# Patient Record
Sex: MALE | Race: BLACK OR AFRICAN AMERICAN | NOT HISPANIC OR LATINO | Employment: UNEMPLOYED | URBAN - METROPOLITAN AREA
[De-identification: names, ages, dates, MRNs, and addresses within clinical notes are randomized per-mention and may not be internally consistent; named-entity substitution may affect disease eponyms.]

---

## 2023-12-21 ENCOUNTER — LAB (OUTPATIENT)
Dept: LAB | Facility: LAB | Age: 63
End: 2023-12-21
Payer: MEDICAID

## 2023-12-21 DIAGNOSIS — Z12.5 ENCOUNTER FOR SCREENING FOR MALIGNANT NEOPLASM OF PROSTATE: ICD-10-CM

## 2023-12-21 DIAGNOSIS — J44.9 CHRONIC OBSTRUCTIVE PULMONARY DISEASE, UNSPECIFIED (MULTI): ICD-10-CM

## 2023-12-21 DIAGNOSIS — I10 ESSENTIAL (PRIMARY) HYPERTENSION: Primary | ICD-10-CM

## 2023-12-21 DIAGNOSIS — E78.5 HYPERLIPIDEMIA, UNSPECIFIED: ICD-10-CM

## 2023-12-21 LAB
ALBUMIN SERPL BCP-MCNC: 4.1 G/DL (ref 3.4–5)
ALP SERPL-CCNC: 67 U/L (ref 33–136)
ALT SERPL W P-5'-P-CCNC: 15 U/L (ref 10–52)
ANION GAP SERPL CALC-SCNC: 13 MMOL/L (ref 10–20)
APPEARANCE UR: ABNORMAL
AST SERPL W P-5'-P-CCNC: 15 U/L (ref 9–39)
BILIRUB DIRECT SERPL-MCNC: 0.1 MG/DL (ref 0–0.3)
BILIRUB SERPL-MCNC: 0.7 MG/DL (ref 0–1.2)
BILIRUB UR STRIP.AUTO-MCNC: NEGATIVE MG/DL
BUN SERPL-MCNC: 24 MG/DL (ref 6–23)
CALCIUM SERPL-MCNC: 9.3 MG/DL (ref 8.6–10.6)
CHLORIDE SERPL-SCNC: 108 MMOL/L (ref 98–107)
CHOLEST SERPL-MCNC: 282 MG/DL (ref 0–199)
CHOLESTEROL/HDL RATIO: 6.3
CO2 SERPL-SCNC: 27 MMOL/L (ref 21–32)
COLOR UR: YELLOW
CREAT SERPL-MCNC: 1.68 MG/DL (ref 0.5–1.3)
GFR SERPL CREATININE-BSD FRML MDRD: 46 ML/MIN/1.73M*2
GLUCOSE SERPL-MCNC: 89 MG/DL (ref 74–99)
GLUCOSE UR STRIP.AUTO-MCNC: NEGATIVE MG/DL
HDLC SERPL-MCNC: 44.5 MG/DL
KETONES UR STRIP.AUTO-MCNC: NEGATIVE MG/DL
LDLC SERPL CALC-MCNC: 216 MG/DL
LEUKOCYTE ESTERASE UR QL STRIP.AUTO: NEGATIVE
MUCOUS THREADS #/AREA URNS AUTO: NORMAL /LPF
NITRITE UR QL STRIP.AUTO: NEGATIVE
NON HDL CHOLESTEROL: 238 MG/DL (ref 0–149)
PH UR STRIP.AUTO: 5 [PH]
POTASSIUM SERPL-SCNC: 4.3 MMOL/L (ref 3.5–5.3)
PROT SERPL-MCNC: 7 G/DL (ref 6.4–8.2)
PROT UR STRIP.AUTO-MCNC: ABNORMAL MG/DL
PSA SERPL-MCNC: 3.85 NG/ML
RBC # UR STRIP.AUTO: ABNORMAL /UL
RBC #/AREA URNS AUTO: NORMAL /HPF
SODIUM SERPL-SCNC: 144 MMOL/L (ref 136–145)
SP GR UR STRIP.AUTO: 1.02
TRIGL SERPL-MCNC: 106 MG/DL (ref 0–149)
TSH SERPL-ACNC: 1.31 MIU/L (ref 0.44–3.98)
UROBILINOGEN UR STRIP.AUTO-MCNC: 2 MG/DL
VLDL: 21 MG/DL (ref 0–40)
WBC #/AREA URNS AUTO: NORMAL /HPF

## 2023-12-21 PROCEDURE — 82248 BILIRUBIN DIRECT: CPT

## 2023-12-21 PROCEDURE — 85025 COMPLETE CBC W/AUTO DIFF WBC: CPT

## 2023-12-21 PROCEDURE — 84153 ASSAY OF PSA TOTAL: CPT

## 2023-12-21 PROCEDURE — 36415 COLL VENOUS BLD VENIPUNCTURE: CPT

## 2023-12-21 PROCEDURE — 81001 URINALYSIS AUTO W/SCOPE: CPT

## 2023-12-21 PROCEDURE — 80053 COMPREHEN METABOLIC PANEL: CPT

## 2023-12-21 PROCEDURE — 84443 ASSAY THYROID STIM HORMONE: CPT

## 2023-12-21 PROCEDURE — 83036 HEMOGLOBIN GLYCOSYLATED A1C: CPT

## 2023-12-21 PROCEDURE — 80061 LIPID PANEL: CPT

## 2023-12-22 LAB
BASOPHILS # BLD AUTO: 0.05 X10*3/UL (ref 0–0.1)
BASOPHILS NFR BLD AUTO: 0.7 %
EOSINOPHIL # BLD AUTO: 0.2 X10*3/UL (ref 0–0.7)
EOSINOPHIL NFR BLD AUTO: 2.8 %
ERYTHROCYTE [DISTWIDTH] IN BLOOD BY AUTOMATED COUNT: 14.4 % (ref 11.5–14.5)
EST. AVERAGE GLUCOSE BLD GHB EST-MCNC: 117 MG/DL
HBA1C MFR BLD: 5.7 %
HCT VFR BLD AUTO: 45 % (ref 41–52)
HGB BLD-MCNC: 14.6 G/DL (ref 13.5–17.5)
HOLD SPECIMEN: NORMAL
IMM GRANULOCYTES # BLD AUTO: 0.04 X10*3/UL (ref 0–0.7)
IMM GRANULOCYTES NFR BLD AUTO: 0.6 % (ref 0–0.9)
LYMPHOCYTES # BLD AUTO: 1.79 X10*3/UL (ref 1.2–4.8)
LYMPHOCYTES NFR BLD AUTO: 24.7 %
MCH RBC QN AUTO: 31.5 PG (ref 26–34)
MCHC RBC AUTO-ENTMCNC: 32.4 G/DL (ref 32–36)
MCV RBC AUTO: 97 FL (ref 80–100)
MONOCYTES # BLD AUTO: 1.17 X10*3/UL (ref 0.1–1)
MONOCYTES NFR BLD AUTO: 16.2 %
NEUTROPHILS # BLD AUTO: 3.99 X10*3/UL (ref 1.2–7.7)
NEUTROPHILS NFR BLD AUTO: 55 %
NRBC BLD-RTO: 0 /100 WBCS (ref 0–0)
PLATELET # BLD AUTO: 207 X10*3/UL (ref 150–450)
RBC # BLD AUTO: 4.63 X10*6/UL (ref 4.5–5.9)
WBC # BLD AUTO: 7.2 X10*3/UL (ref 4.4–11.3)

## 2024-01-10 ENCOUNTER — APPOINTMENT (OUTPATIENT)
Dept: RADIOLOGY | Facility: HOSPITAL | Age: 64
End: 2024-01-10
Payer: MEDICARE

## 2024-01-10 ENCOUNTER — HOSPITAL ENCOUNTER (OUTPATIENT)
Dept: RADIOLOGY | Facility: HOSPITAL | Age: 64
Discharge: HOME | End: 2024-01-10
Payer: MEDICARE

## 2024-01-10 DIAGNOSIS — Z12.2 ENCOUNTER FOR SCREENING FOR MALIGNANT NEOPLASM OF RESPIRATORY ORGANS: ICD-10-CM

## 2024-01-10 DIAGNOSIS — F17.200 NICOTINE DEPENDENCE, UNSPECIFIED, UNCOMPLICATED: ICD-10-CM

## 2024-01-10 PROCEDURE — 71250 CT THORAX DX C-: CPT

## 2024-01-11 DIAGNOSIS — R91.8 LUNG MASS: Primary | ICD-10-CM

## 2024-01-15 ENCOUNTER — APPOINTMENT (OUTPATIENT)
Dept: RADIOLOGY | Facility: CLINIC | Age: 64
End: 2024-01-15
Payer: COMMERCIAL

## 2024-01-15 ENCOUNTER — ANCILLARY PROCEDURE (OUTPATIENT)
Dept: RADIOLOGY | Facility: CLINIC | Age: 64
End: 2024-01-15
Payer: MEDICARE

## 2024-01-15 DIAGNOSIS — R91.8 LUNG MASS: Primary | ICD-10-CM

## 2024-01-15 PROCEDURE — 3430000001 HC RX 343 DIAGNOSTIC RADIOPHARMACEUTICALS: Mod: MUE | Performed by: INTERNAL MEDICINE

## 2024-01-15 PROCEDURE — 78815 PET IMAGE W/CT SKULL-THIGH: CPT | Mod: PET TUMOR INIT TX STRAT | Performed by: STUDENT IN AN ORGANIZED HEALTH CARE EDUCATION/TRAINING PROGRAM

## 2024-01-15 PROCEDURE — 78815 PET IMAGE W/CT SKULL-THIGH: CPT | Mod: PI

## 2024-01-15 PROCEDURE — A9552 F18 FDG: HCPCS | Mod: MUE | Performed by: INTERNAL MEDICINE

## 2024-01-15 RX ORDER — FLUDEOXYGLUCOSE F 18 200 MCI/ML
13.32 INJECTION, SOLUTION INTRAVENOUS
Status: COMPLETED | OUTPATIENT
Start: 2024-01-15 | End: 2024-01-15

## 2024-01-15 RX ADMIN — FLUDEOXYGLUCOSE F 18 13.32 MILLICURIE: 200 INJECTION, SOLUTION INTRAVENOUS at 08:55

## 2024-01-16 DIAGNOSIS — Z01.818 PRE-OP TESTING: ICD-10-CM

## 2024-01-16 DIAGNOSIS — R59.0 MEDIASTINAL LYMPHADENOPATHY: ICD-10-CM

## 2024-01-16 DIAGNOSIS — R91.1 LUNG NODULE: Primary | ICD-10-CM

## 2024-01-16 NOTE — PROGRESS NOTES
Bronchoscopy Scheduling Request    Pre-bronchoscopy visit: New patient visit with Bronchoscopy group provider  Please schedule procedure: Next available    Cytology on-site:  Yes  Location:  Either location  Performing physician:  Advanced diagnostic bronchoscopist  Referring physician:  Robin Metz MD, Darwin Early MD  Indication:  RLL nodule, mediastinal / hilar LN  Sedation / Anesthesia:  GA  Procedure:  Staging EBUS  Time:  Tier 2  Fluorscopy:   No  Imaging needed:  None  Labs:  CBC, BMP  Meds:  None?  Special Considerations:  None  Reviewed by:  Sergey Braun MD

## 2024-01-26 ENCOUNTER — OFFICE VISIT (OUTPATIENT)
Dept: CARDIOLOGY | Facility: CLINIC | Age: 64
End: 2024-01-26
Payer: MEDICARE

## 2024-01-26 VITALS
HEART RATE: 95 BPM | DIASTOLIC BLOOD PRESSURE: 90 MMHG | OXYGEN SATURATION: 96 % | BODY MASS INDEX: 29.04 KG/M2 | SYSTOLIC BLOOD PRESSURE: 150 MMHG | WEIGHT: 251 LBS | HEIGHT: 78 IN

## 2024-01-26 DIAGNOSIS — I25.10 CORONARY ARTERY DISEASE INVOLVING NATIVE CORONARY ARTERY OF NATIVE HEART WITHOUT ANGINA PECTORIS: ICD-10-CM

## 2024-01-26 DIAGNOSIS — I25.84 CORONARY ARTERY CALCIFICATION: ICD-10-CM

## 2024-01-26 DIAGNOSIS — R06.09 DOE (DYSPNEA ON EXERTION): Primary | ICD-10-CM

## 2024-01-26 DIAGNOSIS — I25.10 CORONARY ARTERY CALCIFICATION: ICD-10-CM

## 2024-01-26 PROCEDURE — 99205 OFFICE O/P NEW HI 60 MIN: CPT | Performed by: STUDENT IN AN ORGANIZED HEALTH CARE EDUCATION/TRAINING PROGRAM

## 2024-01-26 RX ORDER — AMLODIPINE BESYLATE 5 MG/1
5 TABLET ORAL DAILY
Qty: 30 TABLET | Refills: 11 | Status: SHIPPED | OUTPATIENT
Start: 2024-01-26 | End: 2024-05-22 | Stop reason: ALTCHOICE

## 2024-01-26 RX ORDER — TRIAMTERENE/HYDROCHLOROTHIAZID 37.5-25 MG
1 TABLET ORAL EVERY MORNING
COMMUNITY
Start: 2023-12-21

## 2024-01-26 RX ORDER — BUDESONIDE AND FORMOTEROL FUMARATE DIHYDRATE 80; 4.5 UG/1; UG/1
2 AEROSOL RESPIRATORY (INHALATION) DAILY
COMMUNITY
Start: 2023-12-21 | End: 2024-04-25 | Stop reason: SDUPTHER

## 2024-01-26 RX ORDER — ATORVASTATIN CALCIUM 40 MG/1
40 TABLET, FILM COATED ORAL DAILY
COMMUNITY
Start: 2024-01-11

## 2024-01-26 NOTE — PATIENT INSTRUCTIONS
We will start amlodipine 5 mg once daily.    We will obtain a transthoracic echocardiogram for structural evaluation including ejection fraction, assessment of regional wall motion abnormalities or valvular disease, and further evaluation of hemodynamics.     Given the patient's risk factors and symptoms, we will obtain a nuclear treadmill stress test for further ischemic evaluation. Please avoid caffeine the day prior to and the day of the stress test. Do not eat the morning of the stress test or 12 hours prior.     Please continue remaining cardiac medications including atorvastatin 40 mg daily.    Please followup with me in Cardiology clinic within the next 6 to 8 weeks.  Please return to clinic sooner or seek emergent care if your symptoms reoccur or worsen.

## 2024-01-26 NOTE — PROGRESS NOTES
Referred by Dr. Early for New Patient Visit (PCP referral. )     HPI:    Dane Molina is a 63 y.o. male with pertinent history of hypertension, dyslipidemia, active tobacco abuse, COPD, 2 FDG avid lesions in the right lower lobe concerning for primary lung cancer, likely underlying coronary artery disease with moderate coronary artery calcifications and right lower lobe mass on CT of chest performed 1/10/2024 presents to cardiology clinic to establish care.     His recent emergency room visit and imaging was reviewed and discussed.  He notes progression of dyspnea on exertion.  No clear chest discomfort.  Dyspnea on exertion does have some improvement with inhalers.  He is undergoing evaluation for possible lung cancer with biopsy.  He is trying to quit smoking.  No exacerbating or relieving factors.  Patient denies chest pain and angina.  Pt denies orthopnea, and paroxysmal nocturnal dyspnea.  Pt denies worsening lower extremity edema.  Pt denies palpitations or syncope.  No recent falls.  No fever or chills.  No cough.  No change in bowel or bladder habits.  No sick contacts.  No recent travel.    12 point review of systems including (Constitutional, Eyes, ENMT, Respiratory, Cardiac, Gastrointestinal, Neurological, Psychiatric, and Hematologic) was performed and is otherwise negative.    Past medical history:  As above.    Medications were reviewed.    Allergies were reviewed.    Family history:  No sudden cardiac death or premature coronary artery disease.     Social history reviewed:   reports that he has been smoking cigarettes and cigars. He has never used smokeless tobacco. No history on file for alcohol use and drug use.     Medications reviewed:   Current Outpatient Medications   Medication Instructions    atorvastatin (LIPITOR) 40 mg, oral, Daily    Symbicort 80-4.5 mcg/actuation inhaler 2 puffs, Daily    triamterene-hydrochlorothiazid (Maxzide-25) 37.5-25 mg tablet 1 tablet, oral, Every morning         Vitals reviewed: Visit Vitals  /90   Pulse 95       Physical Exam:   General:  Patient is awake, alert, and oriented.  Patient is in no acute distress.  HEENT:  Pupils equal and reactive.  Normocephalic.  Moist mucosa.    Neck:  No thyromegaly.  Normal Jugular Venous Pressure.  Cardiovascular:  Regular rate and rhythm.  Normal S1 and S2.  1/6 MILADY.  Pulmonary:  Clear to auscultation bilaterally.  Abdomen:  Soft. Non-tender.   Non-distended.  Positive bowel sounds.  Lower Extremities:  2+ pedal pulses. No LE edema.  Neurologic:  Cranial nerves intact.  No focal deficit.   Skin: Skin warm and dry, normal skin turgor.   Psychiatric: Normal affect.    Last Labs:  CBC -      Lab Results   Component Value Date    WBC 7.2 12/21/2023    HGB 14.6 12/21/2023    HCT 45.0 12/21/2023     12/21/2023        CMP-  Lab Results   Component Value Date    GLUCOSE 89 12/21/2023     12/21/2023    K 4.3 12/21/2023     (H) 12/21/2023    CO2 27 12/21/2023    ANIONGAP 13 12/21/2023    BUN 24 (H) 12/21/2023    CREATININE 1.68 (H) 12/21/2023    EGFR 46 (L) 12/21/2023    CALCIUM 9.3 12/21/2023    PROT 7.0 12/21/2023    ALBUMIN 4.1 12/21/2023    AST 15 12/21/2023    ALT 15 12/21/2023    ALKPHOS 67 12/21/2023    BILITOT 0.7 12/21/2023        LIPIDS-  Lab Results   Component Value Date    CHOL 282 (H) 12/21/2023    TRIG 106 12/21/2023    HDL 44.5 12/21/2023    CHHDL 6.3 12/21/2023    VLDL 21 12/21/2023        OTHERS-  Lab Results   Component Value Date    HGBA1C 5.7 (H) 12/21/2023        I personally reviewed the patient's recent vitals, labs, medications, orders, EKGs, pertinent cardiac imaging/ echocardiography and ischemic evaluations including stress testing/ cardiac catheterization.    Assessment and Plan:    Dane Molina is a 63 y.o. male with pertinent history of hypertension, dyslipidemia, active tobacco abuse, COPD, 2 FDG avid lesions in the right lower lobe concerning for primary lung cancer, likely  underlying coronary artery disease with moderate coronary artery calcifications and right lower lobe mass on CT of chest performed 1/10/2024 presents to cardiology clinic to establish care. His recent emergency room visit and imaging was reviewed and discussed.  He notes progression of dyspnea on exertion.  No clear chest discomfort.  Dyspnea on exertion does have some improvement with inhalers.  He is undergoing evaluation for possible lung cancer with biopsy.  He is trying to quit smoking.  His blood pressure has been running high.    In the future he would likely benefit from aspirin 81 mg we will hold off on that for now until after his biopsy and further evaluation.    We will start amlodipine 5 mg once daily.    We will obtain a transthoracic echocardiogram for structural evaluation including ejection fraction, assessment of regional wall motion abnormalities or valvular disease, and further evaluation of hemodynamics.     Given the patient's risk factors and symptoms, we will obtain a nuclear treadmill stress test for further ischemic evaluation. Please avoid caffeine the day prior to and the day of the stress test. Do not eat the morning of the stress test or 12 hours prior.     Please continue remaining cardiac medications including atorvastatin 40 mg daily.    Please followup with me in Cardiology clinic within the next 6 to 8 weeks.  Please return to clinic sooner or seek emergent care if your symptoms reoccur or worsen.    Thank you for allowing me to participate in their care.  Please feel free to call me with any further questions or concerns.        Wili Zarco MD, FACC, E[facility] ARMSTRONG  Division of Cardiovascular Medicine  Medical Director, [facility] [place]  Clinical , [employer]  [email or web address]   Office:  [phone]

## 2024-02-02 ENCOUNTER — OFFICE VISIT (OUTPATIENT)
Dept: PULMONOLOGY | Facility: CLINIC | Age: 64
End: 2024-02-02
Payer: MEDICARE

## 2024-02-02 VITALS
SYSTOLIC BLOOD PRESSURE: 150 MMHG | TEMPERATURE: 99.9 F | RESPIRATION RATE: 18 BRPM | BODY MASS INDEX: 33.72 KG/M2 | WEIGHT: 249 LBS | HEART RATE: 86 BPM | OXYGEN SATURATION: 97 % | DIASTOLIC BLOOD PRESSURE: 100 MMHG | HEIGHT: 72 IN

## 2024-02-02 DIAGNOSIS — R59.0 MEDIASTINAL LYMPHADENOPATHY: ICD-10-CM

## 2024-02-02 DIAGNOSIS — R91.1 LUNG NODULE: Primary | ICD-10-CM

## 2024-02-02 DIAGNOSIS — J44.9 CHRONIC OBSTRUCTIVE PULMONARY DISEASE, UNSPECIFIED COPD TYPE (MULTI): ICD-10-CM

## 2024-02-02 DIAGNOSIS — R06.02 SHORTNESS OF BREATH: ICD-10-CM

## 2024-02-02 DIAGNOSIS — F17.200 SMOKING: ICD-10-CM

## 2024-02-02 PROCEDURE — 99205 OFFICE O/P NEW HI 60 MIN: CPT | Performed by: INTERNAL MEDICINE

## 2024-02-02 PROCEDURE — 99215 OFFICE O/P EST HI 40 MIN: CPT | Performed by: INTERNAL MEDICINE

## 2024-02-02 ASSESSMENT — ENCOUNTER SYMPTOMS
NECK PAIN: 0
EYE ITCHING: 0
APPETITE CHANGE: 0
VOMITING: 0
DYSURIA: 0
COUGH: 1
SEIZURES: 0
BACK PAIN: 1
DIZZINESS: 0
DYSPHORIC MOOD: 0
EYE REDNESS: 0
SHORTNESS OF BREATH: 1
LIGHT-HEADEDNESS: 0
RHINORRHEA: 0
SINUS PRESSURE: 0
CHILLS: 0
HEMATURIA: 0
ARTHRALGIAS: 0
PALPITATIONS: 0
FATIGUE: 0
HEADACHES: 0
SINUS PAIN: 0
CHEST TIGHTNESS: 0
WHEEZING: 1
DIARRHEA: 0
CONSTIPATION: 1
CONFUSION: 0
UNEXPECTED WEIGHT CHANGE: 1
ABDOMINAL PAIN: 0
FEVER: 0
SORE THROAT: 0
WOUND: 0
FREQUENCY: 0
NERVOUS/ANXIOUS: 0
MYALGIAS: 0
EYE PAIN: 1
NAUSEA: 0

## 2024-02-02 NOTE — PROGRESS NOTES
Subjective   Patient ID: Dane Molina is a 63 y.o. male who presents for Shortness of Breath.  Shortness of Breath  Associated symptoms include wheezing. Pertinent negatives include no abdominal pain, chest pain, fever, headaches, leg swelling, neck pain, rash, rhinorrhea, sore throat or vomiting.     63 YOM with h/o HTN, DLP, who is referred to my office for SOB and also lung nodule/mass.  Patient has a CT screening done in 12/27 that showed 3.1 cm RLL mass associated with hilar/mediastinal LAP, with concern for lung cancer. Subsequently had PET/CT done that showed SUV 6.1 in the mass and 3.5 on a separate nodule. R subcarinal nodule with SUV 11 and R hilar LAP SUV 9.6. Now is here to establish care.   Denies SOB at rest, but ROSSI after walking 2 city block or climbing 2 FOS. +ve orthopnea, no PND or LE edema. No associated wheezing or CP. Of note on Symbicort that he uses it BID. Also on albuterol that he uses it 1/2 times a week.   +ve wheezing, on average 1-2 times a week.   Denies chronic cough, but at times has a yellow sputum. No h/o hemoptysis.    Denies any sleeping issues. Feels rested in am.    Exposures: active smoker, now only cigars, currently 5-6 cigars a day. , no known exposures.   Past Medical History:   Diagnosis Date    Hyperlipidemia     Hypertension     SOB (shortness of breath)    Past Surgical History:  Ankle surgery  Social History     Socioeconomic History    Marital status: Single     Spouse name: Not on file    Number of children: Not on file    Years of education: Not on file    Highest education level: Not on file   Occupational History    Not on file   Tobacco Use    Smoking status: Every Day     Types: Cigars    Smokeless tobacco: Never   Vaping Use    Vaping Use: Never used   Substance and Sexual Activity    Alcohol use: Yes     Comment: recovering alcohol    Drug use: Not Currently    Sexual activity: Defer   Other Topics Concern    Not on file   Social History  Narrative    Not on file     Social Determinants of Health     Financial Resource Strain: Not on file   Food Insecurity: Not on file   Transportation Needs: Not on file   Physical Activity: Not on file   Stress: Not on file   Social Connections: Not on file   Intimate Partner Violence: Not on file   Housing Stability: Not on file   Family History:   +ve for HTN  Current Outpatient Medications   Medication Instructions    amLODIPine (NORVASC) 5 mg, oral, Daily    atorvastatin (LIPITOR) 40 mg, oral, Daily    Symbicort 80-4.5 mcg/actuation inhaler 2 puffs, Daily    triamterene-hydrochlorothiazid (Maxzide-25) 37.5-25 mg tablet 1 tablet, oral, Every morning   Review of Systems   Constitutional:  Positive for unexpected weight change (weight gain.). Negative for appetite change, chills, fatigue and fever.   HENT:  Positive for congestion. Negative for postnasal drip, rhinorrhea, sinus pressure, sinus pain and sore throat.    Eyes:  Positive for pain. Negative for redness, itching and visual disturbance.   Respiratory:  Positive for cough, shortness of breath and wheezing. Negative for chest tightness.    Cardiovascular:  Negative for chest pain, palpitations and leg swelling.   Gastrointestinal:  Positive for constipation. Negative for abdominal pain, diarrhea, nausea and vomiting.   Genitourinary:  Negative for dysuria, frequency and hematuria.   Musculoskeletal:  Positive for back pain. Negative for arthralgias, myalgias and neck pain.   Skin:  Negative for rash and wound.   Neurological:  Negative for dizziness, seizures, syncope, light-headedness and headaches.   Psychiatric/Behavioral:  Negative for confusion and dysphoric mood. The patient is not nervous/anxious.    Objective   Visit Vitals  BP (!) 150/100 Comment: manual   Pulse 86   Temp 37.7 °C (99.9 °F) (Temporal)   Resp 18      Physical Exam  Constitutional:       General: He is not in acute distress.     Appearance: Normal appearance. He is obese. He is not  toxic-appearing.   HENT:      Head: Normocephalic and atraumatic.      Nose:      Comments: On RA     Mouth/Throat:      Mouth: Mucous membranes are moist.      Comments: Mallampati 2-3.   Eyes:      General: No scleral icterus.     Extraocular Movements: Extraocular movements intact.      Pupils: Pupils are equal, round, and reactive to light.   Cardiovascular:      Rate and Rhythm: Normal rate and regular rhythm.      Heart sounds: No murmur heard.     No friction rub. No gallop.   Pulmonary:      Effort: Pulmonary effort is normal. No respiratory distress.      Breath sounds: No wheezing or rales.      Comments: Clear lung fields b/l with fair air entry.   Abdominal:      General: Abdomen is flat. There is no distension.      Palpations: Abdomen is soft.      Tenderness: There is no abdominal tenderness.   Musculoskeletal:         General: No swelling or tenderness. Normal range of motion.      Cervical back: Neck supple. No rigidity or tenderness.      Right lower leg: No edema.      Left lower leg: No edema.   Lymphadenopathy:      Cervical: No cervical adenopathy.   Skin:     General: Skin is warm and dry.      Coloration: Skin is not jaundiced.      Findings: No bruising.   Neurological:      General: No focal deficit present.      Mental Status: He is alert and oriented to person, place, and time.      Cranial Nerves: No cranial nerve deficit.      Motor: No weakness.   Psychiatric:         Mood and Affect: Mood normal.         Behavior: Behavior normal.   Results:   I personally the images for the CT screening done in 12/27 that showed 3.1 cm RLL mass associated with hilar/mediastinal LAP and  PET/CT from 1/2024 that showed SUV 6.1 in the mass and 3.5 on a separate nodule. R subcarinal nodule with SUV 11 and R hilar LAP SUV 9.6.   Labs from 12/21/2023 reviewed that showed Cr 1.68, Hb A1C 5.7    Assessment/Plan   63 YOM with h/o HTN, DLP, who is referred to my office for SOB and also lung nodule/mass.    1.  Lung mass/LAP/Lung nodule: large 3.1 RLL mass with associated hilar/mediastinal LAP, given h/o smoking with concern for lung cancer. PET/CT done that showed SUV 6.1 in the mass and 3.5 on a separate nodule. R subcarinal nodule with SUV 11 and R hilar LAP SUV 9.6.       Patient is scheduled for bronch + biopsy on 12/5/2024    2. SOB: likely due to COPD, currently on Symbicort as outpatient     Continue Symbicort     Full PFT     Echo and stress test are ordered, pending    3. Smoking: actively  smokes cigars     Smoking  cessation counseling done, total time  4 min.     4. HTN: BP very high during his office visit.      advised to check at home if still elevated need to call doctor.     RTC in 1-2 months  Robin Metz MD 02/02/24 10:11 AM

## 2024-02-05 ENCOUNTER — ANESTHESIA EVENT (OUTPATIENT)
Dept: GASTROENTEROLOGY | Facility: HOSPITAL | Age: 64
End: 2024-02-05
Payer: MEDICARE

## 2024-02-05 ENCOUNTER — HOSPITAL ENCOUNTER (OUTPATIENT)
Dept: GASTROENTEROLOGY | Facility: HOSPITAL | Age: 64
Setting detail: OUTPATIENT SURGERY
Discharge: HOME | End: 2024-02-05
Payer: MEDICARE

## 2024-02-05 ENCOUNTER — ANESTHESIA (OUTPATIENT)
Dept: GASTROENTEROLOGY | Facility: HOSPITAL | Age: 64
End: 2024-02-05
Payer: MEDICARE

## 2024-02-05 VITALS
BODY MASS INDEX: 33.5 KG/M2 | DIASTOLIC BLOOD PRESSURE: 71 MMHG | WEIGHT: 247.36 LBS | SYSTOLIC BLOOD PRESSURE: 112 MMHG | OXYGEN SATURATION: 98 % | TEMPERATURE: 97.3 F | RESPIRATION RATE: 17 BRPM | HEART RATE: 79 BPM | HEIGHT: 72 IN

## 2024-02-05 DIAGNOSIS — R91.1 LUNG NODULE: ICD-10-CM

## 2024-02-05 DIAGNOSIS — R59.0 MEDIASTINAL LYMPHADENOPATHY: ICD-10-CM

## 2024-02-05 PROCEDURE — 88173 CYTOPATH EVAL FNA REPORT: CPT | Performed by: PATHOLOGY

## 2024-02-05 PROCEDURE — 88173 CYTOPATH EVAL FNA REPORT: CPT | Mod: TC | Performed by: STUDENT IN AN ORGANIZED HEALTH CARE EDUCATION/TRAINING PROGRAM

## 2024-02-05 PROCEDURE — A31622 PR BRONCHOSCOPY,DIAGNOSTIC

## 2024-02-05 PROCEDURE — 2720000007 HC OR 272 NO HCPCS: Performed by: STUDENT IN AN ORGANIZED HEALTH CARE EDUCATION/TRAINING PROGRAM

## 2024-02-05 PROCEDURE — 2500000001 HC RX 250 WO HCPCS SELF ADMINISTERED DRUGS (ALT 637 FOR MEDICARE OP)

## 2024-02-05 PROCEDURE — 2500000005 HC RX 250 GENERAL PHARMACY W/O HCPCS

## 2024-02-05 PROCEDURE — 3700000001 HC GENERAL ANESTHESIA TIME - INITIAL BASE CHARGE: Performed by: STUDENT IN AN ORGANIZED HEALTH CARE EDUCATION/TRAINING PROGRAM

## 2024-02-05 PROCEDURE — 7100000001 HC RECOVERY ROOM TIME - INITIAL BASE CHARGE: Performed by: STUDENT IN AN ORGANIZED HEALTH CARE EDUCATION/TRAINING PROGRAM

## 2024-02-05 PROCEDURE — 7100000010 HC PHASE TWO TIME - EACH INCREMENTAL 1 MINUTE: Performed by: STUDENT IN AN ORGANIZED HEALTH CARE EDUCATION/TRAINING PROGRAM

## 2024-02-05 PROCEDURE — 2500000004 HC RX 250 GENERAL PHARMACY W/ HCPCS (ALT 636 FOR OP/ED)

## 2024-02-05 PROCEDURE — 31652 BRONCH EBUS SAMPLNG 1/2 NODE: CPT | Performed by: STUDENT IN AN ORGANIZED HEALTH CARE EDUCATION/TRAINING PROGRAM

## 2024-02-05 PROCEDURE — 7100000009 HC PHASE TWO TIME - INITIAL BASE CHARGE: Performed by: STUDENT IN AN ORGANIZED HEALTH CARE EDUCATION/TRAINING PROGRAM

## 2024-02-05 PROCEDURE — 88305 TISSUE EXAM BY PATHOLOGIST: CPT | Performed by: PATHOLOGY

## 2024-02-05 PROCEDURE — 3700000002 HC GENERAL ANESTHESIA TIME - EACH INCREMENTAL 1 MINUTE: Performed by: STUDENT IN AN ORGANIZED HEALTH CARE EDUCATION/TRAINING PROGRAM

## 2024-02-05 PROCEDURE — 88342 IMHCHEM/IMCYTCHM 1ST ANTB: CPT | Performed by: PATHOLOGY

## 2024-02-05 PROCEDURE — 88341 IMHCHEM/IMCYTCHM EA ADD ANTB: CPT | Performed by: PATHOLOGY

## 2024-02-05 PROCEDURE — 7100000002 HC RECOVERY ROOM TIME - EACH INCREMENTAL 1 MINUTE: Performed by: STUDENT IN AN ORGANIZED HEALTH CARE EDUCATION/TRAINING PROGRAM

## 2024-02-05 PROCEDURE — A31622 PR BRONCHOSCOPY,DIAGNOSTIC: Performed by: ANESTHESIOLOGY

## 2024-02-05 PROCEDURE — 88172 CYTP DX EVAL FNA 1ST EA SITE: CPT | Performed by: PATHOLOGY

## 2024-02-05 RX ORDER — ACETAMINOPHEN 325 MG/1
650 TABLET ORAL EVERY 4 HOURS PRN
OUTPATIENT
Start: 2024-02-05

## 2024-02-05 RX ORDER — ROCURONIUM BROMIDE 10 MG/ML
INJECTION, SOLUTION INTRAVENOUS AS NEEDED
Status: DISCONTINUED | OUTPATIENT
Start: 2024-02-05 | End: 2024-02-05

## 2024-02-05 RX ORDER — ESMOLOL HYDROCHLORIDE 10 MG/ML
INJECTION INTRAVENOUS AS NEEDED
Status: DISCONTINUED | OUTPATIENT
Start: 2024-02-05 | End: 2024-02-05

## 2024-02-05 RX ORDER — OXYCODONE HYDROCHLORIDE 5 MG/1
5 TABLET ORAL EVERY 4 HOURS PRN
OUTPATIENT
Start: 2024-02-05

## 2024-02-05 RX ORDER — ONDANSETRON HYDROCHLORIDE 2 MG/ML
INJECTION, SOLUTION INTRAVENOUS AS NEEDED
Status: DISCONTINUED | OUTPATIENT
Start: 2024-02-05 | End: 2024-02-05

## 2024-02-05 RX ORDER — PROPOFOL 10 MG/ML
INJECTION, EMULSION INTRAVENOUS CONTINUOUS PRN
Status: DISCONTINUED | OUTPATIENT
Start: 2024-02-05 | End: 2024-02-05

## 2024-02-05 RX ORDER — MIDAZOLAM HYDROCHLORIDE 1 MG/ML
INJECTION INTRAMUSCULAR; INTRAVENOUS AS NEEDED
Status: DISCONTINUED | OUTPATIENT
Start: 2024-02-05 | End: 2024-02-05

## 2024-02-05 RX ORDER — ONDANSETRON HYDROCHLORIDE 2 MG/ML
4 INJECTION, SOLUTION INTRAVENOUS ONCE AS NEEDED
OUTPATIENT
Start: 2024-02-05

## 2024-02-05 RX ORDER — LIDOCAINE HYDROCHLORIDE 20 MG/ML
INJECTION, SOLUTION EPIDURAL; INFILTRATION; INTRACAUDAL; PERINEURAL AS NEEDED
Status: DISCONTINUED | OUTPATIENT
Start: 2024-02-05 | End: 2024-02-05

## 2024-02-05 RX ORDER — PROPOFOL 10 MG/ML
INJECTION, EMULSION INTRAVENOUS AS NEEDED
Status: DISCONTINUED | OUTPATIENT
Start: 2024-02-05 | End: 2024-02-05

## 2024-02-05 RX ORDER — LIDOCAINE HYDROCHLORIDE 40 MG/ML
SOLUTION TOPICAL AS NEEDED
Status: DISCONTINUED | OUTPATIENT
Start: 2024-02-05 | End: 2024-02-05

## 2024-02-05 RX ORDER — ALBUTEROL SULFATE 0.83 MG/ML
2.5 SOLUTION RESPIRATORY (INHALATION) ONCE AS NEEDED
OUTPATIENT
Start: 2024-02-05

## 2024-02-05 RX ORDER — SODIUM CHLORIDE, SODIUM LACTATE, POTASSIUM CHLORIDE, CALCIUM CHLORIDE 600; 310; 30; 20 MG/100ML; MG/100ML; MG/100ML; MG/100ML
100 INJECTION, SOLUTION INTRAVENOUS CONTINUOUS
OUTPATIENT
Start: 2024-02-05

## 2024-02-05 RX ORDER — SODIUM CHLORIDE, SODIUM LACTATE, POTASSIUM CHLORIDE, CALCIUM CHLORIDE 600; 310; 30; 20 MG/100ML; MG/100ML; MG/100ML; MG/100ML
INJECTION, SOLUTION INTRAVENOUS CONTINUOUS PRN
Status: DISCONTINUED | OUTPATIENT
Start: 2024-02-05 | End: 2024-02-05

## 2024-02-05 RX ADMIN — ESMOLOL HYDROCHLORIDE 30 MG: 10 INJECTION, SOLUTION INTRAVENOUS at 11:58

## 2024-02-05 RX ADMIN — ESMOLOL HYDROCHLORIDE 40 MG: 10 INJECTION, SOLUTION INTRAVENOUS at 12:21

## 2024-02-05 RX ADMIN — ONDANSETRON 4 MG: 2 INJECTION INTRAMUSCULAR; INTRAVENOUS at 12:09

## 2024-02-05 RX ADMIN — PROPOFOL 120 MCG/KG/MIN: 10 INJECTION, EMULSION INTRAVENOUS at 11:51

## 2024-02-05 RX ADMIN — SUGAMMADEX 200 MG: 100 INJECTION, SOLUTION INTRAVENOUS at 12:24

## 2024-02-05 RX ADMIN — SODIUM CHLORIDE, POTASSIUM CHLORIDE, SODIUM LACTATE AND CALCIUM CHLORIDE: 600; 310; 30; 20 INJECTION, SOLUTION INTRAVENOUS at 11:46

## 2024-02-05 RX ADMIN — LIDOCAINE HYDROCHLORIDE 80 MG: 20 INJECTION, SOLUTION EPIDURAL; INFILTRATION; INTRACAUDAL; PERINEURAL at 11:50

## 2024-02-05 RX ADMIN — PROPOFOL 200 MG: 10 INJECTION, EMULSION INTRAVENOUS at 11:50

## 2024-02-05 RX ADMIN — GLYCOPYRROLATE 0.1 MCG: 0.2 INJECTION, SOLUTION INTRAMUSCULAR; INTRAVITREAL at 12:09

## 2024-02-05 RX ADMIN — ROCURONIUM BROMIDE 50 MG: 10 INJECTION, SOLUTION INTRAVENOUS at 11:50

## 2024-02-05 RX ADMIN — GLYCOPYRROLATE 0.1 MCG: 0.2 INJECTION, SOLUTION INTRAMUSCULAR; INTRAVITREAL at 11:46

## 2024-02-05 RX ADMIN — SUGAMMADEX 200 MG: 100 INJECTION, SOLUTION INTRAVENOUS at 12:20

## 2024-02-05 RX ADMIN — LIDOCAINE HYDROCHLORIDE 4 ML: 40 SOLUTION TOPICAL at 11:52

## 2024-02-05 RX ADMIN — MIDAZOLAM HYDROCHLORIDE 2 MG: 1 INJECTION INTRAMUSCULAR; INTRAVENOUS at 11:46

## 2024-02-05 RX ADMIN — ESMOLOL HYDROCHLORIDE 30 MG: 10 INJECTION, SOLUTION INTRAVENOUS at 12:09

## 2024-02-05 ASSESSMENT — PAIN - FUNCTIONAL ASSESSMENT
PAIN_FUNCTIONAL_ASSESSMENT: 0-10

## 2024-02-05 ASSESSMENT — COLUMBIA-SUICIDE SEVERITY RATING SCALE - C-SSRS
1. IN THE PAST MONTH, HAVE YOU WISHED YOU WERE DEAD OR WISHED YOU COULD GO TO SLEEP AND NOT WAKE UP?: NO
6. HAVE YOU EVER DONE ANYTHING, STARTED TO DO ANYTHING, OR PREPARED TO DO ANYTHING TO END YOUR LIFE?: NO
2. HAVE YOU ACTUALLY HAD ANY THOUGHTS OF KILLING YOURSELF?: NO

## 2024-02-05 ASSESSMENT — PAIN SCALES - GENERAL
PAINLEVEL_OUTOF10: 0 - NO PAIN

## 2024-02-05 NOTE — ANESTHESIA POSTPROCEDURE EVALUATION
Patient: Dane Molina    Procedure Summary       Date: 02/05/24 Room / Location: [facility]    Anesthesia Start: 1146 Anesthesia Stop: 1240    Procedure: BRONCHOSCOPY Diagnosis:       Lung nodule      Mediastinal lymphadenopathy    Scheduled Providers: Luis Manuel Martinez MD; Peng Hall MD; JOAN Yu Responsible Provider: Peng Hall MD    Anesthesia Type: general ASA Status: 3            Anesthesia Type: general    Vitals Value Taken Time   /71 02/05/24 1315   Temp 36.3 °C (97.3 °F) 02/05/24 1315   Pulse 79 02/05/24 1315   Resp 17 02/05/24 1315   SpO2 98 % 02/05/24 1315       Anesthesia Post Evaluation    Patient location during evaluation: PACU  Patient participation: complete - patient participated  Level of consciousness: awake and alert  Pain management: adequate  Airway patency: patent  Cardiovascular status: acceptable and hemodynamically stable  Respiratory status: acceptable, spontaneous ventilation and nonlabored ventilation  Hydration status: acceptable  Postoperative Nausea and Vomiting: none        There were no known notable events for this encounter.

## 2024-02-05 NOTE — NURSING NOTE
1315: Handoff received from Bailee CALABRESE    1325: Patient dressed with RN assistance    1335: Discharge instructions reviewed with patient and family, no further questions at this time.     1340: Peripheral IV removed with no complications.     1350: Patient to main lobby via transport with all belongings in stable condition. Phase 2 complete.   No

## 2024-02-05 NOTE — DISCHARGE INSTRUCTIONS
The anesthetics, sedatives or narcotics which were given to you today will be acting in your body for the next 24 hours, so you might feel a little sleepy or groggy. This feeling should slowly wear off.   Carefully read and follow the instructions below:   You received sedation today.   Do not drive or operate machinery or power tools of any kind.   No alcoholic beverages today, not even beer or wine.   No over the counter medications that contain alcohol or may cause drowsiness.   Do not make important decisions or sign legal documents.     Do not use Aspirin containing products or non-steroidal medications for the next 24 hours.  (Examples of these types of medications include: Advil, Aleve, Ecotrin, Ibuprofen, Motrin or Naprosyn.  This list is not all-inclusive.  Check with your physician or pharmacist before resuming these medications.)  Tylenol, cough medicine, cough drops or throat lozenges may be used when you are allowed to resume eating and drinking.     Call your physician if any of these symptoms occur:   High fever over 101 degrees or chills (a low grade fever is common for 24 hours)   Rash or hives   Persistent nausea or vomiting   Inability to urinate within 8 hours after the procedure  Go directly to the emergency room if you notice any of the following:   Shortness of breath   Chest pain  Coughing up large amounts of bright red blood greater than a teaspoonful of blood clots (about a teaspoonful for the next 24-48 hours is normal, especially if you had a biopsy)  Resume all normal medications unless directed otherwise by your doctor.     Your doctor recommends these additional instructions:    Follow up with your referring physician as previously scheduled.    If you experience any problems or have any questions following discharge, please call:   Before 5 pm: [phone]   After 5pm and on weekends: [phone] / [phone] and ask for the Pulmonary Fellow on-call (Pager Number: [phone])

## 2024-02-05 NOTE — ANESTHESIA PREPROCEDURE EVALUATION
Patient: Dane Molina    Procedure Information       Date/Time: 02/05/24 1130    Scheduled providers: Luis Manuel Martinez MD; Peng Hall MD; JOAN Yu    Procedure: BRONCHOSCOPY    Location: [facility]            Relevant Problems   Other   (+) Mediastinal lymphadenopathy       Clinical information reviewed:   Tobacco  Allergies  Meds   Med Hx  Surg Hx   Fam Hx  Soc Hx        NPO/Void Status  Carbohydrate Drink Given Prior to Surgery? : N  Date of Last Liquid: 02/05/24  Time of Last Liquid: 0630  Date of Last Solid: 02/04/24  Time of Last Solid: 1930  Last Intake Type: Clear fluids (water)  Time of Last Void: 1030           Past Medical History:   Diagnosis Date    COPD (chronic obstructive pulmonary disease) (CMS/HCC)     Hyperlipidemia     Hypertension     SOB (shortness of breath)       Past Surgical History:   Procedure Laterality Date    COLONOSCOPY      ESOPHAGOGASTRODUODENOSCOPY      ORIF ANKLE FRACTURE  1993     Social History     Tobacco Use    Smoking status: Every Day     Types: Cigars    Smokeless tobacco: Never   Vaping Use    Vaping Use: Never used   Substance Use Topics    Alcohol use: Yes     Comment: recovering alcohol    Drug use: Not Currently      Current Outpatient Medications   Medication Instructions    amLODIPine (NORVASC) 5 mg, oral, Daily    atorvastatin (LIPITOR) 40 mg, oral, Daily    Symbicort 80-4.5 mcg/actuation inhaler 2 puffs, Daily    triamterene-hydrochlorothiazid (Maxzide-25) 37.5-25 mg tablet 1 tablet, oral, Every morning      No Known Allergies     Chemistry    Lab Results   Component Value Date/Time     12/21/2023 1330    K 4.3 12/21/2023 1330     (H) 12/21/2023 1330    CO2 27 12/21/2023 1330    BUN 24 (H) 12/21/2023 1330    CREATININE 1.68 (H) 12/21/2023 1330    Lab Results   Component Value Date/Time    CALCIUM 9.3 12/21/2023 1330    ALKPHOS 67 12/21/2023 1330    AST 15 12/21/2023 1330    ALT 15 12/21/2023 1330    BILITOT 0.7  "12/21/2023 1330          Lab Results   Component Value Date/Time    WBC 7.2 12/21/2023 1330    HGB 14.6 12/21/2023 1330    HCT 45.0 12/21/2023 1330     12/21/2023 1330     No results found for: \"PROTIME\", \"PTT\", \"INR\"  No results found for this or any previous visit (from the past 4464 hour(s)).  No results found for this or any previous visit from the past 1095 days.       Visit Vitals  BP (!) 166/92   Pulse 91   Temp 36.2 °C (97.2 °F) (Temporal)   Resp 16   Ht 1.829 m (6' 0.01\")   Wt 112 kg (247 lb 5.7 oz)   SpO2 97%   BMI 33.54 kg/m²   Smoking Status Every Day   BSA 2.39 m²        Physical Exam    Airway  Mallampati: III  TM distance: >3 FB  Neck ROM: full     Cardiovascular   Rhythm: regular  Rate: normal     Dental - normal exam     Pulmonary   Breath sounds clear to auscultation     Abdominal - normal exam              Anesthesia Plan    History of general anesthesia?: yes  History of complications of general anesthesia?: no    ASA 3     general   (General with ETT)  intravenous induction   Postoperative administration of opioids is intended.  Trial extubation is planned.  Anesthetic plan and risks discussed with patient.    Plan discussed with CAA and CRNA.        "

## 2024-02-05 NOTE — ANESTHESIA PROCEDURE NOTES
Airway  Date/Time: 2/5/2024 11:52 AM  Urgency: elective    Airway not difficult    Staffing  Performed: JOAN   Authorized by: Peng Hall MD    Performed by: JOAN Yu  Patient location during procedure: OR    Indications and Patient Condition  Indications for airway management: anesthesia and airway protection  Spontaneous Ventilation: absent  Sedation level: deep  Preoxygenated: yes  Patient position: sniffing  Mask difficulty assessment: 1 - vent by mask  Planned trial extubation    Final Airway Details  Final airway type: endotracheal airway      Successful airway: ETT  Cuffed: yes   Successful intubation technique: direct laryngoscopy  Facilitating devices/methods: intubating stylet  Endotracheal tube insertion site: oral  Blade: Janiya  Blade size: #4  ETT size (mm): 8.5  Cormack-Lehane Classification: grade IIb - view of arytenoids or posterior of glottis only  Placement verified by: chest auscultation   Measured from: teeth  ETT to teeth (cm): 23  Number of attempts at approach: 1    Additional Comments  Lips/teeth in preanesthetic condition. LTA kit used.

## 2024-02-06 ASSESSMENT — PAIN SCALES - GENERAL: PAINLEVEL_OUTOF10: 0 - NO PAIN

## 2024-02-08 LAB
LAB AP ASR DISCLAIMER: NORMAL
LABORATORY COMMENT REPORT: NORMAL
LABORATORY COMMENT REPORT: NORMAL
PATH REPORT.FINAL DX SPEC: NORMAL
PATH REPORT.GROSS SPEC: NORMAL
PATH REPORT.INTRAOP OBS SPEC DOC: NORMAL
PATH REPORT.TOTAL CANCER: NORMAL

## 2024-02-09 NOTE — RESULT ENCOUNTER NOTE
Results discussed with patient over the phone today. MRI brain ordered and referral to oncology placed. All questions answered for the patient at this time.    Luis Manuel Martinez MD

## 2024-02-18 NOTE — PATIENT INSTRUCTIONS
Dane,    It was a pleasure to see you in the office today. We discussed the followings:     Lung mass: this is highly suspicious for malignancy. To further assess this, please have a bronchoscopy with biopsy done  Shortness of breath: this might be due to COPD or from your heart, to determine if you have COPD, please have a breathing test done.  Smoking: please make every effort to quit smoking.    Please return to the office in 1-2 months.

## 2024-02-19 ENCOUNTER — DOCUMENTATION (OUTPATIENT)
Dept: HEMATOLOGY/ONCOLOGY | Facility: CLINIC | Age: 64
End: 2024-02-19
Payer: COMMERCIAL

## 2024-02-19 ENCOUNTER — HOSPITAL ENCOUNTER (OUTPATIENT)
Dept: RESPIRATORY THERAPY | Facility: CLINIC | Age: 64
Discharge: HOME | End: 2024-02-19
Payer: MEDICAID

## 2024-02-19 DIAGNOSIS — R91.1 LUNG NODULE: ICD-10-CM

## 2024-02-19 PROCEDURE — 94727 GAS DIL/WSHOT DETER LNG VOL: CPT

## 2024-02-19 PROCEDURE — 94729 DIFFUSING CAPACITY: CPT | Performed by: INTERNAL MEDICINE

## 2024-02-19 PROCEDURE — 94727 GAS DIL/WSHOT DETER LNG VOL: CPT | Performed by: INTERNAL MEDICINE

## 2024-02-19 PROCEDURE — 94729 DIFFUSING CAPACITY: CPT

## 2024-02-19 PROCEDURE — 94060 EVALUATION OF WHEEZING: CPT | Performed by: INTERNAL MEDICINE

## 2024-02-19 PROCEDURE — 94060 EVALUATION OF WHEEZING: CPT

## 2024-02-19 NOTE — PROGRESS NOTES
Call made to patient to change appt to 2.26 at [facility] as Dr. Galvin has a sooner appt. Unable to reach patient and unable to leave voicemail. Will try again later.

## 2024-02-26 ENCOUNTER — APPOINTMENT (OUTPATIENT)
Dept: HEMATOLOGY/ONCOLOGY | Facility: CLINIC | Age: 64
End: 2024-02-26
Payer: MEDICARE

## 2024-02-29 ENCOUNTER — OFFICE VISIT (OUTPATIENT)
Dept: HEMATOLOGY/ONCOLOGY | Facility: HOSPITAL | Age: 64
End: 2024-02-29
Payer: MEDICARE

## 2024-02-29 ENCOUNTER — SOCIAL WORK (OUTPATIENT)
Dept: CASE MANAGEMENT | Facility: HOSPITAL | Age: 64
End: 2024-02-29
Payer: COMMERCIAL

## 2024-02-29 ENCOUNTER — HOSPITAL ENCOUNTER (OUTPATIENT)
Dept: RADIOLOGY | Facility: HOSPITAL | Age: 64
Discharge: HOME | End: 2024-02-29
Payer: MEDICAID

## 2024-02-29 ENCOUNTER — LAB (OUTPATIENT)
Dept: LAB | Facility: HOSPITAL | Age: 64
End: 2024-02-29
Payer: MEDICARE

## 2024-02-29 VITALS
BODY MASS INDEX: 35.25 KG/M2 | WEIGHT: 246.25 LBS | RESPIRATION RATE: 20 BRPM | DIASTOLIC BLOOD PRESSURE: 94 MMHG | SYSTOLIC BLOOD PRESSURE: 154 MMHG | TEMPERATURE: 97.7 F | HEIGHT: 70 IN | OXYGEN SATURATION: 97 % | HEART RATE: 96 BPM

## 2024-02-29 DIAGNOSIS — R91.1 LUNG NODULE: ICD-10-CM

## 2024-02-29 DIAGNOSIS — R59.0 MEDIASTINAL LYMPHADENOPATHY: ICD-10-CM

## 2024-02-29 DIAGNOSIS — C34.91 SMALL CELL CARCINOMA OF RIGHT LUNG (MULTI): Primary | ICD-10-CM

## 2024-02-29 LAB
ALBUMIN SERPL BCP-MCNC: 4.1 G/DL (ref 3.4–5)
ALP SERPL-CCNC: 76 U/L (ref 33–136)
ALT SERPL W P-5'-P-CCNC: 14 U/L (ref 10–52)
ANION GAP SERPL CALC-SCNC: 13 MMOL/L (ref 10–20)
AST SERPL W P-5'-P-CCNC: 12 U/L (ref 9–39)
BASOPHILS # BLD AUTO: 0.03 X10*3/UL (ref 0–0.1)
BASOPHILS NFR BLD AUTO: 0.4 %
BILIRUB SERPL-MCNC: 0.5 MG/DL (ref 0–1.2)
BUN SERPL-MCNC: 28 MG/DL (ref 6–23)
CALCIUM SERPL-MCNC: 9.6 MG/DL (ref 8.6–10.3)
CHLORIDE SERPL-SCNC: 107 MMOL/L (ref 98–107)
CO2 SERPL-SCNC: 26 MMOL/L (ref 21–32)
CREAT SERPL-MCNC: 1.62 MG/DL (ref 0.5–1.3)
EGFRCR SERPLBLD CKD-EPI 2021: 48 ML/MIN/1.73M*2
EOSINOPHIL # BLD AUTO: 0.2 X10*3/UL (ref 0–0.7)
EOSINOPHIL NFR BLD AUTO: 2.5 %
ERYTHROCYTE [DISTWIDTH] IN BLOOD BY AUTOMATED COUNT: 13.5 % (ref 11.5–14.5)
GLUCOSE SERPL-MCNC: 91 MG/DL (ref 74–99)
HCT VFR BLD AUTO: 45.5 % (ref 41–52)
HGB BLD-MCNC: 15.3 G/DL (ref 13.5–17.5)
IMM GRANULOCYTES # BLD AUTO: 0.05 X10*3/UL (ref 0–0.7)
IMM GRANULOCYTES NFR BLD AUTO: 0.6 % (ref 0–0.9)
LYMPHOCYTES # BLD AUTO: 1.97 X10*3/UL (ref 1.2–4.8)
LYMPHOCYTES NFR BLD AUTO: 24.2 %
MCH RBC QN AUTO: 31 PG (ref 26–34)
MCHC RBC AUTO-ENTMCNC: 33.6 G/DL (ref 32–36)
MCV RBC AUTO: 92 FL (ref 80–100)
MONOCYTES # BLD AUTO: 1.37 X10*3/UL (ref 0.1–1)
MONOCYTES NFR BLD AUTO: 16.8 %
NEUTROPHILS # BLD AUTO: 4.53 X10*3/UL (ref 1.2–7.7)
NEUTROPHILS NFR BLD AUTO: 55.5 %
NRBC BLD-RTO: 0 /100 WBCS (ref 0–0)
PLATELET # BLD AUTO: 225 X10*3/UL (ref 150–450)
POTASSIUM SERPL-SCNC: 3.6 MMOL/L (ref 3.5–5.3)
PROT SERPL-MCNC: 7.5 G/DL (ref 6.4–8.2)
RBC # BLD AUTO: 4.93 X10*6/UL (ref 4.5–5.9)
SODIUM SERPL-SCNC: 142 MMOL/L (ref 136–145)
WBC # BLD AUTO: 8.2 X10*3/UL (ref 4.4–11.3)

## 2024-02-29 PROCEDURE — 84075 ASSAY ALKALINE PHOSPHATASE: CPT

## 2024-02-29 PROCEDURE — 99205 OFFICE O/P NEW HI 60 MIN: CPT | Performed by: INTERNAL MEDICINE

## 2024-02-29 PROCEDURE — 36415 COLL VENOUS BLD VENIPUNCTURE: CPT

## 2024-02-29 PROCEDURE — 85025 COMPLETE CBC W/AUTO DIFF WBC: CPT

## 2024-02-29 PROCEDURE — 99215 OFFICE O/P EST HI 40 MIN: CPT | Performed by: INTERNAL MEDICINE

## 2024-02-29 RX ORDER — LORAZEPAM 0.5 MG/1
TABLET ORAL
Qty: 3 TABLET | Refills: 0 | Status: SHIPPED | OUTPATIENT
Start: 2024-02-29 | End: 2024-05-22 | Stop reason: WASHOUT

## 2024-02-29 ASSESSMENT — PAIN SCALES - GENERAL: PAINLEVEL: 0-NO PAIN

## 2024-02-29 NOTE — PROGRESS NOTES
Patient ID: Dane Molina is a 63 y.o. male    Primary Care Provider: Darwin Early MD    DIAGNOSIS AND STAGING  [id number] small cell lung cancer (INSM1 and TTF1+) of the right lower lobe, diagnosed on 02/05/2024 through bronchoscopy     SITES OF DISEASE  Right lower lobe   Levels 4R and 7 nodes   ? Liver      MOLECULAR GENOMICS  Not performed   RB loss by IHC      PRIOR THERAPIES  None     CURRENT THERAPY  TBD    CURRENT ONCOLOGICAL PROBLEMS  None      HISTORY OF PRESENT ILLNESS  Patient presented to the emergency room on 01/04/2023 with a hypertensive urgency and cough.  He has a history of noncompliance with antihypertensive medications.  He also complained of dyspnea and underwent a CT chest without IV contrast that demonstrated a right lower lobe paramediastinal mass measuring 3.1 cm with adjacent pulmonary nodule measuring 1.8 cm.  Mediastinal lymphadenopathy was demonstrated, including a subcarinal node measuring 1.7 cm.  There was right hilar lymphadenopathy, 2 cm in short axis.  On 01/15/2024 the patient underwent a PET scan that demonstrated avidity of the aforementioned masses/lymph nodes, as well as a lesion in the liver, SUV max 3.3, right hepatic lobe.  On 02/05/2024, patient underwent a bronchoscopy:  A. LYMPH NODE 4 R PULMONARY FINE NEEDLE ASPIRATION , CYTOLOGY AND CELL BLOCK:    Positive for malignant cells   Metastatic small cell carcinoma, see note  B. LYMPH NODE 7 PULMONARY FINE NEEDLE ASPIRATION , CYTOLOGY AND CELL BLOCK:    Positive for malignant cells  Metastatic small cell carcinoma, see note          Note: Immunostains demonstrate the lesional cells to be diffusely positive for CAM 5.2, INSM1, TTF-1.  The proliferative marker Ki-67 is greater than 90%.  Immunostain for retinoblastoma shows loss of staining.  Immunostain for p40 is negative.  The morphology and immunohistochemical profile are consistent with the above diagnosis.  On 02/29/24 the pt is seen by med onc for the first  time. Denies any systemic sx - denies lack of appetite, fatigue or unintentional weight loss. Denies new localized pain, headaches or neuro sx.  Denies new respiratory sx.  He is claustrophobic and MRI brain was scheduled for today but pt could not go through with the test. He also has mild CKD, which limits his ability to receive intravenous contrast for CT.        PAST MEDICAL HISTORY  CKD - creatinine 1.68  Neck pain (injury at work)   Hypertension  Iron deficiency anemia (hx of blood transfusion in the 90's)    SURGICAL HISTORY  Ankle fracture and has a latoya in place      SOCIAL HISTORY  Former 35-40 pack-year smoker, quit at age 57  Smokes cigars now   History of alcohol addiction, sober for the past 15 years   Has one chlid (Roula)  Single      FAMILY HISTORY  Lung CA (mother)  Mother had CKD and required HD    CURRENT MEDS REVIEWED       ALLERGIES REVIEWED        SUBJECTIVE:  A above, evaluated for newly diagnosed small cell lung CA   Brain MRI and liver pending for complete staging   Denies any sx related to underlying malignancy     A 13 point review of systems was performed, with significant findings documented above in subjective history.    OBJECTIVE:  Vitals:    02/29/24 1413   BP: (!) 154/94   Pulse: 96   Resp: 20   Temp: 36.5 °C (97.7 °F)   SpO2: 97%      Body surface area is 2.36 meters squared.     Wt Readings from Last 5 Encounters:   02/05/24 112 kg (247 lb 5.7 oz)   02/02/24 113 kg (249 lb)   01/26/24 114 kg (251 lb)       ECOGSCORE: 1- Restricted in physically strenuous activity.  Carries out light duty.    Physical Exam  Constitutional:       Appearance: Normal appearance.   HENT:      Head: Normocephalic and atraumatic.   Eyes:      General: No scleral icterus.     Extraocular Movements: Extraocular movements intact.      Conjunctiva/sclera: Conjunctivae normal.   Cardiovascular:      Rate and Rhythm: Normal rate and regular rhythm.   Pulmonary:      Effort: Pulmonary effort is normal.    Abdominal:      Palpations: Abdomen is soft. There is no mass.      Tenderness: There is no abdominal tenderness. There is no guarding.   Musculoskeletal:      Right lower leg: No edema.      Left lower leg: No edema.   Skin:     General: Skin is warm.      Findings: No erythema or rash.   Neurological:      General: No focal deficit present.      Mental Status: He is alert and oriented to person, place, and time.      Motor: No weakness.      Gait: Gait normal.   Psychiatric:         Mood and Affect: Mood normal.         Behavior: Behavior normal.         Thought Content: Thought content normal.         Judgment: Judgment normal.          Diagnostic Results   Results:  Labs:  Lab Results   Component Value Date    WBC 7.2 12/21/2023    HGB 14.6 12/21/2023    HCT 45.0 12/21/2023    MCV 97 12/21/2023     12/21/2023      Lab Results   Component Value Date    NEUTROABS 3.99 12/21/2023        Lab Results   Component Value Date    GLUCOSE 89 12/21/2023    CALCIUM 9.3 12/21/2023     12/21/2023    K 4.3 12/21/2023    CO2 27 12/21/2023     (H) 12/21/2023    BUN 24 (H) 12/21/2023    CREATININE 1.68 (H) 12/21/2023     Lab Results   Component Value Date    ALT 15 12/21/2023    AST 15 12/21/2023    ALKPHOS 67 12/21/2023    BILITOT 0.7 12/21/2023    BILIDIR 0.1 12/21/2023      Lab Results   Component Value Date    TSH 1.31 12/21/2023     STUDY:  CT CHEST WO IV CONTRAST; 1/10/2024 9:24 am      INDICATION:  Signs/Symptoms:scan. Shortness of breath, current smoker.      COMPARISON:  None      ACCESSION NUMBER(S):  [id number]      ORDERING CLINICIAN:  MALINA BEATTY      TECHNIQUE:  Contiguous axial images of the thorax were obtained from the level of  the thoracic inlet through the lung bases. All CT examinations are  performed with 1 or more of the following dose reduction techniques:  Automated exposure control, adjustment of mA and/or kv according to  patient's size, or use of iterative  reconstruction techniques.      FINDINGS:  There is a mass in the paramediastinal right lower lobe measuring up  to 3.1 cm. There is an adjacent enlarged pulmonary nodule measuring  up to 1.8 cm. There is mucous plugging and tree-in-bud nodularity  also noted within the superior right lower lobe. There is an adjacent  enlarged fissural lymph node in the right lung measuring up to 9 mm.      There is mediastinal lymphadenopathy. There is a right hilar lymph  node conglomerate measuring up to 2 cm in short axis. There is a  subcarinal lymph node conglomerate measuring up to 1.7 cm.      The thyroid gland is unremarkable.      The heart size is within normal limits.  No pericardial effusion is  identified. Aorta is ectatic measuring up to 3.7 cm. Moderate  coronary calcifications.      The trachea and mainstem bronchi are patent. There is no evidence of  pneumothorax or pleural effusion.      The visualized osseous structures are intact.      Limited images through the upper abdomen are unremarkable.      IMPRESSION:  Right lower lobe mass with adjacent enlarged pulmonary lymph nodes  and associated right hilar and mediastinal lymphadenopathy. Findings  are concerning for primary lung neoplasm. Recommend follow-up with  PET-CT. Given proximity to right mainstem bronchus if tissue sampling  is deemed clinically necessary endoscopic tissue sampling may be  preferable to percutaneous lung biopsy.    STUDY:  NM PET CT LUNG CA  INITIAL DIAGNOSIS;  1/15/2024 8:53 am      INDICATION:  Signs/Symptoms: 63-year-old male with enlarging lung nodule/mass.      COMPARISON:  CT chest without contrast on 01/10/2024      ACCESSION NUMBER(S):  [id number]      ORDERING CLINICIAN:  NOLA CAPUTO      TECHNIQUE:  DIVISION OF NUCLEAR MEDICINE  POSITRON EMISSION TOMOGRAPHY (PET-CT)      The patient received an intravenous dose of 13.0 mCi of Fluorine-18  fluorodeoxyglucose (FDG). Positron emission tomographic (PET) images  from  mid-thigh to skull base were then acquired after a one hour  delay. Also acquired was a contemporaneous low dose non-contrast CT  scan performed for attenuation correction of PET images and anatomic  localization.  The PET and CT images were digitally fused for  display.  All images were acquired on a combined PET-CT scanner unit.  Some areas of FDG accumulation may be described in standardized  uptake value (SUV) units.      CODING:  Initial Treatment Strategy (PI)      CALIBRATION:  Dose Injection-to-Scan Interval (mins): 65 min  Mediastinal blood pool SUV (normal 1.5-2.5): 1.7  Blood glucose: 97 mg/dL      FINDINGS:  NECK:  *FDG avid opacification within bilateral maxillary sinuses,  SUV max  of 3.9 at right maxillary sinus and 3.4 at left maxillary sinus,  likely representing sinusitis. *FDG avidity in bilateral palatine  tonsils, likely reactive. *No enlarged or FDG avid cervical  lymphadenopathy is present. *Unremarkable bilateral thyroid glands.      CHEST:  *FDG avid paramediastinal mass in the right lower lobe measuring to  3.0 cm with SUV max 6.1. Another FDG avid pulmonary lesion the right  lower lobe measuring 1.9 cm, with SUV max of 3.5. 1.0 cm lesion  adjacent to the major fissure in the right lower lobe with SUV max  1.6. *Bulky FDG avid right hilar nodes with SUV max of 9.6,  subcarinal node with SUV max of 11.0. Right paratracheal node with  SUV max of 11.0. *Few mild FDG avid bilateral axillary nodes with SUV  max of 1.4 at right axilla and 1.3 at left axilla, likely reactive in  etiology.      ABDOMEN AND PELVIS:  *Physiologic radiotracer uptake is present in the liver and spleen  with excretion into the bowel loops and the genitourinary tract. Mild  FDG avid focus in the right hepatic lobe with SUV max 3.3  *Unremarkable bilateral adrenal glands. *No enlarged or FDG avid  lymphadenopathy in the abdomen or pelvis.      MUSCULOSKELETAL:  *No concerning FDG avid bone lesion throughout the axial  and  appendicular skeleton.      IMPRESSION:  1. Two FDG avid lesions in the right lower lobe as described above,  likely representing primary lung cancer. FDG avid 1.0 cm lesion  adjacent to the major fissure in the right lower lobe, likely  representing either another site of lung cancer versus fissural lymph  node.  2. FDG avid right hilar, subcarinal, right paratracheal nodes, likely  representing tad metastases.  3. Mild FDG avid focus in the right hepatic lobe, liver metastasis  can not be excluded, attention on follow-up study.    Assessment/Plan     No matching staging information was found for the patient.    This is a former cigarette smoker with newly diagnosed [id number] small cell lung cancer of the RLL -   Needs brain MRI and liver MRI to complete staging -   Regardless of staging, he is not a candidate for cisplatin-based chemotherapy in view of his kidney function.  If metastatic disease, may be eligible for clinical trial.   A Rx for Ativan 0.5 mg provided for pt to take in preparation for MRI, which was re-scheduled.   I will see him immediately after his imaging is done to discuss next steps.   His daughter Roula was called during the appointment and also able to hear recommendations and ask questions.     This note was created with the assistance of a speech recognition program.  Although the intention is to generate a document that actually reflects the content of the visit, it is possible that some mistakes occur and may not be corrected by the time of completion of this note.        Tori Galvin MD, MS  Thoracic Medical Oncology   [id number] [place] [address]  Phone: [phone]

## 2024-02-29 NOTE — PROGRESS NOTES
This SW was asked by med onc NP to meet with pt following first clinic visit. Pt had not prepared to bring money for parking garage. This SW explained about passes during active tx in infusion or radiation, SW unable to provide a pass this date. SW advised that  can take info and provide an IOU type of paper; pt said he has a credit card he can use today. SW also provided info about buying passes for 7-30 visits and paying ahead of time for much less.   Pt reviewed handout provided with services with which SW can assist. Pt reports he is set in terms of income at this time. Has Medicaid insurance. No other needs identified at this time.

## 2024-03-04 ENCOUNTER — HOSPITAL ENCOUNTER (OUTPATIENT)
Dept: RADIOLOGY | Facility: CLINIC | Age: 64
Discharge: HOME | End: 2024-03-04
Payer: MEDICAID

## 2024-03-04 DIAGNOSIS — R91.1 LUNG NODULE: ICD-10-CM

## 2024-03-04 DIAGNOSIS — R59.0 MEDIASTINAL LYMPHADENOPATHY: ICD-10-CM

## 2024-03-04 PROCEDURE — 70553 MRI BRAIN STEM W/O & W/DYE: CPT | Performed by: RADIOLOGY

## 2024-03-04 PROCEDURE — 70553 MRI BRAIN STEM W/O & W/DYE: CPT

## 2024-03-04 PROCEDURE — A9575 INJ GADOTERATE MEGLUMI 0.1ML: HCPCS | Mod: SE | Performed by: INTERNAL MEDICINE

## 2024-03-04 PROCEDURE — 74183 MRI ABD W/O CNTR FLWD CNTR: CPT | Performed by: RADIOLOGY

## 2024-03-04 PROCEDURE — 2550000001 HC RX 255 CONTRASTS: Mod: SE | Performed by: INTERNAL MEDICINE

## 2024-03-04 PROCEDURE — 74183 MRI ABD W/O CNTR FLWD CNTR: CPT

## 2024-03-04 RX ORDER — GADOTERATE MEGLUMINE 376.9 MG/ML
0.2 INJECTION INTRAVENOUS
Status: COMPLETED | OUTPATIENT
Start: 2024-03-04 | End: 2024-03-04

## 2024-03-04 RX ADMIN — GADOTERATE MEGLUMINE 22 ML: 376.9 INJECTION INTRAVENOUS at 16:18

## 2024-03-04 ASSESSMENT — ENCOUNTER SYMPTOMS
LOSS OF SENSATION IN FEET: 0
DEPRESSION: 0
OCCASIONAL FEELINGS OF UNSTEADINESS: 0

## 2024-03-07 ENCOUNTER — ONCOLOGY MEDICATION OUTREACH (OUTPATIENT)
Dept: HEMATOLOGY/ONCOLOGY | Facility: HOSPITAL | Age: 64
End: 2024-03-07
Payer: COMMERCIAL

## 2024-03-07 ENCOUNTER — OFFICE VISIT (OUTPATIENT)
Dept: HEMATOLOGY/ONCOLOGY | Facility: HOSPITAL | Age: 64
End: 2024-03-07
Payer: MEDICAID

## 2024-03-07 VITALS
WEIGHT: 242.95 LBS | RESPIRATION RATE: 20 BRPM | TEMPERATURE: 97.3 F | SYSTOLIC BLOOD PRESSURE: 151 MMHG | DIASTOLIC BLOOD PRESSURE: 85 MMHG | BODY MASS INDEX: 34.51 KG/M2 | HEART RATE: 94 BPM | OXYGEN SATURATION: 96 %

## 2024-03-07 DIAGNOSIS — C34.90 SMALL CELL LUNG CANCER (MULTI): Primary | ICD-10-CM

## 2024-03-07 PROCEDURE — 99215 OFFICE O/P EST HI 40 MIN: CPT | Performed by: INTERNAL MEDICINE

## 2024-03-07 PROCEDURE — 99215 OFFICE O/P EST HI 40 MIN: CPT | Mod: GC | Performed by: INTERNAL MEDICINE

## 2024-03-07 RX ORDER — DIPHENHYDRAMINE HYDROCHLORIDE 50 MG/ML
50 INJECTION INTRAMUSCULAR; INTRAVENOUS AS NEEDED
Status: CANCELLED | OUTPATIENT
Start: 2024-03-19

## 2024-03-07 RX ORDER — FAMOTIDINE 10 MG/ML
20 INJECTION INTRAVENOUS ONCE AS NEEDED
Status: CANCELLED | OUTPATIENT
Start: 2024-03-21

## 2024-03-07 RX ORDER — PROCHLORPERAZINE MALEATE 10 MG
10 TABLET ORAL EVERY 6 HOURS PRN
Qty: 30 TABLET | Refills: 5 | Status: SHIPPED | OUTPATIENT
Start: 2024-03-07

## 2024-03-07 RX ORDER — DEXAMETHASONE SODIUM PHOSPHATE 4 MG/ML
8 INJECTION, SOLUTION INTRA-ARTICULAR; INTRALESIONAL; INTRAMUSCULAR; INTRAVENOUS; SOFT TISSUE ONCE
Status: CANCELLED | OUTPATIENT
Start: 2024-03-20

## 2024-03-07 RX ORDER — DIPHENHYDRAMINE HYDROCHLORIDE 50 MG/ML
50 INJECTION INTRAMUSCULAR; INTRAVENOUS AS NEEDED
Status: CANCELLED | OUTPATIENT
Start: 2024-03-20

## 2024-03-07 RX ORDER — EPINEPHRINE 0.3 MG/.3ML
0.3 INJECTION SUBCUTANEOUS EVERY 5 MIN PRN
Status: CANCELLED | OUTPATIENT
Start: 2024-03-20

## 2024-03-07 RX ORDER — PROCHLORPERAZINE MALEATE 10 MG
10 TABLET ORAL EVERY 6 HOURS PRN
Status: CANCELLED | OUTPATIENT
Start: 2024-03-19

## 2024-03-07 RX ORDER — PALONOSETRON 0.05 MG/ML
0.25 INJECTION, SOLUTION INTRAVENOUS ONCE
Status: CANCELLED | OUTPATIENT
Start: 2024-03-19

## 2024-03-07 RX ORDER — ONDANSETRON HYDROCHLORIDE 2 MG/ML
8 INJECTION, SOLUTION INTRAVENOUS ONCE
Status: CANCELLED | OUTPATIENT
Start: 2024-03-21

## 2024-03-07 RX ORDER — ONDANSETRON HYDROCHLORIDE 8 MG/1
8 TABLET, FILM COATED ORAL EVERY 8 HOURS PRN
Qty: 30 TABLET | Refills: 5 | Status: SHIPPED | OUTPATIENT
Start: 2024-03-07

## 2024-03-07 RX ORDER — EPINEPHRINE 0.3 MG/.3ML
0.3 INJECTION SUBCUTANEOUS EVERY 5 MIN PRN
Status: CANCELLED | OUTPATIENT
Start: 2024-03-21

## 2024-03-07 RX ORDER — PROCHLORPERAZINE MALEATE 10 MG
10 TABLET ORAL EVERY 6 HOURS PRN
Status: CANCELLED | OUTPATIENT
Start: 2024-03-20

## 2024-03-07 RX ORDER — DIPHENHYDRAMINE HYDROCHLORIDE 50 MG/ML
50 INJECTION INTRAMUSCULAR; INTRAVENOUS AS NEEDED
Status: CANCELLED | OUTPATIENT
Start: 2024-03-21

## 2024-03-07 RX ORDER — PROCHLORPERAZINE EDISYLATE 5 MG/ML
10 INJECTION INTRAMUSCULAR; INTRAVENOUS EVERY 6 HOURS PRN
Status: CANCELLED | OUTPATIENT
Start: 2024-03-21

## 2024-03-07 RX ORDER — ALBUTEROL SULFATE 0.83 MG/ML
3 SOLUTION RESPIRATORY (INHALATION) AS NEEDED
Status: CANCELLED | OUTPATIENT
Start: 2024-03-19

## 2024-03-07 RX ORDER — OLANZAPINE 5 MG/1
5 TABLET ORAL NIGHTLY
Qty: 4 TABLET | Refills: 5 | Status: SHIPPED | OUTPATIENT
Start: 2024-03-07

## 2024-03-07 RX ORDER — PROCHLORPERAZINE EDISYLATE 5 MG/ML
10 INJECTION INTRAMUSCULAR; INTRAVENOUS EVERY 6 HOURS PRN
Status: CANCELLED | OUTPATIENT
Start: 2024-03-20

## 2024-03-07 RX ORDER — FAMOTIDINE 10 MG/ML
20 INJECTION INTRAVENOUS ONCE AS NEEDED
Status: CANCELLED | OUTPATIENT
Start: 2024-03-19

## 2024-03-07 RX ORDER — ALBUTEROL SULFATE 0.83 MG/ML
3 SOLUTION RESPIRATORY (INHALATION) AS NEEDED
Status: CANCELLED | OUTPATIENT
Start: 2024-03-20

## 2024-03-07 RX ORDER — HEPARIN 100 UNIT/ML
500 SYRINGE INTRAVENOUS AS NEEDED
Status: CANCELLED | OUTPATIENT
Start: 2024-03-07

## 2024-03-07 RX ORDER — FAMOTIDINE 10 MG/ML
20 INJECTION INTRAVENOUS ONCE AS NEEDED
Status: CANCELLED | OUTPATIENT
Start: 2024-03-20

## 2024-03-07 RX ORDER — HEPARIN SODIUM,PORCINE/PF 10 UNIT/ML
50 SYRINGE (ML) INTRAVENOUS AS NEEDED
Status: CANCELLED | OUTPATIENT
Start: 2024-03-07

## 2024-03-07 RX ORDER — EPINEPHRINE 0.3 MG/.3ML
0.3 INJECTION SUBCUTANEOUS EVERY 5 MIN PRN
Status: CANCELLED | OUTPATIENT
Start: 2024-03-19

## 2024-03-07 RX ORDER — PROCHLORPERAZINE MALEATE 10 MG
10 TABLET ORAL EVERY 6 HOURS PRN
Status: CANCELLED | OUTPATIENT
Start: 2024-03-21

## 2024-03-07 RX ORDER — ALBUTEROL SULFATE 0.83 MG/ML
3 SOLUTION RESPIRATORY (INHALATION) AS NEEDED
Status: CANCELLED | OUTPATIENT
Start: 2024-03-21

## 2024-03-07 RX ORDER — PROCHLORPERAZINE EDISYLATE 5 MG/ML
10 INJECTION INTRAMUSCULAR; INTRAVENOUS EVERY 6 HOURS PRN
Status: CANCELLED | OUTPATIENT
Start: 2024-03-19

## 2024-03-07 RX ORDER — DEXAMETHASONE SODIUM PHOSPHATE 4 MG/ML
8 INJECTION, SOLUTION INTRA-ARTICULAR; INTRALESIONAL; INTRAMUSCULAR; INTRAVENOUS; SOFT TISSUE ONCE
Status: CANCELLED | OUTPATIENT
Start: 2024-03-21

## 2024-03-07 ASSESSMENT — PAIN SCALES - GENERAL: PAINLEVEL: 0-NO PAIN

## 2024-03-07 NOTE — PROGRESS NOTES
ONCOLOGY CLINICAL PHARMACY NOTE     Subjective  Dane Molina is a 63 y.o. male with recently diagnosed SCLC, here for education.    Patient was consented by Dr. Galvin today in clinic for initiation of chemotherapy (carboplatin/etoposide).     Objective  There were no vitals taken for this visit.  Lab Results   Component Value Date    WBC 8.2 02/29/2024    HGB 15.3 02/29/2024    HCT 45.5 02/29/2024    MCV 92 02/29/2024     02/29/2024      Lab Results   Component Value Date    GLUCOSE 91 02/29/2024    CALCIUM 9.6 02/29/2024     02/29/2024    K 3.6 02/29/2024    CO2 26 02/29/2024     02/29/2024    BUN 28 (H) 02/29/2024    CREATININE 1.62 (H) 02/29/2024     Lab Results   Component Value Date    ALT 14 02/29/2024    AST 12 02/29/2024    ALKPHOS 76 02/29/2024    BILITOT 0.5 02/29/2024       Allergies  No Known Allergies    Medications  Were medications reconciled this encounter: No     Current Outpatient Medications:     amLODIPine (Norvasc) 5 mg tablet, Take 1 tablet (5 mg) by mouth once daily., Disp: 30 tablet, Rfl: 11    atorvastatin (Lipitor) 40 mg tablet, Take 1 tablet (40 mg) by mouth once daily., Disp: , Rfl:     LORazepam (Ativan) 0.5 mg tablet, Take 2 tablets 30 minutes before MRI. If necessary, you may take another tablet at the time of the test., Disp: 3 tablet, Rfl: 0    OLANZapine (ZyPREXA) 5 mg tablet, Take 1 tablet (5 mg) by mouth once daily at bedtime. For 4 days starting the evening of treatment., Disp: 4 tablet, Rfl: 5    ondansetron (Zofran) 8 mg tablet, Take 1 tablet (8 mg) by mouth every 8 hours if needed for nausea or vomiting., Disp: 30 tablet, Rfl: 5    prochlorperazine (Compazine) 10 mg tablet, Take 1 tablet (10 mg) by mouth every 6 hours if needed for nausea or vomiting., Disp: 30 tablet, Rfl: 5    Symbicort 80-4.5 mcg/actuation inhaler, 2 puffs once daily., Disp: , Rfl:     triamterene-hydrochlorothiazid (Maxzide-25) 37.5-25 mg tablet, Take 1 tablet by mouth once daily in  the morning., Disp: , Rfl:     Assessment and Plan  Dane Molina is a 63 y.o. male with recently diagnosed SCLC, to be treated with carboplatin/etoposide. Final staging pending liver biopsy (ordered today) - will update plan with C2 (either add IO or concurrent radiation).     Chemotherapy:  Plan for C1D1 treatment on Monday, 3/18  Drug interactions identified: None  Dose adjustments (renal/hepatic, toxicities): None currently (history of CKD but CrCl>50 ml/min as of 2/29)  Patient was counseled on the following:   Carboplatin  Administration schedule: goal AUC of 5 Day 1 of every 21-day cycle (4 cycles)  Side effects counseled on: decreased cell counts (thrombocytopenia, neutropenia, anemia), nausea  Etoposide  Administration schedule: 100 mg/m2 Days 1-3 of every 21-day cycle (4 cycles)  Side effects counseled on: myelosuppression, hair loss  Atezolizumab (potentially will add C2 pending final staging)  Drug information sheet provided to patient    Supportive Care:  CINV:  Pre-meds day of treatment: Aloxi, Emend, dexamethasone  Prescriptions released from [facility] to patient's home pharmacy: Zyprexa nightly x4 nights starting Day 1 of each cycle, Zofran prn (first line), Compazine prn (second line; first line for 2-3 days following Day 1)  Growth Factor Support: None currently    Instructed patient to alert team and go to Emergency Department for fevers of at least 100.4 degrees F, or other changes in clinical status. All questions were answered and my contact information was provided to patient.    Juliet Donald [facility]  Clinical Pharmacist - Thoracic

## 2024-03-07 NOTE — PROGRESS NOTES
Patient ID: Dane Molina is a 63 y.o. male    Primary Care Provider: Darwin Early MD    DIAGNOSIS AND STAGING  [id number] small cell lung cancer (INSM1 and TTF1+) of the right lower lobe, diagnosed on 02/05/2024 through bronchoscopy     SITES OF DISEASE  Right lower lobe   Levels 4R and 7 nodes   ? Liver      MOLECULAR GENOMICS  Not performed   RB loss by IHC      PRIOR THERAPIES  None     CURRENT THERAPY  Carboplatin/etoposide     CURRENT ONCOLOGICAL PROBLEMS  None      HISTORY OF PRESENT ILLNESS  Patient presented to the emergency room on 01/04/2023 with a hypertensive urgency and cough.  He has a history of noncompliance with antihypertensive medications.  He also complained of dyspnea and underwent a CT chest without IV contrast that demonstrated a right lower lobe paramediastinal mass measuring 3.1 cm with adjacent pulmonary nodule measuring 1.8 cm.  Mediastinal lymphadenopathy was demonstrated, including a subcarinal node measuring 1.7 cm.  There was right hilar lymphadenopathy, 2 cm in short axis.  On 01/15/2024 the patient underwent a PET scan that demonstrated avidity of the aforementioned masses/lymph nodes, as well as a lesion in the liver, SUV max 3.3, right hepatic lobe.  On 02/05/2024, patient underwent a bronchoscopy:  A. LYMPH NODE 4 R PULMONARY FINE NEEDLE ASPIRATION , CYTOLOGY AND CELL BLOCK:    Positive for malignant cells   Metastatic small cell carcinoma, see note  B. LYMPH NODE 7 PULMONARY FINE NEEDLE ASPIRATION , CYTOLOGY AND CELL BLOCK:    Positive for malignant cells  Metastatic small cell carcinoma, see note          Note: Immunostains demonstrate the lesional cells to be diffusely positive for CAM 5.2, INSM1, TTF-1.  The proliferative marker Ki-67 is greater than 90%.  Immunostain for retinoblastoma shows loss of staining.  Immunostain for p40 is negative.  The morphology and immunohistochemical profile are consistent with the above diagnosis.    On 02/29/24 the pt is seen by  med onc for the first time. Denies any systemic sx - denies lack of appetite, fatigue or unintentional weight loss. Denies new localized pain, headaches or neuro sx.  Denies new respiratory sx.  He is claustrophobic and MRI brain was scheduled for today but pt could not go through with the test. He also has mild CKD, which limits his ability to receive intravenous contrast for CT.     03/04/2024: brain MRI negative for mets. Liver MRI with 1.9cm indeterminate lesion.     03/07/2024: consent obtained for carboplatin/etoposide     03/18/2024: anticipating start of carbo/etoposide        PAST MEDICAL HISTORY  CKD - creatinine 1.68  Neck pain (injury at work)   Hypertension  Iron deficiency anemia (hx of blood transfusion in the 90's)    SURGICAL HISTORY  Ankle fracture and has a latoya in place      SOCIAL HISTORY  Former 35-40 pack-year smoker, quit at age 57  Smokes cigars now   History of alcohol addiction, sober for the past 15 years   Has one chlid ([place])  Single      FAMILY HISTORY  Lung CA (mother)  Mother had CKD and required HD    CURRENT MEDS REVIEWED    ALLERGIES REVIEWED     SUBJECTIVE:  Kenyatta is presenting alone today.   He continues to feel well overall and is without complaints.   Still smoking 2-3 cigars daily with 1-2 cigarettes daily.   He has lost some weight (3.5lbs) in the last week though he states he has been watching his diet more.   Denies fever, chills, cough, CP, dyspnea, N/V/D, abdominal pain.     A 13 point review of systems was performed, with significant findings documented above in subjective history.    OBJECTIVE:  Vitals:    03/07/24 1313   BP: 151/85   Pulse: 94   Resp: 20   Temp: 36.3 °C (97.3 °F)   SpO2: 96%      Body surface area is 2.34 meters squared.     Wt Readings from Last 5 Encounters:   03/07/24 110 kg (242 lb 15.2 oz)   02/29/24 112 kg (246 lb 4.1 oz)   02/05/24 112 kg (247 lb 5.7 oz)   02/02/24 113 kg (249 lb)   01/26/24 114 kg (251 lb)       ECOGSCORE: 1- Restricted in  physically strenuous activity.  Carries out light duty.    Physical Exam  Constitutional:       General: He is not in acute distress.     Appearance: Normal appearance. He is not toxic-appearing.   HENT:      Head: Normocephalic and atraumatic.      Mouth/Throat:      Mouth: Mucous membranes are moist.      Pharynx: Oropharynx is clear. No oropharyngeal exudate or posterior oropharyngeal erythema.   Eyes:      General: No scleral icterus.     Extraocular Movements: Extraocular movements intact.      Conjunctiva/sclera: Conjunctivae normal.      Pupils: Pupils are equal, round, and reactive to light.   Cardiovascular:      Rate and Rhythm: Normal rate and regular rhythm.      Heart sounds: No murmur heard.     No gallop.   Pulmonary:      Effort: Pulmonary effort is normal. No respiratory distress.      Breath sounds: Normal breath sounds. No wheezing or rales.   Abdominal:      General: There is no distension.      Palpations: Abdomen is soft. There is no mass.      Tenderness: There is no abdominal tenderness. There is no guarding.   Musculoskeletal:         General: No swelling.      Cervical back: Normal range of motion and neck supple.      Right lower leg: No edema.      Left lower leg: No edema.   Lymphadenopathy:      Cervical: No cervical adenopathy.   Skin:     General: Skin is warm and dry.      Findings: No erythema or rash.   Neurological:      General: No focal deficit present.      Mental Status: He is alert and oriented to person, place, and time.      Motor: No weakness.      Gait: Gait normal.   Psychiatric:         Mood and Affect: Mood normal.         Behavior: Behavior normal.         Thought Content: Thought content normal.         Judgment: Judgment normal.          Diagnostic Results   Results:  Labs:  Lab Results   Component Value Date    WBC 8.2 02/29/2024    HGB 15.3 02/29/2024    HCT 45.5 02/29/2024    MCV 92 02/29/2024     02/29/2024      Lab Results   Component Value Date     NEUTROABS 4.53 02/29/2024        Lab Results   Component Value Date    GLUCOSE 91 02/29/2024    CALCIUM 9.6 02/29/2024     02/29/2024    K 3.6 02/29/2024    CO2 26 02/29/2024     02/29/2024    BUN 28 (H) 02/29/2024    CREATININE 1.62 (H) 02/29/2024     Lab Results   Component Value Date    ALT 14 02/29/2024    AST 12 02/29/2024    ALKPHOS 76 02/29/2024    BILITOT 0.5 02/29/2024    BILIDIR 0.1 12/21/2023      Lab Results   Component Value Date    TSH 1.31 12/21/2023     STUDY:  MR BRAIN W AND WO IV CONTRAST;  3/4/2024 4:47 pm      INDICATION:  Signs/Symptoms:small cell lung cancer.      COMPARISON:  None.      ACCESSION NUMBER(S):  [id number]      ORDERING CLINICIAN:  GISSEL CANTRELL      TECHNIQUE:  The brain was studied in the sagittal, axial and coronal planes  utilizing FLAIR, T1 and T2 weighted images.     Following intravenous  injection of gadolinium contrast, T1 weighted fat suppressed  multiplanar images were also performed.      FINDINGS:  There is a normal-size ventricular system.  There is no evidence of  intracranial mass or extra-axial collection.      There is marked mucosal thickening and enhancement within the ethmoid  and maxillary sinuses bilaterally. The skull base, paranasal sinuses  and orbital structures are otherwise unremarkable. Diffusion weighted  images and associated ADC maps of the brain were unremarkable.  There  is no evidence of diffusion restriction to suggest the presence of  acute infarction. Gradient echo T2 weighted images fail to  demonstrate hemosiderin deposition or other evidence of hemorrhage.  There are scattered foci of increased signal in the subcortical and  periventricular white matter best appreciated on FLAIR images. These  likely represent foci of demyelination of uncertain cause and  significance and may be physiologic at this patient's stated age.  Following intravenous injection of there is no abnormal enhancement.  There is normal contrast  opacification of the dural venous sinuses.      IMPRESSION:  * There is no evidence of mass, infarction or hemorrhage  *Sinusitis    STUDY:  MR LIVER W AND WO IV CONTRAST;  3/4/2024 4:50 pm      INDICATION:  Signs/Symptoms:questionable liver metastasis in pt with small cell  lung cancer - cannot have CT abdomen w/ IV contrast due to CKD.      COMPARISON:  CT of the chest dated 01/10/2020. PET-CT dated 01/15/2024.      ACCESSION NUMBER(S):  [id number]      ORDERING CLINICIAN:  DAYANARA GRAY      TECHNIQUE:  MRI LIVER; Multiplanar magnetic resonance images of the abdomen were  obtained including the following sequences; T2-weighted SSFSE with  and without fat saturation, T1-weighted GRE in/opposed phase, DWI,  fat saturated 3D-T1w GRE pre and dynamically post contrast.  22 ml of  Gadolinium contrast agent Dotarem were administered intravenously  without immediate complication.      FINDINGS:  Limitations: Examination is limited due to respiratory motion artifact      LIVER:  The liver is normal in size measuring 13.0 cm in craniocaudal  dimension. There is a mildly T2 hyperintense lesion within hepatic  segment 8 (series 9 image 17) measuring 1.8 x 1.9 cm with peripheral  enhancement which persists on additional postcontrast sequences with  central hypoenhancement. There are multiple additional subcentimeter  T2 hyperintense cystic lesions scattered throughout the liver for  example measuring up to 0.5 cm within hepatic segment 8 (series 9,  image 17) most compatible with simple hepatic cysts. No additional  hepatic lesions..      BILE DUCTS:  No intrahepatic or extrahepatic bile duct dilatation is demonstrated.      GALLBLADDER:  Within normal limits.      PANCREAS:  Normal signal intensity. Normal enhancement. No masses. The  pancreatic duct is normal.      SPLEEN:  Within normal limits.      ADRENAL GLANDS:  Within normal limits.      KIDNEYS:  The kidneys are normal in size and enhance symmetrically.  No  hydronephrosis.      LYMPH NODES:  No lymphadenopathy.      ABDOMINAL VESSELS:  Aorta and the major abdominal arterial vessels demonstrate no gross  abnormality.  Superior mesenteric vein, splenic vein, and main, right  and left portal vein are patent. Hepatic veins are patent.  No  significant collaterals or esophageal varices are present.      BOWEL:  There is significant mucosal thickening and enhancement of the  stomach. No significant abnormality of the remaining bowel.      PERITONEUM/RETROPERITONEUM:  No ascites.      BONES AND LOWER THORAX:  No abnormally enhancing focal bony lesions are identified. Multilevel  discogenic degenerative disease is noted in several levels of the  thoraco- lumbar spine.  Partially visualized lesions within the right  hilar lung compatible with patient's known small-cell lung cancer.  The remaining visualized portions of the lungs and heart appear  within normal limits.      IMPRESSION:  Limited examination, due to respiratory motion artifact. Within  limitations:  1. Indeterminate hepatic segment 8 lesion measuring up to 1.9 cm with  mild T2 hyperintensity and peripheral enhancement. Hepatic metastasis  can not be excluded. Consider correlation with tissue sampling.  2. Prominent gastric mucosal thickening; correlate with concern for  gastritis.      STUDY:  CT CHEST WO IV CONTRAST; 1/10/2024 9:24 am      INDICATION:  Signs/Symptoms:scan. Shortness of breath, current smoker.      COMPARISON:  None      ACCESSION NUMBER(S):  [id number]      ORDERING CLINICIAN:  MALINA BEATTY      TECHNIQUE:  Contiguous axial images of the thorax were obtained from the level of  the thoracic inlet through the lung bases. All CT examinations are  performed with 1 or more of the following dose reduction techniques:  Automated exposure control, adjustment of mA and/or kv according to  patient's size, or use of iterative reconstruction techniques.      FINDINGS:  There is a mass in the  paramediastinal right lower lobe measuring up  to 3.1 cm. There is an adjacent enlarged pulmonary nodule measuring  up to 1.8 cm. There is mucous plugging and tree-in-bud nodularity  also noted within the superior right lower lobe. There is an adjacent  enlarged fissural lymph node in the right lung measuring up to 9 mm.      There is mediastinal lymphadenopathy. There is a right hilar lymph  node conglomerate measuring up to 2 cm in short axis. There is a  subcarinal lymph node conglomerate measuring up to 1.7 cm.      The thyroid gland is unremarkable.      The heart size is within normal limits.  No pericardial effusion is  identified. Aorta is ectatic measuring up to 3.7 cm. Moderate  coronary calcifications.      The trachea and mainstem bronchi are patent. There is no evidence of  pneumothorax or pleural effusion.      The visualized osseous structures are intact.      Limited images through the upper abdomen are unremarkable.      IMPRESSION:  Right lower lobe mass with adjacent enlarged pulmonary lymph nodes  and associated right hilar and mediastinal lymphadenopathy. Findings  are concerning for primary lung neoplasm. Recommend follow-up with  PET-CT. Given proximity to right mainstem bronchus if tissue sampling  is deemed clinically necessary endoscopic tissue sampling may be  preferable to percutaneous lung biopsy.    STUDY:  NM PET CT LUNG CA  INITIAL DIAGNOSIS;  1/15/2024 8:53 am      INDICATION:  Signs/Symptoms: 63-year-old male with enlarging lung nodule/mass.      COMPARISON:  CT chest without contrast on 01/10/2024      ACCESSION NUMBER(S):  [id number]      ORDERING CLINICIAN:  NOLA CAPUTO      TECHNIQUE:  DIVISION OF NUCLEAR MEDICINE  POSITRON EMISSION TOMOGRAPHY (PET-CT)      The patient received an intravenous dose of 13.0 mCi of Fluorine-18  fluorodeoxyglucose (FDG). Positron emission tomographic (PET) images  from mid-thigh to skull base were then acquired after a one hour  delay. Also  acquired was a contemporaneous low dose non-contrast CT  scan performed for attenuation correction of PET images and anatomic  localization.  The PET and CT images were digitally fused for  display.  All images were acquired on a combined PET-CT scanner unit.  Some areas of FDG accumulation may be described in standardized  uptake value (SUV) units.      CODING:  Initial Treatment Strategy (PI)      CALIBRATION:  Dose Injection-to-Scan Interval (mins): 65 min  Mediastinal blood pool SUV (normal 1.5-2.5): 1.7  Blood glucose: 97 mg/dL      FINDINGS:  NECK:  *FDG avid opacification within bilateral maxillary sinuses,  SUV max  of 3.9 at right maxillary sinus and 3.4 at left maxillary sinus,  likely representing sinusitis. *FDG avidity in bilateral palatine  tonsils, likely reactive. *No enlarged or FDG avid cervical  lymphadenopathy is present. *Unremarkable bilateral thyroid glands.      CHEST:  *FDG avid paramediastinal mass in the right lower lobe measuring to  3.0 cm with SUV max 6.1. Another FDG avid pulmonary lesion the right  lower lobe measuring 1.9 cm, with SUV max of 3.5. 1.0 cm lesion  adjacent to the major fissure in the right lower lobe with SUV max  1.6. *Bulky FDG avid right hilar nodes with SUV max of 9.6,  subcarinal node with SUV max of 11.0. Right paratracheal node with  SUV max of 11.0. *Few mild FDG avid bilateral axillary nodes with SUV  max of 1.4 at right axilla and 1.3 at left axilla, likely reactive in  etiology.      ABDOMEN AND PELVIS:  *Physiologic radiotracer uptake is present in the liver and spleen  with excretion into the bowel loops and the genitourinary tract. Mild  FDG avid focus in the right hepatic lobe with SUV max 3.3  *Unremarkable bilateral adrenal glands. *No enlarged or FDG avid  lymphadenopathy in the abdomen or pelvis.      MUSCULOSKELETAL:  *No concerning FDG avid bone lesion throughout the axial and  appendicular skeleton.      IMPRESSION:  1. Two FDG avid lesions in the  right lower lobe as described above,  likely representing primary lung cancer. FDG avid 1.0 cm lesion  adjacent to the major fissure in the right lower lobe, likely  representing either another site of lung cancer versus fissural lymph  node.  2. FDG avid right hilar, subcarinal, right paratracheal nodes, likely  representing tad metastases.  3. Mild FDG avid focus in the right hepatic lobe, liver metastasis  can not be excluded, attention on follow-up study.    Assessment/Plan     No matching staging information was found for the patient.  This is a current cigarette smoker with newly diagnosed [id number] small cell lung cancer of the RLL -   Brain MRI negative for brain metastases.   Liver MRI with indeterminate 1.9cm lesion   Regardless of staging, he is not a candidate for cisplatin-based chemotherapy nor any clinical trial in view of his kidney function.  - consent obtained today for carboplatin/etoposide to begin week of 3/11  - referral for stat IR bx of liver lesion placed today   - referral to radiation oncology placed today      Tobacco use disorder   - currently smoking 2-3 cigars daily plus 1-2 cigarettes daily   - working with PCP to get NRT  - pt counseled again today on the importance of smoking cessation       CKD III  - baseline creatine around 1.6 - stable     HTN  - Continue triamterene-hydrochlorothiazide   - Continue amlodipine 5mg daily     ?COPD   - No PFTs on file - ordered by pulmonology and pending   - continue symbicort     Patient seen and discussed with Dr. Kamar Pyle MD   Internal Medicine, PGY-2  [facility]

## 2024-03-13 ENCOUNTER — TELEPHONE (OUTPATIENT)
Dept: RADIATION ONCOLOGY | Facility: HOSPITAL | Age: 64
End: 2024-03-13
Payer: COMMERCIAL

## 2024-03-14 ENCOUNTER — NUTRITION (OUTPATIENT)
Dept: HEMATOLOGY/ONCOLOGY | Facility: HOSPITAL | Age: 64
End: 2024-03-14

## 2024-03-14 ENCOUNTER — LAB (OUTPATIENT)
Dept: LAB | Facility: HOSPITAL | Age: 64
End: 2024-03-14
Payer: MEDICARE

## 2024-03-14 ENCOUNTER — TELEPHONE (OUTPATIENT)
Dept: HEMATOLOGY/ONCOLOGY | Facility: HOSPITAL | Age: 64
End: 2024-03-14
Payer: COMMERCIAL

## 2024-03-14 ENCOUNTER — HOSPITAL ENCOUNTER (OUTPATIENT)
Dept: RADIATION ONCOLOGY | Facility: HOSPITAL | Age: 64
Setting detail: RADIATION/ONCOLOGY SERIES
Discharge: HOME | End: 2024-03-14
Payer: MEDICARE

## 2024-03-14 VITALS
WEIGHT: 249 LBS | TEMPERATURE: 97.7 F | DIASTOLIC BLOOD PRESSURE: 92 MMHG | HEART RATE: 88 BPM | OXYGEN SATURATION: 95 % | RESPIRATION RATE: 18 BRPM | HEIGHT: 70 IN | BODY MASS INDEX: 35.65 KG/M2 | SYSTOLIC BLOOD PRESSURE: 155 MMHG

## 2024-03-14 DIAGNOSIS — C34.90 SMALL CELL LUNG CANCER (MULTI): ICD-10-CM

## 2024-03-14 LAB
ALBUMIN SERPL BCP-MCNC: 3.9 G/DL (ref 3.4–5)
ALP SERPL-CCNC: 79 U/L (ref 33–136)
ALT SERPL W P-5'-P-CCNC: 14 U/L (ref 10–52)
ANION GAP SERPL CALC-SCNC: 14 MMOL/L (ref 10–20)
AST SERPL W P-5'-P-CCNC: 14 U/L (ref 9–39)
BASOPHILS # BLD AUTO: 0.05 X10*3/UL (ref 0–0.1)
BASOPHILS NFR BLD AUTO: 0.7 %
BILIRUB SERPL-MCNC: 0.4 MG/DL (ref 0–1.2)
BUN SERPL-MCNC: 17 MG/DL (ref 6–23)
CALCIUM SERPL-MCNC: 9 MG/DL (ref 8.6–10.6)
CHLORIDE SERPL-SCNC: 106 MMOL/L (ref 98–107)
CO2 SERPL-SCNC: 26 MMOL/L (ref 21–32)
CREAT SERPL-MCNC: 1.57 MG/DL (ref 0.5–1.3)
EGFRCR SERPLBLD CKD-EPI 2021: 49 ML/MIN/1.73M*2
EOSINOPHIL # BLD AUTO: 0.2 X10*3/UL (ref 0–0.7)
EOSINOPHIL NFR BLD AUTO: 2.6 %
ERYTHROCYTE [DISTWIDTH] IN BLOOD BY AUTOMATED COUNT: 13.5 % (ref 11.5–14.5)
GLUCOSE SERPL-MCNC: 99 MG/DL (ref 74–99)
HBV CORE AB SER QL: NONREACTIVE
HBV SURFACE AB SER-ACNC: <3.1 MIU/ML
HBV SURFACE AG SERPL QL IA: NONREACTIVE
HCT VFR BLD AUTO: 43.1 % (ref 41–52)
HGB BLD-MCNC: 14.5 G/DL (ref 13.5–17.5)
IMM GRANULOCYTES # BLD AUTO: 0.02 X10*3/UL (ref 0–0.7)
IMM GRANULOCYTES NFR BLD AUTO: 0.3 % (ref 0–0.9)
INR PPP: 1.2 (ref 0.9–1.1)
LDH SERPL L TO P-CCNC: 162 U/L (ref 84–246)
LYMPHOCYTES # BLD AUTO: 2.11 X10*3/UL (ref 1.2–4.8)
LYMPHOCYTES NFR BLD AUTO: 27.7 %
MCH RBC QN AUTO: 31.3 PG (ref 26–34)
MCHC RBC AUTO-ENTMCNC: 33.6 G/DL (ref 32–36)
MCV RBC AUTO: 93 FL (ref 80–100)
MONOCYTES # BLD AUTO: 1.2 X10*3/UL (ref 0.1–1)
MONOCYTES NFR BLD AUTO: 15.7 %
NEUTROPHILS # BLD AUTO: 4.04 X10*3/UL (ref 1.2–7.7)
NEUTROPHILS NFR BLD AUTO: 53 %
NRBC BLD-RTO: 0 /100 WBCS (ref 0–0)
PLATELET # BLD AUTO: 227 X10*3/UL (ref 150–450)
POTASSIUM SERPL-SCNC: 3.6 MMOL/L (ref 3.5–5.3)
PROT SERPL-MCNC: 7.4 G/DL (ref 6.4–8.2)
PROTHROMBIN TIME: 13.4 SECONDS (ref 9.8–12.8)
RBC # BLD AUTO: 4.63 X10*6/UL (ref 4.5–5.9)
SODIUM SERPL-SCNC: 142 MMOL/L (ref 136–145)
WBC # BLD AUTO: 7.6 X10*3/UL (ref 4.4–11.3)

## 2024-03-14 PROCEDURE — 85025 COMPLETE CBC W/AUTO DIFF WBC: CPT

## 2024-03-14 PROCEDURE — 36415 COLL VENOUS BLD VENIPUNCTURE: CPT

## 2024-03-14 PROCEDURE — 99215 OFFICE O/P EST HI 40 MIN: CPT | Mod: GC | Performed by: RADIOLOGY

## 2024-03-14 PROCEDURE — 83615 LACTATE (LD) (LDH) ENZYME: CPT

## 2024-03-14 PROCEDURE — 99205 OFFICE O/P NEW HI 60 MIN: CPT | Performed by: RADIOLOGY

## 2024-03-14 PROCEDURE — 86704 HEP B CORE ANTIBODY TOTAL: CPT

## 2024-03-14 PROCEDURE — 85610 PROTHROMBIN TIME: CPT

## 2024-03-14 PROCEDURE — 86706 HEP B SURFACE ANTIBODY: CPT

## 2024-03-14 PROCEDURE — 80053 COMPREHEN METABOLIC PANEL: CPT

## 2024-03-14 PROCEDURE — 87340 HEPATITIS B SURFACE AG IA: CPT

## 2024-03-14 ASSESSMENT — COLUMBIA-SUICIDE SEVERITY RATING SCALE - C-SSRS
2. HAVE YOU ACTUALLY HAD ANY THOUGHTS OF KILLING YOURSELF?: NO
1. IN THE PAST MONTH, HAVE YOU WISHED YOU WERE DEAD OR WISHED YOU COULD GO TO SLEEP AND NOT WAKE UP?: NO
6. HAVE YOU EVER DONE ANYTHING, STARTED TO DO ANYTHING, OR PREPARED TO DO ANYTHING TO END YOUR LIFE?: NO

## 2024-03-14 ASSESSMENT — PATIENT HEALTH QUESTIONNAIRE - PHQ9
1. LITTLE INTEREST OR PLEASURE IN DOING THINGS: NOT AT ALL
2. FEELING DOWN, DEPRESSED OR HOPELESS: NOT AT ALL
SUM OF ALL RESPONSES TO PHQ9 QUESTIONS 1 AND 2: 0

## 2024-03-14 ASSESSMENT — PAIN SCALES - GENERAL: PAINLEVEL: 0-NO PAIN

## 2024-03-14 NOTE — TELEPHONE ENCOUNTER
Pt has first infusion on 3/19.  He picked up new prescriptions from pharmacy and is unsure if he is to take them.  Pt instructed to take olanzapine at bedtime x4 days starting the evening of 3/19.  He understands that ondansetron and prochlorperazine are prn for nausea/vomiting.  No further needs at this time.

## 2024-03-14 NOTE — PROGRESS NOTES
"NUTRITION Assessment NOTE    Nutrition Assessment     Reason for Visit:  Dane Molina is a 64 y.o. male with stage IIIB limited stage SCLC  who presents for nutrition education.    3/18/24: Anticipated start of carbo/etoposide.    Spoke with patient in [facility] clinic.    He has been wanting to lose weight and started making some diet modifications 2 weeks prior, such as getting rid of pop, fried foods, and red meat.    Patient had questions about plant-based protein drinks.    Discussed that protein drinks can be useful if unable to get enough from the diet, but that diet-based modifications are typically a better route, especially as drinks can be expensive.     Discussed the balanced plate method.    Will continue to remain available to patient for future questions.     Lab Results   Component Value Date/Time    GLUCOSE 99 03/14/2024 1652     03/14/2024 1652    K 3.6 03/14/2024 1652     03/14/2024 1652    CO2 26 03/14/2024 1652    ANIONGAP 14 03/14/2024 1652    BUN 17 03/14/2024 1652    CREATININE 1.57 (H) 03/14/2024 1652    EGFR 49 (L) 03/14/2024 1652    CALCIUM 9.0 03/14/2024 1652    ALBUMIN 3.9 03/14/2024 1652    ALKPHOS 79 03/14/2024 1652    PROT 7.4 03/14/2024 1652    AST 14 03/14/2024 1652    BILITOT 0.4 03/14/2024 1652    ALT 14 03/14/2024 1652     No results found for: \"VITD25\"    Anthropometrics:  Anthropometrics  IBW/kg (Dietitian Calculated): 83 kg  Percent of IBW: 136 %  Weight Change  Weight History / % Weight Change: Patient had cut out pop and lost 6 lbs between 3/14-3/19. This weight loss was purposeful. Would like to continue losing weight.        Wt Readings from Last 10 Encounters:   03/21/24 113 kg (248 lb 7.3 oz)   03/20/24 112 kg (246 lb 14.6 oz)   03/19/24 110 kg (243 lb)   03/14/24 113 kg (249 lb)   03/07/24 110 kg (242 lb 15.2 oz)   02/29/24 112 kg (246 lb 4.1 oz)   02/05/24 112 kg (247 lb 5.7 oz)   02/02/24 113 kg (249 lb)   01/26/24 114 kg (251 lb)        Food And Nutrient " Intake:  Food and Nutrient History  Food and Nutrient History: Patient recently cut out pop, fried foods, and red meat. Had questions about plant-based protein drinks, but doesn't necessarily avoid lactose.  Energy Intake: Good > 75 %  Fluid Intake: 64+ oz fluid daily.  Food Allergy: NKFA>  GI Symptoms: none  Oral Problems: denies     Medication and Complementary/Alternative Medicine Use  Prescription Medication Use: Olanzapine; zofran; compazine.      Nutrition Focused Physical Exam Findings:  defer: Well nourished.     Energy Needs  Estimated Energy Needs  Total Energy Estimated Needs (kCal): 2160 kCal  Method for Estimating Needs: 27 kcal/kg IBW  Estimated Fluid Needs  Total Fluid Estimated Needs (mL): 2160 mL  Method for Estimating Needs: 1 ml/kcal or per team  Estimated Protein Needs  Total Protein Estimated Needs (g): 96 g  Method for Estimating Needs: 1.2 g/kg IBW        Nutrition Diagnosis   Malnutrition Diagnosis  Patient has Malnutrition Diagnosis: No    Nutrition Diagnosis  Patient has Nutrition Diagnosis: Yes  Diagnosis Status (1): New  Nutrition Diagnosis 1: Food and nutrition related knowledge deficit  Related to (1): nutrition for weight loss  As Evidenced by (1): inability to lose weight.    Nutrition Interventions/Recommendations   Nutrition Prescription       Food and Nutrition Delivery  Food and Nutrition Delivery  Meals & Snacks: General Healthful Diet, Modify schedule of foods/fluids, Modify Composition of Meals/Snacks  Goals: 3-4 meals per day; balanced plate method.    Nutrition Education  Nutrition Education  Nutrition Education Content: Content related nutrition education  Goals: General healthful diet.    Coordination of Care       There are no Patient Instructions on file for this visit.    Nutrition Monitoring and Evaluation   Food/Nutrient Related History Monitoring  Monitoring and Evaluation Plan: Energy intake, Fluid intake, Amount of food, Meal/snack pattern  Energy Intake: Estimated  energy intake  Criteria: 24 hr recall  Fluid Intake: Estimated fluid intake  Criteria: 24 hr recall  Amount of Food: Estimated amout of food  Criteria: 24 hr recall  Meal/Snack Pattern: Estimated meal and snack pattern  Criteria: 24 hr recall  Body Composition/Growth/Weight History  Monitoring and Evaluation Plan: Weight  Weight: Measured weight          Time Spent  Prep time on day of patient encounter: 10 minutes  Time spent directly with patient, family or caregiver: 20 minutes  Additional Time Spent on Patient Care Activities: 0 minutes  Documentation Time: 15 minutes  Other Time Spent: 0 minutes  Total: 45 minutes        Readiness to Change : Good  Level of Understanding : Good  Anticipated Compliant : Good

## 2024-03-15 ENCOUNTER — HOSPITAL ENCOUNTER (OUTPATIENT)
Dept: RADIOLOGY | Facility: HOSPITAL | Age: 64
Discharge: HOME | End: 2024-03-15
Payer: MEDICARE

## 2024-03-15 ENCOUNTER — HOSPITAL ENCOUNTER (OUTPATIENT)
Dept: CARDIOLOGY | Facility: HOSPITAL | Age: 64
Discharge: HOME | End: 2024-03-15
Payer: MEDICARE

## 2024-03-15 DIAGNOSIS — I25.10 CORONARY ARTERY CALCIFICATION: ICD-10-CM

## 2024-03-15 DIAGNOSIS — I25.10 CORONARY ARTERY DISEASE INVOLVING NATIVE CORONARY ARTERY OF NATIVE HEART WITHOUT ANGINA PECTORIS: ICD-10-CM

## 2024-03-15 DIAGNOSIS — R06.09 DOE (DYSPNEA ON EXERTION): ICD-10-CM

## 2024-03-15 DIAGNOSIS — I25.84 CORONARY ARTERY CALCIFICATION: ICD-10-CM

## 2024-03-15 PROCEDURE — 3430000001 HC RX 343 DIAGNOSTIC RADIOPHARMACEUTICALS: Performed by: STUDENT IN AN ORGANIZED HEALTH CARE EDUCATION/TRAINING PROGRAM

## 2024-03-15 PROCEDURE — A9502 TC99M TETROFOSMIN: HCPCS | Performed by: STUDENT IN AN ORGANIZED HEALTH CARE EDUCATION/TRAINING PROGRAM

## 2024-03-15 PROCEDURE — 78452 HT MUSCLE IMAGE SPECT MULT: CPT | Performed by: STUDENT IN AN ORGANIZED HEALTH CARE EDUCATION/TRAINING PROGRAM

## 2024-03-15 PROCEDURE — 78452 HT MUSCLE IMAGE SPECT MULT: CPT

## 2024-03-15 PROCEDURE — 93017 CV STRESS TEST TRACING ONLY: CPT

## 2024-03-15 RX ADMIN — TETROFOSMIN 10.2 MILLICURIE: 0.23 INJECTION, POWDER, LYOPHILIZED, FOR SOLUTION INTRAVENOUS at 09:49

## 2024-03-15 RX ADMIN — TETROFOSMIN 32 MILLICURIE: 0.23 INJECTION, POWDER, LYOPHILIZED, FOR SOLUTION INTRAVENOUS at 11:42

## 2024-03-15 NOTE — PROGRESS NOTES
Radiation Oncology Outpatient Consult    Patient Name:  Dane Molina  MRN:  [id number]  :  1960    Referring Provider: Tori Galvin MD  Primary Care Provider: Darwin Early MD  Care Team: Patient Care Team:  Darwin Early MD as PCP - General (Internal Medicine)  Tori Galvin MD as Medical Oncologist (Hematology and Oncology)    Date of Service: 3/14/2024     History of Present Illness:  Dane Molina is a 64 y.o. male who was referred by Tori Galvin MD, for a consultation to the [facility] Department of Radiation Oncology.  He is presenting for evaluation and management of his newly diagnosed SCLC.    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  Molecular and ancillary testing: diffusely positive for CAM 5.2, INSM1, TTF-1.  The proliferative marker Ki-67 is greater than 90%.  Rb+    His oncological work up and treatment history is as below:  24: Patient presented to the emergency room with a hypertensive urgency and cough.  He has a history of noncompliance with antihypertensive medications.  He also complained of dyspnea at this time. CT chest without IV contrast demonstrated a right lower lobe paramediastinal mass measuring 3.1 cm with adjacent pulmonary nodule measuring 1.8 cm.  Mediastinal lymphadenopathy was demonstrated, including a subcarinal node measuring 1.7 cm.  There was right hilar lymphadenopathy, 2 cm in short axis.  PET Scan 01/15/2024 demonstrated an FDG avid paramediastinal mass in the right lower lobe measuring to 3.0 cm (SUV max 6.1). Another FDG avid pulmonary lesion the right lower lobe measuring 1.9 cm, (SUV max of 3.5.) 1.0 cm lesion adjacent to the major fissure in the right lower lobe (SUV max 1.6). Bulky FDG avid right hilar nodes with SUV max of 9.6, subcarinal node (SUV max of 11.0). Right paratracheal node (SUV max of 11.0). Additonally, there was a lesion in the R hepatic lobe of the liver. (SUV  max 3.3).  02/05/2024, patient underwent a bronchoscopy: 4R & 7 were + for malignant cells; diffusely positive for CAM 5.2, INSM1, TTF-1.  The proliferative marker Ki-67 is greater than 90%.    PFTs 2/19/24: FVC 71% predicted, FEV1 65% of predicted, DLCO 82% of predicted.  Brain MRI 03/04/2024, negative for mets. Liver MRI with 1.9cm indeterminate lesion.   03/07/2024: consent obtained for carboplatin/etoposide   03/18/2024: anticipating start of carbo/etoposide   03/22/24: Biopsy of Liver lesion     Today, he presents to his appointment alone. He states that he lives with a friend in [place] and recently moved here from [place] to be closer to his daughter and grandchildren. He states that he is feeling well at this time. He denies SOB, cough, or any problems with ADLs. He works in physical labor. He states that he has basically been asymptomatic this entire time. He recently stopped drinking soda, so he has lost a few pounds intentionally. Denies any weight loss prior to this.    Review of Systems:   Denies cough, chest pain, chest tightness, breathing difficulty, wheezing, hemoptysis, cardiac symptoms.  Denies headaches, neurological symptoms, speech/swallow changes, acute vision/hearing changes.  Denies difficulty ambulating, imbalance or recent falls.  Denies abdominal pain, nausea, vomiting, diarrhea.  Denies fever, chills, night sweats, significant weight loss, loss of appetite.  Denies enlarged lymph nodes, pedal edema, calf tenderness.    The patient's current pain level was assessed.  They report currently having a pain of 0 out of 10.  They feel their pain is under control without the use of pain medications.  The patient does not have a documented plan of care to address pain.      RADIATION SCREENING QUESTIONS:  Prior radiation therapy: No  Pacemaker: No  Other implantable devices: No  Connective tissue disease: No    Current Systemic Treatment:  No     Past Medical History:    Past Medical History:    Diagnosis Date    CKD (chronic kidney disease)     COPD (chronic obstructive pulmonary disease) (CMS/[facility])     Hyperlipidemia     Hypertension     SOB (shortness of breath)         Past Surgical History:    Past Surgical History:   Procedure Laterality Date    COLONOSCOPY      ESOPHAGOGASTRODUODENOSCOPY      ORIF ANKLE FRACTURE  1993        Family History:  Cancer-related family history is not on file.    Social History:    Social History     Tobacco Use    Smoking status: Every Day     Types: Cigars     Start date: 1978    Smokeless tobacco: Never   Vaping Use    Vaping Use: Never used   Substance Use Topics    Alcohol use: Not Currently     Comment: recovering alcohol for 15 years    Drug use: Not Currently       Allergies:  No Known Allergies     Medications:    Current Outpatient Medications:     amLODIPine (Norvasc) 5 mg tablet, Take 1 tablet (5 mg) by mouth once daily., Disp: 30 tablet, Rfl: 11    atorvastatin (Lipitor) 40 mg tablet, Take 1 tablet (40 mg) by mouth once daily., Disp: , Rfl:     OLANZapine (ZyPREXA) 5 mg tablet, Take 1 tablet (5 mg) by mouth once daily at bedtime. For 4 days starting the evening of treatment., Disp: 4 tablet, Rfl: 5    ondansetron (Zofran) 8 mg tablet, Take 1 tablet (8 mg) by mouth every 8 hours if needed for nausea or vomiting., Disp: 30 tablet, Rfl: 5    prochlorperazine (Compazine) 10 mg tablet, Take 1 tablet (10 mg) by mouth every 6 hours if needed for nausea or vomiting., Disp: 30 tablet, Rfl: 5    Symbicort 80-4.5 mcg/actuation inhaler, 2 puffs once daily., Disp: , Rfl:     triamterene-hydrochlorothiazid (Maxzide-25) 37.5-25 mg tablet, Take 1 tablet by mouth once daily in the morning., Disp: , Rfl:     LORazepam (Ativan) 0.5 mg tablet, Take 2 tablets 30 minutes before MRI. If necessary, you may take another tablet at the time of the test., Disp: 3 tablet, Rfl: 0  No current facility-administered medications for this encounter.      Performance Status:  The Karnofsky  "performance scale today is 100, Fully active, able to carry on all pre-disease performed without restriction (ECOG equivalent 0).      OBJECTIVE  Physical Exam:  BP (!) 155/92   Pulse 88   Temp 36.5 °C (97.7 °F) (Temporal)   Resp 18   Ht 1.778 m (5' 10\")   Wt 113 kg (249 lb)   SpO2 95%   BMI 35.73 kg/m²    Constitutional:       General: He is not in acute distress.     Appearance: Normal appearance.   HENT:      Head: Normocephalic and atraumatic.      Mouth: Mucous membranes are moist.   Eyes:      Extraocular Movements: Extraocular movements intact.      Pupils: Pupils are equal, round, and reactive to light.   Cardiovascular:      Rate and Rhythm: Normal rate and regular rhythm.      Heart sounds: Normal heart sounds.   Pulmonary:      Effort: Pulmonary effort is normal. No respiratory distress.      Breath sounds: Normal breath sounds.   Abdominal:      General: There is no distension.      Palpations: Abdomen is soft. There is no mass.      Tenderness: There is no abdominal tenderness.   Musculoskeletal:         General: No swelling or tenderness.      Cervical back: Normal range of motion.   Neurological:      General: No focal deficit present.      Mental Status: She is alert and oriented to person, place, and time.      Motor: No weakness.   Psychiatric:         Mood and Affect: Mood normal.     Laboratory Review:  The pertinent lab results were reviewed and discussed with the patient.      Pathology Review:  The pertinent pathology results were reviewed from EMR and discussed with the patient.       Imaging:  The pertinent imaging results were reviewed from EMR/PACS with key results discussed with the patient.    ASSESSMENT:  Dane Molina is a 64 y.o. 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  Molecular and ancillary testing: diffusely positive for CAM 5.2, INSM1, TTF-1.  The proliferative marker Ki-67 is greater than 90%.  Rb+. PET-CT identifies right lower lobe with an adjoining " pulmonary lesion adjacent to the major fissure in the right lower lobe, Bulky FDG avid right hilar nodes, subcarinal node, right paratracheal node. Bronchoscopy has confirmed 4R & 7 were + for malignant cells. Additonally, there was a lesion in the R hepatic lobe of the liver, indeterminate lesion on MRI. Brain MRI is negative for mets. He has an excellent PS.     He is scheduled to start C1 chemotherapy early next week on 03/19/2024, with liver biopsy on 03/22/2024.    He presents to the clinic to discuss role of radiation therapy for presumed limited-stage SCLC.    PLAN:    Mr. Molina's pertinent history, exam, imaging and pathology details were reviewed.     Treatment recommendations/Alternatives:  We reviewed the standard of care management for limited-stage SCLC including the role of radiation therapy concurrent with chemotherapy. We believe the best approach for him concurrent therapy, as he has a good performance status and good lung function. Assuming the lesion in his liver is negative for cancer, we will plan to start RT at the start of cycle 2 of chemo.      Radiation treatment logistics:  We then focused our attention regarding logistics, rationale and potential risks with radiation therapy. This will need an initial simulation/mapping that will involve a planning CT scan in treatment position followed by a 2-3 week planning period and then initiation of radiation treatments.      For limited stage disease, we have the options to use twice-daily radiation, 45 Gy/ 30 fractions delivered on week days over three weeks twiice daily vs 60-66 Gy in 30-33 fractions over 6-6.5 weeks in once-daily fractions when delivered as concurrent therapy.      We then reviewed the role of advanced radiation modalities including VMAT/IMRT (photons). Given the large treatment volume, there might be dosimetric benefits of considering IMRT/VMAT over 3DCRT to reduce dose to the lungs, heart, esophagus and cord. This could  potentially translate to reduced risk of acute and delayed complications.      We then reviewed possible side effects (Acute and long-term). Common and uncommon side effects with risks as relevant for his case were discussed.     The patient would like to do daily treaments, as it will be easier for him to continue working. We will discuss case with med onc to determine best course of action moving forward. CT simulation will be arranged at the earliest, depending on order of RT. Additionally, we discussed that if the liver lesion is positive, then we would defer RT for now and he would start with systemic therapy only.     NCCN Guidelines were applicable to guide this patients treatment plan.      Network location: The patient will be treated at the [facility] location. Simulation will be done at the [facility] location.   This will be arranged on 03/29/2024, to allow possible results from biopsy.     Pain Management:  No active concerns.     Social Work:  No concerns.     Nutrition: Will be seen by Dietician.    The patient was provided our contact information.    Trish Rankin DO,MS,MPH  PGY-3  For  To Conde MD, MMM  Senior Attending Physician, [facility]  Professor, [employer]   Our [place]: “To Heal, To Teach, To Discover.”  RN partner: [email or web address]    Phone (scheduling): [phone]/ [email or web address]  Phone (after hours): [phone]      ATTENDING ADDENDUM:  I saw and evaluated the patient with the resident. I personally obtained the key and critical portions of the history and physical exam and directly counseled the patient of the treatment plan. I reviewed the resident documentation and discussed the patient with the resident. I agree with the resident's medical decision making as documented in the note.

## 2024-03-16 NOTE — ADDENDUM NOTE
Encounter addended by: To Conde MD on: 3/16/2024 5:20 PM   Actions taken: Level of Service modified, Clinical Note Signed

## 2024-03-19 ENCOUNTER — INFUSION (OUTPATIENT)
Dept: HEMATOLOGY/ONCOLOGY | Facility: HOSPITAL | Age: 64
End: 2024-03-19
Payer: MEDICARE

## 2024-03-19 VITALS
HEIGHT: 70 IN | HEART RATE: 91 BPM | WEIGHT: 243 LBS | SYSTOLIC BLOOD PRESSURE: 149 MMHG | RESPIRATION RATE: 18 BRPM | DIASTOLIC BLOOD PRESSURE: 82 MMHG | BODY MASS INDEX: 34.79 KG/M2 | TEMPERATURE: 97 F | OXYGEN SATURATION: 99 %

## 2024-03-19 DIAGNOSIS — C34.90 SMALL CELL LUNG CANCER (MULTI): ICD-10-CM

## 2024-03-19 PROCEDURE — 96375 TX/PRO/DX INJ NEW DRUG ADDON: CPT | Mod: INF

## 2024-03-19 PROCEDURE — 96367 TX/PROPH/DG ADDL SEQ IV INF: CPT

## 2024-03-19 PROCEDURE — 2500000004 HC RX 250 GENERAL PHARMACY W/ HCPCS (ALT 636 FOR OP/ED): Performed by: INTERNAL MEDICINE

## 2024-03-19 PROCEDURE — 96417 CHEMO IV INFUS EACH ADDL SEQ: CPT

## 2024-03-19 PROCEDURE — 96413 CHEMO IV INFUSION 1 HR: CPT

## 2024-03-19 RX ORDER — PROCHLORPERAZINE EDISYLATE 5 MG/ML
10 INJECTION INTRAMUSCULAR; INTRAVENOUS EVERY 6 HOURS PRN
Status: DISCONTINUED | OUTPATIENT
Start: 2024-03-19 | End: 2024-03-19 | Stop reason: HOSPADM

## 2024-03-19 RX ORDER — FAMOTIDINE 10 MG/ML
20 INJECTION INTRAVENOUS ONCE AS NEEDED
Status: DISCONTINUED | OUTPATIENT
Start: 2024-03-19 | End: 2024-03-19 | Stop reason: HOSPADM

## 2024-03-19 RX ORDER — PROCHLORPERAZINE MALEATE 10 MG
10 TABLET ORAL EVERY 6 HOURS PRN
Status: DISCONTINUED | OUTPATIENT
Start: 2024-03-19 | End: 2024-03-19 | Stop reason: HOSPADM

## 2024-03-19 RX ORDER — PALONOSETRON 0.05 MG/ML
0.25 INJECTION, SOLUTION INTRAVENOUS ONCE
Status: COMPLETED | OUTPATIENT
Start: 2024-03-19 | End: 2024-03-19

## 2024-03-19 RX ORDER — ALBUTEROL SULFATE 0.83 MG/ML
3 SOLUTION RESPIRATORY (INHALATION) AS NEEDED
Status: DISCONTINUED | OUTPATIENT
Start: 2024-03-19 | End: 2024-03-19 | Stop reason: HOSPADM

## 2024-03-19 RX ORDER — EPINEPHRINE 0.3 MG/.3ML
0.3 INJECTION SUBCUTANEOUS EVERY 5 MIN PRN
Status: DISCONTINUED | OUTPATIENT
Start: 2024-03-19 | End: 2024-03-19 | Stop reason: HOSPADM

## 2024-03-19 RX ORDER — DIPHENHYDRAMINE HYDROCHLORIDE 50 MG/ML
50 INJECTION INTRAMUSCULAR; INTRAVENOUS AS NEEDED
Status: DISCONTINUED | OUTPATIENT
Start: 2024-03-19 | End: 2024-03-19 | Stop reason: HOSPADM

## 2024-03-19 RX ADMIN — CARBOPLATIN 426 MG: 450 INJECTION, SOLUTION INTRAVENOUS at 10:15

## 2024-03-19 RX ADMIN — DEXAMETHASONE SODIUM PHOSPHATE 12 MG: 10 INJECTION, SOLUTION INTRAMUSCULAR; INTRAVENOUS at 09:10

## 2024-03-19 RX ADMIN — PALONOSETRON HYDROCHLORIDE 250 MCG: 0.25 INJECTION INTRAVENOUS at 09:10

## 2024-03-19 RX ADMIN — ETOPOSIDE 234 MG: 20 INJECTION INTRAVENOUS at 10:55

## 2024-03-19 RX ADMIN — FOSAPREPITANT 150 MG: 150 INJECTION, POWDER, LYOPHILIZED, FOR SOLUTION INTRAVENOUS at 09:33

## 2024-03-19 ASSESSMENT — PAIN SCALES - GENERAL: PAINLEVEL: 0-NO PAIN

## 2024-03-19 NOTE — PROGRESS NOTES
Pt tolerated infusion today without any s/sx of an adverse rxn. Patient requested to have IV kept in place for day 2-3 etoposide. PIV was wrapped and pt was discharged home in stable condition.

## 2024-03-20 ENCOUNTER — INFUSION (OUTPATIENT)
Dept: HEMATOLOGY/ONCOLOGY | Facility: HOSPITAL | Age: 64
End: 2024-03-20
Payer: MEDICARE

## 2024-03-20 VITALS
HEART RATE: 84 BPM | SYSTOLIC BLOOD PRESSURE: 144 MMHG | DIASTOLIC BLOOD PRESSURE: 78 MMHG | TEMPERATURE: 98.6 F | WEIGHT: 246.91 LBS | BODY MASS INDEX: 35.11 KG/M2 | OXYGEN SATURATION: 94 % | RESPIRATION RATE: 18 BRPM

## 2024-03-20 DIAGNOSIS — C34.90 SMALL CELL LUNG CANCER (MULTI): ICD-10-CM

## 2024-03-20 PROCEDURE — 96413 CHEMO IV INFUSION 1 HR: CPT

## 2024-03-20 PROCEDURE — 2500000004 HC RX 250 GENERAL PHARMACY W/ HCPCS (ALT 636 FOR OP/ED): Performed by: INTERNAL MEDICINE

## 2024-03-20 PROCEDURE — 96375 TX/PRO/DX INJ NEW DRUG ADDON: CPT | Mod: INF

## 2024-03-20 RX ORDER — PROCHLORPERAZINE MALEATE 10 MG
10 TABLET ORAL EVERY 6 HOURS PRN
Status: DISCONTINUED | OUTPATIENT
Start: 2024-03-20 | End: 2024-03-20 | Stop reason: HOSPADM

## 2024-03-20 RX ORDER — HEPARIN 100 UNIT/ML
500 SYRINGE INTRAVENOUS AS NEEDED
Status: CANCELLED | OUTPATIENT
Start: 2024-03-20

## 2024-03-20 RX ORDER — FAMOTIDINE 10 MG/ML
20 INJECTION INTRAVENOUS ONCE AS NEEDED
Status: DISCONTINUED | OUTPATIENT
Start: 2024-03-20 | End: 2024-03-20 | Stop reason: HOSPADM

## 2024-03-20 RX ORDER — PROCHLORPERAZINE EDISYLATE 5 MG/ML
10 INJECTION INTRAMUSCULAR; INTRAVENOUS EVERY 6 HOURS PRN
Status: DISCONTINUED | OUTPATIENT
Start: 2024-03-20 | End: 2024-03-20 | Stop reason: HOSPADM

## 2024-03-20 RX ORDER — HEPARIN SODIUM,PORCINE/PF 10 UNIT/ML
50 SYRINGE (ML) INTRAVENOUS AS NEEDED
Status: CANCELLED | OUTPATIENT
Start: 2024-03-20

## 2024-03-20 RX ORDER — DIPHENHYDRAMINE HYDROCHLORIDE 50 MG/ML
50 INJECTION INTRAMUSCULAR; INTRAVENOUS AS NEEDED
Status: DISCONTINUED | OUTPATIENT
Start: 2024-03-20 | End: 2024-03-20 | Stop reason: HOSPADM

## 2024-03-20 RX ORDER — EPINEPHRINE 0.3 MG/.3ML
0.3 INJECTION SUBCUTANEOUS EVERY 5 MIN PRN
Status: DISCONTINUED | OUTPATIENT
Start: 2024-03-20 | End: 2024-03-20 | Stop reason: HOSPADM

## 2024-03-20 RX ORDER — ALBUTEROL SULFATE 0.83 MG/ML
3 SOLUTION RESPIRATORY (INHALATION) AS NEEDED
Status: DISCONTINUED | OUTPATIENT
Start: 2024-03-20 | End: 2024-03-20 | Stop reason: HOSPADM

## 2024-03-20 RX ADMIN — ETOPOSIDE 234 MG: 20 INJECTION INTRAVENOUS at 11:18

## 2024-03-20 RX ADMIN — DEXAMETHASONE SODIUM PHOSPHATE 8 MG: 4 INJECTION, SOLUTION INTRA-ARTICULAR; INTRALESIONAL; INTRAMUSCULAR; INTRAVENOUS; SOFT TISSUE at 10:52

## 2024-03-20 ASSESSMENT — PAIN SCALES - GENERAL: PAINLEVEL: 0-NO PAIN

## 2024-03-20 NOTE — PROGRESS NOTES
Dane Molina is a 64 y.o. male who presents for OP Infusion.    He is on the following chemotherapy regimen:     Treatment Plans       Name Type Plan Dates Plan Provider         Active    CARBOplatin / Etoposide, 21 Day Cycles - Lung Oncology Treatment  3/14/2024 - Present Tori Galvin MD                  .    After yesterday's infusion, he has been doing well.  Overall, he states his energy level is stable.  His appetite has been good.  He reports no complaints.  He has no other concerns.    Lines of note: PIV right forearm intact  Site Maintenance:  Flushed, blood return noted .  Line Removal:  PIV saline locked, wrapped and left in place for tomorrows treatment     Patient tolerated infusion without any complications.  Discharged home with family in stable condition.

## 2024-03-21 ENCOUNTER — INFUSION (OUTPATIENT)
Dept: HEMATOLOGY/ONCOLOGY | Facility: HOSPITAL | Age: 64
End: 2024-03-21
Payer: MEDICARE

## 2024-03-21 VITALS
WEIGHT: 248.46 LBS | RESPIRATION RATE: 19 BRPM | BODY MASS INDEX: 35.33 KG/M2 | HEART RATE: 84 BPM | SYSTOLIC BLOOD PRESSURE: 134 MMHG | TEMPERATURE: 98.1 F | OXYGEN SATURATION: 96 % | DIASTOLIC BLOOD PRESSURE: 75 MMHG

## 2024-03-21 DIAGNOSIS — C34.90 SMALL CELL LUNG CANCER (MULTI): ICD-10-CM

## 2024-03-21 DIAGNOSIS — C34.90 SMALL CELL LUNG CANCER (MULTI): Primary | ICD-10-CM

## 2024-03-21 PROCEDURE — 96375 TX/PRO/DX INJ NEW DRUG ADDON: CPT | Mod: INF

## 2024-03-21 PROCEDURE — 96413 CHEMO IV INFUSION 1 HR: CPT

## 2024-03-21 PROCEDURE — 2500000004 HC RX 250 GENERAL PHARMACY W/ HCPCS (ALT 636 FOR OP/ED): Performed by: INTERNAL MEDICINE

## 2024-03-21 PROCEDURE — 96374 THER/PROPH/DIAG INJ IV PUSH: CPT | Mod: INF

## 2024-03-21 RX ORDER — DIPHENHYDRAMINE HYDROCHLORIDE 50 MG/ML
50 INJECTION INTRAMUSCULAR; INTRAVENOUS AS NEEDED
Status: DISCONTINUED | OUTPATIENT
Start: 2024-03-21 | End: 2024-03-21 | Stop reason: HOSPADM

## 2024-03-21 RX ORDER — EPINEPHRINE 0.3 MG/.3ML
0.3 INJECTION SUBCUTANEOUS EVERY 5 MIN PRN
Status: DISCONTINUED | OUTPATIENT
Start: 2024-03-21 | End: 2024-03-21 | Stop reason: HOSPADM

## 2024-03-21 RX ORDER — FAMOTIDINE 10 MG/ML
20 INJECTION INTRAVENOUS ONCE AS NEEDED
Status: DISCONTINUED | OUTPATIENT
Start: 2024-03-21 | End: 2024-03-21 | Stop reason: HOSPADM

## 2024-03-21 RX ORDER — PROCHLORPERAZINE MALEATE 10 MG
10 TABLET ORAL EVERY 6 HOURS PRN
Status: DISCONTINUED | OUTPATIENT
Start: 2024-03-21 | End: 2024-03-21 | Stop reason: HOSPADM

## 2024-03-21 RX ORDER — HEPARIN 100 UNIT/ML
500 SYRINGE INTRAVENOUS AS NEEDED
OUTPATIENT
Start: 2024-03-21

## 2024-03-21 RX ORDER — ONDANSETRON HYDROCHLORIDE 2 MG/ML
8 INJECTION, SOLUTION INTRAVENOUS ONCE
Status: COMPLETED | OUTPATIENT
Start: 2024-03-21 | End: 2024-03-21

## 2024-03-21 RX ORDER — PROCHLORPERAZINE EDISYLATE 5 MG/ML
10 INJECTION INTRAMUSCULAR; INTRAVENOUS EVERY 6 HOURS PRN
Status: DISCONTINUED | OUTPATIENT
Start: 2024-03-21 | End: 2024-03-21 | Stop reason: HOSPADM

## 2024-03-21 RX ORDER — HEPARIN SODIUM,PORCINE/PF 10 UNIT/ML
50 SYRINGE (ML) INTRAVENOUS AS NEEDED
OUTPATIENT
Start: 2024-03-21

## 2024-03-21 RX ORDER — ALBUTEROL SULFATE 0.83 MG/ML
3 SOLUTION RESPIRATORY (INHALATION) AS NEEDED
Status: DISCONTINUED | OUTPATIENT
Start: 2024-03-21 | End: 2024-03-21 | Stop reason: HOSPADM

## 2024-03-21 RX ADMIN — ETOPOSIDE 234 MG: 20 INJECTION INTRAVENOUS at 10:52

## 2024-03-21 RX ADMIN — ONDANSETRON 8 MG: 2 INJECTION INTRAMUSCULAR; INTRAVENOUS at 10:10

## 2024-03-21 RX ADMIN — DEXAMETHASONE SODIUM PHOSPHATE 8 MG: 4 INJECTION, SOLUTION INTRA-ARTICULAR; INTRALESIONAL; INTRAMUSCULAR; INTRAVENOUS; SOFT TISSUE at 10:11

## 2024-03-21 ASSESSMENT — PAIN SCALES - GENERAL: PAINLEVEL: 0-NO PAIN

## 2024-03-21 NOTE — PROGRESS NOTES
Patient arrived to infusion accompanied by family. IV access from 3/19 intact with blood return. Patient tolerated infusion without difficulty. IV access removed and patient discharged in stable condition with family.

## 2024-03-22 ENCOUNTER — HOSPITAL ENCOUNTER (OUTPATIENT)
Dept: RADIOLOGY | Facility: HOSPITAL | Age: 64
Discharge: HOME | End: 2024-03-22
Payer: MEDICARE

## 2024-03-22 ENCOUNTER — APPOINTMENT (OUTPATIENT)
Dept: CARDIOLOGY | Facility: CLINIC | Age: 64
End: 2024-03-22
Payer: MEDICAID

## 2024-03-22 ENCOUNTER — HOSPITAL ENCOUNTER (OUTPATIENT)
Dept: CARDIOLOGY | Facility: HOSPITAL | Age: 64
Discharge: HOME | End: 2024-03-22
Payer: MEDICARE

## 2024-03-22 VITALS
DIASTOLIC BLOOD PRESSURE: 76 MMHG | RESPIRATION RATE: 15 BRPM | HEART RATE: 74 BPM | TEMPERATURE: 97.7 F | OXYGEN SATURATION: 99 % | SYSTOLIC BLOOD PRESSURE: 124 MMHG

## 2024-03-22 DIAGNOSIS — I25.84 CORONARY ARTERY CALCIFICATION: ICD-10-CM

## 2024-03-22 DIAGNOSIS — I25.10 CORONARY ARTERY CALCIFICATION: ICD-10-CM

## 2024-03-22 DIAGNOSIS — R06.00 DYSPNEA, UNSPECIFIED: ICD-10-CM

## 2024-03-22 DIAGNOSIS — I25.10 CORONARY ARTERY DISEASE INVOLVING NATIVE CORONARY ARTERY OF NATIVE HEART WITHOUT ANGINA PECTORIS: ICD-10-CM

## 2024-03-22 DIAGNOSIS — R06.09 DOE (DYSPNEA ON EXERTION): ICD-10-CM

## 2024-03-22 DIAGNOSIS — C34.90 SMALL CELL LUNG CANCER (MULTI): ICD-10-CM

## 2024-03-22 PROCEDURE — 2720000007 HC OR 272 NO HCPCS

## 2024-03-22 PROCEDURE — 76999 ECHO EXAMINATION PROCEDURE: CPT

## 2024-03-22 PROCEDURE — 88307 TISSUE EXAM BY PATHOLOGIST: CPT | Performed by: STUDENT IN AN ORGANIZED HEALTH CARE EDUCATION/TRAINING PROGRAM

## 2024-03-22 PROCEDURE — 93306 TTE W/DOPPLER COMPLETE: CPT | Performed by: STUDENT IN AN ORGANIZED HEALTH CARE EDUCATION/TRAINING PROGRAM

## 2024-03-22 PROCEDURE — 99153 MOD SED SAME PHYS/QHP EA: CPT

## 2024-03-22 PROCEDURE — 93306 TTE W/DOPPLER COMPLETE: CPT

## 2024-03-22 PROCEDURE — 99152 MOD SED SAME PHYS/QHP 5/>YRS: CPT

## 2024-03-22 PROCEDURE — 76942 ECHO GUIDE FOR BIOPSY: CPT

## 2024-03-22 PROCEDURE — 2500000004 HC RX 250 GENERAL PHARMACY W/ HCPCS (ALT 636 FOR OP/ED): Mod: SE | Performed by: RADIOLOGY

## 2024-03-22 PROCEDURE — 47000 NEEDLE BIOPSY OF LIVER PERQ: CPT | Performed by: RADIOLOGY

## 2024-03-22 PROCEDURE — 76942 ECHO GUIDE FOR BIOPSY: CPT | Performed by: RADIOLOGY

## 2024-03-22 PROCEDURE — 99153 MOD SED SAME PHYS/QHP EA: CPT | Performed by: RADIOLOGY

## 2024-03-22 PROCEDURE — 88307 TISSUE EXAM BY PATHOLOGIST: CPT | Mod: TC,SUR | Performed by: STUDENT IN AN ORGANIZED HEALTH CARE EDUCATION/TRAINING PROGRAM

## 2024-03-22 PROCEDURE — 99152 MOD SED SAME PHYS/QHP 5/>YRS: CPT | Performed by: RADIOLOGY

## 2024-03-22 RX ORDER — MIDAZOLAM HYDROCHLORIDE 1 MG/ML
INJECTION INTRAMUSCULAR; INTRAVENOUS
Status: COMPLETED | OUTPATIENT
Start: 2024-03-22 | End: 2024-03-22

## 2024-03-22 RX ORDER — FENTANYL CITRATE 50 UG/ML
INJECTION, SOLUTION INTRAMUSCULAR; INTRAVENOUS
Status: COMPLETED | OUTPATIENT
Start: 2024-03-22 | End: 2024-03-22

## 2024-03-22 RX ADMIN — FENTANYL CITRATE 50 MCG: 50 INJECTION, SOLUTION INTRAMUSCULAR; INTRAVENOUS at 10:13

## 2024-03-22 RX ADMIN — FENTANYL CITRATE 50 MCG: 50 INJECTION, SOLUTION INTRAMUSCULAR; INTRAVENOUS at 10:04

## 2024-03-22 RX ADMIN — MIDAZOLAM HYDROCHLORIDE 1 MG: 1 INJECTION, SOLUTION INTRAMUSCULAR; INTRAVENOUS at 10:13

## 2024-03-22 RX ADMIN — MIDAZOLAM HYDROCHLORIDE 1 MG: 1 INJECTION, SOLUTION INTRAMUSCULAR; INTRAVENOUS at 10:05

## 2024-03-22 ASSESSMENT — PAIN SCALES - GENERAL

## 2024-03-22 NOTE — DISCHARGE INSTRUCTIONS
You received moderate sedation:  - Do not drive a car, or operate any machinery or power tools of any kind.  - Do not drink any alcoholic drinks.  - Do not take any over the counter medications that may cause drowsiness.  - Do not make any important decisions or sign any legal documents.  - You need to have a responsible adult accompany you home.  - You may resume your normal diet.  - We strongly suggest that a responsible adult be with you for the rest of the day and also during the night. This is for your protection and safety.     For questions related to your procedure:  Please call [phone] between the hours of 7:00am-5:00pm Monday through Friday.  Please call [phone] after 5:00pm and on weekends and holidays.     In the event of an emergency call 911 or go to your nearest emergency room.

## 2024-03-22 NOTE — POST-PROCEDURE NOTE
Interventional Radiology Post-Procedure Note    US guided percutaneous biopsy of liver lesion (R lobe; Hepatic Segment 8)      Indication for procedure: The encounter diagnosis was Small cell lung cancer (CMS/HCC).    Pre-Procedure Verification and Time Out:  · Procedure Location procedure area   · HUDDLE - Pre-procedure Verification completed   · TIME OUT - Final Verification completed immediately prior to procedure start   · DEBRIEF completed     General Information:  Date/Time of Procedure: 03/22/24 at 10:55 AM  Indication(s)/Pre - small cell lung CA with liver lesion (R lobe; hepatic segment 8) concerning for mets.  Post-Procedure Diagnosis: -small cell lung CA with liver lesion (R lobe; hepatic segment 8) concerning for mets.  Procedure Name: US guided percutaneous biopsy of liver lesion (R lobe; Hepatic Segment 8)  Findings: See PACS  Procedure performed by: Dr. Nicki Obrien MD  Assistant(s): Teofilo Franco MD   Estimated Blood Loss (mL): minimal  Specimen: Yes, samples sent to pathology.  Informed Consent: written consent obtained    Procedure Details:    Technically successful and uncomplicated US guided percutaneous biopsy of liver lesion (R lobe; Hepatic Segment 8).  Please see PACS for full procedural details.    Patient Tolerance: good  Complications: None    Prep:  Ultrasound Guided Insertion: Yes  Large Drape, Hand Hygiene, Surgical Cap, Surgical Mask, Sterile Gloves, Glasses, and Scrubs  Patient Position: Supine  Site Prep: chlorhexidine, draped, usual sterile procedure followed    Anesthesia/Medications:  Procedural Sedation:  Medications (Filter: Administrations occurring from 0947 to 1026 on 03/22/24) As of 03/22/24 1026      fentaNYL PF (Sublimaze) injection (mcg) Total dose:  100 mcg      Date/Time Rate/Dose/Volume Action       03/22/24  1004 50 mcg Given      1013 50 mcg Given               midazolam (Versed) injection (mg) Total dose:  2 mg      Date/Time Rate/Dose/Volume Action       03/22/24   1005 1 mg Given      1013 1 mg Given                   Fentanyl: 100 mcg  Versed: 2 mg  1% Lidocaine: 8 mL    Attending Attestation:  I was present for the entire procedure    Teofilo Franco, PGY-3  Diagnostic Radiology    NON-Urgent on call weekends and after hours weekdays (5pm - 5am) IR pager: [phone]  Urgent & emergent on call weekends and after hours weekdays (5pm-7am) IR pager: [phone]

## 2024-03-22 NOTE — PRE-PROCEDURE NOTE
INTERVENTIONAL RADIOLOGY PRE-PROCEDURE NOTE    Dane Molina is a 64 y.o. male with PMHx of small cell lung CA and with liver lesion (R lobe; hepatic segment 8) concerning for mets who presents to the interventional radiology department for US guided percutaneous biopsy of liver lesion.    Procedure: US guided percutaneous biopsy of liver lesion (R lobe; Hepatic Segment 8)    Indication for procedure: The encounter diagnosis was Small cell lung cancer (CMS/HCC).    Past Medical History:   Diagnosis Date    CKD (chronic kidney disease)     COPD (chronic obstructive pulmonary disease) (CMS/HCC)     Hyperlipidemia     Hypertension     SOB (shortness of breath)       Past Surgical History:   Procedure Laterality Date    COLONOSCOPY      ESOPHAGOGASTRODUODENOSCOPY      ORIF ANKLE FRACTURE  1993       Relevant Labs:   Lab Results   Component Value Date    CREATININE 1.57 (H) 03/14/2024    EGFR 49 (L) 03/14/2024    INR 1.2 (H) 03/14/2024    PROTIME 13.4 (H) 03/14/2024       Planned Sedation/Anesthesia: Moderate    Directed physical examination:    Normal appearance, behavior, cognition and NAD  Heart: Heart regular rate and rhythm  Lungs: No increased work of breathing  Abdomen: soft and nontender  Neurologic: negative      Current Outpatient Medications:     amLODIPine (Norvasc) 5 mg tablet, Take 1 tablet (5 mg) by mouth once daily., Disp: 30 tablet, Rfl: 11    atorvastatin (Lipitor) 40 mg tablet, Take 1 tablet (40 mg) by mouth once daily., Disp: , Rfl:     LORazepam (Ativan) 0.5 mg tablet, Take 2 tablets 30 minutes before MRI. If necessary, you may take another tablet at the time of the test., Disp: 3 tablet, Rfl: 0    OLANZapine (ZyPREXA) 5 mg tablet, Take 1 tablet (5 mg) by mouth once daily at bedtime. For 4 days starting the evening of treatment., Disp: 4 tablet, Rfl: 5    ondansetron (Zofran) 8 mg tablet, Take 1 tablet (8 mg) by mouth every 8 hours if needed for nausea or vomiting., Disp: 30 tablet, Rfl: 5     prochlorperazine (Compazine) 10 mg tablet, Take 1 tablet (10 mg) by mouth every 6 hours if needed for nausea or vomiting., Disp: 30 tablet, Rfl: 5    Symbicort 80-4.5 mcg/actuation inhaler, 2 puffs once daily., Disp: , Rfl:     triamterene-hydrochlorothiazid (Maxzide-25) 37.5-25 mg tablet, Take 1 tablet by mouth once daily in the morning., Disp: , Rfl:   No current facility-administered medications for this encounter.     Mallampati: II (hard and soft palate, upper portion of tonsils anduvula visible)    ASA Score: ASA 3 - Patient with moderate systemic disease with functional limitations    Benefits, risks and alternatives of procedure and planned sedation have been discussed with the patient and/or their representative. All questions answered and they agree to proceed.     Teofilo Franco, PGY-3  Diagnostic Radiology    NON-Urgent on call weekends and after hours weekdays (5pm - 5am) IR pager: [phone]  Urgent & emergent on call weekends and after hours weekdays (5pm-7am) IR pager: [phone]

## 2024-03-25 LAB
LABORATORY COMMENT REPORT: NORMAL
PATH REPORT.COMMENTS IMP SPEC: NORMAL
PATH REPORT.FINAL DX SPEC: NORMAL
PATH REPORT.GROSS SPEC: NORMAL
PATH REPORT.RELEVANT HX SPEC: NORMAL
PATH REPORT.TOTAL CANCER: NORMAL

## 2024-03-28 ENCOUNTER — TELEPHONE (OUTPATIENT)
Dept: RADIATION ONCOLOGY | Facility: HOSPITAL | Age: 64
End: 2024-03-28
Payer: COMMERCIAL

## 2024-03-28 NOTE — TELEPHONE ENCOUNTER
Spoke with patient to update pathology results from Liver biopsy as below.    FINAL DIAGNOSIS   A. RIGHT LIVER MASS BIOPSY:      -- LIVER TISSUE INVOLVED BY SMALL CELL CARCINOMA, SEE NOTE     Note: Clinical history of lung small cell carcinoma noted. The findings most likely represent metastasis from the known primary lung tumor.     Given this finding, this will be labeled as Extensive-stage disease. As such, we will hold off radiation simulation appointment scheduled for 03/29/2024, since he will need chemo +/- IO.  Radiation could be considered in future for thoracic consolidation or palliation of symptomatic sites.    I explained prognostic implictation of above to Mr. Molina, who was understanding. He will remain optimistic and will continue to focus on his overall health.    He has our contact details. Dr. Galvin's team is aware.        To Conde MD, MMM  Senior Attending Physician, [facility]  Professor, [employer]   Our [place]: “To Heal, To Teach, To Discover.”  RN partner: [email or web address]    Phone (scheduling): [phone]/ [email or web address]  Phone (after hours): [phone]

## 2024-03-29 ENCOUNTER — APPOINTMENT (OUTPATIENT)
Dept: RADIATION ONCOLOGY | Facility: HOSPITAL | Age: 64
End: 2024-03-29
Payer: MEDICARE

## 2024-04-04 ENCOUNTER — OFFICE VISIT (OUTPATIENT)
Dept: HEMATOLOGY/ONCOLOGY | Facility: HOSPITAL | Age: 64
End: 2024-04-04
Payer: MEDICARE

## 2024-04-04 VITALS
WEIGHT: 236.99 LBS | OXYGEN SATURATION: 95 % | SYSTOLIC BLOOD PRESSURE: 151 MMHG | BODY MASS INDEX: 33.7 KG/M2 | RESPIRATION RATE: 20 BRPM | HEART RATE: 103 BPM | TEMPERATURE: 97.7 F | DIASTOLIC BLOOD PRESSURE: 90 MMHG

## 2024-04-04 DIAGNOSIS — C34.90 SMALL CELL LUNG CANCER (MULTI): Primary | ICD-10-CM

## 2024-04-04 PROCEDURE — 99215 OFFICE O/P EST HI 40 MIN: CPT | Performed by: INTERNAL MEDICINE

## 2024-04-04 ASSESSMENT — PAIN SCALES - GENERAL: PAINLEVEL: 0-NO PAIN

## 2024-04-04 NOTE — PROGRESS NOTES
Patient ID: Dane Molina is a 64 y.o. male    Primary Care Provider: Darwin Early MD    DIAGNOSIS AND STAGING  cT3 pN2 pM1b small cell lung cancer (INSM1 and TTF1+) of the right lower lobe, diagnosed on 02/05/2024 through bronchoscopy     SITES OF DISEASE  Right lower lobe   Levels 4R and 7 nodes   Liver, confirmed by biopsy      MOLECULAR GENOMICS  Not performed   RB loss by IHC      PRIOR THERAPIES  None     CURRENT THERAPY  Carboplatin/etoposide C1D1 on 03/19/24, anticipating adding atezolizumab to C2 on 04/08/2024    CURRENT ONCOLOGICAL PROBLEMS  None      HISTORY OF PRESENT ILLNESS  Patient presented to the emergency room on 01/04/2023 with a hypertensive urgency and cough.  He has a history of noncompliance with antihypertensive medications.  He also complained of dyspnea and underwent a CT chest without IV contrast that demonstrated a right lower lobe paramediastinal mass measuring 3.1 cm with adjacent pulmonary nodule measuring 1.8 cm.  Mediastinal lymphadenopathy was demonstrated, including a subcarinal node measuring 1.7 cm.  There was right hilar lymphadenopathy, 2 cm in short axis.  On 01/15/2024 the patient underwent a PET scan that demonstrated avidity of the aforementioned masses/lymph nodes, as well as a lesion in the liver, SUV max 3.3, right hepatic lobe.  On 02/05/2024, patient underwent a bronchoscopy:  A. LYMPH NODE 4 R PULMONARY FINE NEEDLE ASPIRATION , CYTOLOGY AND CELL BLOCK:    Positive for malignant cells   Metastatic small cell carcinoma, see note  B. LYMPH NODE 7 PULMONARY FINE NEEDLE ASPIRATION , CYTOLOGY AND CELL BLOCK:    Positive for malignant cells  Metastatic small cell carcinoma, see note          Note: Immunostains demonstrate the lesional cells to be diffusely positive for CAM 5.2, INSM1, TTF-1.  The proliferative marker Ki-67 is greater than 90%.  Immunostain for retinoblastoma shows loss of staining.  Immunostain for p40 is negative.  The morphology and  immunohistochemical profile are consistent with the above diagnosis.  On 02/29/24 the pt is seen by med onc for the first time. Denies any systemic sx - denies lack of appetite, fatigue or unintentional weight loss. Denies new localized pain, headaches or neuro sx.  Denies new respiratory sx.  He is claustrophobic and MRI brain was scheduled for today but pt could not go through with the test. He also has mild CKD, which limits his ability to receive intravenous contrast for CT.   03/04/24: MRI brain shows no ICMA, but MRI liver shows a 1.9 cm hepatic lesion with T2 hyperintensity and peripheral enhancement   03/19/2024: C1 D1 carboplatin/etoposide  03/22/24: Liver biopsy:  FINAL DIAGNOSIS   A. RIGHT LIVER MASS BIOPSY:      -- LIVER TISSUE INVOLVED BY SMALL CELL CARCINOMA, SEE NOTE     Note: Clinical history of lung small cell carcinoma noted. The findings most likely represent metastasis from the known primary lung tumor.     04/04/24: discussed with patient liver biopsy results and recommendations to add atezolizumab to his regimen  He has met with radiation oncology, and we will consider consolidative TRT pending response to cytotoxic chemotherapy    04/08/2024: C2 D1 carboplatin/etoposide/atezolizumab       PAST MEDICAL HISTORY  CKD - creatinine 1.68  Neck pain (injury at work)   Hypertension  Iron deficiency anemia (hx of blood transfusion in the 90's)    SURGICAL HISTORY  Ankle fracture and has a latoya in place      SOCIAL HISTORY  Former 35-40 pack-year smoker, quit at age 57  Smokes cigars now   History of alcohol addiction, sober for the past 15 years   Has one chlid (Roula)  Single      FAMILY HISTORY  Lung CA (mother)  Mother had CKD and required HD    CURRENT MEDS REVIEWED       ALLERGIES REVIEWED        SUBJECTIVE:  He is doing well, with no real side effects from cycle 1 carboplatin/etoposide  He had 1 episode of diarrhea this past weekend, which he attributes to his diet which was extremely high in fiber  that day  Denies nausea or vomiting  Denies fatigue  Denies new respiratory symptoms    A 13 point review of systems was performed, with significant findings documented above in subjective history.    OBJECTIVE:  Vitals:    04/04/24 0958   BP: 151/90   Pulse: 103   Resp: 20   Temp: 36.5 °C (97.7 °F)   SpO2: 95%      Body surface area is 2.3 meters squared.     Wt Readings from Last 5 Encounters:   04/04/24 107 kg (236 lb 15.9 oz)   03/21/24 113 kg (248 lb 7.3 oz)   03/20/24 112 kg (246 lb 14.6 oz)   03/19/24 110 kg (243 lb)   03/14/24 113 kg (249 lb)       ECOGSCORE: 1- Restricted in physically strenuous activity.  Carries out light duty.    Physical Exam  Constitutional:       Appearance: Normal appearance.   HENT:      Head: Normocephalic and atraumatic.   Eyes:      General: No scleral icterus.     Extraocular Movements: Extraocular movements intact.      Conjunctiva/sclera: Conjunctivae normal.   Cardiovascular:      Rate and Rhythm: Normal rate and regular rhythm.   Pulmonary:      Effort: Pulmonary effort is normal.   Abdominal:      Palpations: Abdomen is soft. There is no mass.      Tenderness: There is no abdominal tenderness. There is no guarding.   Musculoskeletal:      Right lower leg: No edema.      Left lower leg: No edema.   Skin:     General: Skin is warm.      Findings: No erythema or rash.   Neurological:      General: No focal deficit present.      Mental Status: He is alert and oriented to person, place, and time.      Motor: No weakness.      Gait: Gait normal.   Psychiatric:         Mood and Affect: Mood normal.         Behavior: Behavior normal.         Thought Content: Thought content normal.         Judgment: Judgment normal.        Diagnostic Results   Results:  Labs:  Lab Results   Component Value Date    WBC 7.6 03/14/2024    HGB 14.5 03/14/2024    HCT 43.1 03/14/2024    MCV 93 03/14/2024     03/14/2024      Lab Results   Component Value Date    NEUTROABS 4.04 03/14/2024        Lab  Results   Component Value Date    GLUCOSE 99 03/14/2024    CALCIUM 9.0 03/14/2024     03/14/2024    K 3.6 03/14/2024    CO2 26 03/14/2024     03/14/2024    BUN 17 03/14/2024    CREATININE 1.57 (H) 03/14/2024     Lab Results   Component Value Date    ALT 14 03/14/2024    AST 14 03/14/2024    ALKPHOS 79 03/14/2024    BILITOT 0.4 03/14/2024    BILIDIR 0.1 12/21/2023      Lab Results   Component Value Date    TSH 1.31 12/21/2023           Assessment/Plan     Small cell lung cancer (CMS/HCC), Clinical: Stage FREDRICK (cT3, cN2, pM1b)  This is a former cigarette smoker with T3 pN2 pM1b small cell lung cancer of the RLL -   Now undergoing treatment with carboplatin/etoposide  In view of liver biopsy results confirming metastatic diagnosis, atezolizumab will be added to cycle 2, on 04/08/2024  I was able to review in great detail potential side effects and benefits of this approach and he was able to sign a consent  Will repeat scans after cycle 2, prior to cycle 3 to evaluate his response and tolerability to the current regimen  After 4-6 cycles, we will proceed with consolidative TRT if applicable, as well as will have a discussion regarding whole brain radiation versus serial brain MRI follow-ups.  I will see him after cycle 2, prior to cycle 3, with repeat CT chest/abdomen/pelvis with contrast.  His creatinine clearance is acceptable for IV contrast.    This note was created with the assistance of a speech recognition program.  Although the intention is to generate a document that actually reflects the content of the visit, it is possible that some mistakes occur and may not be corrected by the time of completion of this note.        Tori Galvin MD, MS  Thoracic Medical Oncology   [address] [place] [address]  Phone: [phone]

## 2024-04-05 ENCOUNTER — OFFICE VISIT (OUTPATIENT)
Dept: CARDIOLOGY | Facility: CLINIC | Age: 64
End: 2024-04-05
Payer: MEDICARE

## 2024-04-05 ENCOUNTER — TELEPHONE (OUTPATIENT)
Dept: ADMISSION | Facility: HOSPITAL | Age: 64
End: 2024-04-05

## 2024-04-05 VITALS
OXYGEN SATURATION: 95 % | DIASTOLIC BLOOD PRESSURE: 90 MMHG | BODY MASS INDEX: 33.05 KG/M2 | SYSTOLIC BLOOD PRESSURE: 150 MMHG | HEIGHT: 72 IN | WEIGHT: 244 LBS | HEART RATE: 90 BPM

## 2024-04-05 DIAGNOSIS — Z72.0 TOBACCO ABUSE: ICD-10-CM

## 2024-04-05 DIAGNOSIS — I50.23 ACUTE ON CHRONIC SYSTOLIC HEART FAILURE (MULTI): Primary | ICD-10-CM

## 2024-04-05 DIAGNOSIS — R06.01 ORTHOPNEA: ICD-10-CM

## 2024-04-05 DIAGNOSIS — R06.09 DOE (DYSPNEA ON EXERTION): ICD-10-CM

## 2024-04-05 DIAGNOSIS — I42.0 DILATED CARDIOMYOPATHY (MULTI): ICD-10-CM

## 2024-04-05 DIAGNOSIS — I25.10 CORONARY ARTERY CALCIFICATION: ICD-10-CM

## 2024-04-05 DIAGNOSIS — I25.84 CORONARY ARTERY CALCIFICATION: ICD-10-CM

## 2024-04-05 DIAGNOSIS — I25.10 CORONARY ARTERY DISEASE INVOLVING NATIVE CORONARY ARTERY OF NATIVE HEART WITHOUT ANGINA PECTORIS: ICD-10-CM

## 2024-04-05 DIAGNOSIS — C34.90 SMALL CELL LUNG CANCER (MULTI): ICD-10-CM

## 2024-04-05 LAB
AORTIC VALVE MEAN GRADIENT: 4 MMHG
AORTIC VALVE PEAK VELOCITY: 1.48 M/S
AV PEAK GRADIENT: 8.8 MMHG
AVA (PEAK VEL): 2.48 CM2
AVA (VTI): 2.51 CM2
EJECTION FRACTION APICAL 4 CHAMBER: 37.2
LEFT ATRIUM VOLUME AREA LENGTH INDEX BSA: 40.7 ML/M2
LEFT VENTRICLE INTERNAL DIMENSION DIASTOLE: 6 CM (ref 3.5–6)
LEFT VENTRICULAR OUTFLOW TRACT DIAMETER: 2.1 CM
LV EJECTION FRACTION BIPLANE: 27 %
RIGHT VENTRICLE FREE WALL PEAK S': 12.2 CM/S
RIGHT VENTRICLE PEAK SYSTOLIC PRESSURE: 26 MMHG
TRICUSPID ANNULAR PLANE SYSTOLIC EXCURSION: 2 CM

## 2024-04-05 PROCEDURE — 99215 OFFICE O/P EST HI 40 MIN: CPT | Performed by: STUDENT IN AN ORGANIZED HEALTH CARE EDUCATION/TRAINING PROGRAM

## 2024-04-05 RX ORDER — LOSARTAN POTASSIUM 50 MG/1
50 TABLET ORAL DAILY
Qty: 30 TABLET | Refills: 11 | Status: SHIPPED | OUTPATIENT
Start: 2024-04-05 | End: 2024-05-22 | Stop reason: ALTCHOICE

## 2024-04-05 RX ORDER — IBUPROFEN 200 MG
1 TABLET ORAL EVERY 24 HOURS
Qty: 30 PATCH | Refills: 0 | Status: SHIPPED | OUTPATIENT
Start: 2024-04-05 | End: 2024-05-05

## 2024-04-05 RX ORDER — METOPROLOL SUCCINATE 50 MG/1
50 TABLET, EXTENDED RELEASE ORAL DAILY
Qty: 30 TABLET | Refills: 11 | Status: SHIPPED | OUTPATIENT
Start: 2024-04-05 | End: 2024-05-22 | Stop reason: DRUGHIGH

## 2024-04-05 NOTE — PROGRESS NOTES
Referred by Dr. Early for Follow-up (Echo and nuc results)     HPI:    Dane Molina is a 64 y.o. male with pertinent history of hypertension, dyslipidemia, active tobacco abuse, COPD, 2 FDG avid lesions in the right lower lobe concerning for primary lung cancer, likely underlying coronary artery disease with moderate coronary artery calcifications and right lower lobe mass on CT of chest performed 1/10/2024, no clear ischemia although there was a severe hypertensive response to exercise, moderate to severely dilated left ventricle with LVEDP of 195 mL, moderately reduced LVEF of 33% on nuclear treadmill stress test performed 3/15/2024, moderate severe cardiomyopathy with ejection fraction of 25 to 30%, pseudonormal diastolic dysfunction, moderate eccentric hypertrophy, moderate to severely dilated left ventricle, moderate left atrial enlargement, moderate mitral regurgitation on echo performed 3/22/2024 presents to cardiology clinic for follow-up.    Dyspnea exertion is relatively stable.  We have prolonged discussion about his recent echo and stress test.  He has had minimal chest discomfort.  We have prolonged discussion of the natural history of cardiomyopathy and heart failure.  No recent syncope or dizziness.  He has had minimal palpitations.  He is yet to start amlodipine as blood pressure remains elevated.  He is trying to quit smoking.  He is requesting a nicotine patch.  No exacerbating or relieving factors.  Patient denies chest pain and angina.  Pt denies orthopnea, and paroxysmal nocturnal dyspnea.  Pt denies worsening lower extremity edema.  Pt denies palpitations or syncope.  No recent falls.  No fever or chills.  No cough.  No change in bowel or bladder habits.  No sick contacts.  No recent travel.    12 point review of systems including (Constitutional, Eyes, ENMT, Respiratory, Cardiac, Gastrointestinal, Neurological, Psychiatric, and Hematologic) was performed and is otherwise negative.    Past  medical history:  As above.    Medications were reviewed.    Allergies were reviewed.    Family history:  No sudden cardiac death or premature coronary artery disease.     Social history reviewed:   reports that he has been smoking cigars. He started smoking about 46 years ago. He has never used smokeless tobacco. He reports that he does not currently use alcohol. He reports that he does not currently use drugs.     Medications reviewed:   Current Outpatient Medications   Medication Instructions    amLODIPine (NORVASC) 5 mg, oral, Daily    atorvastatin (LIPITOR) 40 mg, oral, Daily    LORazepam (Ativan) 0.5 mg tablet Take 2 tablets 30 minutes before MRI. If necessary, you may take another tablet at the time of the test.    OLANZapine (ZYPREXA) 5 mg, oral, Nightly, For 4 days starting the evening of treatment.    ondansetron (ZOFRAN) 8 mg, oral, Every 8 hours PRN    prochlorperazine (COMPAZINE) 10 mg, oral, Every 6 hours PRN    Symbicort 80-4.5 mcg/actuation inhaler 2 puffs, Daily    triamterene-hydrochlorothiazid (Maxzide-25) 37.5-25 mg tablet 1 tablet, oral, Every morning        Vitals reviewed: Visit Vitals  /90   Pulse 90       Physical Exam:   General:  Patient is awake, alert, and oriented.  Patient is in no acute distress.  HEENT:  Pupils equal and reactive.  Normocephalic.  Moist mucosa.    Neck:  No thyromegaly.  Normal Jugular Venous Pressure.  Cardiovascular:  Regular rate and rhythm.  Normal S1 and S2.  1/6 MILADY.  Pulmonary:  Clear to auscultation bilaterally.  Abdomen:  Soft. Non-tender.   Non-distended.  Positive bowel sounds.  Lower Extremities:  2+ pedal pulses. No LE edema.  Neurologic:  Cranial nerves intact.  No focal deficit.   Skin: Skin warm and dry, normal skin turgor.   Psychiatric: Normal affect.    Last Labs:  CBC -      Lab Results   Component Value Date    WBC 7.6 03/14/2024    HGB 14.5 03/14/2024    HCT 43.1 03/14/2024     03/14/2024        CMP-  Lab Results   Component Value  Date    GLUCOSE 99 03/14/2024     03/14/2024    K 3.6 03/14/2024     03/14/2024    CO2 26 03/14/2024    ANIONGAP 14 03/14/2024    BUN 17 03/14/2024    CREATININE 1.57 (H) 03/14/2024    EGFR 49 (L) 03/14/2024    CALCIUM 9.0 03/14/2024    PROT 7.4 03/14/2024    ALBUMIN 3.9 03/14/2024    AST 14 03/14/2024    ALT 14 03/14/2024    ALKPHOS 79 03/14/2024    BILITOT 0.4 03/14/2024        LIPIDS-  Lab Results   Component Value Date    CHOL 282 (H) 12/21/2023    TRIG 106 12/21/2023    HDL 44.5 12/21/2023    CHHDL 6.3 12/21/2023    VLDL 21 12/21/2023        OTHERS-  Lab Results   Component Value Date    HGBA1C 5.7 (H) 12/21/2023        I personally reviewed the patient's recent vitals, labs, medications, orders, EKGs, pertinent cardiac imaging/ echocardiography and ischemic evaluations including stress testing.    Assessment and Plan:    Dane Molina is a 64 y.o. male with pertinent history of hypertension, dyslipidemia, active tobacco abuse, COPD, 2 FDG avid lesions in the right lower lobe concerning for primary lung cancer, likely underlying coronary artery disease with moderate coronary artery calcifications and right lower lobe mass on CT of chest performed 1/10/2024, no clear ischemia although there was a severe hypertensive response to exercise, moderate to severely dilated left ventricle with LVEDP of 195 mL, moderately reduced LVEF of 33% on nuclear treadmill stress test performed 3/15/2024, moderate severe cardiomyopathy with ejection fraction of 25 to 30%, pseudonormal diastolic dysfunction, moderate eccentric hypertrophy, moderate to severely dilated left ventricle, moderate left atrial enlargement, moderate mitral regurgitation on echo performed 3/22/2024 presents to cardiology clinic for follow-up.  Dyspnea exertion is relatively stable.  We have prolonged discussion about his recent echo and stress test.  He has had minimal chest discomfort.  We have prolonged discussion of the natural history of  cardiomyopathy and heart failure.  No recent syncope or dizziness.  He has had minimal palpitations.  He is yet to start amlodipine as blood pressure remains elevated.  He is trying to quit smoking.  He is requesting a nicotine patch.      We discussed advanced options like a LifeVest we will defer it at this point.  We also discussed options like Entresto but cost will likely be a significant prohibitive factor at this point.  He may also benefit from a cardiac catheterization in the future as nuclear stress testing sensitivity is significantly reduced in the setting of moderate to severe cardiomyopathy although it is most likely his cardiomyopathy is related to uncontrolled hypertension.    We will not plan to start amlodipine 5 mg once daily.  You do not need to pick this up from your pharmacy.    Will start losartan 50 mg once daily and metoprolol succinate 50 mg once daily.  These will be sent to your pharmacy.    Please obtain repeat blood work including renal function panel and BNP before your follow-up visit.    Please continue remaining cardiac medications including atorvastatin 40 mg daily.    Please followup with me in Cardiology clinic within the next 6 weeks.  Please return to clinic sooner or seek emergent care if your symptoms reoccur or worsen.    Thank you for allowing me to participate in their care.  Please feel free to call me with any further questions or concerns.        Wili Zarco MD, FAC, [facility]STRONG  Division of Cardiovascular Medicine  Medical Director, [facility] [place]  Clinical , [employer]  [email or web address]   Office:  [phone]

## 2024-04-05 NOTE — TELEPHONE ENCOUNTER
[employer] needs further information re tecentriq.   -  Is this 1st line of treatment for small cell lung cancer  - What is dosage and frequency    Please call [phone] and follow prompts to speak with a pharmacist.     Last FUV 4/4 with next planned FUV 4/25

## 2024-04-05 NOTE — PATIENT INSTRUCTIONS
We will not plan to start amlodipine 5 mg once daily.  You do not need to pick this up from your pharmacy.    Will start losartan 50 mg once daily and metoprolol succinate 50 mg once daily.  These will be sent to your pharmacy.    Please obtain repeat blood work including renal function panel and BNP before your follow-up visit.    Please continue remaining cardiac medications including atorvastatin 40 mg daily.    Please followup with me in Cardiology clinic within the next 6 weeks.  Please return to clinic sooner or seek emergent care if your symptoms reoccur or worsen.

## 2024-04-08 ENCOUNTER — INFUSION (OUTPATIENT)
Dept: HEMATOLOGY/ONCOLOGY | Facility: HOSPITAL | Age: 64
End: 2024-04-08
Payer: MEDICARE

## 2024-04-08 VITALS
RESPIRATION RATE: 18 BRPM | HEART RATE: 90 BPM | DIASTOLIC BLOOD PRESSURE: 87 MMHG | BODY MASS INDEX: 33.01 KG/M2 | WEIGHT: 243.39 LBS | SYSTOLIC BLOOD PRESSURE: 137 MMHG | TEMPERATURE: 98.6 F | OXYGEN SATURATION: 97 %

## 2024-04-08 DIAGNOSIS — I50.23 ACUTE ON CHRONIC SYSTOLIC HEART FAILURE (MULTI): ICD-10-CM

## 2024-04-08 DIAGNOSIS — R06.01 ORTHOPNEA: ICD-10-CM

## 2024-04-08 DIAGNOSIS — I25.84 CORONARY ARTERY CALCIFICATION: ICD-10-CM

## 2024-04-08 DIAGNOSIS — R06.09 DOE (DYSPNEA ON EXERTION): ICD-10-CM

## 2024-04-08 DIAGNOSIS — Z72.0 TOBACCO ABUSE: ICD-10-CM

## 2024-04-08 DIAGNOSIS — C34.90 SMALL CELL LUNG CANCER (MULTI): ICD-10-CM

## 2024-04-08 DIAGNOSIS — I42.0 DILATED CARDIOMYOPATHY (MULTI): ICD-10-CM

## 2024-04-08 DIAGNOSIS — I25.10 CORONARY ARTERY CALCIFICATION: ICD-10-CM

## 2024-04-08 DIAGNOSIS — I25.10 CORONARY ARTERY DISEASE INVOLVING NATIVE CORONARY ARTERY OF NATIVE HEART WITHOUT ANGINA PECTORIS: ICD-10-CM

## 2024-04-08 LAB
ALBUMIN SERPL BCP-MCNC: 3.7 G/DL (ref 3.4–5)
ALP SERPL-CCNC: 60 U/L (ref 33–136)
ALT SERPL W P-5'-P-CCNC: 18 U/L (ref 10–52)
ANION GAP SERPL CALC-SCNC: 15 MMOL/L (ref 10–20)
AST SERPL W P-5'-P-CCNC: 12 U/L (ref 9–39)
BASOPHILS # BLD MANUAL: 0 X10*3/UL (ref 0–0.1)
BASOPHILS NFR BLD MANUAL: 0 %
BILIRUB SERPL-MCNC: 0.3 MG/DL (ref 0–1.2)
BNP SERPL-MCNC: 117 PG/ML (ref 0–99)
BUN SERPL-MCNC: 15 MG/DL (ref 6–23)
CALCIUM SERPL-MCNC: 8.2 MG/DL (ref 8.6–10.3)
CHLORIDE SERPL-SCNC: 107 MMOL/L (ref 98–107)
CO2 SERPL-SCNC: 23 MMOL/L (ref 21–32)
CREAT SERPL-MCNC: 1.35 MG/DL (ref 0.5–1.3)
EGFRCR SERPLBLD CKD-EPI 2021: 59 ML/MIN/1.73M*2
EOSINOPHIL # BLD MANUAL: 0 X10*3/UL (ref 0–0.7)
EOSINOPHIL NFR BLD MANUAL: 0 %
ERYTHROCYTE [DISTWIDTH] IN BLOOD BY AUTOMATED COUNT: 13.8 % (ref 11.5–14.5)
GLUCOSE SERPL-MCNC: 131 MG/DL (ref 74–99)
HCT VFR BLD AUTO: 36.4 % (ref 41–52)
HGB BLD-MCNC: 12.3 G/DL (ref 13.5–17.5)
IMM GRANULOCYTES # BLD AUTO: 0.96 X10*3/UL (ref 0–0.7)
IMM GRANULOCYTES NFR BLD AUTO: 9.3 % (ref 0–0.9)
LYMPHOCYTES # BLD MANUAL: 1.85 X10*3/UL (ref 1.2–4.8)
LYMPHOCYTES NFR BLD MANUAL: 18 %
MCH RBC QN AUTO: 30.7 PG (ref 26–34)
MCHC RBC AUTO-ENTMCNC: 33.8 G/DL (ref 32–36)
MCV RBC AUTO: 91 FL (ref 80–100)
METAMYELOCYTES # BLD MANUAL: 0.31 X10*3/UL
METAMYELOCYTES NFR BLD MANUAL: 3 %
MONOCYTES # BLD MANUAL: 1.96 X10*3/UL (ref 0.1–1)
MONOCYTES NFR BLD MANUAL: 19 %
MYELOCYTES # BLD MANUAL: 0.52 X10*3/UL
MYELOCYTES NFR BLD MANUAL: 5 %
NEUTROPHILS # BLD MANUAL: 5.67 X10*3/UL (ref 1.2–7.7)
NEUTS BAND # BLD MANUAL: 0.62 X10*3/UL (ref 0–0.7)
NEUTS BAND NFR BLD MANUAL: 6 %
NEUTS SEG # BLD MANUAL: 5.05 X10*3/UL (ref 1.2–7)
NEUTS SEG NFR BLD MANUAL: 49 %
NRBC BLD-RTO: 0.2 /100 WBCS (ref 0–0)
PHOSPHATE SERPL-MCNC: 2.6 MG/DL (ref 2.5–4.9)
PLATELET # BLD AUTO: 261 X10*3/UL (ref 150–450)
POLYCHROMASIA BLD QL SMEAR: ABNORMAL
POTASSIUM SERPL-SCNC: 3.7 MMOL/L (ref 3.5–5.3)
PROT SERPL-MCNC: 7.1 G/DL (ref 6.4–8.2)
RBC # BLD AUTO: 4.01 X10*6/UL (ref 4.5–5.9)
RBC MORPH BLD: ABNORMAL
SODIUM SERPL-SCNC: 141 MMOL/L (ref 136–145)
TOTAL CELLS COUNTED BLD: 100
WBC # BLD AUTO: 10.3 X10*3/UL (ref 4.4–11.3)

## 2024-04-08 PROCEDURE — 96417 CHEMO IV INFUS EACH ADDL SEQ: CPT

## 2024-04-08 PROCEDURE — 83880 ASSAY OF NATRIURETIC PEPTIDE: CPT

## 2024-04-08 PROCEDURE — 2500000004 HC RX 250 GENERAL PHARMACY W/ HCPCS (ALT 636 FOR OP/ED): Performed by: INTERNAL MEDICINE

## 2024-04-08 PROCEDURE — 85007 BL SMEAR W/DIFF WBC COUNT: CPT

## 2024-04-08 PROCEDURE — 80053 COMPREHEN METABOLIC PANEL: CPT

## 2024-04-08 PROCEDURE — 85027 COMPLETE CBC AUTOMATED: CPT

## 2024-04-08 PROCEDURE — 96375 TX/PRO/DX INJ NEW DRUG ADDON: CPT | Mod: INF

## 2024-04-08 PROCEDURE — 96367 TX/PROPH/DG ADDL SEQ IV INF: CPT

## 2024-04-08 PROCEDURE — 2500000004 HC RX 250 GENERAL PHARMACY W/ HCPCS (ALT 636 FOR OP/ED): Mod: JZ | Performed by: INTERNAL MEDICINE

## 2024-04-08 PROCEDURE — 84100 ASSAY OF PHOSPHORUS: CPT

## 2024-04-08 PROCEDURE — 96413 CHEMO IV INFUSION 1 HR: CPT

## 2024-04-08 RX ORDER — FAMOTIDINE 10 MG/ML
20 INJECTION INTRAVENOUS ONCE AS NEEDED
Status: CANCELLED | OUTPATIENT
Start: 2024-04-10

## 2024-04-08 RX ORDER — PROCHLORPERAZINE MALEATE 10 MG
10 TABLET ORAL EVERY 6 HOURS PRN
Status: CANCELLED | OUTPATIENT
Start: 2024-04-08

## 2024-04-08 RX ORDER — PROCHLORPERAZINE EDISYLATE 5 MG/ML
10 INJECTION INTRAMUSCULAR; INTRAVENOUS EVERY 6 HOURS PRN
Status: DISCONTINUED | OUTPATIENT
Start: 2024-04-08 | End: 2024-04-08 | Stop reason: HOSPADM

## 2024-04-08 RX ORDER — DIPHENHYDRAMINE HYDROCHLORIDE 50 MG/ML
50 INJECTION INTRAMUSCULAR; INTRAVENOUS AS NEEDED
Status: CANCELLED | OUTPATIENT
Start: 2024-04-09

## 2024-04-08 RX ORDER — ONDANSETRON HYDROCHLORIDE 2 MG/ML
8 INJECTION, SOLUTION INTRAVENOUS ONCE
Status: CANCELLED | OUTPATIENT
Start: 2024-04-10

## 2024-04-08 RX ORDER — PROCHLORPERAZINE EDISYLATE 5 MG/ML
10 INJECTION INTRAMUSCULAR; INTRAVENOUS EVERY 6 HOURS PRN
Status: CANCELLED | OUTPATIENT
Start: 2024-04-08

## 2024-04-08 RX ORDER — PROCHLORPERAZINE EDISYLATE 5 MG/ML
10 INJECTION INTRAMUSCULAR; INTRAVENOUS EVERY 6 HOURS PRN
Status: CANCELLED | OUTPATIENT
Start: 2024-04-09

## 2024-04-08 RX ORDER — ALBUTEROL SULFATE 0.83 MG/ML
3 SOLUTION RESPIRATORY (INHALATION) AS NEEDED
Status: CANCELLED | OUTPATIENT
Start: 2024-04-09

## 2024-04-08 RX ORDER — DEXAMETHASONE SODIUM PHOSPHATE 4 MG/ML
8 INJECTION, SOLUTION INTRA-ARTICULAR; INTRALESIONAL; INTRAMUSCULAR; INTRAVENOUS; SOFT TISSUE ONCE
Status: CANCELLED | OUTPATIENT
Start: 2024-04-09

## 2024-04-08 RX ORDER — FAMOTIDINE 10 MG/ML
20 INJECTION INTRAVENOUS ONCE AS NEEDED
Status: DISCONTINUED | OUTPATIENT
Start: 2024-04-08 | End: 2024-04-08 | Stop reason: HOSPADM

## 2024-04-08 RX ORDER — ALBUTEROL SULFATE 0.83 MG/ML
3 SOLUTION RESPIRATORY (INHALATION) AS NEEDED
Status: DISCONTINUED | OUTPATIENT
Start: 2024-04-08 | End: 2024-04-08 | Stop reason: HOSPADM

## 2024-04-08 RX ORDER — EPINEPHRINE 0.3 MG/.3ML
0.3 INJECTION SUBCUTANEOUS EVERY 5 MIN PRN
Status: CANCELLED | OUTPATIENT
Start: 2024-04-08

## 2024-04-08 RX ORDER — DIPHENHYDRAMINE HYDROCHLORIDE 50 MG/ML
50 INJECTION INTRAMUSCULAR; INTRAVENOUS AS NEEDED
Status: CANCELLED | OUTPATIENT
Start: 2024-04-08

## 2024-04-08 RX ORDER — PROCHLORPERAZINE EDISYLATE 5 MG/ML
10 INJECTION INTRAMUSCULAR; INTRAVENOUS EVERY 6 HOURS PRN
Status: CANCELLED | OUTPATIENT
Start: 2024-04-10

## 2024-04-08 RX ORDER — DEXAMETHASONE SODIUM PHOSPHATE 4 MG/ML
8 INJECTION, SOLUTION INTRA-ARTICULAR; INTRALESIONAL; INTRAMUSCULAR; INTRAVENOUS; SOFT TISSUE ONCE
Status: CANCELLED | OUTPATIENT
Start: 2024-04-10

## 2024-04-08 RX ORDER — FAMOTIDINE 10 MG/ML
20 INJECTION INTRAVENOUS ONCE AS NEEDED
Status: CANCELLED | OUTPATIENT
Start: 2024-04-08

## 2024-04-08 RX ORDER — FAMOTIDINE 10 MG/ML
20 INJECTION INTRAVENOUS ONCE AS NEEDED
Status: CANCELLED | OUTPATIENT
Start: 2024-04-09

## 2024-04-08 RX ORDER — PROCHLORPERAZINE MALEATE 10 MG
10 TABLET ORAL EVERY 6 HOURS PRN
Status: DISCONTINUED | OUTPATIENT
Start: 2024-04-08 | End: 2024-04-08 | Stop reason: HOSPADM

## 2024-04-08 RX ORDER — EPINEPHRINE 0.3 MG/.3ML
0.3 INJECTION SUBCUTANEOUS EVERY 5 MIN PRN
Status: CANCELLED | OUTPATIENT
Start: 2024-04-09

## 2024-04-08 RX ORDER — ALBUTEROL SULFATE 0.83 MG/ML
3 SOLUTION RESPIRATORY (INHALATION) AS NEEDED
Status: CANCELLED | OUTPATIENT
Start: 2024-04-10

## 2024-04-08 RX ORDER — ALBUTEROL SULFATE 0.83 MG/ML
3 SOLUTION RESPIRATORY (INHALATION) AS NEEDED
Status: CANCELLED | OUTPATIENT
Start: 2024-04-08

## 2024-04-08 RX ORDER — PALONOSETRON 0.05 MG/ML
0.25 INJECTION, SOLUTION INTRAVENOUS ONCE
Status: COMPLETED | OUTPATIENT
Start: 2024-04-08 | End: 2024-04-08

## 2024-04-08 RX ORDER — EPINEPHRINE 0.3 MG/.3ML
0.3 INJECTION SUBCUTANEOUS EVERY 5 MIN PRN
Status: DISCONTINUED | OUTPATIENT
Start: 2024-04-08 | End: 2024-04-08 | Stop reason: HOSPADM

## 2024-04-08 RX ORDER — PROCHLORPERAZINE MALEATE 10 MG
10 TABLET ORAL EVERY 6 HOURS PRN
Status: CANCELLED | OUTPATIENT
Start: 2024-04-10

## 2024-04-08 RX ORDER — DIPHENHYDRAMINE HYDROCHLORIDE 50 MG/ML
50 INJECTION INTRAMUSCULAR; INTRAVENOUS AS NEEDED
Status: CANCELLED | OUTPATIENT
Start: 2024-04-10

## 2024-04-08 RX ORDER — DIPHENHYDRAMINE HYDROCHLORIDE 50 MG/ML
50 INJECTION INTRAMUSCULAR; INTRAVENOUS AS NEEDED
Status: DISCONTINUED | OUTPATIENT
Start: 2024-04-08 | End: 2024-04-08 | Stop reason: HOSPADM

## 2024-04-08 RX ORDER — PROCHLORPERAZINE MALEATE 10 MG
10 TABLET ORAL EVERY 6 HOURS PRN
Status: CANCELLED | OUTPATIENT
Start: 2024-04-09

## 2024-04-08 RX ORDER — EPINEPHRINE 0.3 MG/.3ML
0.3 INJECTION SUBCUTANEOUS EVERY 5 MIN PRN
Status: CANCELLED | OUTPATIENT
Start: 2024-04-10

## 2024-04-08 RX ORDER — PALONOSETRON 0.05 MG/ML
0.25 INJECTION, SOLUTION INTRAVENOUS ONCE
Status: CANCELLED | OUTPATIENT
Start: 2024-04-08

## 2024-04-08 RX ADMIN — ATEZOLIZUMAB 1200 MG: 1200 INJECTION, SOLUTION INTRAVENOUS at 15:21

## 2024-04-08 RX ADMIN — PALONOSETRON HYDROCHLORIDE 250 MCG: 0.25 INJECTION INTRAVENOUS at 14:30

## 2024-04-08 RX ADMIN — ETOPOSIDE 232 MG: 20 INJECTION INTRAVENOUS at 17:10

## 2024-04-08 RX ADMIN — SODIUM CHLORIDE 150 MG: 9 INJECTION, SOLUTION INTRAVENOUS at 14:49

## 2024-04-08 RX ADMIN — DEXAMETHASONE SODIUM PHOSPHATE 12 MG: 10 INJECTION, SOLUTION INTRAMUSCULAR; INTRAVENOUS at 14:30

## 2024-04-08 RX ADMIN — CARBOPLATIN 471 MG: 600 INJECTION, SOLUTION INTRAVENOUS at 16:34

## 2024-04-08 ASSESSMENT — PAIN SCALES - GENERAL
PAINLEVEL: 0-NO PAIN
PAINLEVEL_OUTOF10: 0-NO PAIN

## 2024-04-08 NOTE — PROGRESS NOTES
Pt arrived to [facility] infusion for scheduled appointment. Pt received treatment without incident. IV access maintained for multiple days of treatment. Pt aware of future appointments and agreeable. Pt dc home in safe condition.

## 2024-04-08 NOTE — PATIENT INSTRUCTIONS
If nausea occurs patient can take compazine/prochlorperazine as directed. Do NOT take zofran/ondansteron till 4/10.

## 2024-04-09 ENCOUNTER — INFUSION (OUTPATIENT)
Dept: HEMATOLOGY/ONCOLOGY | Facility: HOSPITAL | Age: 64
End: 2024-04-09
Payer: MEDICARE

## 2024-04-09 VITALS
HEART RATE: 87 BPM | RESPIRATION RATE: 18 BRPM | OXYGEN SATURATION: 95 % | TEMPERATURE: 98.6 F | SYSTOLIC BLOOD PRESSURE: 157 MMHG | WEIGHT: 246.25 LBS | DIASTOLIC BLOOD PRESSURE: 90 MMHG | BODY MASS INDEX: 33.4 KG/M2

## 2024-04-09 DIAGNOSIS — C34.90 SMALL CELL LUNG CANCER (MULTI): ICD-10-CM

## 2024-04-09 PROCEDURE — 2500000004 HC RX 250 GENERAL PHARMACY W/ HCPCS (ALT 636 FOR OP/ED): Performed by: INTERNAL MEDICINE

## 2024-04-09 PROCEDURE — 96375 TX/PRO/DX INJ NEW DRUG ADDON: CPT | Mod: INF

## 2024-04-09 PROCEDURE — 96413 CHEMO IV INFUSION 1 HR: CPT

## 2024-04-09 RX ORDER — PROCHLORPERAZINE EDISYLATE 5 MG/ML
10 INJECTION INTRAMUSCULAR; INTRAVENOUS EVERY 6 HOURS PRN
Status: DISCONTINUED | OUTPATIENT
Start: 2024-04-09 | End: 2024-04-09 | Stop reason: HOSPADM

## 2024-04-09 RX ORDER — EPINEPHRINE 0.3 MG/.3ML
0.3 INJECTION SUBCUTANEOUS EVERY 5 MIN PRN
Status: DISCONTINUED | OUTPATIENT
Start: 2024-04-09 | End: 2024-04-09 | Stop reason: HOSPADM

## 2024-04-09 RX ORDER — FAMOTIDINE 10 MG/ML
20 INJECTION INTRAVENOUS ONCE AS NEEDED
Status: DISCONTINUED | OUTPATIENT
Start: 2024-04-09 | End: 2024-04-09 | Stop reason: HOSPADM

## 2024-04-09 RX ORDER — DIPHENHYDRAMINE HYDROCHLORIDE 50 MG/ML
50 INJECTION INTRAMUSCULAR; INTRAVENOUS AS NEEDED
Status: DISCONTINUED | OUTPATIENT
Start: 2024-04-09 | End: 2024-04-09 | Stop reason: HOSPADM

## 2024-04-09 RX ORDER — ALBUTEROL SULFATE 0.83 MG/ML
3 SOLUTION RESPIRATORY (INHALATION) AS NEEDED
Status: DISCONTINUED | OUTPATIENT
Start: 2024-04-09 | End: 2024-04-09 | Stop reason: HOSPADM

## 2024-04-09 RX ORDER — PROCHLORPERAZINE MALEATE 10 MG
10 TABLET ORAL EVERY 6 HOURS PRN
Status: DISCONTINUED | OUTPATIENT
Start: 2024-04-09 | End: 2024-04-09 | Stop reason: HOSPADM

## 2024-04-09 RX ADMIN — DEXAMETHASONE SODIUM PHOSPHATE 8 MG: 4 INJECTION, SOLUTION INTRA-ARTICULAR; INTRALESIONAL; INTRAMUSCULAR; INTRAVENOUS; SOFT TISSUE at 14:47

## 2024-04-09 RX ADMIN — ETOPOSIDE 232 MG: 20 INJECTION INTRAVENOUS at 15:21

## 2024-04-09 ASSESSMENT — PAIN SCALES - GENERAL: PAINLEVEL_OUTOF10: 0-NO PAIN

## 2024-04-10 ENCOUNTER — INFUSION (OUTPATIENT)
Dept: HEMATOLOGY/ONCOLOGY | Facility: HOSPITAL | Age: 64
End: 2024-04-10
Payer: MEDICARE

## 2024-04-10 VITALS
RESPIRATION RATE: 18 BRPM | BODY MASS INDEX: 33.61 KG/M2 | OXYGEN SATURATION: 98 % | TEMPERATURE: 97.5 F | HEART RATE: 77 BPM | DIASTOLIC BLOOD PRESSURE: 87 MMHG | SYSTOLIC BLOOD PRESSURE: 165 MMHG | WEIGHT: 247.8 LBS

## 2024-04-10 DIAGNOSIS — C34.90 SMALL CELL LUNG CANCER (MULTI): ICD-10-CM

## 2024-04-10 PROCEDURE — 96375 TX/PRO/DX INJ NEW DRUG ADDON: CPT | Mod: INF

## 2024-04-10 PROCEDURE — 2500000004 HC RX 250 GENERAL PHARMACY W/ HCPCS (ALT 636 FOR OP/ED): Performed by: INTERNAL MEDICINE

## 2024-04-10 PROCEDURE — 96413 CHEMO IV INFUSION 1 HR: CPT

## 2024-04-10 RX ORDER — PROCHLORPERAZINE EDISYLATE 5 MG/ML
10 INJECTION INTRAMUSCULAR; INTRAVENOUS EVERY 6 HOURS PRN
Status: DISCONTINUED | OUTPATIENT
Start: 2024-04-10 | End: 2024-04-10 | Stop reason: HOSPADM

## 2024-04-10 RX ORDER — ALBUTEROL SULFATE 0.83 MG/ML
3 SOLUTION RESPIRATORY (INHALATION) AS NEEDED
Status: DISCONTINUED | OUTPATIENT
Start: 2024-04-10 | End: 2024-04-10 | Stop reason: HOSPADM

## 2024-04-10 RX ORDER — ONDANSETRON HYDROCHLORIDE 2 MG/ML
8 INJECTION, SOLUTION INTRAVENOUS ONCE
Status: COMPLETED | OUTPATIENT
Start: 2024-04-10 | End: 2024-04-10

## 2024-04-10 RX ORDER — PROCHLORPERAZINE MALEATE 10 MG
10 TABLET ORAL EVERY 6 HOURS PRN
Status: DISCONTINUED | OUTPATIENT
Start: 2024-04-10 | End: 2024-04-10 | Stop reason: HOSPADM

## 2024-04-10 RX ORDER — EPINEPHRINE 0.3 MG/.3ML
0.3 INJECTION SUBCUTANEOUS EVERY 5 MIN PRN
Status: DISCONTINUED | OUTPATIENT
Start: 2024-04-10 | End: 2024-04-10 | Stop reason: HOSPADM

## 2024-04-10 RX ORDER — DIPHENHYDRAMINE HYDROCHLORIDE 50 MG/ML
50 INJECTION INTRAMUSCULAR; INTRAVENOUS AS NEEDED
Status: DISCONTINUED | OUTPATIENT
Start: 2024-04-10 | End: 2024-04-10 | Stop reason: HOSPADM

## 2024-04-10 RX ORDER — FAMOTIDINE 10 MG/ML
20 INJECTION INTRAVENOUS ONCE AS NEEDED
Status: DISCONTINUED | OUTPATIENT
Start: 2024-04-10 | End: 2024-04-10 | Stop reason: HOSPADM

## 2024-04-10 RX ADMIN — ONDANSETRON 8 MG: 2 INJECTION INTRAMUSCULAR; INTRAVENOUS at 12:11

## 2024-04-10 RX ADMIN — ETOPOSIDE 232 MG: 20 INJECTION INTRAVENOUS at 12:35

## 2024-04-10 RX ADMIN — DEXAMETHASONE SODIUM PHOSPHATE 8 MG: 4 INJECTION, SOLUTION INTRA-ARTICULAR; INTRALESIONAL; INTRAMUSCULAR; INTRAVENOUS; SOFT TISSUE at 12:12

## 2024-04-10 ASSESSMENT — PAIN SCALES - GENERAL: PAINLEVEL: 0-NO PAIN

## 2024-04-10 NOTE — PROGRESS NOTES
Pt arrived to [facility] infusion for day 3 etoposide. Prior assessment unchanged from 4/9/24. Pt received treatment without incident. Pt aware of future appointments and agreeable.

## 2024-04-25 ENCOUNTER — HOSPITAL ENCOUNTER (OUTPATIENT)
Dept: RADIOLOGY | Facility: HOSPITAL | Age: 64
Discharge: HOME | End: 2024-04-25
Payer: MEDICARE

## 2024-04-25 ENCOUNTER — OFFICE VISIT (OUTPATIENT)
Dept: HEMATOLOGY/ONCOLOGY | Facility: HOSPITAL | Age: 64
End: 2024-04-25
Payer: MEDICARE

## 2024-04-25 ENCOUNTER — APPOINTMENT (OUTPATIENT)
Dept: HEMATOLOGY/ONCOLOGY | Facility: HOSPITAL | Age: 64
End: 2024-04-25
Payer: MEDICARE

## 2024-04-25 VITALS
DIASTOLIC BLOOD PRESSURE: 79 MMHG | HEART RATE: 87 BPM | BODY MASS INDEX: 32.02 KG/M2 | TEMPERATURE: 97.7 F | SYSTOLIC BLOOD PRESSURE: 127 MMHG | WEIGHT: 236.11 LBS | OXYGEN SATURATION: 97 % | RESPIRATION RATE: 20 BRPM

## 2024-04-25 DIAGNOSIS — C34.90 SMALL CELL LUNG CANCER (MULTI): ICD-10-CM

## 2024-04-25 DIAGNOSIS — R06.09 DOE (DYSPNEA ON EXERTION): ICD-10-CM

## 2024-04-25 DIAGNOSIS — C34.90 SMALL CELL LUNG CANCER (MULTI): Primary | ICD-10-CM

## 2024-04-25 PROCEDURE — 74177 CT ABD & PELVIS W/CONTRAST: CPT

## 2024-04-25 PROCEDURE — 71260 CT THORAX DX C+: CPT | Performed by: RADIOLOGY

## 2024-04-25 PROCEDURE — 99214 OFFICE O/P EST MOD 30 MIN: CPT | Mod: GC | Performed by: STUDENT IN AN ORGANIZED HEALTH CARE EDUCATION/TRAINING PROGRAM

## 2024-04-25 PROCEDURE — 2550000001 HC RX 255 CONTRASTS: Performed by: INTERNAL MEDICINE

## 2024-04-25 PROCEDURE — 99214 OFFICE O/P EST MOD 30 MIN: CPT | Performed by: STUDENT IN AN ORGANIZED HEALTH CARE EDUCATION/TRAINING PROGRAM

## 2024-04-25 PROCEDURE — 74177 CT ABD & PELVIS W/CONTRAST: CPT | Performed by: RADIOLOGY

## 2024-04-25 RX ORDER — ALBUTEROL SULFATE 90 UG/1
2 AEROSOL, METERED RESPIRATORY (INHALATION) 2 TIMES DAILY PRN
Qty: 18 G | Refills: 1 | Status: SHIPPED | OUTPATIENT
Start: 2024-04-25 | End: 2025-04-25

## 2024-04-25 RX ORDER — BUDESONIDE AND FORMOTEROL FUMARATE DIHYDRATE 80; 4.5 UG/1; UG/1
2 AEROSOL RESPIRATORY (INHALATION) DAILY
Qty: 10.2 G | Refills: 1 | Status: SHIPPED | OUTPATIENT
Start: 2024-04-25

## 2024-04-25 RX ADMIN — IOHEXOL 85 ML: 350 INJECTION, SOLUTION INTRAVENOUS at 12:43

## 2024-04-25 NOTE — PROGRESS NOTES
Patient ID: Dane Molina is a 64 y.o. male    Primary Care Provider: Darwin Early MD    DIAGNOSIS AND STAGING  cT3 pN2 pM1b small cell lung cancer (INSM1 and TTF1+) of the right lower lobe, diagnosed on 02/05/2024 through bronchoscopy     SITES OF DISEASE  Right lower lobe   Levels 4R and 7 nodes   Liver, confirmed by biopsy      MOLECULAR GENOMICS  Not performed   RB loss by IHC      PRIOR THERAPIES  None     CURRENT THERAPY  Carboplatin/etoposide C1D1 on 03/19/24 with addition of atezolizumab to C2 on 04/08/2024    CURRENT ONCOLOGICAL PROBLEMS  None      HISTORY OF PRESENT ILLNESS  Patient presented to the emergency room on 01/04/2023 with a hypertensive urgency and cough.  He has a history of noncompliance with antihypertensive medications.  He also complained of dyspnea and underwent a CT chest without IV contrast that demonstrated a right lower lobe paramediastinal mass measuring 3.1 cm with adjacent pulmonary nodule measuring 1.8 cm.  Mediastinal lymphadenopathy was demonstrated, including a subcarinal node measuring 1.7 cm.  There was right hilar lymphadenopathy, 2 cm in short axis.  On 01/15/2024 the patient underwent a PET scan that demonstrated avidity of the aforementioned masses/lymph nodes, as well as a lesion in the liver, SUV max 3.3, right hepatic lobe.  On 02/05/2024, patient underwent a bronchoscopy:  A. LYMPH NODE 4 R PULMONARY FINE NEEDLE ASPIRATION , CYTOLOGY AND CELL BLOCK:    Positive for malignant cells   Metastatic small cell carcinoma, see note  B. LYMPH NODE 7 PULMONARY FINE NEEDLE ASPIRATION , CYTOLOGY AND CELL BLOCK:    Positive for malignant cells  Metastatic small cell carcinoma, see note          Note: Immunostains demonstrate the lesional cells to be diffusely positive for CAM 5.2, INSM1, TTF-1.  The proliferative marker Ki-67 is greater than 90%.  Immunostain for retinoblastoma shows loss of staining.  Immunostain for p40 is negative.  The morphology and  immunohistochemical profile are consistent with the above diagnosis.  On 02/29/24 the pt is seen by med onc for the first time. Denies any systemic sx - denies lack of appetite, fatigue or unintentional weight loss. Denies new localized pain, headaches or neuro sx.  Denies new respiratory sx.  He is claustrophobic and MRI brain was scheduled for today but pt could not go through with the test. He also has mild CKD, which limits his ability to receive intravenous contrast for CT.   03/04/24: MRI brain shows no ICMA, but MRI liver shows a 1.9 cm hepatic lesion with T2 hyperintensity and peripheral enhancement   03/19/2024: C1 D1 carboplatin/etoposide  03/22/24: Liver biopsy:  FINAL DIAGNOSIS   A. RIGHT LIVER MASS BIOPSY:      -- LIVER TISSUE INVOLVED BY SMALL CELL CARCINOMA, SEE NOTE     Note: Clinical history of lung small cell carcinoma noted. The findings most likely represent metastasis from the known primary lung tumor.     04/04/24: discussed with patient liver biopsy results and recommendations to add atezolizumab to his regimen  He has met with radiation oncology, and we will consider consolidative TRT pending response to cytotoxic chemotherapy    04/08/2024: C2 D1 carboplatin/etoposide/atezolizumab       PAST MEDICAL HISTORY  CKD - creatinine 1.68  Neck pain (injury at work)   Hypertension  Iron deficiency anemia (hx of blood transfusion in the 90's)    SURGICAL HISTORY  Ankle fracture and has a latoya in place      SOCIAL HISTORY  Former 35-40 pack-year smoker, quit at age 57  Smokes cigars now   History of alcohol addiction, sober for the past 15 years   Has one chlid (Roula)  Single      FAMILY HISTORY  Lung CA (mother)  Mother had CKD and required HD    CURRENT MEDS REVIEWED       ALLERGIES REVIEWED        SUBJECTIVE:  Patient doing well. He tolerated cycle 2 carbo/etoposide with addition of atezolizumab. No somatic complaints at all. He has lost 8 lbs since last cycle    A 13 point review of systems was  performed, with significant findings documented above in subjective history.    OBJECTIVE:  Vitals:    04/25/24 1514   BP: 127/79   Pulse: 87   Resp: 20   Temp: 36.5 °C (97.7 °F)   SpO2: 97%        Body surface area is 2.33 meters squared.     Wt Readings from Last 5 Encounters:   04/25/24 107 kg (236 lb 1.8 oz)   04/10/24 112 kg (247 lb 12.8 oz)   04/09/24 112 kg (246 lb 4.1 oz)   04/08/24 110 kg (243 lb 6.2 oz)   04/05/24 111 kg (244 lb)       ECOGSCORE: 1- Restricted in physically strenuous activity.  Carries out light duty.    Physical Exam  Constitutional:       Appearance: Normal appearance.   HENT:      Head: Normocephalic and atraumatic.   Eyes:      General: No scleral icterus.     Extraocular Movements: Extraocular movements intact.      Conjunctiva/sclera: Conjunctivae normal.   Cardiovascular:      Rate and Rhythm: Normal rate and regular rhythm.   Pulmonary:      Effort: Pulmonary effort is normal.   Abdominal:      Palpations: Abdomen is soft. There is no mass.      Tenderness: There is no abdominal tenderness. There is no guarding.   Musculoskeletal:      Right lower leg: No edema.      Left lower leg: No edema.   Skin:     General: Skin is warm.      Findings: No erythema or rash.   Neurological:      General: No focal deficit present.      Mental Status: He is alert and oriented to person, place, and time.      Motor: No weakness.      Gait: Gait normal.   Psychiatric:         Mood and Affect: Mood normal.         Behavior: Behavior normal.         Thought Content: Thought content normal.         Judgment: Judgment normal.        Diagnostic Results   Results:  Labs:  Lab Results   Component Value Date    WBC 10.3 04/08/2024    HGB 12.3 (L) 04/08/2024    HCT 36.4 (L) 04/08/2024    MCV 91 04/08/2024     04/08/2024      Lab Results   Component Value Date    NEUTROABS 4.04 03/14/2024        Lab Results   Component Value Date    GLUCOSE 131 (H) 04/08/2024    CALCIUM 8.2 (L) 04/08/2024      04/08/2024    K 3.7 04/08/2024    CO2 23 04/08/2024     04/08/2024    BUN 15 04/08/2024    CREATININE 1.35 (H) 04/08/2024     Lab Results   Component Value Date    ALT 18 04/08/2024    AST 12 04/08/2024    ALKPHOS 60 04/08/2024    BILITOT 0.3 04/08/2024    BILIDIR 0.1 12/21/2023      Lab Results   Component Value Date    TSH 1.31 12/21/2023           Assessment/Plan     Small cell lung cancer (Multi), Clinical: Stage FREDRICK (cT3, cN2, pM1b)  This is a former cigarette smoker with T3 pN2 pM1b small cell lung cancer of the RLL - extensive stage.  Now undergoing treatment with carboplatin/etoposide  In view of liver biopsy results confirming metastatic diagnosis, atezolizumab will be added to cycle 2, on 04/08/2024  I was able to review in great detail potential side effects and benefits of this approach and he was able to sign a consent. He has tolerated cycle 2 without major issue. Monitoring weight loss.    He has repeat scans which were personally reviewed noting treatment response in at least primary lesion and nodes - liver lesion not seen on CT.     We will proceed with c3d1 on 4/29 and follow prior to c4.  After 4-6 cycles, we will proceed with consolidative TRT if applicable, as well as will have a discussion regarding whole brain radiation versus serial brain MRI follow-ups.    Nicolas Ospina MD PGY-6  Hematology/Oncology Fellow

## 2024-04-29 ENCOUNTER — INFUSION (OUTPATIENT)
Dept: HEMATOLOGY/ONCOLOGY | Facility: HOSPITAL | Age: 64
End: 2024-04-29
Payer: MEDICARE

## 2024-04-29 ENCOUNTER — TELEPHONE (OUTPATIENT)
Dept: HEMATOLOGY/ONCOLOGY | Facility: CLINIC | Age: 64
End: 2024-04-29

## 2024-04-29 VITALS
RESPIRATION RATE: 18 BRPM | TEMPERATURE: 97.5 F | SYSTOLIC BLOOD PRESSURE: 115 MMHG | WEIGHT: 235 LBS | OXYGEN SATURATION: 95 % | BODY MASS INDEX: 31.87 KG/M2 | HEART RATE: 97 BPM | DIASTOLIC BLOOD PRESSURE: 71 MMHG

## 2024-04-29 DIAGNOSIS — C34.90 SMALL CELL CARCINOMA OF LUNG (MULTI): ICD-10-CM

## 2024-04-29 DIAGNOSIS — C34.90 SMALL CELL LUNG CANCER (MULTI): Primary | ICD-10-CM

## 2024-04-29 DIAGNOSIS — E87.6 HYPOKALEMIA DUE TO LOSS OF POTASSIUM: Primary | ICD-10-CM

## 2024-04-29 DIAGNOSIS — C34.90 SMALL CELL LUNG CANCER (MULTI): ICD-10-CM

## 2024-04-29 LAB
ALBUMIN SERPL BCP-MCNC: 3.9 G/DL (ref 3.4–5)
ALP SERPL-CCNC: 64 U/L (ref 33–136)
ALT SERPL W P-5'-P-CCNC: 18 U/L (ref 10–52)
ANION GAP SERPL CALC-SCNC: 14 MMOL/L (ref 10–20)
AST SERPL W P-5'-P-CCNC: 17 U/L (ref 9–39)
BASOPHILS # BLD MANUAL: 0 X10*3/UL (ref 0–0.1)
BASOPHILS NFR BLD MANUAL: 0 %
BILIRUB SERPL-MCNC: 0.3 MG/DL (ref 0–1.2)
BUN SERPL-MCNC: 33 MG/DL (ref 6–23)
CALCIUM SERPL-MCNC: 8.9 MG/DL (ref 8.6–10.3)
CHLORIDE SERPL-SCNC: 103 MMOL/L (ref 98–107)
CO2 SERPL-SCNC: 27 MMOL/L (ref 21–32)
CORTIS AM PEAK SERPL-MSCNC: 18 UG/DL (ref 5–20)
CREAT SERPL-MCNC: 1.91 MG/DL (ref 0.5–1.3)
EGFRCR SERPLBLD CKD-EPI 2021: 39 ML/MIN/1.73M*2
EOSINOPHIL # BLD MANUAL: 0 X10*3/UL (ref 0–0.7)
EOSINOPHIL NFR BLD MANUAL: 0 %
ERYTHROCYTE [DISTWIDTH] IN BLOOD BY AUTOMATED COUNT: 15.4 % (ref 11.5–14.5)
GLUCOSE SERPL-MCNC: 159 MG/DL (ref 74–99)
HCT VFR BLD AUTO: 35.4 % (ref 41–52)
HGB BLD-MCNC: 11.8 G/DL (ref 13.5–17.5)
IMM GRANULOCYTES # BLD AUTO: 1.33 X10*3/UL (ref 0–0.7)
IMM GRANULOCYTES NFR BLD AUTO: 15.4 % (ref 0–0.9)
LYMPHOCYTES # BLD MANUAL: 2.18 X10*3/UL (ref 1.2–4.8)
LYMPHOCYTES NFR BLD MANUAL: 25 %
MCH RBC QN AUTO: 30.8 PG (ref 26–34)
MCHC RBC AUTO-ENTMCNC: 33.3 G/DL (ref 32–36)
MCV RBC AUTO: 92 FL (ref 80–100)
METAMYELOCYTES # BLD MANUAL: 0.35 X10*3/UL
METAMYELOCYTES NFR BLD MANUAL: 4 %
MONOCYTES # BLD MANUAL: 1.74 X10*3/UL (ref 0.1–1)
MONOCYTES NFR BLD MANUAL: 20 %
MYELOCYTES # BLD MANUAL: 0.44 X10*3/UL
MYELOCYTES NFR BLD MANUAL: 5 %
NEUTROPHILS # BLD MANUAL: 3.92 X10*3/UL (ref 1.2–7.7)
NEUTS BAND # BLD MANUAL: 0.7 X10*3/UL (ref 0–0.7)
NEUTS BAND NFR BLD MANUAL: 8 %
NEUTS SEG # BLD MANUAL: 3.22 X10*3/UL (ref 1.2–7)
NEUTS SEG NFR BLD MANUAL: 37 %
NRBC BLD-RTO: 0.3 /100 WBCS (ref 0–0)
PLATELET # BLD AUTO: 266 X10*3/UL (ref 150–450)
POLYCHROMASIA BLD QL SMEAR: ABNORMAL
POTASSIUM SERPL-SCNC: 3.3 MMOL/L (ref 3.5–5.3)
PROT SERPL-MCNC: 7.2 G/DL (ref 6.4–8.2)
RBC # BLD AUTO: 3.83 X10*6/UL (ref 4.5–5.9)
RBC MORPH BLD: ABNORMAL
SODIUM SERPL-SCNC: 141 MMOL/L (ref 136–145)
TOTAL CELLS COUNTED BLD: 100
TSH SERPL-ACNC: 2.33 MIU/L (ref 0.44–3.98)
VARIANT LYMPHS # BLD MANUAL: 0.09 X10*3/UL (ref 0–0.5)
VARIANT LYMPHS NFR BLD: 1 %
WBC # BLD AUTO: 8.7 X10*3/UL (ref 4.4–11.3)

## 2024-04-29 PROCEDURE — 84443 ASSAY THYROID STIM HORMONE: CPT

## 2024-04-29 PROCEDURE — 96367 TX/PROPH/DG ADDL SEQ IV INF: CPT

## 2024-04-29 PROCEDURE — 85027 COMPLETE CBC AUTOMATED: CPT | Mod: 59

## 2024-04-29 PROCEDURE — 2500000004 HC RX 250 GENERAL PHARMACY W/ HCPCS (ALT 636 FOR OP/ED): Performed by: INTERNAL MEDICINE

## 2024-04-29 PROCEDURE — 96413 CHEMO IV INFUSION 1 HR: CPT

## 2024-04-29 PROCEDURE — 96375 TX/PRO/DX INJ NEW DRUG ADDON: CPT | Mod: INF

## 2024-04-29 PROCEDURE — 96417 CHEMO IV INFUS EACH ADDL SEQ: CPT

## 2024-04-29 PROCEDURE — 85007 BL SMEAR W/DIFF WBC COUNT: CPT | Mod: 59

## 2024-04-29 PROCEDURE — 82533 TOTAL CORTISOL: CPT

## 2024-04-29 PROCEDURE — 80053 COMPREHEN METABOLIC PANEL: CPT

## 2024-04-29 RX ORDER — FAMOTIDINE 10 MG/ML
20 INJECTION INTRAVENOUS ONCE AS NEEDED
Status: CANCELLED | OUTPATIENT
Start: 2024-05-01

## 2024-04-29 RX ORDER — PROCHLORPERAZINE EDISYLATE 5 MG/ML
10 INJECTION INTRAMUSCULAR; INTRAVENOUS EVERY 6 HOURS PRN
Status: CANCELLED | OUTPATIENT
Start: 2024-05-01

## 2024-04-29 RX ORDER — FAMOTIDINE 10 MG/ML
20 INJECTION INTRAVENOUS ONCE AS NEEDED
Status: CANCELLED | OUTPATIENT
Start: 2024-04-30

## 2024-04-29 RX ORDER — DEXAMETHASONE SODIUM PHOSPHATE 4 MG/ML
8 INJECTION, SOLUTION INTRA-ARTICULAR; INTRALESIONAL; INTRAMUSCULAR; INTRAVENOUS; SOFT TISSUE ONCE
Status: CANCELLED | OUTPATIENT
Start: 2024-04-30

## 2024-04-29 RX ORDER — ONDANSETRON HYDROCHLORIDE 2 MG/ML
8 INJECTION, SOLUTION INTRAVENOUS ONCE
Status: CANCELLED | OUTPATIENT
Start: 2024-05-22

## 2024-04-29 RX ORDER — EPINEPHRINE 0.3 MG/.3ML
0.3 INJECTION SUBCUTANEOUS EVERY 5 MIN PRN
Status: CANCELLED | OUTPATIENT
Start: 2024-05-01

## 2024-04-29 RX ORDER — DIPHENHYDRAMINE HYDROCHLORIDE 50 MG/ML
50 INJECTION INTRAMUSCULAR; INTRAVENOUS AS NEEDED
Status: CANCELLED | OUTPATIENT
Start: 2024-04-30

## 2024-04-29 RX ORDER — ALBUTEROL SULFATE 0.83 MG/ML
3 SOLUTION RESPIRATORY (INHALATION) AS NEEDED
Status: CANCELLED | OUTPATIENT
Start: 2024-05-01

## 2024-04-29 RX ORDER — ALBUTEROL SULFATE 0.83 MG/ML
3 SOLUTION RESPIRATORY (INHALATION) AS NEEDED
Status: CANCELLED | OUTPATIENT
Start: 2024-04-29

## 2024-04-29 RX ORDER — PROCHLORPERAZINE MALEATE 10 MG
10 TABLET ORAL EVERY 6 HOURS PRN
Status: CANCELLED | OUTPATIENT
Start: 2024-05-22

## 2024-04-29 RX ORDER — DIPHENHYDRAMINE HYDROCHLORIDE 50 MG/ML
50 INJECTION INTRAMUSCULAR; INTRAVENOUS AS NEEDED
Status: CANCELLED | OUTPATIENT
Start: 2024-05-01

## 2024-04-29 RX ORDER — FAMOTIDINE 10 MG/ML
20 INJECTION INTRAVENOUS ONCE AS NEEDED
Status: CANCELLED | OUTPATIENT
Start: 2024-05-20

## 2024-04-29 RX ORDER — DEXAMETHASONE SODIUM PHOSPHATE 4 MG/ML
8 INJECTION, SOLUTION INTRA-ARTICULAR; INTRALESIONAL; INTRAMUSCULAR; INTRAVENOUS; SOFT TISSUE ONCE
Status: CANCELLED | OUTPATIENT
Start: 2024-05-22

## 2024-04-29 RX ORDER — PROCHLORPERAZINE MALEATE 10 MG
10 TABLET ORAL EVERY 6 HOURS PRN
Status: CANCELLED | OUTPATIENT
Start: 2024-04-30

## 2024-04-29 RX ORDER — PROCHLORPERAZINE MALEATE 10 MG
10 TABLET ORAL EVERY 6 HOURS PRN
Status: CANCELLED | OUTPATIENT
Start: 2024-05-01

## 2024-04-29 RX ORDER — PROCHLORPERAZINE EDISYLATE 5 MG/ML
10 INJECTION INTRAMUSCULAR; INTRAVENOUS EVERY 6 HOURS PRN
Status: CANCELLED | OUTPATIENT
Start: 2024-05-22

## 2024-04-29 RX ORDER — ALBUTEROL SULFATE 0.83 MG/ML
3 SOLUTION RESPIRATORY (INHALATION) AS NEEDED
Status: CANCELLED | OUTPATIENT
Start: 2024-04-30

## 2024-04-29 RX ORDER — PROCHLORPERAZINE MALEATE 10 MG
10 TABLET ORAL EVERY 6 HOURS PRN
Status: DISCONTINUED | OUTPATIENT
Start: 2024-04-29 | End: 2024-04-29 | Stop reason: HOSPADM

## 2024-04-29 RX ORDER — DEXAMETHASONE SODIUM PHOSPHATE 4 MG/ML
8 INJECTION, SOLUTION INTRA-ARTICULAR; INTRALESIONAL; INTRAMUSCULAR; INTRAVENOUS; SOFT TISSUE ONCE
Status: CANCELLED | OUTPATIENT
Start: 2024-05-21

## 2024-04-29 RX ORDER — EPINEPHRINE 0.3 MG/.3ML
0.3 INJECTION SUBCUTANEOUS EVERY 5 MIN PRN
Status: CANCELLED | OUTPATIENT
Start: 2024-05-21

## 2024-04-29 RX ORDER — ALBUTEROL SULFATE 0.83 MG/ML
3 SOLUTION RESPIRATORY (INHALATION) AS NEEDED
Status: CANCELLED | OUTPATIENT
Start: 2024-05-21

## 2024-04-29 RX ORDER — EPINEPHRINE 0.3 MG/.3ML
0.3 INJECTION SUBCUTANEOUS EVERY 5 MIN PRN
Status: CANCELLED | OUTPATIENT
Start: 2024-05-22

## 2024-04-29 RX ORDER — PROCHLORPERAZINE EDISYLATE 5 MG/ML
10 INJECTION INTRAMUSCULAR; INTRAVENOUS EVERY 6 HOURS PRN
Status: DISCONTINUED | OUTPATIENT
Start: 2024-04-29 | End: 2024-04-29 | Stop reason: HOSPADM

## 2024-04-29 RX ORDER — EPINEPHRINE 0.3 MG/.3ML
0.3 INJECTION SUBCUTANEOUS EVERY 5 MIN PRN
Status: CANCELLED | OUTPATIENT
Start: 2024-04-30

## 2024-04-29 RX ORDER — PROCHLORPERAZINE MALEATE 10 MG
10 TABLET ORAL EVERY 6 HOURS PRN
Status: CANCELLED | OUTPATIENT
Start: 2024-05-21

## 2024-04-29 RX ORDER — DIPHENHYDRAMINE HYDROCHLORIDE 50 MG/ML
50 INJECTION INTRAMUSCULAR; INTRAVENOUS AS NEEDED
Status: CANCELLED | OUTPATIENT
Start: 2024-05-22

## 2024-04-29 RX ORDER — DEXAMETHASONE SODIUM PHOSPHATE 4 MG/ML
8 INJECTION, SOLUTION INTRA-ARTICULAR; INTRALESIONAL; INTRAMUSCULAR; INTRAVENOUS; SOFT TISSUE ONCE
Status: CANCELLED | OUTPATIENT
Start: 2024-05-01

## 2024-04-29 RX ORDER — DIPHENHYDRAMINE HYDROCHLORIDE 50 MG/ML
50 INJECTION INTRAMUSCULAR; INTRAVENOUS AS NEEDED
Status: CANCELLED | OUTPATIENT
Start: 2024-05-20

## 2024-04-29 RX ORDER — PROCHLORPERAZINE EDISYLATE 5 MG/ML
10 INJECTION INTRAMUSCULAR; INTRAVENOUS EVERY 6 HOURS PRN
Status: CANCELLED | OUTPATIENT
Start: 2024-05-21

## 2024-04-29 RX ORDER — DIPHENHYDRAMINE HYDROCHLORIDE 50 MG/ML
50 INJECTION INTRAMUSCULAR; INTRAVENOUS AS NEEDED
Status: CANCELLED | OUTPATIENT
Start: 2024-04-29

## 2024-04-29 RX ORDER — PALONOSETRON 0.05 MG/ML
0.25 INJECTION, SOLUTION INTRAVENOUS ONCE
Status: CANCELLED | OUTPATIENT
Start: 2024-05-20

## 2024-04-29 RX ORDER — PROCHLORPERAZINE MALEATE 10 MG
10 TABLET ORAL EVERY 6 HOURS PRN
Status: CANCELLED | OUTPATIENT
Start: 2024-04-29

## 2024-04-29 RX ORDER — PROCHLORPERAZINE MALEATE 10 MG
10 TABLET ORAL EVERY 6 HOURS PRN
Status: CANCELLED | OUTPATIENT
Start: 2024-05-20

## 2024-04-29 RX ORDER — DIPHENHYDRAMINE HYDROCHLORIDE 50 MG/ML
50 INJECTION INTRAMUSCULAR; INTRAVENOUS AS NEEDED
Status: CANCELLED | OUTPATIENT
Start: 2024-05-21

## 2024-04-29 RX ORDER — EPINEPHRINE 0.3 MG/.3ML
0.3 INJECTION SUBCUTANEOUS EVERY 5 MIN PRN
Status: CANCELLED | OUTPATIENT
Start: 2024-05-20

## 2024-04-29 RX ORDER — FAMOTIDINE 10 MG/ML
20 INJECTION INTRAVENOUS ONCE AS NEEDED
Status: CANCELLED | OUTPATIENT
Start: 2024-05-22

## 2024-04-29 RX ORDER — POTASSIUM CHLORIDE 20 MEQ/1
20 TABLET, EXTENDED RELEASE ORAL DAILY
Qty: 30 TABLET | Refills: 0 | Status: SHIPPED | OUTPATIENT
Start: 2024-04-29 | End: 2024-05-29

## 2024-04-29 RX ORDER — FAMOTIDINE 10 MG/ML
20 INJECTION INTRAVENOUS ONCE AS NEEDED
Status: CANCELLED | OUTPATIENT
Start: 2024-04-29

## 2024-04-29 RX ORDER — PROCHLORPERAZINE EDISYLATE 5 MG/ML
10 INJECTION INTRAMUSCULAR; INTRAVENOUS EVERY 6 HOURS PRN
Status: CANCELLED | OUTPATIENT
Start: 2024-05-20

## 2024-04-29 RX ORDER — PALONOSETRON 0.05 MG/ML
0.25 INJECTION, SOLUTION INTRAVENOUS ONCE
Status: COMPLETED | OUTPATIENT
Start: 2024-04-29 | End: 2024-04-29

## 2024-04-29 RX ORDER — FAMOTIDINE 10 MG/ML
20 INJECTION INTRAVENOUS ONCE AS NEEDED
Status: CANCELLED | OUTPATIENT
Start: 2024-05-21

## 2024-04-29 RX ORDER — PALONOSETRON 0.05 MG/ML
0.25 INJECTION, SOLUTION INTRAVENOUS ONCE
Status: CANCELLED | OUTPATIENT
Start: 2024-04-29

## 2024-04-29 RX ORDER — ONDANSETRON HYDROCHLORIDE 2 MG/ML
8 INJECTION, SOLUTION INTRAVENOUS ONCE
Status: CANCELLED | OUTPATIENT
Start: 2024-05-01

## 2024-04-29 RX ORDER — EPINEPHRINE 0.3 MG/.3ML
0.3 INJECTION SUBCUTANEOUS EVERY 5 MIN PRN
Status: CANCELLED | OUTPATIENT
Start: 2024-04-29

## 2024-04-29 RX ORDER — PROCHLORPERAZINE EDISYLATE 5 MG/ML
10 INJECTION INTRAMUSCULAR; INTRAVENOUS EVERY 6 HOURS PRN
Status: CANCELLED | OUTPATIENT
Start: 2024-04-29

## 2024-04-29 RX ORDER — ALBUTEROL SULFATE 0.83 MG/ML
3 SOLUTION RESPIRATORY (INHALATION) AS NEEDED
Status: CANCELLED | OUTPATIENT
Start: 2024-05-22

## 2024-04-29 RX ORDER — ALBUTEROL SULFATE 0.83 MG/ML
3 SOLUTION RESPIRATORY (INHALATION) AS NEEDED
Status: CANCELLED | OUTPATIENT
Start: 2024-05-20

## 2024-04-29 RX ORDER — PROCHLORPERAZINE EDISYLATE 5 MG/ML
10 INJECTION INTRAMUSCULAR; INTRAVENOUS EVERY 6 HOURS PRN
Status: CANCELLED | OUTPATIENT
Start: 2024-04-30

## 2024-04-29 RX ADMIN — SODIUM CHLORIDE 232 MG: 0.9 INJECTION, SOLUTION INTRAVENOUS at 17:27

## 2024-04-29 RX ADMIN — ATEZOLIZUMAB 1200 MG: 1200 INJECTION, SOLUTION INTRAVENOUS at 16:07

## 2024-04-29 RX ADMIN — SODIUM CHLORIDE 150 MG: 0.9 INJECTION, SOLUTION INTRAVENOUS at 15:31

## 2024-04-29 RX ADMIN — CARBOPLATIN 370 MG: 600 INJECTION, SOLUTION INTRAVENOUS at 16:48

## 2024-04-29 RX ADMIN — DEXAMETHASONE SODIUM PHOSPHATE 12 MG: 10 INJECTION, SOLUTION INTRAMUSCULAR; INTRAVENOUS at 15:05

## 2024-04-29 RX ADMIN — PALONOSETRON HYDROCHLORIDE 250 MCG: 0.25 INJECTION INTRAVENOUS at 15:05

## 2024-04-29 ASSESSMENT — PAIN SCALES - GENERAL: PAINLEVEL: 0-NO PAIN

## 2024-04-29 NOTE — PROGRESS NOTES
Pt arrived to [facility] for scheduled treatment. 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 He had called 911 and EMT arrived. Per patient his vital signs were stable and the EMT's believed that it was related to carbon monoxide coming from the exhaust pipe of the work truck. Pt claimed symptoms only persisted for 30 minutes. Pt denies any symptoms since then. RN notified primary team. Primary team aware and stated if that was to occur again, patient should report to ED. Labs resulted, K of 3.3 and creat slightly more elevated at 1.91. RN notified primary team of lab results. MD Galvin aware, oral K ordered for home. RN and MD encouraged oral hydration. Pt received treatment without incident. Pt aware to return tomorrow for day 2 of treatment.

## 2024-04-30 ENCOUNTER — INFUSION (OUTPATIENT)
Dept: HEMATOLOGY/ONCOLOGY | Facility: HOSPITAL | Age: 64
End: 2024-04-30
Payer: MEDICARE

## 2024-04-30 VITALS
TEMPERATURE: 97.5 F | RESPIRATION RATE: 18 BRPM | BODY MASS INDEX: 32.59 KG/M2 | SYSTOLIC BLOOD PRESSURE: 129 MMHG | WEIGHT: 240.3 LBS | OXYGEN SATURATION: 97 % | DIASTOLIC BLOOD PRESSURE: 83 MMHG | HEART RATE: 81 BPM

## 2024-04-30 DIAGNOSIS — C34.90 SMALL CELL LUNG CANCER (MULTI): ICD-10-CM

## 2024-04-30 PROCEDURE — 2500000004 HC RX 250 GENERAL PHARMACY W/ HCPCS (ALT 636 FOR OP/ED): Performed by: INTERNAL MEDICINE

## 2024-04-30 PROCEDURE — 96413 CHEMO IV INFUSION 1 HR: CPT

## 2024-04-30 PROCEDURE — 96375 TX/PRO/DX INJ NEW DRUG ADDON: CPT | Mod: INF

## 2024-04-30 RX ORDER — PROCHLORPERAZINE MALEATE 10 MG
10 TABLET ORAL EVERY 6 HOURS PRN
Status: DISCONTINUED | OUTPATIENT
Start: 2024-04-30 | End: 2024-04-30 | Stop reason: HOSPADM

## 2024-04-30 RX ORDER — FAMOTIDINE 10 MG/ML
20 INJECTION INTRAVENOUS ONCE AS NEEDED
Status: DISCONTINUED | OUTPATIENT
Start: 2024-04-30 | End: 2024-04-30 | Stop reason: HOSPADM

## 2024-04-30 RX ORDER — ALBUTEROL SULFATE 0.83 MG/ML
3 SOLUTION RESPIRATORY (INHALATION) AS NEEDED
Status: DISCONTINUED | OUTPATIENT
Start: 2024-04-30 | End: 2024-04-30 | Stop reason: HOSPADM

## 2024-04-30 RX ORDER — PROCHLORPERAZINE EDISYLATE 5 MG/ML
10 INJECTION INTRAMUSCULAR; INTRAVENOUS EVERY 6 HOURS PRN
Status: DISCONTINUED | OUTPATIENT
Start: 2024-04-30 | End: 2024-04-30 | Stop reason: HOSPADM

## 2024-04-30 RX ORDER — DIPHENHYDRAMINE HYDROCHLORIDE 50 MG/ML
50 INJECTION INTRAMUSCULAR; INTRAVENOUS AS NEEDED
Status: DISCONTINUED | OUTPATIENT
Start: 2024-04-30 | End: 2024-04-30 | Stop reason: HOSPADM

## 2024-04-30 RX ORDER — EPINEPHRINE 0.3 MG/.3ML
0.3 INJECTION SUBCUTANEOUS EVERY 5 MIN PRN
Status: DISCONTINUED | OUTPATIENT
Start: 2024-04-30 | End: 2024-04-30 | Stop reason: HOSPADM

## 2024-04-30 RX ADMIN — DEXAMETHASONE SODIUM PHOSPHATE 8 MG: 4 INJECTION, SOLUTION INTRA-ARTICULAR; INTRALESIONAL; INTRAMUSCULAR; INTRAVENOUS; SOFT TISSUE at 15:20

## 2024-04-30 RX ADMIN — SODIUM CHLORIDE 232 MG: 0.9 INJECTION, SOLUTION INTRAVENOUS at 15:25

## 2024-04-30 ASSESSMENT — PAIN SCALES - GENERAL: PAINLEVEL: 0-NO PAIN

## 2024-04-30 NOTE — PROGRESS NOTES
Pt arrived to [facility] infusion for day 2 etoposide. Prior assessment unchanged from yesterday. RN checked IV from yesterday that was maintained for multiple days of treatment. IV flushed and had BBR. RN hooked up patient to his etoposide. RN was called less than 5 minutes later to the room by another nurse. Upon assessment their was swelling around IV site and patient reported burning pain. RN stopped infusion and tried to pull back as much of the medication back. RN followed the infiltration protocol and referred to pharmacy if any antidote was needed. No antidote required. RN notified primary team of incident and educated patient to monitor the site for any changes such as redness, pain, swelling, or warmth. Patient is to return tomorrow for day 3 etoposide.

## 2024-05-01 ENCOUNTER — INFUSION (OUTPATIENT)
Dept: HEMATOLOGY/ONCOLOGY | Facility: HOSPITAL | Age: 64
End: 2024-05-01
Payer: MEDICARE

## 2024-05-01 VITALS
HEART RATE: 80 BPM | DIASTOLIC BLOOD PRESSURE: 80 MMHG | SYSTOLIC BLOOD PRESSURE: 145 MMHG | TEMPERATURE: 97.7 F | OXYGEN SATURATION: 99 % | RESPIRATION RATE: 18 BRPM | WEIGHT: 243 LBS | BODY MASS INDEX: 32.96 KG/M2

## 2024-05-01 DIAGNOSIS — C34.90 SMALL CELL LUNG CANCER (MULTI): ICD-10-CM

## 2024-05-01 PROCEDURE — 96375 TX/PRO/DX INJ NEW DRUG ADDON: CPT | Mod: INF

## 2024-05-01 PROCEDURE — 96413 CHEMO IV INFUSION 1 HR: CPT

## 2024-05-01 PROCEDURE — 2500000004 HC RX 250 GENERAL PHARMACY W/ HCPCS (ALT 636 FOR OP/ED): Performed by: INTERNAL MEDICINE

## 2024-05-01 RX ORDER — PROCHLORPERAZINE EDISYLATE 5 MG/ML
10 INJECTION INTRAMUSCULAR; INTRAVENOUS EVERY 6 HOURS PRN
Status: DISCONTINUED | OUTPATIENT
Start: 2024-05-01 | End: 2024-05-01 | Stop reason: HOSPADM

## 2024-05-01 RX ORDER — FAMOTIDINE 10 MG/ML
20 INJECTION INTRAVENOUS ONCE AS NEEDED
Status: DISCONTINUED | OUTPATIENT
Start: 2024-05-01 | End: 2024-05-01 | Stop reason: HOSPADM

## 2024-05-01 RX ORDER — DIPHENHYDRAMINE HYDROCHLORIDE 50 MG/ML
50 INJECTION INTRAMUSCULAR; INTRAVENOUS AS NEEDED
Status: DISCONTINUED | OUTPATIENT
Start: 2024-05-01 | End: 2024-05-01 | Stop reason: HOSPADM

## 2024-05-01 RX ORDER — EPINEPHRINE 0.3 MG/.3ML
0.3 INJECTION SUBCUTANEOUS EVERY 5 MIN PRN
Status: DISCONTINUED | OUTPATIENT
Start: 2024-05-01 | End: 2024-05-01 | Stop reason: HOSPADM

## 2024-05-01 RX ORDER — ALBUTEROL SULFATE 0.83 MG/ML
3 SOLUTION RESPIRATORY (INHALATION) AS NEEDED
Status: DISCONTINUED | OUTPATIENT
Start: 2024-05-01 | End: 2024-05-01 | Stop reason: HOSPADM

## 2024-05-01 RX ORDER — ONDANSETRON HYDROCHLORIDE 2 MG/ML
8 INJECTION, SOLUTION INTRAVENOUS ONCE
Status: COMPLETED | OUTPATIENT
Start: 2024-05-01 | End: 2024-05-01

## 2024-05-01 RX ORDER — PROCHLORPERAZINE MALEATE 10 MG
10 TABLET ORAL EVERY 6 HOURS PRN
Status: DISCONTINUED | OUTPATIENT
Start: 2024-05-01 | End: 2024-05-01 | Stop reason: HOSPADM

## 2024-05-01 RX ADMIN — ONDANSETRON 8 MG: 2 INJECTION INTRAMUSCULAR; INTRAVENOUS at 15:07

## 2024-05-01 RX ADMIN — SODIUM CHLORIDE 232 MG: 0.9 INJECTION, SOLUTION INTRAVENOUS at 15:15

## 2024-05-01 RX ADMIN — DEXAMETHASONE SODIUM PHOSPHATE 8 MG: 4 INJECTION, SOLUTION INTRA-ARTICULAR; INTRALESIONAL; INTRAMUSCULAR; INTRAVENOUS; SOFT TISSUE at 15:06

## 2024-05-01 ASSESSMENT — PAIN SCALES - GENERAL: PAINLEVEL: 0-NO PAIN

## 2024-05-01 NOTE — PROGRESS NOTES
Patient arrived in infusion center for Day 3 Etoposide. IV from 4/30 had blood return and was able to infuse. Patient tolerated infusion and was discharged from the infusion center  in stable condition.

## 2024-05-15 PROBLEM — D50.9 IRON DEFICIENCY ANEMIA, UNSPECIFIED: Status: ACTIVE | Noted: 2024-05-15

## 2024-05-15 PROBLEM — M54.2 CERVICALGIA: Status: ACTIVE | Noted: 2024-05-15

## 2024-05-15 PROBLEM — I50.23 ACUTE ON CHRONIC SYSTOLIC HEART FAILURE (MULTI): Status: ACTIVE | Noted: 2024-04-08

## 2024-05-15 PROBLEM — C34.90 SMALL CELL CARCINOMA OF LUNG (MULTI): Status: ACTIVE | Noted: 2024-02-29

## 2024-05-15 PROBLEM — I10 ESSENTIAL (PRIMARY) HYPERTENSION: Status: ACTIVE | Noted: 2023-12-21

## 2024-05-15 PROBLEM — Z72.0 TOBACCO USE: Status: ACTIVE | Noted: 2024-04-08

## 2024-05-15 PROBLEM — R06.09 DYSPNEA ON EXERTION: Status: ACTIVE | Noted: 2024-04-08

## 2024-05-15 PROBLEM — J44.9 CHRONIC OBSTRUCTIVE PULMONARY DISEASE (MULTI): Status: ACTIVE | Noted: 2023-12-21

## 2024-05-15 PROBLEM — I25.10 ARTERIOSCLEROSIS OF CORONARY ARTERY: Status: ACTIVE | Noted: 2024-04-08

## 2024-05-15 PROBLEM — F17.200 NICOTINE DEPENDENCE: Status: ACTIVE | Noted: 2024-05-15

## 2024-05-16 ENCOUNTER — OFFICE VISIT (OUTPATIENT)
Dept: HEMATOLOGY/ONCOLOGY | Facility: HOSPITAL | Age: 64
End: 2024-05-16
Payer: MEDICARE

## 2024-05-16 ENCOUNTER — LAB (OUTPATIENT)
Dept: LAB | Facility: HOSPITAL | Age: 64
End: 2024-05-16
Payer: MEDICARE

## 2024-05-16 VITALS
OXYGEN SATURATION: 98 % | HEART RATE: 96 BPM | TEMPERATURE: 97.9 F | DIASTOLIC BLOOD PRESSURE: 87 MMHG | WEIGHT: 236.55 LBS | SYSTOLIC BLOOD PRESSURE: 147 MMHG | BODY MASS INDEX: 32.08 KG/M2 | RESPIRATION RATE: 20 BRPM

## 2024-05-16 DIAGNOSIS — C34.90 SMALL CELL LUNG CANCER (MULTI): ICD-10-CM

## 2024-05-16 DIAGNOSIS — R59.0 MEDIASTINAL LYMPHADENOPATHY: ICD-10-CM

## 2024-05-16 DIAGNOSIS — Z01.818 PRE-OP TESTING: ICD-10-CM

## 2024-05-16 DIAGNOSIS — R91.1 LUNG NODULE: ICD-10-CM

## 2024-05-16 DIAGNOSIS — C34.90 SMALL CELL CARCINOMA OF LUNG (MULTI): ICD-10-CM

## 2024-05-16 DIAGNOSIS — C34.90 SMALL CELL CARCINOMA OF LUNG (MULTI): Primary | ICD-10-CM

## 2024-05-16 LAB
ALBUMIN SERPL BCP-MCNC: 4 G/DL (ref 3.4–5)
ALP SERPL-CCNC: 68 U/L (ref 33–136)
ALT SERPL W P-5'-P-CCNC: 18 U/L (ref 10–52)
ANION GAP SERPL CALC-SCNC: 13 MMOL/L (ref 10–20)
AST SERPL W P-5'-P-CCNC: 15 U/L (ref 9–39)
BASOPHILS # BLD MANUAL: 0.06 X10*3/UL (ref 0–0.1)
BASOPHILS NFR BLD MANUAL: 1 %
BILIRUB SERPL-MCNC: 0.4 MG/DL (ref 0–1.2)
BUN SERPL-MCNC: 20 MG/DL (ref 6–23)
BURR CELLS BLD QL SMEAR: ABNORMAL
CALCIUM SERPL-MCNC: 9.4 MG/DL (ref 8.6–10.3)
CHLORIDE SERPL-SCNC: 104 MMOL/L (ref 98–107)
CO2 SERPL-SCNC: 28 MMOL/L (ref 21–32)
CREAT SERPL-MCNC: 1.58 MG/DL (ref 0.5–1.3)
DACRYOCYTES BLD QL SMEAR: ABNORMAL
EGFRCR SERPLBLD CKD-EPI 2021: 49 ML/MIN/1.73M*2
EOSINOPHIL # BLD MANUAL: 0.11 X10*3/UL (ref 0–0.7)
EOSINOPHIL NFR BLD MANUAL: 2 %
ERYTHROCYTE [DISTWIDTH] IN BLOOD BY AUTOMATED COUNT: 16.6 % (ref 11.5–14.5)
GLUCOSE SERPL-MCNC: 96 MG/DL (ref 74–99)
HCT VFR BLD AUTO: 33.6 % (ref 41–52)
HGB BLD-MCNC: 11.1 G/DL (ref 13.5–17.5)
IMM GRANULOCYTES # BLD AUTO: 1.49 X10*3/UL (ref 0–0.7)
IMM GRANULOCYTES NFR BLD AUTO: 26.2 % (ref 0–0.9)
LDH SERPL L TO P-CCNC: 185 U/L (ref 84–246)
LYMPHOCYTES # BLD MANUAL: 2.05 X10*3/UL (ref 1.2–4.8)
LYMPHOCYTES NFR BLD MANUAL: 36 %
MCH RBC QN AUTO: 31.2 PG (ref 26–34)
MCHC RBC AUTO-ENTMCNC: 33 G/DL (ref 32–36)
MCV RBC AUTO: 94 FL (ref 80–100)
MONOCYTES # BLD MANUAL: 1.71 X10*3/UL (ref 0.1–1)
MONOCYTES NFR BLD MANUAL: 30 %
MYELOCYTES # BLD MANUAL: 0.63 X10*3/UL
MYELOCYTES NFR BLD MANUAL: 11 %
NEUTROPHILS # BLD MANUAL: 1.14 X10*3/UL (ref 1.2–7.7)
NEUTS BAND # BLD MANUAL: 0.46 X10*3/UL (ref 0–0.7)
NEUTS BAND NFR BLD MANUAL: 8 %
NEUTS SEG # BLD MANUAL: 0.68 X10*3/UL (ref 1.2–7)
NEUTS SEG NFR BLD MANUAL: 12 %
NRBC BLD-RTO: 2.8 /100 WBCS (ref 0–0)
PLATELET # BLD AUTO: 242 X10*3/UL (ref 150–450)
POLYCHROMASIA BLD QL SMEAR: ABNORMAL
POTASSIUM SERPL-SCNC: 4 MMOL/L (ref 3.5–5.3)
PROT SERPL-MCNC: 7.4 G/DL (ref 6.4–8.2)
RBC # BLD AUTO: 3.56 X10*6/UL (ref 4.5–5.9)
RBC MORPH BLD: ABNORMAL
SODIUM SERPL-SCNC: 141 MMOL/L (ref 136–145)
TOTAL CELLS COUNTED BLD: 100
WBC # BLD AUTO: 5.7 X10*3/UL (ref 4.4–11.3)

## 2024-05-16 PROCEDURE — 83615 LACTATE (LD) (LDH) ENZYME: CPT

## 2024-05-16 PROCEDURE — 36415 COLL VENOUS BLD VENIPUNCTURE: CPT

## 2024-05-16 PROCEDURE — 85007 BL SMEAR W/DIFF WBC COUNT: CPT

## 2024-05-16 PROCEDURE — 85027 COMPLETE CBC AUTOMATED: CPT

## 2024-05-16 PROCEDURE — 80053 COMPREHEN METABOLIC PANEL: CPT

## 2024-05-16 PROCEDURE — 3077F SYST BP >= 140 MM HG: CPT | Performed by: INTERNAL MEDICINE

## 2024-05-16 PROCEDURE — 3079F DIAST BP 80-89 MM HG: CPT | Performed by: INTERNAL MEDICINE

## 2024-05-16 PROCEDURE — 99214 OFFICE O/P EST MOD 30 MIN: CPT | Performed by: INTERNAL MEDICINE

## 2024-05-16 PROCEDURE — 99214 OFFICE O/P EST MOD 30 MIN: CPT | Mod: GC | Performed by: INTERNAL MEDICINE

## 2024-05-16 ASSESSMENT — PAIN SCALES - GENERAL: PAINLEVEL: 0-NO PAIN

## 2024-05-16 NOTE — PROGRESS NOTES
Patient ID: Dane Molina is a 64 y.o. male    Primary Care Provider: Darwin Early MD    DIAGNOSIS AND STAGING  cT3 pN2 pM1b small cell lung cancer (INSM1 and TTF1+) of the right lower lobe, diagnosed on 02/05/2024 through bronchoscopy     SITES OF DISEASE  Right lower lobe   Levels 4R and 7 nodes   Liver, confirmed by biopsy      MOLECULAR GENOMICS  Not performed   RB loss by IHC      PRIOR THERAPIES  None     CURRENT THERAPY  Carboplatin/etoposide C1D1 on 03/19/24 with addition of atezolizumab to C2 on 04/08/2024    CURRENT ONCOLOGICAL PROBLEMS  None      HISTORY OF PRESENT ILLNESS  Patient presented to the emergency room on 01/04/2023 with a hypertensive urgency and cough.  He has a history of noncompliance with antihypertensive medications.  He also complained of dyspnea and underwent a CT chest without IV contrast that demonstrated a right lower lobe paramediastinal mass measuring 3.1 cm with adjacent pulmonary nodule measuring 1.8 cm.  Mediastinal lymphadenopathy was demonstrated, including a subcarinal node measuring 1.7 cm.  There was right hilar lymphadenopathy, 2 cm in short axis.  On 01/15/2024 the patient underwent a PET scan that demonstrated avidity of the aforementioned masses/lymph nodes, as well as a lesion in the liver, SUV max 3.3, right hepatic lobe.  On 02/05/2024, patient underwent a bronchoscopy:  A. LYMPH NODE 4 R PULMONARY FINE NEEDLE ASPIRATION , CYTOLOGY AND CELL BLOCK:    Positive for malignant cells   Metastatic small cell carcinoma, see note  B. LYMPH NODE 7 PULMONARY FINE NEEDLE ASPIRATION , CYTOLOGY AND CELL BLOCK:    Positive for malignant cells  Metastatic small cell carcinoma, see note          Note: Immunostains demonstrate the lesional cells to be diffusely positive for CAM 5.2, INSM1, TTF-1.  The proliferative marker Ki-67 is greater than 90%.  Immunostain for retinoblastoma shows loss of staining.  Immunostain for p40 is negative.  The morphology and  immunohistochemical profile are consistent with the above diagnosis.  On 02/29/24 the pt is seen by med onc for the first time. Denies any systemic sx - denies lack of appetite, fatigue or unintentional weight loss. Denies new localized pain, headaches or neuro sx.  Denies new respiratory sx.  He is claustrophobic and MRI brain was scheduled for today but pt could not go through with the test. He also has mild CKD, which limits his ability to receive intravenous contrast for CT.   03/04/24: MRI brain shows no ICMA, but MRI liver shows a 1.9 cm hepatic lesion with T2 hyperintensity and peripheral enhancement   03/19/2024: C1 D1 carboplatin/etoposide  03/22/24: Liver biopsy:  FINAL DIAGNOSIS   A. RIGHT LIVER MASS BIOPSY:      -- LIVER TISSUE INVOLVED BY SMALL CELL CARCINOMA, SEE NOTE     Note: Clinical history of lung small cell carcinoma noted. The findings most likely represent metastasis from the known primary lung tumor.     04/04/24: discussed with patient liver biopsy results and recommendations to add atezolizumab to his regimen  He has met with radiation oncology, and we will consider consolidative TRT pending response to cytotoxic chemotherapy    04/08/2024: C2 D1 carboplatin/etoposide/atezolizumab       PAST MEDICAL HISTORY  CKD - creatinine 1.68  Neck pain (injury at work)   Hypertension  Iron deficiency anemia (hx of blood transfusion in the 90's)    SURGICAL HISTORY  Ankle fracture and has a latoya in place      SOCIAL HISTORY  Former 35-40 pack-year smoker, quit at age 57  Smokes cigars now   History of alcohol addiction, sober for the past 15 years   Has one chlid ([place])  Single      FAMILY HISTORY  Lung CA (mother)  Mother had CKD and required HD    CURRENT MEDS REVIEWED       ALLERGIES REVIEWED        SUBJECTIVE:  Patient doing well today. He tolerated cycle 3 without issue. He has lost 4 lbs since last visit however weight same as 4/25 visit - he has cut out all sugary soda. He is working to cut down on  tobacco use. Aside from alopecia he has no concerns at this time. Still working as a dumptruck .    A 13 point review of systems was performed, with significant findings documented above in subjective history.    OBJECTIVE:  Vitals:    05/16/24 0955   BP: 147/87   Pulse: 96   Resp: 20   Temp: 36.6 °C (97.9 °F)   SpO2: 98%          Body surface area is 2.33 meters squared.     Wt Readings from Last 5 Encounters:   05/16/24 107 kg (236 lb 8.9 oz)   05/01/24 110 kg (243 lb)   04/30/24 109 kg (240 lb 4.8 oz)   04/29/24 107 kg (235 lb)   04/25/24 107 kg (236 lb 1.8 oz)     ECOGSCORE: 1- Restricted in physically strenuous activity.  Carries out light duty.    Physical Exam  Constitutional:       Appearance: Normal appearance.   HENT:      Head: Normocephalic and atraumatic.   Eyes:      General: No scleral icterus.     Extraocular Movements: Extraocular movements intact.      Conjunctiva/sclera: Conjunctivae normal.   Cardiovascular:      Rate and Rhythm: Normal rate and regular rhythm.   Pulmonary:      Effort: Pulmonary effort is normal.   Abdominal:      Palpations: Abdomen is soft. There is no mass.      Tenderness: There is no abdominal tenderness. There is no guarding.   Musculoskeletal:      Right lower leg: No edema.      Left lower leg: No edema.   Skin:     General: Skin is warm.      Findings: No erythema or rash.   Neurological:      General: No focal deficit present.      Mental Status: He is alert and oriented to person, place, and time.      Motor: No weakness.      Gait: Gait normal.   Psychiatric:         Mood and Affect: Mood normal.         Behavior: Behavior normal.         Thought Content: Thought content normal.         Judgment: Judgment normal.          Diagnostic Results   Results:  Labs:  Lab Results   Component Value Date    WBC 8.7 04/29/2024    HGB 11.8 (L) 04/29/2024    HCT 35.4 (L) 04/29/2024    MCV 92 04/29/2024     04/29/2024      Lab Results   Component Value Date     NEUTROABS 4.04 03/14/2024        Lab Results   Component Value Date    GLUCOSE 159 (H) 04/29/2024    CALCIUM 8.9 04/29/2024     04/29/2024    K 3.3 (L) 04/29/2024    CO2 27 04/29/2024     04/29/2024    BUN 33 (H) 04/29/2024    CREATININE 1.91 (H) 04/29/2024     Lab Results   Component Value Date    ALT 18 04/29/2024    AST 17 04/29/2024    ALKPHOS 64 04/29/2024    BILITOT 0.3 04/29/2024    BILIDIR 0.1 12/21/2023      Lab Results   Component Value Date    CORTISOL 18.0 04/29/2024    TSH 2.33 04/29/2024     INDICATION:  Signs/Symptoms:extensive stage SCLC with liver metastasis - assess  response after 2 cycles of chemo and immunotherapy.      COMPARISON:      PET-CT 01/15/2024. CT chest 01/10/2024. MRI liver 03/04/2024      ACCESSION NUMBER(S):  [id number]      ORDERING CLINICIAN:  DAYANARA GRAY      TECHNIQUE:  CT of the chest, abdomen, and pelvis was performed.  Contiguous axial  images were obtained at 3 mm slice thickness through the chest,  abdomen and pelvis. Coronal and sagittal reconstructions at 3 mm  slice thickness were performed.      85 mL of Omnipaque 350 contrast administered intravenously without  immediate complication.      FINDINGS:  CHEST:      LUNG/PLEURA/LARGE AIRWAYS:  No focal pulmonary consolidation or pleural effusion.  No pneumothorax. Airways are clear.  Findings of respiratory bronchiolitis noted with upper lung  predominant centrilobular micro nodules evident. Interval improvement  in degree of poorly marginated right lung perihilar mass. For  example, a 11 x 33 mm component on image 170 noted which previously  measured 23 x 43 mm. A 17 x 18 mm component previously measured 27 x  30 2 mL.      A 3 mm left upper lobe nodule on image 122.  3 mm left upper lobe nodule on image 143.  3 mm right upper lobe nodule on image 134 which may represent mucous  plug.      VESSELS:  Aorta and pulmonary arteries are normal caliber. Moderate thoracic  aorta atherosclerosis.      Moderate  coronary artery calcifications. Study is not optimized for  evaluation of coronary arteries.      HEART:  The heart is normal in size. No pericardial effusion.      MEDIASTINUM AND HARMEET:  Improved mediastinal lymphadenopathy with a 15 mm right lower  paratracheal chain lymph node on image 124 that previously measured  20 mm. The esophagus appears normal.      CHEST WALL AND LOWER NECK:  No acute fracture or suspicious osseous lesion.      The soft tissues of the chest wall are unremarkable. No axillary  adenopathy.      The visualized thyroid gland appears within normal limits.      ABDOMEN:      LIVER:  There is an area of ill-defined attenuation in the right hepatic lobe  on image 97 of series 201 which may represent previously noted  hepatic lesion on the liver MRI.      GALLBLADDER:  Normal size gallbladder. No radiopaque gallstones.      BILE DUCTS:  Normal caliber.      PANCREAS:  The pancreas appears to be within normal limits.      SPLEEN:  Spleen is normal in size with homogeneous enhancement.      ADRENAL GLANDS:  Within normal limits.      KIDNEYS AND URETERS:  Normal size kidneys. No hydroureteronephrosis or urinary tract stone.  No focal lesion evident.      PELVIS:      BLADDER:  Within normal limits.      REPRODUCTIVE ORGANS:  Uterus is not visualized and likely surgically absent..      BOWEL:  The stomach is unremarkable. The small bowel is normal in caliber  without evidence of focal wall thickening or obstruction.  Diverticulosis is noted of the sigmoid colon.      VESSELS:  The aorta and IVC are within normal limits. The principal vasculature  of the abdomen and pelvis is patent. No aneurysm. Moderate  atherosclerotic changes of the abdominal aorta.      PERITONEUM/RETROPERITONEUM/LYMPH NODES:  There is no free or loculated intraperitoneal or retroperitoneal  fluid collection, no free intraperitoneal air. No adenopathy.      BONES AND ABDOMINAL WALL:  No evidence of acute fracture. No suspicious  osseous lesions are  identified. The abdominal wall soft tissues appear normal.      IMPRESSION:  CHEST:  1. Interval favorable response to treatment with decrease in size of  right lung perihilar masses as well as right lower paratracheal chain  lymphadenopathy.  2. No new sites of metastatic disease in the thorax.      ABDOMEN-PELVIS:  1. Ill-defined region of attenuation in the right hepatic lobe which  may correlate with previously noted hepatic lesion. Attention on the  follow-up studies is recommended.    Assessment/Plan     Small cell carcinoma of lung (Multi), Clinical: Stage FREDRICK (cT3, cN2, pM1b)  This is a former cigarette smoker with T3 pN2 pM1b small cell lung cancer of the RLL - extensive stage.  Now undergoing treatment with carboplatin/etoposide  In view of liver biopsy results confirming metastatic diagnosis, atezolizumab was added to cycle 2, on 04/08/2024  He has repeat scans after C2 which were personally reviewed noting treatment response in at least primary lesion and nodes - liver lesion not seen on CT.  He has tolerated cycle 3 without major issue.      Will proceed with c3d1 on 5/20 and follow with scans 2 weeks after completion.    After 4 cycles, we will proceed with consolidative TRT if applicable, as well as will have a discussion regarding whole brain radiation versus serial brain MRI follow-ups.    Nicolas Ospina MD PGY-6  Hematology/Oncology Fellow    Pt was seen and discussed with Dr. Galvin     I saw and evaluated the patient. I personally obtained the key and critical portions of the history and physical exam or was physically present for key and critical portions performed by the resident/fellow. I reviewed the resident/fellow's documentation and discussed the patient with the resident/fellow. I agree with the resident/fellow's medical decision making as documented in the note.     Tori Galvin MD, MS  Thoracic Medical Oncology   [address] [place] [address]  Phone:  [phone]

## 2024-05-20 ENCOUNTER — INFUSION (OUTPATIENT)
Dept: HEMATOLOGY/ONCOLOGY | Facility: HOSPITAL | Age: 64
End: 2024-05-20
Payer: MEDICARE

## 2024-05-20 VITALS
RESPIRATION RATE: 20 BRPM | TEMPERATURE: 99.3 F | BODY MASS INDEX: 31.96 KG/M2 | OXYGEN SATURATION: 98 % | WEIGHT: 235.67 LBS | HEART RATE: 95 BPM | SYSTOLIC BLOOD PRESSURE: 128 MMHG | DIASTOLIC BLOOD PRESSURE: 79 MMHG

## 2024-05-20 DIAGNOSIS — C34.90 SMALL CELL LUNG CANCER (MULTI): ICD-10-CM

## 2024-05-20 DIAGNOSIS — C34.90 SMALL CELL CARCINOMA OF LUNG (MULTI): ICD-10-CM

## 2024-05-20 LAB
BASOPHILS # BLD MANUAL: 0 X10*3/UL (ref 0–0.1)
BASOPHILS NFR BLD MANUAL: 0 %
EOSINOPHIL # BLD MANUAL: 0 X10*3/UL (ref 0–0.7)
EOSINOPHIL NFR BLD MANUAL: 0 %
ERYTHROCYTE [DISTWIDTH] IN BLOOD BY AUTOMATED COUNT: 17.4 % (ref 11.5–14.5)
HCT VFR BLD AUTO: 32.4 % (ref 41–52)
HGB BLD-MCNC: 10.7 G/DL (ref 13.5–17.5)
IMM GRANULOCYTES # BLD AUTO: 2.1 X10*3/UL (ref 0–0.7)
IMM GRANULOCYTES NFR BLD AUTO: 17.4 % (ref 0–0.9)
LYMPHOCYTES # BLD MANUAL: 2.66 X10*3/UL (ref 1.2–4.8)
LYMPHOCYTES NFR BLD MANUAL: 22 %
MCH RBC QN AUTO: 31.7 PG (ref 26–34)
MCHC RBC AUTO-ENTMCNC: 33 G/DL (ref 32–36)
MCV RBC AUTO: 96 FL (ref 80–100)
METAMYELOCYTES # BLD MANUAL: 0.48 X10*3/UL
METAMYELOCYTES NFR BLD MANUAL: 4 %
MONOCYTES # BLD MANUAL: 1.45 X10*3/UL (ref 0.1–1)
MONOCYTES NFR BLD MANUAL: 12 %
MYELOCYTES # BLD MANUAL: 1.21 X10*3/UL
MYELOCYTES NFR BLD MANUAL: 10 %
NEUTROPHILS # BLD MANUAL: 6.29 X10*3/UL (ref 1.2–7.7)
NEUTS BAND # BLD MANUAL: 1.09 X10*3/UL (ref 0–0.7)
NEUTS BAND NFR BLD MANUAL: 9 %
NEUTS SEG # BLD MANUAL: 5.2 X10*3/UL (ref 1.2–7)
NEUTS SEG NFR BLD MANUAL: 43 %
NRBC BLD-RTO: 1.3 /100 WBCS (ref 0–0)
PLATELET # BLD AUTO: 342 X10*3/UL (ref 150–450)
POLYCHROMASIA BLD QL SMEAR: ABNORMAL
RBC # BLD AUTO: 3.38 X10*6/UL (ref 4.5–5.9)
RBC MORPH BLD: ABNORMAL
TOTAL CELLS COUNTED BLD: 100
WBC # BLD AUTO: 12.1 X10*3/UL (ref 4.4–11.3)

## 2024-05-20 PROCEDURE — 96375 TX/PRO/DX INJ NEW DRUG ADDON: CPT | Mod: INF

## 2024-05-20 PROCEDURE — 2500000004 HC RX 250 GENERAL PHARMACY W/ HCPCS (ALT 636 FOR OP/ED): Performed by: INTERNAL MEDICINE

## 2024-05-20 PROCEDURE — 2500000004 HC RX 250 GENERAL PHARMACY W/ HCPCS (ALT 636 FOR OP/ED): Mod: JZ | Performed by: INTERNAL MEDICINE

## 2024-05-20 PROCEDURE — 96417 CHEMO IV INFUS EACH ADDL SEQ: CPT

## 2024-05-20 PROCEDURE — 85027 COMPLETE CBC AUTOMATED: CPT

## 2024-05-20 PROCEDURE — 85007 BL SMEAR W/DIFF WBC COUNT: CPT

## 2024-05-20 PROCEDURE — 96413 CHEMO IV INFUSION 1 HR: CPT

## 2024-05-20 PROCEDURE — 96367 TX/PROPH/DG ADDL SEQ IV INF: CPT

## 2024-05-20 RX ORDER — PROCHLORPERAZINE MALEATE 10 MG
10 TABLET ORAL EVERY 6 HOURS PRN
Status: DISCONTINUED | OUTPATIENT
Start: 2024-05-20 | End: 2024-05-20 | Stop reason: HOSPADM

## 2024-05-20 RX ORDER — ALBUTEROL SULFATE 0.83 MG/ML
3 SOLUTION RESPIRATORY (INHALATION) AS NEEDED
Status: DISCONTINUED | OUTPATIENT
Start: 2024-05-20 | End: 2024-05-20 | Stop reason: HOSPADM

## 2024-05-20 RX ORDER — PROCHLORPERAZINE EDISYLATE 5 MG/ML
10 INJECTION INTRAMUSCULAR; INTRAVENOUS EVERY 6 HOURS PRN
Status: DISCONTINUED | OUTPATIENT
Start: 2024-05-20 | End: 2024-05-20 | Stop reason: HOSPADM

## 2024-05-20 RX ORDER — DIPHENHYDRAMINE HYDROCHLORIDE 50 MG/ML
50 INJECTION INTRAMUSCULAR; INTRAVENOUS AS NEEDED
Status: DISCONTINUED | OUTPATIENT
Start: 2024-05-20 | End: 2024-05-20 | Stop reason: HOSPADM

## 2024-05-20 RX ORDER — FAMOTIDINE 10 MG/ML
20 INJECTION INTRAVENOUS ONCE AS NEEDED
Status: DISCONTINUED | OUTPATIENT
Start: 2024-05-20 | End: 2024-05-20 | Stop reason: HOSPADM

## 2024-05-20 RX ORDER — EPINEPHRINE 0.3 MG/.3ML
0.3 INJECTION SUBCUTANEOUS EVERY 5 MIN PRN
Status: DISCONTINUED | OUTPATIENT
Start: 2024-05-20 | End: 2024-05-20 | Stop reason: HOSPADM

## 2024-05-20 RX ORDER — PALONOSETRON 0.05 MG/ML
0.25 INJECTION, SOLUTION INTRAVENOUS ONCE
Status: COMPLETED | OUTPATIENT
Start: 2024-05-20 | End: 2024-05-20

## 2024-05-20 RX ADMIN — ATEZOLIZUMAB 1200 MG: 1200 INJECTION, SOLUTION INTRAVENOUS at 12:26

## 2024-05-20 RX ADMIN — PALONOSETRON HYDROCHLORIDE 250 MCG: 0.25 INJECTION INTRAVENOUS at 11:18

## 2024-05-20 RX ADMIN — SODIUM CHLORIDE 232 MG: 0.9 INJECTION, SOLUTION INTRAVENOUS at 13:51

## 2024-05-20 RX ADMIN — SODIUM CHLORIDE 150 MG: 9 INJECTION, SOLUTION INTRAVENOUS at 11:43

## 2024-05-20 RX ADMIN — CARBOPLATIN 466 MG: 600 INJECTION, SOLUTION INTRAVENOUS at 13:08

## 2024-05-20 RX ADMIN — DEXAMETHASONE SODIUM PHOSPHATE 12 MG: 10 INJECTION, SOLUTION INTRAMUSCULAR; INTRAVENOUS at 11:18

## 2024-05-20 ASSESSMENT — PAIN SCALES - GENERAL: PAINLEVEL_OUTOF10: 0-NO PAIN

## 2024-05-20 NOTE — PROGRESS NOTES
Pt arrived to [facility] infusion for scheduled treatment. Pt received scheduled infusion without incident. Pt aware to return to [facility] infusion for day 2 etoposide tomorrow 5/21/24.

## 2024-05-21 ENCOUNTER — INFUSION (OUTPATIENT)
Dept: HEMATOLOGY/ONCOLOGY | Facility: HOSPITAL | Age: 64
End: 2024-05-21
Payer: MEDICARE

## 2024-05-21 VITALS
DIASTOLIC BLOOD PRESSURE: 68 MMHG | RESPIRATION RATE: 18 BRPM | TEMPERATURE: 98.2 F | BODY MASS INDEX: 32.69 KG/M2 | OXYGEN SATURATION: 99 % | HEART RATE: 98 BPM | SYSTOLIC BLOOD PRESSURE: 130 MMHG | WEIGHT: 241 LBS

## 2024-05-21 DIAGNOSIS — C34.90 SMALL CELL CARCINOMA OF LUNG (MULTI): ICD-10-CM

## 2024-05-21 PROCEDURE — 2500000004 HC RX 250 GENERAL PHARMACY W/ HCPCS (ALT 636 FOR OP/ED): Performed by: INTERNAL MEDICINE

## 2024-05-21 PROCEDURE — 96413 CHEMO IV INFUSION 1 HR: CPT

## 2024-05-21 PROCEDURE — 96375 TX/PRO/DX INJ NEW DRUG ADDON: CPT | Mod: INF

## 2024-05-21 RX ORDER — FAMOTIDINE 10 MG/ML
20 INJECTION INTRAVENOUS ONCE AS NEEDED
Status: DISCONTINUED | OUTPATIENT
Start: 2024-05-21 | End: 2024-05-21 | Stop reason: HOSPADM

## 2024-05-21 RX ORDER — EPINEPHRINE 0.3 MG/.3ML
0.3 INJECTION SUBCUTANEOUS EVERY 5 MIN PRN
Status: DISCONTINUED | OUTPATIENT
Start: 2024-05-21 | End: 2024-05-21 | Stop reason: HOSPADM

## 2024-05-21 RX ORDER — PROCHLORPERAZINE EDISYLATE 5 MG/ML
10 INJECTION INTRAMUSCULAR; INTRAVENOUS EVERY 6 HOURS PRN
Status: DISCONTINUED | OUTPATIENT
Start: 2024-05-21 | End: 2024-05-21 | Stop reason: HOSPADM

## 2024-05-21 RX ORDER — DIPHENHYDRAMINE HYDROCHLORIDE 50 MG/ML
50 INJECTION INTRAMUSCULAR; INTRAVENOUS AS NEEDED
Status: DISCONTINUED | OUTPATIENT
Start: 2024-05-21 | End: 2024-05-21 | Stop reason: HOSPADM

## 2024-05-21 RX ORDER — PROCHLORPERAZINE MALEATE 10 MG
10 TABLET ORAL EVERY 6 HOURS PRN
Status: DISCONTINUED | OUTPATIENT
Start: 2024-05-21 | End: 2024-05-21 | Stop reason: HOSPADM

## 2024-05-21 RX ORDER — ALBUTEROL SULFATE 0.83 MG/ML
3 SOLUTION RESPIRATORY (INHALATION) AS NEEDED
Status: DISCONTINUED | OUTPATIENT
Start: 2024-05-21 | End: 2024-05-21 | Stop reason: HOSPADM

## 2024-05-21 RX ADMIN — DEXAMETHASONE SODIUM PHOSPHATE 8 MG: 4 INJECTION, SOLUTION INTRA-ARTICULAR; INTRALESIONAL; INTRAMUSCULAR; INTRAVENOUS; SOFT TISSUE at 12:50

## 2024-05-21 RX ADMIN — SODIUM CHLORIDE 232 MG: 0.9 INJECTION, SOLUTION INTRAVENOUS at 13:27

## 2024-05-21 ASSESSMENT — PAIN SCALES - GENERAL: PAINLEVEL: 0-NO PAIN

## 2024-05-21 NOTE — PROGRESS NOTES
Pt arrived to [facility] infusion for day 2 etoposide. IV checked for patency. IV had brisk blood return and flushes without issue. Prior assessment unchanged from yesterday. IV maintained for day 3 etoposide. Pt aware to return tomorrow at 1330 for treatment.

## 2024-05-22 ENCOUNTER — INFUSION (OUTPATIENT)
Dept: HEMATOLOGY/ONCOLOGY | Facility: HOSPITAL | Age: 64
End: 2024-05-22
Payer: MEDICARE

## 2024-05-22 ENCOUNTER — OFFICE VISIT (OUTPATIENT)
Dept: CARDIOLOGY | Facility: CLINIC | Age: 64
End: 2024-05-22
Payer: MEDICARE

## 2024-05-22 VITALS
RESPIRATION RATE: 18 BRPM | HEIGHT: 72 IN | SYSTOLIC BLOOD PRESSURE: 160 MMHG | OXYGEN SATURATION: 96 % | BODY MASS INDEX: 32.91 KG/M2 | DIASTOLIC BLOOD PRESSURE: 88 MMHG | WEIGHT: 243 LBS | HEART RATE: 79 BPM

## 2024-05-22 VITALS — TEMPERATURE: 97 F

## 2024-05-22 DIAGNOSIS — C34.90 SMALL CELL LUNG CANCER (MULTI): ICD-10-CM

## 2024-05-22 DIAGNOSIS — R06.09 DYSPNEA ON EXERTION: ICD-10-CM

## 2024-05-22 DIAGNOSIS — R06.01 ORTHOPNEA: ICD-10-CM

## 2024-05-22 DIAGNOSIS — I42.0 DILATED CARDIOMYOPATHY (MULTI): ICD-10-CM

## 2024-05-22 DIAGNOSIS — I25.84 CORONARY ARTERY CALCIFICATION: ICD-10-CM

## 2024-05-22 DIAGNOSIS — C34.90 SMALL CELL CARCINOMA OF LUNG (MULTI): ICD-10-CM

## 2024-05-22 DIAGNOSIS — R06.00 DYSPNEA, UNSPECIFIED TYPE: ICD-10-CM

## 2024-05-22 DIAGNOSIS — I25.10 CORONARY ARTERY CALCIFICATION: ICD-10-CM

## 2024-05-22 DIAGNOSIS — I50.23 ACUTE ON CHRONIC SYSTOLIC HEART FAILURE (MULTI): Primary | ICD-10-CM

## 2024-05-22 DIAGNOSIS — Z72.0 TOBACCO ABUSE: ICD-10-CM

## 2024-05-22 DIAGNOSIS — I25.10 CORONARY ARTERY DISEASE INVOLVING NATIVE CORONARY ARTERY OF NATIVE HEART WITHOUT ANGINA PECTORIS: ICD-10-CM

## 2024-05-22 DIAGNOSIS — R06.09 DOE (DYSPNEA ON EXERTION): ICD-10-CM

## 2024-05-22 PROCEDURE — 99215 OFFICE O/P EST HI 40 MIN: CPT | Performed by: STUDENT IN AN ORGANIZED HEALTH CARE EDUCATION/TRAINING PROGRAM

## 2024-05-22 PROCEDURE — 2500000004 HC RX 250 GENERAL PHARMACY W/ HCPCS (ALT 636 FOR OP/ED): Performed by: INTERNAL MEDICINE

## 2024-05-22 PROCEDURE — 96413 CHEMO IV INFUSION 1 HR: CPT

## 2024-05-22 PROCEDURE — 3077F SYST BP >= 140 MM HG: CPT | Performed by: STUDENT IN AN ORGANIZED HEALTH CARE EDUCATION/TRAINING PROGRAM

## 2024-05-22 PROCEDURE — 3079F DIAST BP 80-89 MM HG: CPT | Performed by: STUDENT IN AN ORGANIZED HEALTH CARE EDUCATION/TRAINING PROGRAM

## 2024-05-22 RX ORDER — METOPROLOL SUCCINATE 100 MG/1
100 TABLET, EXTENDED RELEASE ORAL DAILY
Qty: 90 TABLET | Refills: 3 | Status: SHIPPED | OUTPATIENT
Start: 2024-05-22 | End: 2025-05-22

## 2024-05-22 RX ORDER — EPINEPHRINE 0.3 MG/.3ML
0.3 INJECTION SUBCUTANEOUS EVERY 5 MIN PRN
Status: DISCONTINUED | OUTPATIENT
Start: 2024-05-22 | End: 2024-05-22 | Stop reason: HOSPADM

## 2024-05-22 RX ORDER — FAMOTIDINE 10 MG/ML
20 INJECTION INTRAVENOUS ONCE AS NEEDED
Status: DISCONTINUED | OUTPATIENT
Start: 2024-05-22 | End: 2024-05-22 | Stop reason: HOSPADM

## 2024-05-22 RX ORDER — DIPHENHYDRAMINE HYDROCHLORIDE 50 MG/ML
50 INJECTION INTRAMUSCULAR; INTRAVENOUS AS NEEDED
Status: DISCONTINUED | OUTPATIENT
Start: 2024-05-22 | End: 2024-05-22 | Stop reason: HOSPADM

## 2024-05-22 RX ORDER — ONDANSETRON HYDROCHLORIDE 2 MG/ML
8 INJECTION, SOLUTION INTRAVENOUS ONCE
Status: COMPLETED | OUTPATIENT
Start: 2024-05-22 | End: 2024-05-22

## 2024-05-22 RX ORDER — PROCHLORPERAZINE EDISYLATE 5 MG/ML
10 INJECTION INTRAMUSCULAR; INTRAVENOUS EVERY 6 HOURS PRN
Status: DISCONTINUED | OUTPATIENT
Start: 2024-05-22 | End: 2024-05-22 | Stop reason: HOSPADM

## 2024-05-22 RX ORDER — PROCHLORPERAZINE MALEATE 10 MG
10 TABLET ORAL EVERY 6 HOURS PRN
Status: DISCONTINUED | OUTPATIENT
Start: 2024-05-22 | End: 2024-05-22 | Stop reason: HOSPADM

## 2024-05-22 RX ORDER — ALBUTEROL SULFATE 0.83 MG/ML
3 SOLUTION RESPIRATORY (INHALATION) AS NEEDED
Status: DISCONTINUED | OUTPATIENT
Start: 2024-05-22 | End: 2024-05-22 | Stop reason: HOSPADM

## 2024-05-22 RX ADMIN — DEXAMETHASONE SODIUM PHOSPHATE 8 MG: 4 INJECTION, SOLUTION INTRA-ARTICULAR; INTRALESIONAL; INTRAMUSCULAR; INTRAVENOUS; SOFT TISSUE at 13:35

## 2024-05-22 RX ADMIN — SODIUM CHLORIDE 232 MG: 0.9 INJECTION, SOLUTION INTRAVENOUS at 14:02

## 2024-05-22 RX ADMIN — ONDANSETRON 8 MG: 2 INJECTION INTRAMUSCULAR; INTRAVENOUS at 13:34

## 2024-05-22 ASSESSMENT — ENCOUNTER SYMPTOMS: DEPRESSION: 0

## 2024-05-22 NOTE — PROGRESS NOTES
Pt arrived to [facility] infusion for day 3 etoposide. Prior assessment unchanged. IV checked for patency. IV had brisk blood return and flushed without issue. Pt received infusion without incident. Pt dc home in safe condition.

## 2024-05-22 NOTE — PROGRESS NOTES
Follow-up and Hypertension (6wk)     HPI:    Dane Molina is a 64 y.o. male with pertinent history of hypertension, dyslipidemia, active tobacco abuse, COPD, 2 FDG avid lesions in the right lower lobe concerning for primary lung cancer, likely underlying coronary artery disease with moderate coronary artery calcifications and right lower lobe mass on CT of chest performed 1/10/2024, no clear ischemia although there was a severe hypertensive response to exercise, moderate to severely dilated left ventricle with LVEDP of 195 mL, moderately reduced LVEF of 33% on nuclear treadmill stress test performed 3/15/2024, moderate severe cardiomyopathy with ejection fraction of 25 to 30%, pseudonormal diastolic dysfunction, moderate eccentric hypertrophy, moderate to severely dilated left ventricle, moderate left atrial enlargement, moderate mitral regurgitation on echo performed 3/22/2024 presents to cardiology clinic for follow-up.    He is doing relatively well.  Blood pressure has been significantly better controlled on adjustments.  It appears he was still taking amlodipine 5 mg daily.  Dyspnea exertion is relatively stable.  He has had minimal chest discomfort.  We had a prolonged discussion of the natural history of cardiomyopathy and heart failure.  No recent syncope or dizziness.  He has had minimal palpitations.    No exacerbating or relieving factors.  Patient denies chest pain and angina.  Pt denies orthopnea, and paroxysmal nocturnal dyspnea.  Pt denies worsening lower extremity edema.  Pt denies palpitations or syncope.  No recent falls.  No fever or chills.  No cough.  No change in bowel or bladder habits.  No sick contacts.  No recent travel.    12 point review of systems including (Constitutional, Eyes, ENMT, Respiratory, Cardiac, Gastrointestinal, Neurological, Psychiatric, and Hematologic) was performed and is otherwise negative.    Past medical history:  As above.    Medications were reviewed.    Allergies  were reviewed.    Family history:  No sudden cardiac death or premature coronary artery disease.     Social history reviewed:   reports that he has been smoking cigars. He started smoking about 46 years ago. He has never used smokeless tobacco. He reports that he does not currently use alcohol. He reports that he does not currently use drugs.     Medications reviewed:   Current Outpatient Medications   Medication Instructions    albuterol 90 mcg/actuation inhaler 2 puffs, inhalation, 2 times daily PRN    amLODIPine (NORVASC) 5 mg, oral, Daily    atorvastatin (LIPITOR) 40 mg, oral, Daily    losartan (COZAAR) 50 mg, oral, Daily    metoprolol succinate XL (TOPROL-XL) 50 mg, oral, Daily, Do not crush or chew.    nicotine (Nicoderm CQ) 14 mg/24 hr patch 1 patch, transdermal, Every 24 hours    OLANZapine (ZYPREXA) 5 mg, oral, Nightly, For 4 days starting the evening of treatment.    ondansetron (ZOFRAN) 8 mg, oral, Every 8 hours PRN    potassium chloride CR (Klor-Con M20) 20 mEq ER tablet 20 mEq, oral, Daily, Do not crush or chew.    prochlorperazine (COMPAZINE) 10 mg, oral, Every 6 hours PRN    Symbicort 80-4.5 mcg/actuation inhaler 2 puffs, inhalation, Daily    triamterene-hydrochlorothiazid (Maxzide-25) 37.5-25 mg tablet 1 tablet, oral, Every morning        Vitals reviewed: Visit Vitals  /88 (BP Location: Left arm, Patient Position: Sitting, BP Cuff Size: Adult)   Pulse 79   Resp 18       Physical Exam:   General:  Patient is awake, alert, and oriented.  Patient is in no acute distress.  HEENT:  Pupils equal and reactive.  Normocephalic.  Moist mucosa.    Neck:  No thyromegaly.  Normal Jugular Venous Pressure.  Cardiovascular:  Regular rate and rhythm.  Normal S1 and S2.  1/6 MILADY.  Pulmonary:  Clear to auscultation bilaterally.  Abdomen:  Soft. Non-tender.   Non-distended.  Positive bowel sounds.  Lower Extremities:  2+ pedal pulses. No LE edema.  Neurologic:  Cranial nerves intact.  No focal deficit.   Skin:  Skin warm and dry, normal skin turgor.   Psychiatric: Normal affect.    Last Labs:  CBC -      Lab Results   Component Value Date    WBC 12.1 (H) 05/20/2024    HGB 10.7 (L) 05/20/2024    HCT 32.4 (L) 05/20/2024     05/20/2024        CMP-  Lab Results   Component Value Date    GLUCOSE 96 05/16/2024     05/16/2024    K 4.0 05/16/2024     05/16/2024    CO2 28 05/16/2024    ANIONGAP 13 05/16/2024    BUN 20 05/16/2024    CREATININE 1.58 (H) 05/16/2024    EGFR 49 (L) 05/16/2024    CALCIUM 9.4 05/16/2024    PHOS 2.6 04/08/2024    PROT 7.4 05/16/2024    ALBUMIN 4.0 05/16/2024    AST 15 05/16/2024    ALT 18 05/16/2024    ALKPHOS 68 05/16/2024    BILITOT 0.4 05/16/2024        LIPIDS-  Lab Results   Component Value Date    CHOL 282 (H) 12/21/2023    TRIG 106 12/21/2023    HDL 44.5 12/21/2023    CHHDL 6.3 12/21/2023    VLDL 21 12/21/2023        OTHERS-  Lab Results   Component Value Date    HGBA1C 5.7 (H) 12/21/2023     (H) 04/08/2024        I personally reviewed the patient's recent vitals, labs, medications, orders, EKGs, pertinent cardiac imaging/ echocardiography and ischemic evaluations including stress testing.    Assessment and Plan:    Dane Molina is a 64 y.o. male with pertinent history of hypertension, dyslipidemia, active tobacco abuse, COPD, 2 FDG avid lesions in the right lower lobe concerning for primary lung cancer, likely underlying coronary artery disease with moderate coronary artery calcifications and right lower lobe mass on CT of chest performed 1/10/2024, no clear ischemia although there was a severe hypertensive response to exercise, moderate to severely dilated left ventricle with LVEDP of 195 mL, moderately reduced LVEF of 33% on nuclear treadmill stress test performed 3/15/2024, moderate severe cardiomyopathy with ejection fraction of 25 to 30%, pseudonormal diastolic dysfunction, moderate eccentric hypertrophy, moderate to severely dilated left ventricle, moderate left  atrial enlargement, moderate mitral regurgitation on echo performed 3/22/2024 presents to cardiology clinic for follow-up. He is doing relatively well.  Blood pressure has been significantly better controlled on adjustments.  It appears he was still taking amlodipine 5 mg daily.  Dyspnea exertion is relatively stable.  He has had minimal chest discomfort.  We had a prolonged discussion of the natural history of cardiomyopathy and heart failure.  No recent syncope or dizziness.  He has had minimal palpitations.      We did discuss the benefits of left heart catheterization and angiography to further evaluate for possible blockages or coronary artery disease and cause weakening of the heart muscle or cardiomyopathy.  Please plan to bring your daughter with you to your next visit so we can discuss this further and potentially schedule this procedure.    We will discontinue amlodipine 5 mg once daily.    We will discontinue losartan 50 mg once daily and switch this to Entresto  twice daily.    We will increase metoprolol succinate from 50 mg daily to 100 mg daily.  Refills were sent to her pharmacy.    I have also placed a referral to the pharmacy division to help with the ability of Entresto.    We discussed getting a home blood pressure cuff and checking a home blood pressure log.  Please make sure the blood pressure cuff goes on your upper arm (not wrist) and is the appropriate size for your upper arm.  This can often be found at local pharmacies or online through distributors like [employer].  I usually recommend checking your blood pressure for 1 month before coming to see me or your primary care physician about 4-5 times a week.  Please take some of these readings in the morning and some in the evening and document date, time, and blood pressure on a piece of paper notebook that we can review at our next visit.     Please obtain repeat blood work including renal function panel and BNP before your follow-up  visit.    Please continue remaining cardiac medications including atorvastatin 40 mg daily.    Please followup with me in Cardiology clinic within the next 4 weeks.  Please return to clinic sooner or seek emergent care if your symptoms reoccur or worsen.    Thank you for allowing me to participate in their care.  Please feel free to call me with any further questions or concerns.        Wili Zarco MD, FACC, E[facility] ARMSTRONG  Division of Cardiovascular Medicine  Medical Director, [facility] [place]  Clinical , [employer]  [email or web address]   Office:  [phone]

## 2024-05-22 NOTE — PATIENT INSTRUCTIONS
We did discuss the benefits of left heart catheterization and angiography to further evaluate for possible blockages or coronary artery disease and cause weakening of the heart muscle or cardiomyopathy.  Please plan to bring your daughter with you to your next visit so we can discuss this further and potentially schedule this procedure.    We will discontinue amlodipine 5 mg once daily.    We will discontinue losartan 50 mg once daily and switch this to Entresto  twice daily.    We will increase metoprolol succinate from 50 mg daily to 100 mg daily.  Refills were sent to her pharmacy.    I have also placed a referral to the pharmacy division to help with the ability of Entresto.    We discussed getting a home blood pressure cuff and checking a home blood pressure log.  Please make sure the blood pressure cuff goes on your upper arm (not wrist) and is the appropriate size for your upper arm.  This can often be found at local pharmacies or online through distributors like [employer].  I usually recommend checking your blood pressure for 1 month before coming to see me or your primary care physician about 4-5 times a week.  Please take some of these readings in the morning and some in the evening and document date, time, and blood pressure on a piece of paper notebook that we can review at our next visit.     Please obtain repeat blood work including renal function panel and BNP before your follow-up visit.    Please continue remaining cardiac medications including atorvastatin 40 mg daily.    Please followup with me in Cardiology clinic within the next 4 weeks.  Please return to clinic sooner or seek emergent care if your symptoms reoccur or worsen.

## 2024-05-30 ENCOUNTER — TELEPHONE (OUTPATIENT)
Dept: RADIATION ONCOLOGY | Facility: HOSPITAL | Age: 64
End: 2024-05-30
Payer: COMMERCIAL

## 2024-05-31 ENCOUNTER — APPOINTMENT (OUTPATIENT)
Dept: RADIATION ONCOLOGY | Facility: HOSPITAL | Age: 64
End: 2024-05-31
Payer: MEDICARE

## 2024-05-31 ENCOUNTER — TELEPHONE (OUTPATIENT)
Dept: RADIATION ONCOLOGY | Facility: HOSPITAL | Age: 64
End: 2024-05-31
Payer: COMMERCIAL

## 2024-05-31 NOTE — TELEPHONE ENCOUNTER
Called patient and explained that we are going to reschedule his appointment this afternoon to 6/17.  Explained that we need to schedule it after his follow up CT on 6/10 and appointment with Kamar on 6/13.  Pt. Verbalized understanding.

## 2024-06-04 ENCOUNTER — APPOINTMENT (OUTPATIENT)
Dept: PHARMACY | Facility: HOSPITAL | Age: 64
End: 2024-06-04
Payer: COMMERCIAL

## 2024-06-07 ENCOUNTER — TELEMEDICINE (OUTPATIENT)
Dept: PHARMACY | Facility: HOSPITAL | Age: 64
End: 2024-06-07
Payer: COMMERCIAL

## 2024-06-07 DIAGNOSIS — I25.10 CORONARY ARTERY DISEASE INVOLVING NATIVE CORONARY ARTERY OF NATIVE HEART WITHOUT ANGINA PECTORIS: ICD-10-CM

## 2024-06-07 DIAGNOSIS — I25.10 CORONARY ARTERY CALCIFICATION: ICD-10-CM

## 2024-06-07 DIAGNOSIS — I25.84 CORONARY ARTERY CALCIFICATION: ICD-10-CM

## 2024-06-07 DIAGNOSIS — R06.00 DYSPNEA, UNSPECIFIED TYPE: ICD-10-CM

## 2024-06-07 DIAGNOSIS — R06.09 DOE (DYSPNEA ON EXERTION): ICD-10-CM

## 2024-06-07 DIAGNOSIS — R06.09 DYSPNEA ON EXERTION: ICD-10-CM

## 2024-06-07 DIAGNOSIS — Z72.0 TOBACCO ABUSE: ICD-10-CM

## 2024-06-07 DIAGNOSIS — C34.90 SMALL CELL LUNG CANCER (MULTI): ICD-10-CM

## 2024-06-07 DIAGNOSIS — I50.23 ACUTE ON CHRONIC SYSTOLIC HEART FAILURE (MULTI): ICD-10-CM

## 2024-06-07 DIAGNOSIS — I42.0 DILATED CARDIOMYOPATHY (MULTI): ICD-10-CM

## 2024-06-07 DIAGNOSIS — R06.01 ORTHOPNEA: ICD-10-CM

## 2024-06-07 NOTE — PROGRESS NOTES
"Pharmacist Clinic: Heart Failure/Cardiomyopathy Management  Dane Molina was referred to the Clinical Pharmacy Team for his heart failure/cardiomyopathy management.    Referring Provider:  Wili PERALES MD    PHARMACY ASSESSMENT    Allergies Reviewed? Yes  Home Pharmacy Reviewed? No    Affordability/Accessibility: patient reports no issues affording his medications, has medicaid secondary insurance  Adherence/Organization: has not started Entresto yet, wanted to finish supply of losartan first  Adverse Effects: no issues with losartan, has not started entresto yet    CURRENT PHARMACOTHERAPY:   Entresto 97-103mg BID  65 yo  110 kg  Scr 1.58  CrCl 74mL/min  Dose is appropriate for kidney function     HISTORICAL PHARMACOTHERAPY:   Switched from losartan 50mg to Entresto (5/22/2024)  Amlodipine 5mg daily : discontinued 5/22/2024    MEDICATION RECONCILIATION  Added:  - none  Changed:  - none  Removed:  - none    RELEVANT LAB RESULTS  Lab Results   Component Value Date    BILITOT 0.4 05/16/2024    CALCIUM 9.4 05/16/2024    CO2 28 05/16/2024     05/16/2024    CREATININE 1.58 (H) 05/16/2024    GLUCOSE 96 05/16/2024    ALKPHOS 68 05/16/2024    K 4.0 05/16/2024    PROT 7.4 05/16/2024     05/16/2024    AST 15 05/16/2024    ALT 18 05/16/2024    BUN 20 05/16/2024    ANIONGAP 13 05/16/2024    PHOS 2.6 04/08/2024     05/16/2024    ALBUMIN 4.0 05/16/2024     Lab Results   Component Value Date    TRIG 106 12/21/2023    CHOL 282 (H) 12/21/2023    LDLCALC 216 (H) 12/21/2023    HDL 44.5 12/21/2023     No results found for: \"BMCBC\", \"CBCDIF\"       DRUG INTERACTIONS  - No interactions requiring review    CHF ASSESSMENT     Symptom/Staging:  -Most recent ejection fraction: 25-30%      Guideline-Directed Medical Therapy:  -ARNI: Yes, describe: Entresto 97-103mg BID   -If no, then ACEi/ARB?: No  -Beta Blocker: Yes, describe: metoprolol succinate 100mg  -MRA: No  -SGLT2i: No    Secondary Prevention:  -The 10-year " ASCVD risk score (Michael PADILLA, et al., 2019) is: 39.1%    Values used to calculate the score:      Age: 64 years      Sex: Male      Is Non- : Yes      Diabetic: No      Tobacco smoker: Yes      Systolic Blood Pressure: 160 mmHg      Is BP treated: Yes      HDL Cholesterol: 44.5 mg/dL      Total Cholesterol: 282 mg/dL   -Aspirin 81mg? no  -Statin?: Yes, describe: atorvastatin 40mg  -HTN?: Yes, describe: triamterene-hydrochlorothiazide 37.5-25mg    PATIENT EDUCATION/GOALS  - Counseled patient on MOA, expectations, duration of therapy, contraindications, administration, and monitoring parameters, and dosing/administration  - Counseled patient on lifestyle modifications that can decrease your risk of having complications (smoking cessation, losing weight, daily weights, vaccines)  - Counseled patient on fluid intake and weight management. Recommended to not consume more than 2 liters of fliuids per day. If they have gained more than 2-3 pounds within a 24 hours period (or 5 pounds in a week), contact their cardiologist  - Answered all patient questions and concerns  - Patient reports no issues with affordability, he has medicaid secondary insurance and has no copays for medications  - Patient wanted to finish supply of losartan that he had prior to switching to Entresto, so has not started Entresto yet, however plans to start tomorrow morning  - Patient reports he monitors his blood pressure at home, counseled patient to continue monitoring and to call Dr. Zarco's office with any abnormal or sustained low blood pressures    RECOMMENDATIONS/PLAN  Continue Entresto 97-103mg BID     Patient Assistance Program (PAP)    Patient verbally reports monthly or yearly income which is less than 400% federal poverty level    At this time, Dane Molina is ineligible to receive financial assistance through  Patient Assistance Program because patient has no copay for his med, financial assistance is not  required at this time.      Next Cardiology Appointment: 6/26/2024  Clinical Pharmacist follow up: as needed, patient declined regular clinical pharmacy follow-up      Clarissa Muñoz, PharmD , [facility]  PGY1 [facility] Pharmacy Resident     Verbal consent to manage patient's drug therapy was obtained from the patient . They were informed they may decline to participate or withdraw from participation in pharmacy services at any time.    Continue all meds under the continuation of care with the referring provider and clinical pharmacy team.

## 2024-06-07 NOTE — Clinical Note
HI Dr. Zarco! This patient her Medicare as primary and Medicaid as secondary so all medications are covered in full ($0). Thank you!

## 2024-06-10 ENCOUNTER — HOSPITAL ENCOUNTER (OUTPATIENT)
Dept: RADIOLOGY | Facility: CLINIC | Age: 64
Discharge: HOME | End: 2024-06-10
Payer: MEDICARE

## 2024-06-10 DIAGNOSIS — C34.90 SMALL CELL CARCINOMA OF LUNG (MULTI): ICD-10-CM

## 2024-06-10 PROCEDURE — 74177 CT ABD & PELVIS W/CONTRAST: CPT

## 2024-06-10 PROCEDURE — 2550000001 HC RX 255 CONTRASTS: Mod: SE | Performed by: INTERNAL MEDICINE

## 2024-06-10 PROCEDURE — 74177 CT ABD & PELVIS W/CONTRAST: CPT | Performed by: RADIOLOGY

## 2024-06-10 PROCEDURE — 71260 CT THORAX DX C+: CPT | Performed by: RADIOLOGY

## 2024-06-10 RX ADMIN — IOHEXOL 75 ML: 350 INJECTION, SOLUTION INTRAVENOUS at 17:41

## 2024-06-13 ENCOUNTER — OFFICE VISIT (OUTPATIENT)
Dept: HEMATOLOGY/ONCOLOGY | Facility: HOSPITAL | Age: 64
End: 2024-06-13
Payer: MEDICAID

## 2024-06-13 VITALS
RESPIRATION RATE: 20 BRPM | DIASTOLIC BLOOD PRESSURE: 86 MMHG | TEMPERATURE: 97.3 F | BODY MASS INDEX: 32.38 KG/M2 | SYSTOLIC BLOOD PRESSURE: 161 MMHG | WEIGHT: 238.76 LBS | OXYGEN SATURATION: 97 % | HEART RATE: 94 BPM

## 2024-06-13 DIAGNOSIS — C34.90 SMALL CELL CARCINOMA OF LUNG (MULTI): Primary | ICD-10-CM

## 2024-06-13 PROCEDURE — 99215 OFFICE O/P EST HI 40 MIN: CPT | Performed by: INTERNAL MEDICINE

## 2024-06-13 PROCEDURE — 3077F SYST BP >= 140 MM HG: CPT | Performed by: INTERNAL MEDICINE

## 2024-06-13 PROCEDURE — 3079F DIAST BP 80-89 MM HG: CPT | Performed by: INTERNAL MEDICINE

## 2024-06-13 RX ORDER — VARENICLINE TARTRATE 0.5 MG/1
TABLET, FILM COATED ORAL
Qty: 60 TABLET | Refills: 2 | Status: SHIPPED | OUTPATIENT
Start: 2024-06-13

## 2024-06-13 ASSESSMENT — PAIN SCALES - GENERAL: PAINLEVEL: 0-NO PAIN

## 2024-06-13 NOTE — PROGRESS NOTES
Patient ID: Dane Molina is a 64 y.o. male    Primary Care Provider: Darwin Early MD    DIAGNOSIS AND STAGING  cT3 pN2 pM1b small cell lung cancer (INSM1 and TTF1+) of the right lower lobe, diagnosed on 02/05/2024 through bronchoscopy     SITES OF DISEASE  Right lower lobe   Levels 4R and 7 nodes   Liver, confirmed by biopsy      MOLECULAR GENOMICS  Not performed   RB loss by IHC      PRIOR THERAPIES  None     CURRENT THERAPY  Carboplatin/etoposide C1D1 on 03/19/24 with addition of atezolizumab to C2 on 04/08/2024  C4D1 o    CURRENT ONCOLOGICAL PROBLEMS  None      HISTORY OF PRESENT ILLNESS  Patient presented to the emergency room on 01/04/2023 with a hypertensive urgency and cough.  He has a history of noncompliance with antihypertensive medications.  He also complained of dyspnea and underwent a CT chest without IV contrast that demonstrated a right lower lobe paramediastinal mass measuring 3.1 cm with adjacent pulmonary nodule measuring 1.8 cm.  Mediastinal lymphadenopathy was demonstrated, including a subcarinal node measuring 1.7 cm.  There was right hilar lymphadenopathy, 2 cm in short axis.  On 01/15/2024 the patient underwent a PET scan that demonstrated avidity of the aforementioned masses/lymph nodes, as well as a lesion in the liver, SUV max 3.3, right hepatic lobe.  On 02/05/2024, patient underwent a bronchoscopy:  A. LYMPH NODE 4 R PULMONARY FINE NEEDLE ASPIRATION , CYTOLOGY AND CELL BLOCK:    Positive for malignant cells   Metastatic small cell carcinoma, see note  B. LYMPH NODE 7 PULMONARY FINE NEEDLE ASPIRATION , CYTOLOGY AND CELL BLOCK:    Positive for malignant cells  Metastatic small cell carcinoma, see note          Note: Immunostains demonstrate the lesional cells to be diffusely positive for CAM 5.2, INSM1, TTF-1.  The proliferative marker Ki-67 is greater than 90%.  Immunostain for retinoblastoma shows loss of staining.  Immunostain for p40 is negative.  The morphology and  immunohistochemical profile are consistent with the above diagnosis.  On 02/29/24 the pt is seen by med onc for the first time. Denies any systemic sx - denies lack of appetite, fatigue or unintentional weight loss. Denies new localized pain, headaches or neuro sx.  Denies new respiratory sx.  He is claustrophobic and MRI brain was scheduled for today but pt could not go through with the test. He also has mild CKD, which limits his ability to receive intravenous contrast for CT.   03/04/24: MRI brain shows no ICMA, but MRI liver shows a 1.9 cm hepatic lesion with T2 hyperintensity and peripheral enhancement   03/19/2024: C1 D1 carboplatin/etoposide  03/22/24: Liver biopsy:  FINAL DIAGNOSIS   A. RIGHT LIVER MASS BIOPSY:      -- LIVER TISSUE INVOLVED BY SMALL CELL CARCINOMA, SEE NOTE     Note: Clinical history of lung small cell carcinoma noted. The findings most likely represent metastasis from the known primary lung tumor.     04/04/24: discussed with patient liver biopsy results and recommendations to add atezolizumab to his regimen  He has met with radiation oncology, and we will consider consolidative TRT pending response to cytotoxic chemotherapy    04/08/2024: C2 D1 carboplatin/etoposide/atezolizumab    05/20/24: C4 D1 carboplatin/etoposide/atezolizumab    06/10/2024: CT chest/abdomen/pelvis demonstrating continue his improvement of metastatic lymphadenopathy within the mediastinum/right hilum, with no progression within the liver    06/13/2024: Referral for radiation oncology/TRT.  Patient to proceed with maintenance atezolizumab       PAST MEDICAL HISTORY  CKD - creatinine 1.68  Neck pain (injury at work)   Hypertension  Iron deficiency anemia (hx of blood transfusion in the 90's)    SURGICAL HISTORY  Ankle fracture and has a latoya in place      SOCIAL HISTORY  Former 35-40 pack-year smoker, quit at age 57  Smokes cigars now   History of alcohol addiction, sober for the past 15 years   Has one chlid  ([place])  Single      FAMILY HISTORY  Lung CA (mother)  Mother had CKD and required HD    CURRENT MEDS REVIEWED       ALLERGIES REVIEWED        SUBJECTIVE:  Continues to do well, attempting to quit completely smoking and asking for Chantix prescription  Appetite has been great  Stamina is fair  No recent fevers, diarrhea or rashes  No new neurological symptoms or headaches    A 13 point review of systems was performed, with significant findings documented above in subjective history.    OBJECTIVE:  Vitals:    06/13/24 1513   BP: 161/86   Pulse: 94   Resp: 20   Temp: 36.3 °C (97.3 °F)   SpO2: 97%          Body surface area is 2.34 meters squared.     Wt Readings from Last 5 Encounters:   06/13/24 108 kg (238 lb 12.1 oz)   05/22/24 110 kg (243 lb)   05/21/24 109 kg (241 lb)   05/20/24 107 kg (235 lb 10.8 oz)   05/16/24 107 kg (236 lb 8.9 oz)     ECOGSCORE: 1- Restricted in physically strenuous activity.  Carries out light duty.    Physical Exam  Constitutional:       Appearance: Normal appearance.   HENT:      Head: Normocephalic and atraumatic.   Eyes:      General: No scleral icterus.     Extraocular Movements: Extraocular movements intact.      Conjunctiva/sclera: Conjunctivae normal.   Cardiovascular:      Rate and Rhythm: Normal rate and regular rhythm.   Pulmonary:      Effort: Pulmonary effort is normal.   Abdominal:      Palpations: Abdomen is soft. There is no mass.      Tenderness: There is no abdominal tenderness. There is no guarding.   Musculoskeletal:      Right lower leg: No edema.      Left lower leg: No edema.   Skin:     General: Skin is warm.      Findings: No erythema or rash.   Neurological:      General: No focal deficit present.      Mental Status: He is alert and oriented to person, place, and time.      Motor: No weakness.      Gait: Gait normal.   Psychiatric:         Mood and Affect: Mood normal.         Behavior: Behavior normal.         Thought Content: Thought content normal.          Judgment: Judgment normal.          Diagnostic Results   Results:  Labs:  Lab Results   Component Value Date    WBC 12.1 (H) 05/20/2024    HGB 10.7 (L) 05/20/2024    HCT 32.4 (L) 05/20/2024    MCV 96 05/20/2024     05/20/2024      Lab Results   Component Value Date    NEUTROABS 4.04 03/14/2024        Lab Results   Component Value Date    GLUCOSE 96 05/16/2024    CALCIUM 9.4 05/16/2024     05/16/2024    K 4.0 05/16/2024    CO2 28 05/16/2024     05/16/2024    BUN 20 05/16/2024    CREATININE 1.58 (H) 05/16/2024     Lab Results   Component Value Date    ALT 18 05/16/2024    AST 15 05/16/2024    ALKPHOS 68 05/16/2024    BILITOT 0.4 05/16/2024    BILIDIR 0.1 12/21/2023      Lab Results   Component Value Date    CORTISOL 18.0 04/29/2024    TSH 2.33 04/29/2024     CT chest/abdomen/pelvis on 06/10/2024:    IMPRESSION:  1. Mild acute diverticulitis of the mid sigmoid colon with mild  pericolonic fat stranding and focal area of micro perforation versus  prominent gas-filled diverticula. No pericolonic fluid collection.  Findings are seen superimposed on chronic diverticulitis.      2. Interval improvement in metastatic lymphadenopathy within the  mediastinum as well as previously measured right hilar lesion with  only subtle residual nodularity demonstrated.      3. Subtle ill-defined hypodensity within the right lobe of the liver  not significantly changed from the prior examination with focal  heterogeneity nonspecific.      Assessment/Plan     Small cell carcinoma of lung (Multi), Clinical: Stage FREDRICK (cT3, cN2, pM1b)  This is a former cigarette smoker with T3 pN2 pM1b small cell lung cancer of the RLL - extensive stage.  Now undergoing treatment with carboplatin/etoposide  In view of liver biopsy results confirming metastatic diagnosis, atezolizumab was added to cycle 2, on 04/08/2024  He is now status post 4 cycles of carboplatin/etoposide/atezolizumab, completed on 05/20/2024  We reviewed with the  patient the results of the most recent scans, which were favorable  Proceed with consolidative TRT and he sees Dr. Conde on 6/17  Would favor monitoring for intracranial metastasis with brain MRIs every 3 months  We will continue on maintenance atezolizumab every 3 to 4 weeks      Tori Galvin MD, MS  Thoracic Medical Oncology   [address] [place], [place] [id number]  Phone: [phone]

## 2024-06-13 NOTE — PATIENT INSTRUCTIONS
Your scans after 4 cycles of chemotherapy (3 of them with atezolizumab, which is the immunotherapy) have shown a very good response to treatment! Congratulations.  Now, we move on to the maintenance phase, when you get immunotherapy IV every 3 weeks, no chemo. We will keep going with immunotherapy as long as the cancer remains under control and you feel OK, with no major side effects (diarrhea, rashes, etc).   I am referring you to see Dr. Conde, from Radiation Oncology to discuss a short course of radiation to the chest (2 weeks, every day) to decrease the chances of the cancer growing back in the chest and/or delay its return. You are expected to feel tired after the radiatio treatments. Consider bringing someone with you after a few treatments, if you start feeling tired and it gets difficult to stay alert to drive.

## 2024-06-14 ENCOUNTER — TELEPHONE (OUTPATIENT)
Dept: RADIATION ONCOLOGY | Facility: HOSPITAL | Age: 64
End: 2024-06-14
Payer: COMMERCIAL

## 2024-06-17 ENCOUNTER — HOSPITAL ENCOUNTER (OUTPATIENT)
Dept: RADIATION ONCOLOGY | Facility: HOSPITAL | Age: 64
Setting detail: RADIATION/ONCOLOGY SERIES
Discharge: HOME | End: 2024-06-17
Payer: MEDICARE

## 2024-06-17 VITALS
OXYGEN SATURATION: 99 % | HEIGHT: 72 IN | RESPIRATION RATE: 18 BRPM | TEMPERATURE: 97.5 F | SYSTOLIC BLOOD PRESSURE: 145 MMHG | DIASTOLIC BLOOD PRESSURE: 83 MMHG | WEIGHT: 243 LBS | BODY MASS INDEX: 32.91 KG/M2 | HEART RATE: 83 BPM

## 2024-06-17 DIAGNOSIS — C34.90 SMALL CELL CARCINOMA OF LUNG (MULTI): Primary | ICD-10-CM

## 2024-06-17 DIAGNOSIS — F40.240 CLAUSTROPHOBIA: ICD-10-CM

## 2024-06-17 PROCEDURE — 99215 OFFICE O/P EST HI 40 MIN: CPT | Performed by: RADIOLOGY

## 2024-06-17 PROCEDURE — 99417 PROLNG OP E/M EACH 15 MIN: CPT | Performed by: RADIOLOGY

## 2024-06-17 RX ORDER — LORAZEPAM 0.5 MG/1
1 TABLET ORAL
Qty: 3 TABLET | Refills: 0 | Status: SHIPPED | OUTPATIENT
Start: 2024-06-17 | End: 2024-06-18

## 2024-06-17 ASSESSMENT — ENCOUNTER SYMPTOMS
RESPIRATORY NEGATIVE: 1
HEMATOLOGIC/LYMPHATIC NEGATIVE: 1
MUSCULOSKELETAL NEGATIVE: 1
NEUROLOGICAL NEGATIVE: 1
GASTROINTESTINAL NEGATIVE: 1
CARDIOVASCULAR NEGATIVE: 1
PSYCHIATRIC NEGATIVE: 1
EYES NEGATIVE: 1
FATIGUE: 1
ENDOCRINE NEGATIVE: 1

## 2024-06-17 ASSESSMENT — COLUMBIA-SUICIDE SEVERITY RATING SCALE - C-SSRS
2. HAVE YOU ACTUALLY HAD ANY THOUGHTS OF KILLING YOURSELF?: NO
6. HAVE YOU EVER DONE ANYTHING, STARTED TO DO ANYTHING, OR PREPARED TO DO ANYTHING TO END YOUR LIFE?: NO

## 2024-06-17 ASSESSMENT — PATIENT HEALTH QUESTIONNAIRE - PHQ9
SUM OF ALL RESPONSES TO PHQ9 QUESTIONS 1 AND 2: 0
2. FEELING DOWN, DEPRESSED OR HOPELESS: NOT AT ALL
1. LITTLE INTEREST OR PLEASURE IN DOING THINGS: NOT AT ALL

## 2024-06-17 ASSESSMENT — PAIN SCALES - GENERAL: PAINLEVEL: 0-NO PAIN

## 2024-06-17 NOTE — PROGRESS NOTES
Radiation Oncology Nursing Note    Prior Radiotherapy:  No  No radiation treatments to show. (Treatments may have been administered in another system.)     Current Systemic Treatment:  No     Presence of Pacemaker or ICD:  No    History of Autoimmune or Connective Tissue Disorders:  No    Pain: The patient's current pain level was assessed.  They report currently having a pain of 0 out of 10.  They feel their pain is under control without the use of pain medications.    Review of Systems:  Review of Systems   Constitutional:  Positive for fatigue.        Energy levels still low. Gradually getting better. This limits physical activity. Feels that he is unable to walk as fast as he used to.   HENT:  Negative.          Alopecia related to chemotherapy.    Eyes: Negative.    Respiratory: Negative.     Cardiovascular: Negative.    Gastrointestinal: Negative.    Endocrine: Negative.    Genitourinary: Negative.     Musculoskeletal: Negative.    Skin: Negative.    Neurological: Negative.    Hematological: Negative.    Psychiatric/Behavioral: Negative.       Overall doing very well, no major symptoms.

## 2024-06-17 NOTE — PROGRESS NOTES
Radiation Oncology Outpatient Consult    Patient Name:  Dane Molina  MRN:  [id number]  :  1960    Referring Provider: Tori Galvin MD  Primary Care Provider: Darwin Early MD  Care Team: Patient Care Team:  Darwin Early MD as PCP - General (Internal Medicine)  Tori Galvin MD as Medical Oncologist (Hematology and Oncology)  Ivania Carrillo MD as Consulting Physician (Hematology and Oncology)    Date of Service: 2024     History of Present Illness:  Dane Molina is a 64 y.o. male who was referred by Tori Galvin MD, for a follow up consultation to the [facility] Department of Radiation Oncology.  He is presenting for discusion of thoracic radiation therapy (TRT) in the setting of Extensive-stage Small cell carcinoma of lung (Multi), Clinical: Stage FREDRICK (cT3, cN2, pM1b) (INSM1 and TTF1+) of the right lower lobe, diagnosed on 2024 through bronchoscopy with liver metastases at diagnosis (biopsy confirmed).     His oncological work up and treatment history is as below:  24: Patient presented to the emergency room with a hypertensive urgency and cough.  He has a history of noncompliance with antihypertensive medications.  He also complained of dyspnea at this time. CT chest without IV contrast demonstrated a right lower lobe paramediastinal mass measuring 3.1 cm with adjacent pulmonary nodule measuring 1.8 cm.  Mediastinal lymphadenopathy was demonstrated, including a subcarinal node measuring 1.7 cm.  There was right hilar lymphadenopathy, 2 cm in short axis.  PET Scan 01/15/2024 demonstrated an FDG avid paramediastinal mass in the right lower lobe measuring to 3.0 cm (SUV max 6.1). Another FDG avid pulmonary lesion the right lower lobe measuring 1.9 cm, (SUV max of 3.5.) 1.0 cm lesion adjacent to the major fissure in the right lower lobe (SUV max 1.6). Bulky FDG avid right hilar nodes with SUV max of 9.6, subcarinal node (SUV  max of 11.0). Right paratracheal node (SUV max of 11.0). Additonally, there was a lesion in the R hepatic lobe of the liver. (SUV max 3.3).  02/05/2024, patient underwent a bronchoscopy: 4R & 7 were + for malignant cells; diffusely positive for CAM 5.2, INSM1, TTF-1.  The proliferative marker Ki-67 is greater than 90%.    PFTs 2/19/24: FVC 71% predicted, FEV1 65% of predicted, DLCO 82% of predicted.  Brain MRI 03/04/2024, negative for mets. Liver MRI with 1.9cm indeterminate lesion.   He was seen for original consultation in our clinic ON 03/14/2024. Liver biopsy was pending.  03/19/2024 Started on systemic therapy, Dr. Galvin.  03/22/24: Biopsy of Liver lesion.  RIGHT LIVER MASS BIOPSY:      -- LIVER TISSUE INVOLVED BY SMALL CELL CARCINOMA   Atezo added from C2.    INTERVAL HISTORY:  Systemic therapy.   Oncology History   Small cell carcinoma of lung (Multi)   3/19/2024 - 3/21/2024 Chemotherapy    CARBOplatin / Etoposide, 21 Day Cycles - Lung     3/19/2024 - 5/22/2024 Chemotherapy    Atezolizumab + CARBOplatin / Etoposide, 21 Day Cycles     Surveillance scans demonstrate a good response.  CT C/A/P 04/25/2024: 1. Interval favorable response to treatment with decrease in size of right lung perihilar masses as well as right lower paratracheal chain lymphadenopathy. 2. No new sites of metastatic disease in the thorax. 3. Ill-defined region of attenuation in the right hepatic lobe which may correlate with previously noted hepatic lesion.     CT C/A/P 06/10/2024: 1. Mild acute diverticulitis of the mid sigmoid colon with mild pericolonic fat stranding and focal area of micro perforation versus prominent gas-filled diverticula. No pericolonic fluid collection. Findings are seen superimposed on chronic diverticulitis.    2. Interval improvement in metastatic lymphadenopathy within the mediastinum as well as previously measured right hilar lesion with only subtle residual nodularity demonstrated. 3. Subtle ill-defined  hypodensity within the right lobe of the liver not significantly changed from the prior examination with focal heterogeneity nonspecific.     Shceduled to start Maintenance Atezo, C1 from 06/21/2024    Review of Systems:   Per Nursing note.    RADIATION SCREENING QUESTIONS:  Prior radiation therapy: No  Pacemaker: No  Other implantable devices: No  Connective tissue disease: No    Current Systemic Treatment:  No     Past Medical History:    Past Medical History:   Diagnosis Date    CKD (chronic kidney disease)     COPD (chronic obstructive pulmonary disease) (Multi)     Hyperlipidemia     Hypertension     SOB (shortness of breath)         Past Surgical History:    Past Surgical History:   Procedure Laterality Date    COLONOSCOPY      ESOPHAGOGASTRODUODENOSCOPY      ORIF ANKLE FRACTURE  1993        Family History:  Cancer-related family history is not on file.    Social History:    Social History     Tobacco Use    Smoking status: Every Day     Types: Cigars     Start date: 1978    Smokeless tobacco: Never   Vaping Use    Vaping status: Never Used   Substance Use Topics    Alcohol use: Not Currently     Comment: recovering alcohol for 15 years    Drug use: Not Currently       Allergies:  No Known Allergies     Medications:    Current Outpatient Medications:     albuterol 90 mcg/actuation inhaler, Inhale 2 puffs 2 times a day as needed for wheezing., Disp: 18 g, Rfl: 1    atorvastatin (Lipitor) 40 mg tablet, Take 1 tablet (40 mg) by mouth once daily., Disp: , Rfl:     metoprolol succinate XL (Toprol-XL) 100 mg 24 hr tablet, Take 1 tablet (100 mg) by mouth once daily. Do not crush or chew., Disp: 90 tablet, Rfl: 3    nicotine (Nicoderm CQ) 14 mg/24 hr patch, Place 1 patch over 24 hours on the skin once every 24 hours. (Patient not taking: Reported on 4/25/2024), Disp: 30 patch, Rfl: 0    OLANZapine (ZyPREXA) 5 mg tablet, Take 1 tablet (5 mg) by mouth once daily at bedtime. For 4 days starting the evening of treatment.,  Disp: 4 tablet, Rfl: 5    ondansetron (Zofran) 8 mg tablet, Take 1 tablet (8 mg) by mouth every 8 hours if needed for nausea or vomiting., Disp: 30 tablet, Rfl: 5    prochlorperazine (Compazine) 10 mg tablet, Take 1 tablet (10 mg) by mouth every 6 hours if needed for nausea or vomiting., Disp: 30 tablet, Rfl: 5    sacubitriL-valsartan (Entresto)  mg tablet, Take 1 tablet by mouth 2 times a day., Disp: 180 tablet, Rfl: 3    Symbicort 80-4.5 mcg/actuation inhaler, Inhale 2 puffs once daily., Disp: 10.2 g, Rfl: 1    triamterene-hydrochlorothiazid (Maxzide-25) 37.5-25 mg tablet, Take 1 tablet by mouth once daily in the morning., Disp: , Rfl:     varenicline (Chantix) 0.5 mg tablet, Take 1 tablet daily for 3 days, followed by 1 tablet twice a day for 3 days, followed by 2 tablets twice daily.  Take it with full glass of water., Disp: 60 tablet, Rfl: 2      Performance Status:  The Karnofsky performance scale today is 80, Normal activity with effort; some signs or symptoms of disease (ECOG equivalent 1).      OBJECTIVE  Physical Exam:  /83   Pulse 83   Temp 36.4 °C (97.5 °F) (Temporal)   Resp 18   Ht 1.829 m (6')   Wt 110 kg (243 lb)   SpO2 99%   BMI 32.96 kg/m²    Physical Exam  Constitutional:       General: He is not in acute distress.     Appearance: He is not ill-appearing.   HENT:      Head: Atraumatic.   Eyes:      General: No scleral icterus.  Pulmonary:      Effort: Pulmonary effort is normal. No respiratory distress.      Breath sounds: No wheezing.   Abdominal:      General: There is no distension.   Musculoskeletal:      Right lower leg: No edema.      Left lower leg: No edema.   Lymphadenopathy:      Cervical: No cervical adenopathy.   Neurological:      General: No focal deficit present.      Mental Status: He is alert and oriented to person, place, and time.      Cranial Nerves: No cranial nerve deficit.      Motor: No weakness.      Gait: Gait normal.   Psychiatric:         Mood and  Affect: Mood normal.       Laboratory Review:  The pertinent lab results were reviewed and discussed with the patient.      Pathology Review:  The pertinent pathology results were reviewed from EMR and discussed with the patient.       Imaging:  The pertinent imaging results were reviewed from EMR/PACS with key results discussed with the patient.    ASSESSMENT:  Dane Molina is a 64 y.o. male with Small cell carcinoma of lung (Multi), Clinical: Stage FREDRICK (cT3, cN2, pM1b) (INSM1 and TTF1+, Ki67 >90%, Rb+) of the right lower lobe, , diagnosed on 02/05/2024 through bronchoscopy with liver metastases at diagnosis (biopsy confirmed). PET-CT identifies right lower lobe with an adjoining pulmonary lesion adjacent to the major fissure in the right lower lobe, Bulky FDG avid right hilar nodes, subcarinal node, right paratracheal node. Bronchoscopy has confirmed 4R & 7 were + for malignant cells. Additonally, there was a lesion in the R hepatic lobe of the liver, biopsy confirmed as metastatic disease. Brain MRI is negative for mets. He has an excellent PS. He is s/p induction chemo/IO with excellent response in thorax and relative stability in liver.     PLAN:    Mr. Molina's pertinent history, exam, imaging and pathology details were reviewed.   Accompanied by himself.    Treatment recommendations/Alternatives:  NCCN Guidelines were applicable to guide this patients treatment plan.   We reviewed the standard of care management for Extensive-stage Small cell carcinoma of lung (Multi), Clinical: Stage FREDRICK (cT3, cN2, pM1b) including the role of thoracic radiation therapy (TRT) as part of consolidation vs continuing systemic therapy alone. Given his excellent PS, good response to chemotherapy and stable extra-thoracic burden, I have recommended proceeding with TRT.    With regards to radiation dose, clinical studies have used a variety of dose-regimens. NCCN guidelines recommend using 30 Gy up to definitive dosing regimens in  patients with a longer life expectancy. In Mr. Molina's situation, he has had an excellent response to systemic therapy and had only one site of extra-thoracic disease, which has been stable. As such, should dosimetry allow, we will consider treating to a dose of 45 Gy in 15 fractions to the thoracic disease.    In future, should he continue to remain stable, I would recommend a discussion for possible SBRT to the liver in future. I explained that again, this would be reasonable in someone with limited metastatic disease burden who has maintained excellent PS and lack of distant spread.     We then discussed the role of Prophylactic Cranial Irradiation (PCI) in the setting of extensive stage disease. Options of MRI surveillance or participation in the SW clinical trial randomizing patients between PCI vs surveillance were reviewed. Based on our discussion, the patient prefers MRI surveillance. A new MRI will be ordered.    Treatment alternatives including systemic therapy or radiation therapy alone or best supportive care were reviewed, though these would not be curative.    Radiation treatment logistics:  We then focused our attention regarding logistics, rationale and potential risks with radiation therapy. This will need an initial simulation/mapping that will involve a planning CT scan in treatment position followed by a 2-3 week planning period and then initiation of radiation treatments.     We then reviewed the role of advanced radiation modalities including VMAT/IMRT (photons). Given the large treatment volume, there might be dosimetric benefits of considering IMRT/VMAT over 3DCRT to reduce dose to the lungs, heart, esophagus and cord. This could potentially translate to reduced risk of acute and delayed complications.     We then reviewed possible side effects (Acute and long-term). Common and uncommon side effects with risks as relevant for his case were discussed.    After detailed discussion of  risks/benefits/goals/alternatives, patient signed the informed consent.  CT simulation will be arranged at the earliest.    The patient is scheduled to start C1 maintenance Atezolizumab on 2024.. We will aim to start TRT from 2024 and will coordinate with Dr. Galvin if he needs to skip C2 or continue.    Orders placed:  1) MRI Brain, Ativan prescription provided due to claustrophobia.  2) Radonc intent to treat: IMRT/VMAT    Network location: The patient will be treated at the [facility] location. Sim[place] done [place] [facility] [place]    Pain Management:  No acute needs    Social Work:  No acute needs    Nutrition: Will be seen by Dietician.    Clinical Trials: None applicable.    Longitudinal Care/ Extra Effort : Longitudinal care during and after radiation will be completed in the Department of Radiation Oncology for managing acute and possible late effects of radiation therapy. Additional effort was needed at the encounter today to discuss multiple options.     The patient was provided my contact information.     To Conde MD, MMM  Senior Attending Physician, [facility]  Professor, [employer]   Our [place] [employer]: “To Heal, To Teach, To Discover.”  RN partner: [email or web address]    Phone (scheduling): [phone]/ [email or web address]  Proton Therapy (scheduling): [phone]/ [email or web address]  Phone (after hours): [phone]      Total time: 75 min  Prep: 10 min  In-room: 40 min  Chartin min  Care coordination: 5 min

## 2024-06-19 DIAGNOSIS — C34.90 SMALL CELL CARCINOMA OF LUNG (MULTI): Primary | ICD-10-CM

## 2024-06-19 RX ORDER — ALBUTEROL SULFATE 0.83 MG/ML
3 SOLUTION RESPIRATORY (INHALATION) AS NEEDED
Status: CANCELLED | OUTPATIENT
Start: 2024-06-21

## 2024-06-19 RX ORDER — EPINEPHRINE 0.3 MG/.3ML
0.3 INJECTION SUBCUTANEOUS EVERY 5 MIN PRN
Status: CANCELLED | OUTPATIENT
Start: 2024-06-21

## 2024-06-19 RX ORDER — DIPHENHYDRAMINE HYDROCHLORIDE 50 MG/ML
50 INJECTION INTRAMUSCULAR; INTRAVENOUS AS NEEDED
OUTPATIENT
Start: 2024-07-11

## 2024-06-19 RX ORDER — EPINEPHRINE 0.3 MG/.3ML
0.3 INJECTION SUBCUTANEOUS EVERY 5 MIN PRN
OUTPATIENT
Start: 2024-08-22

## 2024-06-19 RX ORDER — EPINEPHRINE 0.3 MG/.3ML
0.3 INJECTION SUBCUTANEOUS EVERY 5 MIN PRN
OUTPATIENT
Start: 2024-07-11

## 2024-06-19 RX ORDER — PROCHLORPERAZINE EDISYLATE 5 MG/ML
10 INJECTION INTRAMUSCULAR; INTRAVENOUS EVERY 6 HOURS PRN
OUTPATIENT
Start: 2024-08-01

## 2024-06-19 RX ORDER — PROCHLORPERAZINE MALEATE 10 MG
10 TABLET ORAL EVERY 6 HOURS PRN
Status: CANCELLED | OUTPATIENT
Start: 2024-06-21

## 2024-06-19 RX ORDER — PROCHLORPERAZINE EDISYLATE 5 MG/ML
10 INJECTION INTRAMUSCULAR; INTRAVENOUS EVERY 6 HOURS PRN
OUTPATIENT
Start: 2024-08-22

## 2024-06-19 RX ORDER — ALBUTEROL SULFATE 0.83 MG/ML
3 SOLUTION RESPIRATORY (INHALATION) AS NEEDED
OUTPATIENT
Start: 2024-07-11

## 2024-06-19 RX ORDER — EPINEPHRINE 0.3 MG/.3ML
0.3 INJECTION SUBCUTANEOUS EVERY 5 MIN PRN
OUTPATIENT
Start: 2024-08-01

## 2024-06-19 RX ORDER — PROCHLORPERAZINE MALEATE 10 MG
10 TABLET ORAL EVERY 6 HOURS PRN
OUTPATIENT
Start: 2024-08-22

## 2024-06-19 RX ORDER — PROCHLORPERAZINE MALEATE 10 MG
10 TABLET ORAL EVERY 6 HOURS PRN
OUTPATIENT
Start: 2024-07-11

## 2024-06-19 RX ORDER — PROCHLORPERAZINE EDISYLATE 5 MG/ML
10 INJECTION INTRAMUSCULAR; INTRAVENOUS EVERY 6 HOURS PRN
Status: CANCELLED | OUTPATIENT
Start: 2024-06-21

## 2024-06-19 RX ORDER — FAMOTIDINE 10 MG/ML
20 INJECTION INTRAVENOUS ONCE AS NEEDED
OUTPATIENT
Start: 2024-08-22

## 2024-06-19 RX ORDER — DIPHENHYDRAMINE HYDROCHLORIDE 50 MG/ML
50 INJECTION INTRAMUSCULAR; INTRAVENOUS AS NEEDED
OUTPATIENT
Start: 2024-08-22

## 2024-06-19 RX ORDER — FAMOTIDINE 10 MG/ML
20 INJECTION INTRAVENOUS ONCE AS NEEDED
OUTPATIENT
Start: 2024-08-01

## 2024-06-19 RX ORDER — DIPHENHYDRAMINE HYDROCHLORIDE 50 MG/ML
50 INJECTION INTRAMUSCULAR; INTRAVENOUS AS NEEDED
OUTPATIENT
Start: 2024-08-01

## 2024-06-19 RX ORDER — ALBUTEROL SULFATE 0.83 MG/ML
3 SOLUTION RESPIRATORY (INHALATION) AS NEEDED
OUTPATIENT
Start: 2024-08-01

## 2024-06-19 RX ORDER — FAMOTIDINE 10 MG/ML
20 INJECTION INTRAVENOUS ONCE AS NEEDED
OUTPATIENT
Start: 2024-07-11

## 2024-06-19 RX ORDER — DIPHENHYDRAMINE HYDROCHLORIDE 50 MG/ML
50 INJECTION INTRAMUSCULAR; INTRAVENOUS AS NEEDED
Status: CANCELLED | OUTPATIENT
Start: 2024-06-21

## 2024-06-19 RX ORDER — PROCHLORPERAZINE EDISYLATE 5 MG/ML
10 INJECTION INTRAMUSCULAR; INTRAVENOUS EVERY 6 HOURS PRN
OUTPATIENT
Start: 2024-07-11

## 2024-06-19 RX ORDER — PROCHLORPERAZINE MALEATE 10 MG
10 TABLET ORAL EVERY 6 HOURS PRN
OUTPATIENT
Start: 2024-08-01

## 2024-06-19 RX ORDER — FAMOTIDINE 10 MG/ML
20 INJECTION INTRAVENOUS ONCE AS NEEDED
Status: CANCELLED | OUTPATIENT
Start: 2024-06-21

## 2024-06-19 RX ORDER — ALBUTEROL SULFATE 0.83 MG/ML
3 SOLUTION RESPIRATORY (INHALATION) AS NEEDED
OUTPATIENT
Start: 2024-08-22

## 2024-06-20 ENCOUNTER — TELEPHONE (OUTPATIENT)
Dept: RADIATION ONCOLOGY | Facility: HOSPITAL | Age: 64
End: 2024-06-20
Payer: COMMERCIAL

## 2024-06-20 NOTE — TELEPHONE ENCOUNTER
Called pt to remind of CT SIM appointment on 06/21/24 at 12:30. Pt.'s voicemail unavailable.  Unable to leave message.

## 2024-06-21 ENCOUNTER — LAB (OUTPATIENT)
Dept: LAB | Facility: HOSPITAL | Age: 64
End: 2024-06-21
Payer: MEDICARE

## 2024-06-21 ENCOUNTER — OFFICE VISIT (OUTPATIENT)
Dept: PULMONOLOGY | Facility: CLINIC | Age: 64
End: 2024-06-21
Payer: MEDICARE

## 2024-06-21 ENCOUNTER — INFUSION (OUTPATIENT)
Dept: HEMATOLOGY/ONCOLOGY | Facility: HOSPITAL | Age: 64
End: 2024-06-21
Payer: MEDICARE

## 2024-06-21 ENCOUNTER — HOSPITAL ENCOUNTER (OUTPATIENT)
Dept: RADIOLOGY | Facility: EXTERNAL LOCATION | Age: 64
Discharge: HOME | End: 2024-06-21

## 2024-06-21 ENCOUNTER — HOSPITAL ENCOUNTER (OUTPATIENT)
Dept: RADIATION ONCOLOGY | Facility: HOSPITAL | Age: 64
Setting detail: RADIATION/ONCOLOGY SERIES
Discharge: HOME | End: 2024-06-21
Payer: MEDICARE

## 2024-06-21 VITALS
DIASTOLIC BLOOD PRESSURE: 82 MMHG | TEMPERATURE: 98.6 F | SYSTOLIC BLOOD PRESSURE: 133 MMHG | OXYGEN SATURATION: 94 % | HEIGHT: 72 IN | BODY MASS INDEX: 32.1 KG/M2 | HEART RATE: 101 BPM | RESPIRATION RATE: 16 BRPM | WEIGHT: 237 LBS

## 2024-06-21 DIAGNOSIS — C34.31 MALIGNANT NEOPLASM OF LOWER LOBE, RIGHT BRONCHUS OR LUNG (MULTI): Primary | ICD-10-CM

## 2024-06-21 DIAGNOSIS — C34.90 SMALL CELL CARCINOMA OF LUNG (MULTI): ICD-10-CM

## 2024-06-21 DIAGNOSIS — C34.31 MALIGNANT NEOPLASM OF LOWER LOBE, RIGHT BRONCHUS OR LUNG (MULTI): ICD-10-CM

## 2024-06-21 DIAGNOSIS — F17.298 OTHER TOBACCO PRODUCT NICOTINE DEPENDENCE WITH OTHER NICOTINE-INDUCED DISORDER: Primary | ICD-10-CM

## 2024-06-21 DIAGNOSIS — R06.09 DYSPNEA ON EXERTION: ICD-10-CM

## 2024-06-21 LAB
ALBUMIN SERPL BCP-MCNC: 3.8 G/DL (ref 3.4–5)
ALP SERPL-CCNC: 60 U/L (ref 33–136)
ALT SERPL W P-5'-P-CCNC: 13 U/L (ref 10–52)
ANION GAP SERPL CALC-SCNC: 14 MMOL/L (ref 10–20)
AST SERPL W P-5'-P-CCNC: 10 U/L (ref 9–39)
BASOPHILS # BLD AUTO: 0.04 X10*3/UL (ref 0–0.1)
BASOPHILS NFR BLD AUTO: 0.4 %
BILIRUB SERPL-MCNC: 0.4 MG/DL (ref 0–1.2)
BUN SERPL-MCNC: 22 MG/DL (ref 6–23)
CALCIUM SERPL-MCNC: 8.7 MG/DL (ref 8.6–10.3)
CHLORIDE SERPL-SCNC: 112 MMOL/L (ref 98–107)
CO2 SERPL-SCNC: 23 MMOL/L (ref 21–32)
CORTIS AM PEAK SERPL-MSCNC: 10.4 UG/DL (ref 5–20)
CREAT SERPL-MCNC: 1.38 MG/DL (ref 0.5–1.3)
EGFRCR SERPLBLD CKD-EPI 2021: 57 ML/MIN/1.73M*2
EOSINOPHIL # BLD AUTO: 0.05 X10*3/UL (ref 0–0.7)
EOSINOPHIL NFR BLD AUTO: 0.5 %
ERYTHROCYTE [DISTWIDTH] IN BLOOD BY AUTOMATED COUNT: 20 % (ref 11.5–14.5)
GLUCOSE SERPL-MCNC: 83 MG/DL (ref 74–99)
HCT VFR BLD AUTO: 32.3 % (ref 41–52)
HGB BLD-MCNC: 10.2 G/DL (ref 13.5–17.5)
IMM GRANULOCYTES # BLD AUTO: 0.04 X10*3/UL (ref 0–0.7)
IMM GRANULOCYTES NFR BLD AUTO: 0.4 % (ref 0–0.9)
LYMPHOCYTES # BLD AUTO: 1.48 X10*3/UL (ref 1.2–4.8)
LYMPHOCYTES NFR BLD AUTO: 16.3 %
MAGNESIUM SERPL-MCNC: 1.96 MG/DL (ref 1.6–2.4)
MCH RBC QN AUTO: 31.6 PG (ref 26–34)
MCHC RBC AUTO-ENTMCNC: 31.6 G/DL (ref 32–36)
MCV RBC AUTO: 100 FL (ref 80–100)
MONOCYTES # BLD AUTO: 1.91 X10*3/UL (ref 0.1–1)
MONOCYTES NFR BLD AUTO: 21 %
NEUTROPHILS # BLD AUTO: 5.58 X10*3/UL (ref 1.2–7.7)
NEUTROPHILS NFR BLD AUTO: 61.4 %
NRBC BLD-RTO: 0 /100 WBCS (ref 0–0)
PLATELET # BLD AUTO: 233 X10*3/UL (ref 150–450)
POTASSIUM SERPL-SCNC: 3.8 MMOL/L (ref 3.5–5.3)
PROT SERPL-MCNC: 7.2 G/DL (ref 6.4–8.2)
RBC # BLD AUTO: 3.23 X10*6/UL (ref 4.5–5.9)
SODIUM SERPL-SCNC: 145 MMOL/L (ref 136–145)
TSH SERPL-ACNC: 0.7 MIU/L (ref 0.44–3.98)
WBC # BLD AUTO: 9.1 X10*3/UL (ref 4.4–11.3)

## 2024-06-21 PROCEDURE — 99214 OFFICE O/P EST MOD 30 MIN: CPT | Performed by: INTERNAL MEDICINE

## 2024-06-21 PROCEDURE — 3075F SYST BP GE 130 - 139MM HG: CPT | Performed by: INTERNAL MEDICINE

## 2024-06-21 PROCEDURE — 36415 COLL VENOUS BLD VENIPUNCTURE: CPT

## 2024-06-21 PROCEDURE — 3008F BODY MASS INDEX DOCD: CPT | Performed by: INTERNAL MEDICINE

## 2024-06-21 PROCEDURE — 2500000004 HC RX 250 GENERAL PHARMACY W/ HCPCS (ALT 636 FOR OP/ED): Performed by: INTERNAL MEDICINE

## 2024-06-21 PROCEDURE — 84443 ASSAY THYROID STIM HORMONE: CPT

## 2024-06-21 PROCEDURE — 82024 ASSAY OF ACTH: CPT

## 2024-06-21 PROCEDURE — 96413 CHEMO IV INFUSION 1 HR: CPT

## 2024-06-21 PROCEDURE — 83735 ASSAY OF MAGNESIUM: CPT

## 2024-06-21 PROCEDURE — 82533 TOTAL CORTISOL: CPT

## 2024-06-21 PROCEDURE — 85025 COMPLETE CBC W/AUTO DIFF WBC: CPT

## 2024-06-21 PROCEDURE — 80053 COMPREHEN METABOLIC PANEL: CPT

## 2024-06-21 PROCEDURE — 3079F DIAST BP 80-89 MM HG: CPT | Performed by: INTERNAL MEDICINE

## 2024-06-21 RX ORDER — ALBUTEROL SULFATE 0.83 MG/ML
3 SOLUTION RESPIRATORY (INHALATION) AS NEEDED
Status: DISCONTINUED | OUTPATIENT
Start: 2024-06-21 | End: 2024-06-21 | Stop reason: HOSPADM

## 2024-06-21 RX ORDER — PROCHLORPERAZINE MALEATE 10 MG
10 TABLET ORAL EVERY 6 HOURS PRN
Status: DISCONTINUED | OUTPATIENT
Start: 2024-06-21 | End: 2024-06-21 | Stop reason: HOSPADM

## 2024-06-21 RX ORDER — DIPHENHYDRAMINE HYDROCHLORIDE 50 MG/ML
50 INJECTION INTRAMUSCULAR; INTRAVENOUS AS NEEDED
Status: DISCONTINUED | OUTPATIENT
Start: 2024-06-21 | End: 2024-06-21 | Stop reason: HOSPADM

## 2024-06-21 RX ORDER — FAMOTIDINE 10 MG/ML
20 INJECTION INTRAVENOUS ONCE AS NEEDED
Status: DISCONTINUED | OUTPATIENT
Start: 2024-06-21 | End: 2024-06-21 | Stop reason: HOSPADM

## 2024-06-21 RX ORDER — EPINEPHRINE 0.3 MG/.3ML
0.3 INJECTION SUBCUTANEOUS EVERY 5 MIN PRN
Status: DISCONTINUED | OUTPATIENT
Start: 2024-06-21 | End: 2024-06-21 | Stop reason: HOSPADM

## 2024-06-21 RX ORDER — PROCHLORPERAZINE EDISYLATE 5 MG/ML
10 INJECTION INTRAMUSCULAR; INTRAVENOUS EVERY 6 HOURS PRN
Status: DISCONTINUED | OUTPATIENT
Start: 2024-06-21 | End: 2024-06-21 | Stop reason: HOSPADM

## 2024-06-21 ASSESSMENT — ENCOUNTER SYMPTOMS
EYE ITCHING: 0
CHILLS: 0
FATIGUE: 0
ABDOMINAL PAIN: 0
MYALGIAS: 0
SINUS PRESSURE: 0
CONFUSION: 0
FEVER: 0
WOUND: 0
VOMITING: 0
APPETITE CHANGE: 0
SEIZURES: 0
SINUS PAIN: 0
DIZZINESS: 0
HEADACHES: 0
COUGH: 1
RHINORRHEA: 0
LIGHT-HEADEDNESS: 0
EYE PAIN: 0
HEMATURIA: 0
FREQUENCY: 0
CHEST TIGHTNESS: 0
EYE REDNESS: 0
DYSURIA: 0
PALPITATIONS: 0
UNEXPECTED WEIGHT CHANGE: 1
DYSPHORIC MOOD: 0
SORE THROAT: 0
WHEEZING: 0
BACK PAIN: 0
CONSTIPATION: 1
ARTHRALGIAS: 0
DIARRHEA: 0
NECK PAIN: 0
NAUSEA: 0
SHORTNESS OF BREATH: 1
NERVOUS/ANXIOUS: 0

## 2024-06-21 NOTE — LETTER
August 21, 2024     No Recipients    Patient: Dane Molina   YOB: 1960   Date of Visit: 6/21/2024       Dear Dr. Stubbs Recipients:    Thank you for referring Dane Molina to me for evaluation. Below are my notes for this consultation.  If you have questions, please do not hesitate to call me. I look forward to following your patient along with you.       Sincerely,     Robin Metz MD      CC: No Recipients  ______________________________________________________________________________________    Patient ID: Dane Molina is a 64 y.o. male who presents for Lung Nodule.  Lung cancer    Shortness of Breath  Associated symptoms include a rash. Pertinent negatives include no abdominal pain, chest pain, fever, headaches, leg swelling, neck pain, rhinorrhea, sore throat, vomiting or wheezing.   Patient with h/o HTN, DLP, who is referred to my office for SOB and also lung nodule/mass.  Patient has a CT screening done in 12/27 that showed 3.1 cm RLL mass associated with hilar/mediastinal LAP, with concern for lung cancer. Subsequently had PET/CT done that showed SUV 6.1 in the mass and 3.5 on a separate nodule. R subcarinal nodule with SUV 11 and R hilar LAP SUV 9.6. Now is here for  follow up visit.  Last visit 4 months ago, since then had EBUS biopsy showed SCLC with R4 and R7 involvements. Work up also revealed liver lesion, s/p R lobe biopsy that was +ve for SCLC. Since then being treated with 4 cycle of carboplatin/etoposide/atezolizumab, now is being evaluated for consolidative TRT.   Overall is feeling better than 4 months ago.    Denies SOB at rest, no ROSSI after walking 2 city block or climbing 2 FOS. No orthopnea, no PND or LE edema. No associated wheezing or CP. Of note on Symbicort that he doesn't use regularly. Also on albuterol that he uses it  less than once a week.   Last visit +ve wheezing, on average 1-2 times a week. Wheezing now resolved.   +ve chronic cough, mild, at times  productive of a white sputum. No h/o hemoptysis.    Denies any sleeping issues. Feels rested in am.    Exposures: active smoker, now 4-5 cigarettes a day. , no known exposures.   Current Outpatient Medications   Medication Instructions   • albuterol 90 mcg/actuation inhaler 2 puffs, inhalation, 2 times daily PRN   • atorvastatin (LIPITOR) 40 mg, oral, Daily   • LORazepam (ATIVAN) 1 mg, oral, Once in imaging, Take 45 min before scheduled scan. Take additional 0.5 mg if needed just before or during imaging.  to take patient home.   • metoprolol succinate XL (TOPROL-XL) 100 mg, oral, Daily, Do not crush or chew.   • nicotine (Nicoderm CQ) 14 mg/24 hr patch 1 patch, transdermal, Every 24 hours   • OLANZapine (ZYPREXA) 5 mg, oral, Nightly, For 4 days starting the evening of treatment.   • ondansetron (ZOFRAN) 8 mg, oral, Every 8 hours PRN   • prochlorperazine (COMPAZINE) 10 mg, oral, Every 6 hours PRN   • sacubitriL-valsartan (Entresto)  mg tablet 1 tablet, oral, 2 times daily   • Symbicort 80-4.5 mcg/actuation inhaler 2 puffs, inhalation, Daily   • triamterene-hydrochlorothiazid (Maxzide-25) 37.5-25 mg tablet 1 tablet, oral, Every morning   • varenicline (Chantix) 0.5 mg tablet Take 1 tablet daily for 3 days, followed by 1 tablet twice a day for 3 days, followed by 2 tablets twice daily.  Take it with full glass of water.   Review of Systems   Constitutional:  Positive for unexpected weight change (weight gain.). Negative for appetite change, chills, fatigue and fever.   HENT:  Negative for congestion, postnasal drip, rhinorrhea, sinus pressure, sinus pain and sore throat.    Eyes:  Negative for pain, redness, itching and visual disturbance.   Respiratory:  Positive for cough and shortness of breath. Negative for chest tightness and wheezing.    Cardiovascular:  Negative for chest pain, palpitations and leg swelling.   Gastrointestinal:  Positive for constipation. Negative for abdominal pain,  diarrhea, nausea and vomiting.   Genitourinary:  Negative for dysuria, frequency and hematuria.   Musculoskeletal:  Negative for arthralgias, back pain, myalgias and neck pain.   Skin:  Positive for rash. Negative for wound.   Neurological:  Negative for dizziness, seizures, syncope, light-headedness and headaches.   Psychiatric/Behavioral:  Negative for confusion and dysphoric mood. The patient is not nervous/anxious.    Objective   Visit Vitals  /82 (BP Location: Right arm)   Pulse 101   Temp 37 °C (98.6 °F) (Temporal)   Resp 16   Physical Exam  Constitutional:       General: He is not in acute distress.     Appearance: Normal appearance. He is obese. He is not toxic-appearing.   HENT:      Head: Normocephalic and atraumatic.      Nose:      Comments: On RA     Mouth/Throat:      Mouth: Mucous membranes are moist.      Comments: Mallampati 2.  Eyes:      General: No scleral icterus.     Extraocular Movements: Extraocular movements intact.      Pupils: Pupils are equal, round, and reactive to light.   Cardiovascular:      Rate and Rhythm: Normal rate and regular rhythm.      Heart sounds: No murmur heard.     No friction rub. No gallop.   Pulmonary:      Effort: Pulmonary effort is normal. No respiratory distress.      Breath sounds: No wheezing or rales.      Comments: Clear lung fields b/l with good/fair air entry.   Abdominal:      General: Abdomen is flat. There is no distension.      Palpations: Abdomen is soft.      Tenderness: There is no abdominal tenderness.   Musculoskeletal:         General: No swelling or tenderness. Normal range of motion.      Cervical back: Neck supple. No rigidity or tenderness.      Right lower leg: No edema.      Left lower leg: No edema.   Lymphadenopathy:      Cervical: No cervical adenopathy.   Skin:     General: Skin is warm and dry.      Coloration: Skin is not jaundiced.      Findings: No bruising.   Neurological:      General: No focal deficit present.      Mental  Status: He is alert and oriented to person, place, and time.      Cranial Nerves: No cranial nerve deficit.      Motor: No weakness.   Psychiatric:         Mood and Affect: Mood normal.         Behavior: Behavior normal.   Results:   I personally reviewed the images for the chest CT from 4/2024 and 5/2024 that showed improvement in metastatic RLL mass and LAP and stable diffuse tree-in-bud  Echo from 1/2024 showed: HFrEF 25-30%, normal RV, RVSF and RVSP 26.     The following were reviewed during previous visits.  I personally the images for the CT screening done in 12/27 that showed 3.1 cm RLL mass associated with hilar/mediastinal LAP and  PET/CT from 1/2024 that showed SUV 6.1 in the mass and 3.5 on a separate nodule. R subcarinal nodule with SUV 11 and R hilar LAP SUV 9.6.   Labs from 12/21/2023 reviewed that showed Cr 1.68, Hb A1C 5.7    Assessment/Plan   63 YOM with h/o HTN, DLP, who is referred to my office for SOB and also lung nodule/mass, work up consistent with cancer.     1. Lung mass/LAP/Lung nodule: large 3.1 RLL mass with associated hilar/mediastinal LAP, given h/o smoking with concern for lung cancer. PET/CT done that showed SUV 6.1 in the mass and 3.5 on a separate nodule. R subcarinal nodule with SUV 11 and R hilar LAP SUV 9.6. Work up revealed SCLC s/p  4 cycle of carboplatin/etoposide/atezolizumab, now is being evaluated for consolidative TRT.      FU with oncology and rad oncology for further management.     2. SOB: with concern for COPD, however PFT in 2/2024 showed: FEV1/FVC 70%, FEF 52-->56%, TLC 81%, RV/%, and DLCO 82%      Will DC Symbicort      Continue albuterol PRN for small airway disease.       Echo and stress test are done, result as above.       Being seen by cardiology management of CHF as per cardiology    3. Smoking: actively  smokes cigars     Smoking  cessation counseling done, total time 3 min.     RTC in 3 months  Robin Metz MD 06/21/24 10:27 AM

## 2024-06-21 NOTE — PROGRESS NOTES
Patient ID: Dane Molina is a 64 y.o. male who presents for Lung Nodule.  Patient with h/o HTN, DLP, who is referred to my office for SOB and also lung nodule/mass.  Patient has a CT screening done in 12/27 that showed 3.1 cm RLL mass associated with hilar/mediastinal LAP, with concern for lung cancer. Subsequently had PET/CT done that showed SUV 6.1 in the mass and 3.5 on a separate nodule. R subcarinal nodule with SUV 11 and R hilar LAP SUV 9.6. Now is here for  follow up visit.  Last visit 4 months ago, since then had EBUS biopsy showed SCLC with R4 and R7 involvements. Work up also revealed liver lesion, s/p R lobe biopsy that was +ve for SCLC. Since then being treated with 4 cycle of carboplatin/etoposide/atezolizumab, now is being evaluated for consolidative TRT.   Overall is feeling better than 4 months ago.    Denies SOB at rest, no ROSSI after walking 2 city block or climbing 2 FOS. No orthopnea, no PND or LE edema. No associated wheezing or CP. Of note on Symbicort that he doesn't use regularly. Also on albuterol that he uses it  less than once a week.   Last visit +ve wheezing, on average 1-2 times a week. Wheezing now resolved.   +ve chronic cough, mild, at times productive of a white sputum. No h/o hemoptysis.    Denies any sleeping issues. Feels rested in am.    Exposures: active smoker, now 4-5 cigarettes a day. , no known exposures.   Current Outpatient Medications   Medication Instructions    albuterol 90 mcg/actuation inhaler 2 puffs, inhalation, 2 times daily PRN    atorvastatin (LIPITOR) 40 mg, oral, Daily    LORazepam (ATIVAN) 1 mg, oral, Once in imaging, Take 45 min before scheduled scan. Take additional 0.5 mg if needed just before or during imaging.  to take patient home.    metoprolol succinate XL (TOPROL-XL) 100 mg, oral, Daily, Do not crush or chew.    nicotine (Nicoderm CQ) 14 mg/24 hr patch 1 patch, transdermal, Every 24 hours    OLANZapine (ZYPREXA) 5 mg, oral,  Nightly, For 4 days starting the evening of treatment.    ondansetron (ZOFRAN) 8 mg, oral, Every 8 hours PRN    prochlorperazine (COMPAZINE) 10 mg, oral, Every 6 hours PRN    sacubitriL-valsartan (Entresto)  mg tablet 1 tablet, oral, 2 times daily    Symbicort 80-4.5 mcg/actuation inhaler 2 puffs, inhalation, Daily    triamterene-hydrochlorothiazid (Maxzide-25) 37.5-25 mg tablet 1 tablet, oral, Every morning    varenicline (Chantix) 0.5 mg tablet Take 1 tablet daily for 3 days, followed by 1 tablet twice a day for 3 days, followed by 2 tablets twice daily.  Take it with full glass of water.   Review of Systems   Constitutional:  Positive for unexpected weight change (weight gain.). Negative for appetite change, chills and fatigue.   Eyes:  Negative for pain, redness, itching and visual disturbance.   Cardiovascular:  Negative for palpitations.   Gastrointestinal:  Positive for constipation. Negative for diarrhea and nausea.   Genitourinary:  Negative for dysuria, frequency and hematuria.   Musculoskeletal:  Negative for arthralgias, back pain and myalgias.   Skin:  Negative for wound.   Neurological:  Negative for dizziness, seizures, syncope and light-headedness.   Psychiatric/Behavioral:  Negative for confusion and dysphoric mood. The patient is not nervous/anxious.    Objective   Visit Vitals  /82 (BP Location: Right arm)   Pulse 101   Temp 37 °C (98.6 °F) (Temporal)   Resp 16   Physical Exam  Constitutional:       General: He is not in acute distress.     Appearance: Normal appearance. He is obese. He is not toxic-appearing.   HENT:      Head: Normocephalic and atraumatic.      Nose:      Comments: On RA     Mouth/Throat:      Mouth: Mucous membranes are moist.      Comments: Mallampati 2.  Eyes:      General: No scleral icterus.     Extraocular Movements: Extraocular movements intact.      Pupils: Pupils are equal, round, and reactive to light.   Cardiovascular:      Rate and Rhythm: Normal rate and  regular rhythm.      Heart sounds: No murmur heard.     No friction rub. No gallop.   Pulmonary:      Effort: Pulmonary effort is normal. No respiratory distress.      Breath sounds: No wheezing or rales.      Comments: Clear lung fields b/l with good/fair air entry.   Abdominal:      General: Abdomen is flat. There is no distension.      Palpations: Abdomen is soft.      Tenderness: There is no abdominal tenderness.   Musculoskeletal:         General: No swelling or tenderness. Normal range of motion.      Cervical back: Neck supple. No rigidity or tenderness.      Right lower leg: No edema.      Left lower leg: No edema.   Lymphadenopathy:      Cervical: No cervical adenopathy.   Skin:     General: Skin is warm and dry.      Coloration: Skin is not jaundiced.      Findings: No bruising.   Neurological:      General: No focal deficit present.      Mental Status: He is alert and oriented to person, place, and time.      Cranial Nerves: No cranial nerve deficit.      Motor: No weakness.   Psychiatric:         Mood and Affect: Mood normal.         Behavior: Behavior normal.   Results:   I personally reviewed the images for the chest CT from 4/2024 and 5/2024 that showed improvement in metastatic RLL mass and LAP and stable diffuse tree-in-bud  Echo from 1/2024 showed: HFrEF 25-30%, normal RV, RVSF and RVSP 26.     The following were reviewed during previous visits.  I personally the images for the CT screening done in 12/27 that showed 3.1 cm RLL mass associated with hilar/mediastinal LAP and  PET/CT from 1/2024 that showed SUV 6.1 in the mass and 3.5 on a separate nodule. R subcarinal nodule with SUV 11 and R hilar LAP SUV 9.6.   Labs from 12/21/2023 reviewed that showed Cr 1.68, Hb A1C 5.7  Assessment/Plan   63 YOM with h/o HTN, DLP, who is referred to my office for SOB and also lung nodule/mass, work up consistent with cancer.     1. Lung mass/LAP/Lung nodule: large 3.1 RLL mass with associated hilar/mediastinal  LAP, given h/o smoking with concern for lung cancer. PET/CT done that showed SUV 6.1 in the mass and 3.5 on a separate nodule. R subcarinal nodule with SUV 11 and R hilar LAP SUV 9.6. Work up revealed SCLC s/p  4 cycle of carboplatin/etoposide/atezolizumab, now is being evaluated for consolidative TRT.      FU with oncology and rad oncology for further management.     2. SOB: with concern for COPD, however PFT in 2/2024 showed: FEV1/FVC 70%, FEF 52-->56%, TLC 81%, RV/%, and DLCO 82%      Will DC Symbicort      Continue albuterol PRN for small airway disease.       Echo and stress test are done, result as above.       Being seen by cardiology management of CHF as per cardiology    3. Smoking: actively  smokes cigars     Smoking  cessation counseling done, total time 3 min.     RTC in 3 months  Robin Metz MD 06/21/24 10:27 AM

## 2024-06-21 NOTE — PROGRESS NOTES
-Patient presents to the clinic today for maintenance atez  -Patient tolerated infusion well  -Patient discharged in stable condition. Reviewed calendar/next appointment, all questions answered.   -Patient ambulated out of clinic with ease  -Notes/Plan for next visit-    -next infusion 7/11

## 2024-06-22 LAB — ACTH PLAS-MCNC: 13.8 PG/ML (ref 7.2–63.3)

## 2024-06-25 ENCOUNTER — TELEPHONE (OUTPATIENT)
Dept: CARDIOLOGY | Facility: CLINIC | Age: 64
End: 2024-06-25
Payer: COMMERCIAL

## 2024-06-25 NOTE — TELEPHONE ENCOUNTER
Patient called and wanted clarification regarding medication regimen. I spoke with patient and went through Dr. Zarco's instructions Patient understands he should not take losartan and should be taking the Entresto in replace of it. Pt also understands he should be taking 100mg of metoprolol instead of 50mg. Pt's follow up appointment is scheduled for 7/2/2024 @9:15.

## 2024-06-26 ENCOUNTER — APPOINTMENT (OUTPATIENT)
Dept: CARDIOLOGY | Facility: CLINIC | Age: 64
End: 2024-06-26
Payer: COMMERCIAL

## 2024-06-27 ENCOUNTER — HOSPITAL ENCOUNTER (OUTPATIENT)
Dept: RADIATION ONCOLOGY | Facility: HOSPITAL | Age: 64
Setting detail: RADIATION/ONCOLOGY SERIES
Discharge: HOME | End: 2024-06-27
Payer: MEDICARE

## 2024-06-27 PROCEDURE — 77338 DESIGN MLC DEVICE FOR IMRT: CPT | Performed by: RADIOLOGY

## 2024-06-27 PROCEDURE — 77300 RADIATION THERAPY DOSE PLAN: CPT | Performed by: RADIOLOGY

## 2024-06-27 PROCEDURE — 77293 RESPIRATOR MOTION MGMT SIMUL: CPT | Performed by: RADIOLOGY

## 2024-06-27 PROCEDURE — 77301 RADIOTHERAPY DOSE PLAN IMRT: CPT | Performed by: RADIOLOGY

## 2024-06-28 ENCOUNTER — TELEPHONE (OUTPATIENT)
Dept: RADIATION ONCOLOGY | Facility: HOSPITAL | Age: 64
End: 2024-06-28
Payer: COMMERCIAL

## 2024-06-28 DIAGNOSIS — C34.90 SMALL CELL CARCINOMA OF LUNG (MULTI): Primary | ICD-10-CM

## 2024-06-28 NOTE — TELEPHONE ENCOUNTER
Called and explained new radiation schedule to patient, with him starting 7/22 and finishing 8/9.  Explained that it is being done this way so he can get his radiation between his doses of IO.  Pt. Verbalized understanding.

## 2024-07-06 ENCOUNTER — HOSPITAL ENCOUNTER (OUTPATIENT)
Dept: RADIOLOGY | Facility: HOSPITAL | Age: 64
Discharge: HOME | End: 2024-07-06
Payer: COMMERCIAL

## 2024-07-06 DIAGNOSIS — C34.90 SMALL CELL CARCINOMA OF LUNG (MULTI): ICD-10-CM

## 2024-07-06 PROCEDURE — 70553 MRI BRAIN STEM W/O & W/DYE: CPT

## 2024-07-06 PROCEDURE — 2550000001 HC RX 255 CONTRASTS: Mod: SE | Performed by: RADIOLOGY

## 2024-07-06 PROCEDURE — 70553 MRI BRAIN STEM W/O & W/DYE: CPT | Performed by: RADIOLOGY

## 2024-07-06 PROCEDURE — A9575 INJ GADOTERATE MEGLUMI 0.1ML: HCPCS | Mod: SE | Performed by: RADIOLOGY

## 2024-07-06 RX ORDER — GADOTERATE MEGLUMINE 376.9 MG/ML
20 INJECTION INTRAVENOUS
Status: COMPLETED | OUTPATIENT
Start: 2024-07-06 | End: 2024-07-06

## 2024-07-06 RX ADMIN — GADOTERATE MEGLUMINE 20 ML: 376.9 INJECTION INTRAVENOUS at 10:40

## 2024-07-09 ENCOUNTER — APPOINTMENT (OUTPATIENT)
Dept: RADIATION ONCOLOGY | Facility: HOSPITAL | Age: 64
End: 2024-07-09
Payer: COMMERCIAL

## 2024-07-10 ENCOUNTER — APPOINTMENT (OUTPATIENT)
Dept: RADIATION ONCOLOGY | Facility: HOSPITAL | Age: 64
End: 2024-07-10
Payer: COMMERCIAL

## 2024-07-11 ENCOUNTER — APPOINTMENT (OUTPATIENT)
Dept: RADIATION ONCOLOGY | Facility: HOSPITAL | Age: 64
End: 2024-07-11
Payer: COMMERCIAL

## 2024-07-11 ENCOUNTER — APPOINTMENT (OUTPATIENT)
Dept: HEMATOLOGY/ONCOLOGY | Facility: HOSPITAL | Age: 64
End: 2024-07-11
Payer: COMMERCIAL

## 2024-07-12 ENCOUNTER — APPOINTMENT (OUTPATIENT)
Dept: RADIATION ONCOLOGY | Facility: HOSPITAL | Age: 64
End: 2024-07-12
Payer: COMMERCIAL

## 2024-07-15 ENCOUNTER — APPOINTMENT (OUTPATIENT)
Dept: RADIATION ONCOLOGY | Facility: HOSPITAL | Age: 64
End: 2024-07-15
Payer: COMMERCIAL

## 2024-07-16 ENCOUNTER — APPOINTMENT (OUTPATIENT)
Dept: RADIATION ONCOLOGY | Facility: HOSPITAL | Age: 64
End: 2024-07-16
Payer: COMMERCIAL

## 2024-07-16 ENCOUNTER — HOSPITAL ENCOUNTER (EMERGENCY)
Facility: HOSPITAL | Age: 64
Discharge: HOME | End: 2024-07-17
Attending: EMERGENCY MEDICINE
Payer: MEDICAID

## 2024-07-16 ENCOUNTER — NURSE TRIAGE (OUTPATIENT)
Dept: HEMATOLOGY/ONCOLOGY | Facility: HOSPITAL | Age: 64
End: 2024-07-16
Payer: COMMERCIAL

## 2024-07-16 VITALS
DIASTOLIC BLOOD PRESSURE: 73 MMHG | HEIGHT: 72 IN | OXYGEN SATURATION: 97 % | HEART RATE: 61 BPM | BODY MASS INDEX: 32.25 KG/M2 | SYSTOLIC BLOOD PRESSURE: 125 MMHG | RESPIRATION RATE: 18 BRPM | TEMPERATURE: 98.9 F | WEIGHT: 238.1 LBS

## 2024-07-16 DIAGNOSIS — R21 RASH AND OTHER NONSPECIFIC SKIN ERUPTION: ICD-10-CM

## 2024-07-16 DIAGNOSIS — R21 RASH: ICD-10-CM

## 2024-07-16 LAB
ALBUMIN SERPL BCP-MCNC: 4.1 G/DL (ref 3.4–5)
ALP SERPL-CCNC: 87 U/L (ref 33–136)
ALT SERPL W P-5'-P-CCNC: 15 U/L (ref 10–52)
ANION GAP SERPL CALC-SCNC: 15 MMOL/L (ref 10–20)
AST SERPL W P-5'-P-CCNC: 12 U/L (ref 9–39)
BASOPHILS # BLD AUTO: 0.02 X10*3/UL (ref 0–0.1)
BASOPHILS NFR BLD AUTO: 0.3 %
BILIRUB SERPL-MCNC: 0.4 MG/DL (ref 0–1.2)
BUN SERPL-MCNC: 33 MG/DL (ref 6–23)
CALCIUM SERPL-MCNC: 8.9 MG/DL (ref 8.6–10.6)
CHLORIDE SERPL-SCNC: 106 MMOL/L (ref 98–107)
CO2 SERPL-SCNC: 24 MMOL/L (ref 21–32)
CREAT SERPL-MCNC: 1.85 MG/DL (ref 0.5–1.3)
EGFRCR SERPLBLD CKD-EPI 2021: 40 ML/MIN/1.73M*2
EOSINOPHIL # BLD AUTO: 0.43 X10*3/UL (ref 0–0.7)
EOSINOPHIL NFR BLD AUTO: 5.7 %
ERYTHROCYTE [DISTWIDTH] IN BLOOD BY AUTOMATED COUNT: 17.9 % (ref 11.5–14.5)
GLUCOSE SERPL-MCNC: 103 MG/DL (ref 74–99)
HCT VFR BLD AUTO: 32.5 % (ref 41–52)
HGB BLD-MCNC: 10.7 G/DL (ref 13.5–17.5)
IMM GRANULOCYTES # BLD AUTO: 0.03 X10*3/UL (ref 0–0.7)
IMM GRANULOCYTES NFR BLD AUTO: 0.4 % (ref 0–0.9)
LYMPHOCYTES # BLD AUTO: 1.29 X10*3/UL (ref 1.2–4.8)
LYMPHOCYTES NFR BLD AUTO: 17.1 %
MAGNESIUM SERPL-MCNC: 2.07 MG/DL (ref 1.6–2.4)
MCH RBC QN AUTO: 31.5 PG (ref 26–34)
MCHC RBC AUTO-ENTMCNC: 32.9 G/DL (ref 32–36)
MCV RBC AUTO: 96 FL (ref 80–100)
MONOCYTES # BLD AUTO: 1.29 X10*3/UL (ref 0.1–1)
MONOCYTES NFR BLD AUTO: 17.1 %
NEUTROPHILS # BLD AUTO: 4.47 X10*3/UL (ref 1.2–7.7)
NEUTROPHILS NFR BLD AUTO: 59.4 %
NRBC BLD-RTO: 0 /100 WBCS (ref 0–0)
PLATELET # BLD AUTO: 182 X10*3/UL (ref 150–450)
POTASSIUM SERPL-SCNC: 3.8 MMOL/L (ref 3.5–5.3)
PROT SERPL-MCNC: 7 G/DL (ref 6.4–8.2)
RBC # BLD AUTO: 3.4 X10*6/UL (ref 4.5–5.9)
SODIUM SERPL-SCNC: 141 MMOL/L (ref 136–145)
WBC # BLD AUTO: 7.5 X10*3/UL (ref 4.4–11.3)

## 2024-07-16 PROCEDURE — 99283 EMERGENCY DEPT VISIT LOW MDM: CPT

## 2024-07-16 PROCEDURE — 2500000001 HC RX 250 WO HCPCS SELF ADMINISTERED DRUGS (ALT 637 FOR MEDICARE OP): Mod: SE

## 2024-07-16 PROCEDURE — 36415 COLL VENOUS BLD VENIPUNCTURE: CPT

## 2024-07-16 PROCEDURE — 83735 ASSAY OF MAGNESIUM: CPT

## 2024-07-16 PROCEDURE — 80053 COMPREHEN METABOLIC PANEL: CPT

## 2024-07-16 PROCEDURE — 85025 COMPLETE CBC W/AUTO DIFF WBC: CPT

## 2024-07-16 PROCEDURE — 99285 EMERGENCY DEPT VISIT HI MDM: CPT | Performed by: EMERGENCY MEDICINE

## 2024-07-16 RX ORDER — HYDROXYZINE HYDROCHLORIDE 25 MG/1
25 TABLET, FILM COATED ORAL ONCE
Status: COMPLETED | OUTPATIENT
Start: 2024-07-16 | End: 2024-07-16

## 2024-07-16 ASSESSMENT — PAIN - FUNCTIONAL ASSESSMENT: PAIN_FUNCTIONAL_ASSESSMENT: 0-10

## 2024-07-16 ASSESSMENT — PAIN SCALES - GENERAL: PAINLEVEL_OUTOF10: 0 - NO PAIN

## 2024-07-16 NOTE — TELEPHONE ENCOUNTER
"Additional Information   Where is the rash - has it spread?     Started on arm and now it has moved to his chest and stomach.   Commented on: Review EMR for h/o immunotherpy. If positive, ask if patient is having a rash - symptoms may be related to immune-realted dermatitis.     He had treatment about 3 weeks ago.   Commented on: How does the rash look? What color and size is it?     The rash appears like a \"fresh burn\"  thinks it was blistered.  He has been scratching it.  It is not draining.    It is the size of a \"hair bump\"   Commented on: If the rash itches, is it getting worse? Does anything help relieve the itching?     Hydrocortisone was used (he had it from a couple years ago).  Eucerin also.  It barely helped   Commented on: Do you have any skin disease such as eczema or psoriasis?     When he got the hydrocortisone he thought he had eczema and the sun irritates his skin.  He has been out in the sun but as little as possible    Protocols used: Rash    No chest pain, no change in breathing, no N/V/D.    No burning with urination.    He starts radiation 7/22.    Pharmacy is [facility] [place].    Please advise     Message sent   "

## 2024-07-16 NOTE — TELEPHONE ENCOUNTER
Per Dr. Galvin he should go to [facility] ED for eval due to potential Ojeda Gerardo syndrome.    Call to patient to let him know that he should proceed to .  He states he is able to go and will arrive about 7 pm

## 2024-07-16 NOTE — ED TRIAGE NOTES
PT HAS HX OF LUNG AND LYMPH CA, FINISHED CHEMO AND STARTING RADIATION NEXT WEEK. PT REPORTS TO ED FOR POSS REACTION TO CHEMO. SKIN IS DRY, PEELING AND ITCHY. IRRITATIONS TO ARMS, HANDS, BACK, CHEST, ABD, AND NECK. STARTED APPROX 10 DAYS AGO.     NO SIGNS OF SYSTEMIC ALLERGIC REACTION. DENIES SOB, N/V, HIVES, SORE THROAT, HOARSENESS AND/OR HYPOTENSION.

## 2024-07-17 ENCOUNTER — APPOINTMENT (OUTPATIENT)
Dept: RADIATION ONCOLOGY | Facility: HOSPITAL | Age: 64
End: 2024-07-17
Payer: COMMERCIAL

## 2024-07-17 LAB
APTT PPP: 30 SECONDS (ref 27–38)
INR PPP: 1.2 (ref 0.9–1.1)
PROTHROMBIN TIME: 13.7 SECONDS (ref 9.8–12.8)

## 2024-07-17 PROCEDURE — 96360 HYDRATION IV INFUSION INIT: CPT

## 2024-07-17 PROCEDURE — 85610 PROTHROMBIN TIME: CPT

## 2024-07-17 PROCEDURE — 36415 COLL VENOUS BLD VENIPUNCTURE: CPT

## 2024-07-17 PROCEDURE — 2500000004 HC RX 250 GENERAL PHARMACY W/ HCPCS (ALT 636 FOR OP/ED): Mod: SE

## 2024-07-17 RX ORDER — DIPHENHYDRAMINE HCL 25 MG
25 CAPSULE ORAL NIGHTLY PRN
Qty: 30 CAPSULE | Refills: 0 | Status: SHIPPED | OUTPATIENT
Start: 2024-07-17

## 2024-07-17 RX ORDER — FAMOTIDINE 20 MG/1
20 TABLET, FILM COATED ORAL DAILY
Qty: 30 TABLET | Refills: 0 | Status: SHIPPED | OUTPATIENT
Start: 2024-07-17 | End: 2024-08-16

## 2024-07-17 RX ORDER — CETIRIZINE HYDROCHLORIDE 10 MG/1
10 TABLET ORAL DAILY
Qty: 30 TABLET | Refills: 0 | Status: SHIPPED | OUTPATIENT
Start: 2024-07-17 | End: 2024-08-16

## 2024-07-17 RX ORDER — HYDROCORTISONE 1 %
1 CREAM (GRAM) TOPICAL 2 TIMES DAILY
Qty: 6 G | Refills: 0 | Status: SHIPPED | OUTPATIENT
Start: 2024-07-17 | End: 2024-08-16

## 2024-07-17 NOTE — ED PROVIDER NOTES
Emergency Department Provider Note        History of Present Illness     History provided by: Patient  Limitations to History: None  External Records Reviewed with Brief Summary: Outpatient progress note from [facility] which showed summary of patient's cancer treatment    HPI:  Patient is a 64-year-old male with past medical history of small cell carcinoma, hypertension, dyslipidemia, tobacco use disorder, COPD presenting due to concerns of a diffuse rash that started about 1 week ago.  Patient reports that the rash is pruritic and is blanching and drying out his skin.  It is around his neck, over his anterior chest and upper abdomen along with dry skin from shoulders down to his hands.  Patient also has slight rash extending to his face.  Patient reports that he has tried putting some old tubes of hydrocortisone cream, Eucerin and taking warm showers.  He reports that his skin frequently dries out and is still very itchy.  He has not had this type of reaction before and reports that he his last infusion was on 6/21 for maintenance atezolizumab .  Patient has received total 4 cycles of carboplatin, etoposide and atezolizumab.  Patient denies any new exposure to allergens, soaps, detergents or foods.  He denies any medication allergies that he is aware of.  Patient denies any nausea or vomiting, fevers or chills, weakness or numbness in his upper or lower extremities, blistering of the rash, diarrhea.    Physical Exam   Triage vitals:  T 37.2 °C (98.9 °F)  HR 94  /85  RR 18  O2 96 %      Physical Exam  Vitals and nursing note reviewed.   Constitutional:       General: He is not in acute distress.     Appearance: He is well-developed.   HENT:      Head: Normocephalic and atraumatic.      Nose: Nose normal.      Mouth/Throat:      Mouth: Mucous membranes are dry.      Pharynx: Oropharynx is clear.   Eyes:      Conjunctiva/sclera: Conjunctivae normal.   Cardiovascular:      Rate and Rhythm: Normal rate and  regular rhythm.      Heart sounds: No murmur heard.  Pulmonary:      Effort: Pulmonary effort is normal. No respiratory distress.      Breath sounds: Normal breath sounds.   Abdominal:      Palpations: Abdomen is soft.      Tenderness: There is no abdominal tenderness.   Musculoskeletal:         General: No swelling.      Cervical back: Neck supple.   Skin:     General: Skin is warm and dry.      Capillary Refill: Capillary refill takes less than 2 seconds.      Comments: Bilateral upper extremities with severely dry skin that is flaking off without any obvious skin breakdown other than at the creases in his fingers with small amount of skin tear noted.  No obvious bruising.  Large patches that are erythematous and not significantly elevated.  No obvious pustules or burst lesions noted over his chest and abdomen.  Negative Nikolsky sign   Neurological:      Mental Status: He is alert and oriented to person, place, and time.      Motor: No weakness.      Gait: Gait normal.   Psychiatric:         Mood and Affect: Mood normal.          Medical Decision Making & ED Course   Medical Decision Making:  Patient is a 64-year-old male with past medical history of small cell carcinoma, hypertension, dyslipidemia, tobacco use disorder, COPD presenting due to concerns of a diffuse rash that started about 1 week ago. Based on patient's history and physical examination my concern at this time for any worrisome or life-threatening rash is low.  Patient's rash distribution consistent extensive however appears to only involve the superficial of the epidermis.  He is experiencing significant pruritus and I do not believe he is able to keep up motioning himself at the rate that he needs to do to ensure that he has an intact skin barrier.  He does not have any obvious pustules and does not have any systemic signs of illness.  He has small amount of skin breakdown from dry skin.  He had basic lab work obtained that was largely  unremarkable.  He was given Atarax for symptom relief. Given the fact the patient is immunocompromise and true cause of rash is undetermined, he was offered observation for dermatology consult in the morning through CDU observation.  Patient declined as he is worried about losing his job.  Given the fact that he is a , will be prescribed Zyrtec and Pepcid to take during the day to alleviate symptoms in addition to hydrocortisone cream and then Benadryl to only take at night prior to sleeping as needed.  Patient has a oncology appointment in 5 days and was told to keep it and return if he had any worsening symptoms. Patient care was overseen by attending physician agrees with plan and disposition.    Differential diagnoses considered include but are not limited to: cellulitis, abscess, necrotizing fasciitis, drug reaction, contact dermatitis, scalded skin syndrome, Ojeda-Gerardo syndrome.     Social Determinants of Health which Significantly Impact Care: Concern for job security    EKG Independent Interpretation: EKG not obtained    Independent Result Review and Interpretation: Relevant laboratory and radiographic results were reviewed and independently interpreted by myself.  As necessary, they are commented on in the ED Course.    Chronic conditions affecting the patient's care: As documented above in MDM    The patient was discussed with the following consultants/services: None    Care Considerations: As documented above in [facility]    ED Course:  ED Course as of 07/17/24 0014 Wed Jul 17, 2024   0008 ED Attending Attestation:   64-year-old male with a history lung cancer getting maintenance chemotherapy specifically [KF]      ED Course User Index  [KF] Orlando Carr MD MPH     Disposition   As a result of the work-up, the patient was discharged home.  he was informed of his diagnosis and instructed to come back with any concerns or worsening of condition.  he and was agreeable to the plan as discussed  above.  he was given the opportunity to ask questions.  All of the patient's questions were answered.    Procedures   Procedures    Patient seen and discussed with ED attending physician.    Vidhi Valles MD  Emergency Medicine       Vidhi Valles MD  Resident  07/17/24 0015

## 2024-07-17 NOTE — DISCHARGE INSTRUCTIONS
You are seen today due to concerns of a rash.  You are likely experiencing a skin reaction from your chemotherapy however this is not determined.  You were offered dermatology consult in the morning it is understandable that you are concerned about your job.  Please return if you have any worsening symptoms such as pus, severe bleeding, fevers or chills.  Otherwise please follow-up with your oncologist and your dermatologist.    Dermatology clinic number is [phone] or [phone]

## 2024-07-18 ENCOUNTER — APPOINTMENT (OUTPATIENT)
Dept: RADIATION ONCOLOGY | Facility: HOSPITAL | Age: 64
End: 2024-07-18
Payer: COMMERCIAL

## 2024-07-19 ENCOUNTER — APPOINTMENT (OUTPATIENT)
Dept: RADIATION ONCOLOGY | Facility: HOSPITAL | Age: 64
End: 2024-07-19
Payer: COMMERCIAL

## 2024-07-22 ENCOUNTER — HOSPITAL ENCOUNTER (OUTPATIENT)
Dept: RADIATION ONCOLOGY | Facility: HOSPITAL | Age: 64
Setting detail: RADIATION/ONCOLOGY SERIES
Discharge: HOME | End: 2024-07-22
Payer: COMMERCIAL

## 2024-07-22 ENCOUNTER — APPOINTMENT (OUTPATIENT)
Dept: RADIATION ONCOLOGY | Facility: HOSPITAL | Age: 64
End: 2024-07-22
Payer: COMMERCIAL

## 2024-07-22 DIAGNOSIS — C34.31 MALIGNANT NEOPLASM OF LOWER LOBE, RIGHT BRONCHUS OR LUNG (MULTI): ICD-10-CM

## 2024-07-22 DIAGNOSIS — Z51.0 ENCOUNTER FOR ANTINEOPLASTIC RADIATION THERAPY: ICD-10-CM

## 2024-07-22 LAB
RAD ONC MSQ ACTUAL FRACTIONS DELIVERED: 1
RAD ONC MSQ ACTUAL SESSION DELIVERED DOSE: 300 CGRAY
RAD ONC MSQ ACTUAL TOTAL DOSE: 300 CGRAY
RAD ONC MSQ ELAPSED DAYS: 0
RAD ONC MSQ LAST DATE: NORMAL
RAD ONC MSQ PRESCRIBED FRACTIONAL DOSE: 300 CGRAY
RAD ONC MSQ PRESCRIBED NUMBER OF FRACTIONS: 15
RAD ONC MSQ PRESCRIBED TECHNIQUE: NORMAL
RAD ONC MSQ PRESCRIBED TOTAL DOSE: 4500 CGRAY
RAD ONC MSQ PRESCRIPTION PATTERN COMMENT: NORMAL
RAD ONC MSQ START DATE: NORMAL
RAD ONC MSQ TREATMENT COURSE NUMBER: 1
RAD ONC MSQ TREATMENT SITE: NORMAL

## 2024-07-22 PROCEDURE — 77386 HC INTENSITY-MODULATED RADIATION THERAPY (IMRT), COMPLEX: CPT | Performed by: RADIOLOGY

## 2024-07-23 ENCOUNTER — APPOINTMENT (OUTPATIENT)
Dept: RADIATION ONCOLOGY | Facility: HOSPITAL | Age: 64
End: 2024-07-23
Payer: COMMERCIAL

## 2024-07-23 ENCOUNTER — HOSPITAL ENCOUNTER (OUTPATIENT)
Dept: RADIATION ONCOLOGY | Facility: HOSPITAL | Age: 64
Setting detail: RADIATION/ONCOLOGY SERIES
Discharge: HOME | End: 2024-07-23
Payer: COMMERCIAL

## 2024-07-23 DIAGNOSIS — Z51.0 ENCOUNTER FOR ANTINEOPLASTIC RADIATION THERAPY: ICD-10-CM

## 2024-07-23 DIAGNOSIS — C34.31 MALIGNANT NEOPLASM OF LOWER LOBE, RIGHT BRONCHUS OR LUNG (MULTI): ICD-10-CM

## 2024-07-23 LAB
RAD ONC MSQ ACTUAL FRACTIONS DELIVERED: 2
RAD ONC MSQ ACTUAL SESSION DELIVERED DOSE: 300 CGRAY
RAD ONC MSQ ACTUAL TOTAL DOSE: 600 CGRAY
RAD ONC MSQ ELAPSED DAYS: 1
RAD ONC MSQ LAST DATE: NORMAL
RAD ONC MSQ PRESCRIBED FRACTIONAL DOSE: 300 CGRAY
RAD ONC MSQ PRESCRIBED NUMBER OF FRACTIONS: 15
RAD ONC MSQ PRESCRIBED TECHNIQUE: NORMAL
RAD ONC MSQ PRESCRIBED TOTAL DOSE: 4500 CGRAY
RAD ONC MSQ PRESCRIPTION PATTERN COMMENT: NORMAL
RAD ONC MSQ START DATE: NORMAL
RAD ONC MSQ TREATMENT COURSE NUMBER: 1
RAD ONC MSQ TREATMENT SITE: NORMAL

## 2024-07-23 PROCEDURE — 77386 HC INTENSITY-MODULATED RADIATION THERAPY (IMRT), COMPLEX: CPT | Performed by: STUDENT IN AN ORGANIZED HEALTH CARE EDUCATION/TRAINING PROGRAM

## 2024-07-24 ENCOUNTER — APPOINTMENT (OUTPATIENT)
Dept: RADIATION ONCOLOGY | Facility: HOSPITAL | Age: 64
End: 2024-07-24
Payer: COMMERCIAL

## 2024-07-24 ENCOUNTER — HOSPITAL ENCOUNTER (OUTPATIENT)
Dept: RADIATION ONCOLOGY | Facility: HOSPITAL | Age: 64
Setting detail: RADIATION/ONCOLOGY SERIES
Discharge: HOME | End: 2024-07-24
Payer: COMMERCIAL

## 2024-07-24 DIAGNOSIS — C34.31 MALIGNANT NEOPLASM OF LOWER LOBE, RIGHT BRONCHUS OR LUNG (MULTI): ICD-10-CM

## 2024-07-24 DIAGNOSIS — Z51.0 ENCOUNTER FOR ANTINEOPLASTIC RADIATION THERAPY: ICD-10-CM

## 2024-07-24 LAB
RAD ONC MSQ ACTUAL FRACTIONS DELIVERED: 3
RAD ONC MSQ ACTUAL SESSION DELIVERED DOSE: 300 CGRAY
RAD ONC MSQ ACTUAL TOTAL DOSE: 900 CGRAY
RAD ONC MSQ ELAPSED DAYS: 2
RAD ONC MSQ LAST DATE: NORMAL
RAD ONC MSQ PRESCRIBED FRACTIONAL DOSE: 300 CGRAY
RAD ONC MSQ PRESCRIBED NUMBER OF FRACTIONS: 15
RAD ONC MSQ PRESCRIBED TECHNIQUE: NORMAL
RAD ONC MSQ PRESCRIBED TOTAL DOSE: 4500 CGRAY
RAD ONC MSQ PRESCRIPTION PATTERN COMMENT: NORMAL
RAD ONC MSQ START DATE: NORMAL
RAD ONC MSQ TREATMENT COURSE NUMBER: 1
RAD ONC MSQ TREATMENT SITE: NORMAL

## 2024-07-24 PROCEDURE — 77386 HC INTENSITY-MODULATED RADIATION THERAPY (IMRT), COMPLEX: CPT | Performed by: RADIOLOGY

## 2024-07-24 PROCEDURE — 77336 RADIATION PHYSICS CONSULT: CPT | Performed by: RADIOLOGY

## 2024-07-25 ENCOUNTER — APPOINTMENT (OUTPATIENT)
Dept: RADIATION ONCOLOGY | Facility: HOSPITAL | Age: 64
End: 2024-07-25
Payer: COMMERCIAL

## 2024-07-25 ENCOUNTER — HOSPITAL ENCOUNTER (OUTPATIENT)
Dept: RADIATION ONCOLOGY | Facility: HOSPITAL | Age: 64
Setting detail: RADIATION/ONCOLOGY SERIES
Discharge: HOME | End: 2024-07-25
Payer: COMMERCIAL

## 2024-07-25 DIAGNOSIS — Z51.0 ENCOUNTER FOR ANTINEOPLASTIC RADIATION THERAPY: ICD-10-CM

## 2024-07-25 DIAGNOSIS — C34.31 MALIGNANT NEOPLASM OF LOWER LOBE, RIGHT BRONCHUS OR LUNG (MULTI): ICD-10-CM

## 2024-07-25 LAB
RAD ONC MSQ ACTUAL FRACTIONS DELIVERED: 4
RAD ONC MSQ ACTUAL SESSION DELIVERED DOSE: 300 CGRAY
RAD ONC MSQ ACTUAL TOTAL DOSE: 1200 CGRAY
RAD ONC MSQ ELAPSED DAYS: 3
RAD ONC MSQ LAST DATE: NORMAL
RAD ONC MSQ PRESCRIBED FRACTIONAL DOSE: 300 CGRAY
RAD ONC MSQ PRESCRIBED NUMBER OF FRACTIONS: 15
RAD ONC MSQ PRESCRIBED TECHNIQUE: NORMAL
RAD ONC MSQ PRESCRIBED TOTAL DOSE: 4500 CGRAY
RAD ONC MSQ PRESCRIPTION PATTERN COMMENT: NORMAL
RAD ONC MSQ START DATE: NORMAL
RAD ONC MSQ TREATMENT COURSE NUMBER: 1
RAD ONC MSQ TREATMENT SITE: NORMAL

## 2024-07-25 PROCEDURE — 77386 HC INTENSITY-MODULATED RADIATION THERAPY (IMRT), COMPLEX: CPT | Performed by: RADIOLOGY

## 2024-07-26 ENCOUNTER — APPOINTMENT (OUTPATIENT)
Dept: RADIATION ONCOLOGY | Facility: HOSPITAL | Age: 64
End: 2024-07-26
Payer: COMMERCIAL

## 2024-07-26 ENCOUNTER — RADIATION ONCOLOGY OTV (OUTPATIENT)
Dept: RADIATION ONCOLOGY | Facility: HOSPITAL | Age: 64
End: 2024-07-26
Payer: COMMERCIAL

## 2024-07-26 ENCOUNTER — HOSPITAL ENCOUNTER (OUTPATIENT)
Dept: RADIATION ONCOLOGY | Facility: HOSPITAL | Age: 64
Setting detail: RADIATION/ONCOLOGY SERIES
Discharge: HOME | End: 2024-07-26
Payer: MEDICARE

## 2024-07-26 VITALS
SYSTOLIC BLOOD PRESSURE: 119 MMHG | OXYGEN SATURATION: 96 % | TEMPERATURE: 98.2 F | RESPIRATION RATE: 18 BRPM | HEART RATE: 99 BPM | DIASTOLIC BLOOD PRESSURE: 83 MMHG

## 2024-07-26 DIAGNOSIS — Z51.0 ENCOUNTER FOR ANTINEOPLASTIC RADIATION THERAPY: ICD-10-CM

## 2024-07-26 DIAGNOSIS — C34.31 MALIGNANT NEOPLASM OF LOWER LOBE, RIGHT BRONCHUS OR LUNG (MULTI): ICD-10-CM

## 2024-07-26 LAB
RAD ONC MSQ ACTUAL FRACTIONS DELIVERED: 5
RAD ONC MSQ ACTUAL SESSION DELIVERED DOSE: 300 CGRAY
RAD ONC MSQ ACTUAL TOTAL DOSE: 1500 CGRAY
RAD ONC MSQ ELAPSED DAYS: 4
RAD ONC MSQ LAST DATE: NORMAL
RAD ONC MSQ PRESCRIBED FRACTIONAL DOSE: 300 CGRAY
RAD ONC MSQ PRESCRIBED NUMBER OF FRACTIONS: 15
RAD ONC MSQ PRESCRIBED TECHNIQUE: NORMAL
RAD ONC MSQ PRESCRIBED TOTAL DOSE: 4500 CGRAY
RAD ONC MSQ PRESCRIPTION PATTERN COMMENT: NORMAL
RAD ONC MSQ START DATE: NORMAL
RAD ONC MSQ TREATMENT COURSE NUMBER: 1
RAD ONC MSQ TREATMENT SITE: NORMAL

## 2024-07-26 PROCEDURE — 77386 HC INTENSITY-MODULATED RADIATION THERAPY (IMRT), COMPLEX: CPT | Performed by: RADIOLOGY

## 2024-07-26 ASSESSMENT — PAIN SCALES - GENERAL: PAINLEVEL: 0-NO PAIN

## 2024-07-28 NOTE — PROGRESS NOTES
Radiation Oncology On Treatment Visit    Patient Name:  Dane Molina  MRN:  [id number]  :  1960    Referring Provider: Tori Galvin MD  Primary Care Provider: Darwin Early MD  Care Team: Patient Care Team:  Darwin Early MD as PCP - General (Internal Medicine)  Tori Galvin MD as Medical Oncologist (Hematology and Oncology)  Ivania Carrillo MD as Consulting Physician (Hematology and Oncology)    Date of Service: 2024     Diagnosis:   Specialty Problems          Radiation Oncology Problems    Small cell carcinoma of lung (Multi)         Treatment Summary:  IMRT: Right Lung or bronchus    Treatment Period Technique Fraction Dose Fractions Total Dose   Course 1 2024-2024  (days elapsed: 4)         RtlungHilMed 2024-2024 VMAT 300 / 300 cGy 5 / 15 1500 / 4,500 cGy       Chemotherapy Summary (if applicable):   fosaprepitant (Emend) 150 mg in sodium chloride 0.9% 250 mL IV, 150 mg, intravenous, Once, 1 of 6 cycles    Administration: 150 mg (3/19/2024)        CARBOplatin (Paraplatin) 426 mg in sodium chloride 0.9% 142.6 mL IV, 426 mg, intravenous, Once, 1 of 6 cycles    Administration: 426 mg (3/19/2024)        etoposide (Toposar) 234 mg in sodium chloride 0.9% 1,011.7 mL IV, 100 mg/m2 = 234 mg, intravenous, Once, 1 of 6 cycles    Administration: 234 mg (3/19/2024), 234 mg (3/20/2024), 234 mg (3/21/2024)        palonosetron (Aloxi) injection 250 mcg, 250 mcg, intravenous, Once, 1 of 6 cycles    Administration: 250 mcg (3/19/2024)    fosaprepitant (Emend) 150 mg in sodium chloride 0.9% 250 mL IV, 150 mg, intravenous, Once, 4 of 4 cycles    Administration: 150 mg (2024), 150 mg (2024), 150 mg (2024)        CARBOplatin (Paraplatin) in sodium chloride 0.9% 100 mL IV, , intravenous, Once, 4 of 4 cycles    Dose modification: 366 mg (original dose 422.5 mg, Cycle 3, Reason: Protocol Driven)    Administration: 471 mg (2024), 370 mg (2024), 466  mg (5/20/2024)        etoposide (Toposar) 232 mg in sodium chloride 0.9% 1,000 mL IV, 100 mg/m2, intravenous, Once, 4 of 4 cycles    Administration: 232 mg (4/8/2024), 232 mg (4/9/2024), 232 mg (4/10/2024), 232 mg (4/29/2024), 232 mg (4/30/2024), 232 mg (5/1/2024), 232 mg (5/20/2024), 232 mg (5/21/2024), 232 mg (5/22/2024)        palonosetron (Aloxi) injection 250 mcg, 0.25 mg, intravenous, Once, 4 of 4 cycles    Administration: 250 mcg (4/8/2024), 250 mcg (4/29/2024), 250 mcg (5/20/2024)        atezolizumab (Tecentriq) 1,200 mg in sodium chloride 0.9% 270 mL IV, 1,200 mg, intravenous, Once, 4 of 4 cycles    Administration: 1,200 mg (4/8/2024), 1,200 mg (4/29/2024), 1,200 mg (5/20/2024)    atezolizumab (Tecentriq) 1,200 mg in sodium chloride 0.9% 287 mL IV, 1,200 mg, intravenous, Once, 2 of 17 cycles    Administration: 1,200 mg (6/21/2024)       SUBJECTIVE: Tolerating well. Denies significant skin changes, breathing difficulty, swallowing discomfort. No concurrent chemotherapy.    OBJECTIVE:   Vital Signs:  /83   Pulse 99   Temp 36.8 °C (98.2 °F) (Skin)   Resp 18   SpO2 96%    Pain Scale: The patient's current pain level was assessed.  They report currently having a pain of 0 out of 10.    Other Pertinent Findings: Sitting comfortably. Alert, oriented. Breathing well on room air. No wheezing. Ambulatory.    Toxicity Assessment          7/26/2024    16:24   Toxicity Assessment   Adverse Events Reviewed (WDL) No (Exceptions to WDL)   Treatment Site Thoracic   Anorexia Grade 0   Dehydration Grade 0   Dermatitis Radiation Grade 0   Fatigue Grade 0   Nausea Grade 0   Pain Grade 0   Dysphagia Grade 0   Cough Grade 0   Dyspnea Grade 0   Hypoxia Grade 0          Assessment / Plan:  Dosimetry/IGRT reviewed.  The patient is tolerating radiation therapy as anticipated.    Continue per current treatment plan.              To Conde MD, MMM  Senior Attending Physician, [facility]  Professor, Case  [employer]   Our Era: “To Heal, To Teach, To Discover.”  RN partner: [phone]/ [email or web address]    Phone (scheduling): [phone]/ [email or web address]  Proton Therapy (scheduling): [phone]/ [email or web address]  Phone (after hours): [phone]

## 2024-07-29 ENCOUNTER — APPOINTMENT (OUTPATIENT)
Dept: RADIATION ONCOLOGY | Facility: HOSPITAL | Age: 64
End: 2024-07-29
Payer: COMMERCIAL

## 2024-07-29 ENCOUNTER — HOSPITAL ENCOUNTER (OUTPATIENT)
Dept: RADIATION ONCOLOGY | Facility: HOSPITAL | Age: 64
Setting detail: RADIATION/ONCOLOGY SERIES
Discharge: HOME | End: 2024-07-29
Payer: MEDICARE

## 2024-07-29 DIAGNOSIS — Z51.0 ENCOUNTER FOR ANTINEOPLASTIC RADIATION THERAPY: ICD-10-CM

## 2024-07-29 DIAGNOSIS — C34.31 MALIGNANT NEOPLASM OF LOWER LOBE, RIGHT BRONCHUS OR LUNG (MULTI): ICD-10-CM

## 2024-07-29 LAB
RAD ONC MSQ ACTUAL FRACTIONS DELIVERED: 6
RAD ONC MSQ ACTUAL SESSION DELIVERED DOSE: 300 CGRAY
RAD ONC MSQ ACTUAL TOTAL DOSE: 1800 CGRAY
RAD ONC MSQ ELAPSED DAYS: 7
RAD ONC MSQ LAST DATE: NORMAL
RAD ONC MSQ PRESCRIBED FRACTIONAL DOSE: 300 CGRAY
RAD ONC MSQ PRESCRIBED NUMBER OF FRACTIONS: 15
RAD ONC MSQ PRESCRIBED TECHNIQUE: NORMAL
RAD ONC MSQ PRESCRIBED TOTAL DOSE: 4500 CGRAY
RAD ONC MSQ PRESCRIPTION PATTERN COMMENT: NORMAL
RAD ONC MSQ START DATE: NORMAL
RAD ONC MSQ TREATMENT COURSE NUMBER: 1
RAD ONC MSQ TREATMENT SITE: NORMAL

## 2024-07-29 PROCEDURE — 77386 HC INTENSITY-MODULATED RADIATION THERAPY (IMRT), COMPLEX: CPT | Performed by: RADIOLOGY

## 2024-07-30 ENCOUNTER — APPOINTMENT (OUTPATIENT)
Dept: RADIATION ONCOLOGY | Facility: HOSPITAL | Age: 64
End: 2024-07-30
Payer: COMMERCIAL

## 2024-07-30 LAB
RAD ONC MSQ ACTUAL FRACTIONS DELIVERED: 7
RAD ONC MSQ ACTUAL SESSION DELIVERED DOSE: 300 CGRAY
RAD ONC MSQ ACTUAL TOTAL DOSE: 2100 CGRAY
RAD ONC MSQ ELAPSED DAYS: 8
RAD ONC MSQ LAST DATE: NORMAL
RAD ONC MSQ PRESCRIBED FRACTIONAL DOSE: 300 CGRAY
RAD ONC MSQ PRESCRIBED NUMBER OF FRACTIONS: 15
RAD ONC MSQ PRESCRIBED TECHNIQUE: NORMAL
RAD ONC MSQ PRESCRIBED TOTAL DOSE: 4500 CGRAY
RAD ONC MSQ PRESCRIPTION PATTERN COMMENT: NORMAL
RAD ONC MSQ START DATE: NORMAL
RAD ONC MSQ TREATMENT COURSE NUMBER: 1
RAD ONC MSQ TREATMENT SITE: NORMAL

## 2024-07-30 PROCEDURE — 77386 HC INTENSITY-MODULATED RADIATION THERAPY (IMRT), COMPLEX: CPT | Performed by: RADIOLOGY

## 2024-07-31 ENCOUNTER — APPOINTMENT (OUTPATIENT)
Dept: RADIATION ONCOLOGY | Facility: HOSPITAL | Age: 64
End: 2024-07-31
Payer: COMMERCIAL

## 2024-07-31 ENCOUNTER — HOSPITAL ENCOUNTER (OUTPATIENT)
Dept: RADIATION ONCOLOGY | Facility: HOSPITAL | Age: 64
Setting detail: RADIATION/ONCOLOGY SERIES
Discharge: HOME | End: 2024-07-31
Payer: MEDICARE

## 2024-07-31 DIAGNOSIS — C34.31 MALIGNANT NEOPLASM OF LOWER LOBE, RIGHT BRONCHUS OR LUNG (MULTI): ICD-10-CM

## 2024-07-31 DIAGNOSIS — Z51.0 ENCOUNTER FOR ANTINEOPLASTIC RADIATION THERAPY: ICD-10-CM

## 2024-07-31 LAB
RAD ONC MSQ ACTUAL FRACTIONS DELIVERED: 8
RAD ONC MSQ ACTUAL SESSION DELIVERED DOSE: 300 CGRAY
RAD ONC MSQ ACTUAL TOTAL DOSE: 2400 CGRAY
RAD ONC MSQ ELAPSED DAYS: 9
RAD ONC MSQ LAST DATE: NORMAL
RAD ONC MSQ PRESCRIBED FRACTIONAL DOSE: 300 CGRAY
RAD ONC MSQ PRESCRIBED NUMBER OF FRACTIONS: 15
RAD ONC MSQ PRESCRIBED TECHNIQUE: NORMAL
RAD ONC MSQ PRESCRIBED TOTAL DOSE: 4500 CGRAY
RAD ONC MSQ PRESCRIPTION PATTERN COMMENT: NORMAL
RAD ONC MSQ START DATE: NORMAL
RAD ONC MSQ TREATMENT COURSE NUMBER: 1
RAD ONC MSQ TREATMENT SITE: NORMAL

## 2024-07-31 PROCEDURE — 77336 RADIATION PHYSICS CONSULT: CPT | Performed by: RADIOLOGY

## 2024-07-31 PROCEDURE — 77386 HC INTENSITY-MODULATED RADIATION THERAPY (IMRT), COMPLEX: CPT | Performed by: RADIOLOGY

## 2024-08-01 ENCOUNTER — HOSPITAL ENCOUNTER (OUTPATIENT)
Dept: RADIATION ONCOLOGY | Facility: HOSPITAL | Age: 64
Setting detail: RADIATION/ONCOLOGY SERIES
Discharge: HOME | End: 2024-08-01
Payer: MEDICARE

## 2024-08-01 ENCOUNTER — APPOINTMENT (OUTPATIENT)
Dept: HEMATOLOGY/ONCOLOGY | Facility: HOSPITAL | Age: 64
End: 2024-08-01
Payer: COMMERCIAL

## 2024-08-01 ENCOUNTER — APPOINTMENT (OUTPATIENT)
Dept: RADIATION ONCOLOGY | Facility: HOSPITAL | Age: 64
End: 2024-08-01
Payer: MEDICARE

## 2024-08-01 DIAGNOSIS — Z51.0 ENCOUNTER FOR ANTINEOPLASTIC RADIATION THERAPY: ICD-10-CM

## 2024-08-01 DIAGNOSIS — C34.31 MALIGNANT NEOPLASM OF LOWER LOBE, RIGHT BRONCHUS OR LUNG (MULTI): ICD-10-CM

## 2024-08-01 LAB
RAD ONC MSQ ACTUAL FRACTIONS DELIVERED: 9
RAD ONC MSQ ACTUAL SESSION DELIVERED DOSE: 300 CGRAY
RAD ONC MSQ ACTUAL TOTAL DOSE: 2700 CGRAY
RAD ONC MSQ ELAPSED DAYS: 10
RAD ONC MSQ LAST DATE: NORMAL
RAD ONC MSQ PRESCRIBED FRACTIONAL DOSE: 300 CGRAY
RAD ONC MSQ PRESCRIBED NUMBER OF FRACTIONS: 15
RAD ONC MSQ PRESCRIBED TECHNIQUE: NORMAL
RAD ONC MSQ PRESCRIBED TOTAL DOSE: 4500 CGRAY
RAD ONC MSQ PRESCRIPTION PATTERN COMMENT: NORMAL
RAD ONC MSQ START DATE: NORMAL
RAD ONC MSQ TREATMENT COURSE NUMBER: 1
RAD ONC MSQ TREATMENT SITE: NORMAL

## 2024-08-01 PROCEDURE — 77386 HC INTENSITY-MODULATED RADIATION THERAPY (IMRT), COMPLEX: CPT | Performed by: RADIOLOGY

## 2024-08-02 ENCOUNTER — APPOINTMENT (OUTPATIENT)
Dept: RADIATION ONCOLOGY | Facility: HOSPITAL | Age: 64
End: 2024-08-02
Payer: MEDICARE

## 2024-08-02 ENCOUNTER — HOSPITAL ENCOUNTER (OUTPATIENT)
Dept: RADIATION ONCOLOGY | Facility: HOSPITAL | Age: 64
Setting detail: RADIATION/ONCOLOGY SERIES
Discharge: HOME | End: 2024-08-02
Payer: MEDICARE

## 2024-08-02 ENCOUNTER — RADIATION ONCOLOGY OTV (OUTPATIENT)
Dept: RADIATION ONCOLOGY | Facility: HOSPITAL | Age: 64
End: 2024-08-02
Payer: MEDICARE

## 2024-08-02 VITALS
RESPIRATION RATE: 18 BRPM | HEART RATE: 110 BPM | WEIGHT: 230.38 LBS | BODY MASS INDEX: 31.25 KG/M2 | TEMPERATURE: 98.4 F | SYSTOLIC BLOOD PRESSURE: 117 MMHG | DIASTOLIC BLOOD PRESSURE: 80 MMHG | OXYGEN SATURATION: 96 %

## 2024-08-02 DIAGNOSIS — K20.80 RADIATION ESOPHAGITIS: Primary | ICD-10-CM

## 2024-08-02 DIAGNOSIS — C34.90 SMALL CELL CARCINOMA OF LUNG (MULTI): ICD-10-CM

## 2024-08-02 DIAGNOSIS — T66.XXXA RADIATION ESOPHAGITIS: Primary | ICD-10-CM

## 2024-08-02 DIAGNOSIS — Z51.0 ENCOUNTER FOR ANTINEOPLASTIC RADIATION THERAPY: ICD-10-CM

## 2024-08-02 DIAGNOSIS — C34.31 MALIGNANT NEOPLASM OF LOWER LOBE, RIGHT BRONCHUS OR LUNG (MULTI): ICD-10-CM

## 2024-08-02 LAB
RAD ONC MSQ ACTUAL FRACTIONS DELIVERED: 10
RAD ONC MSQ ACTUAL SESSION DELIVERED DOSE: 300 CGRAY
RAD ONC MSQ ACTUAL TOTAL DOSE: 3000 CGRAY
RAD ONC MSQ ELAPSED DAYS: 11
RAD ONC MSQ LAST DATE: NORMAL
RAD ONC MSQ PRESCRIBED FRACTIONAL DOSE: 300 CGRAY
RAD ONC MSQ PRESCRIBED NUMBER OF FRACTIONS: 15
RAD ONC MSQ PRESCRIBED TECHNIQUE: NORMAL
RAD ONC MSQ PRESCRIBED TOTAL DOSE: 4500 CGRAY
RAD ONC MSQ PRESCRIPTION PATTERN COMMENT: NORMAL
RAD ONC MSQ START DATE: NORMAL
RAD ONC MSQ TREATMENT COURSE NUMBER: 1
RAD ONC MSQ TREATMENT SITE: NORMAL

## 2024-08-02 PROCEDURE — 77386 HC INTENSITY-MODULATED RADIATION THERAPY (IMRT), COMPLEX: CPT | Performed by: RADIOLOGY

## 2024-08-02 RX ORDER — SUCRALFATE 1 G/1
1 TABLET ORAL
Qty: 60 TABLET | Refills: 1 | Status: SHIPPED | OUTPATIENT
Start: 2024-08-02 | End: 2024-09-01

## 2024-08-02 ASSESSMENT — PAIN SCALES - GENERAL: PAINLEVEL: 0-NO PAIN

## 2024-08-05 ENCOUNTER — APPOINTMENT (OUTPATIENT)
Dept: RADIATION ONCOLOGY | Facility: HOSPITAL | Age: 64
End: 2024-08-05
Payer: MEDICARE

## 2024-08-05 ENCOUNTER — HOSPITAL ENCOUNTER (OUTPATIENT)
Dept: RADIATION ONCOLOGY | Facility: HOSPITAL | Age: 64
Setting detail: RADIATION/ONCOLOGY SERIES
Discharge: HOME | End: 2024-08-05
Payer: MEDICARE

## 2024-08-05 DIAGNOSIS — Z51.0 ENCOUNTER FOR ANTINEOPLASTIC RADIATION THERAPY: ICD-10-CM

## 2024-08-05 DIAGNOSIS — C34.31 MALIGNANT NEOPLASM OF LOWER LOBE, RIGHT BRONCHUS OR LUNG (MULTI): ICD-10-CM

## 2024-08-05 LAB
RAD ONC MSQ ACTUAL FRACTIONS DELIVERED: 11
RAD ONC MSQ ACTUAL SESSION DELIVERED DOSE: 300 CGRAY
RAD ONC MSQ ACTUAL TOTAL DOSE: 3300 CGRAY
RAD ONC MSQ ELAPSED DAYS: 14
RAD ONC MSQ LAST DATE: NORMAL
RAD ONC MSQ PRESCRIBED FRACTIONAL DOSE: 300 CGRAY
RAD ONC MSQ PRESCRIBED NUMBER OF FRACTIONS: 15
RAD ONC MSQ PRESCRIBED TECHNIQUE: NORMAL
RAD ONC MSQ PRESCRIBED TOTAL DOSE: 4500 CGRAY
RAD ONC MSQ PRESCRIPTION PATTERN COMMENT: NORMAL
RAD ONC MSQ START DATE: NORMAL
RAD ONC MSQ TREATMENT COURSE NUMBER: 1
RAD ONC MSQ TREATMENT SITE: NORMAL

## 2024-08-05 PROCEDURE — 77386 HC INTENSITY-MODULATED RADIATION THERAPY (IMRT), COMPLEX: CPT | Performed by: RADIOLOGY

## 2024-08-06 ENCOUNTER — HOSPITAL ENCOUNTER (OUTPATIENT)
Dept: RADIATION ONCOLOGY | Facility: HOSPITAL | Age: 64
Setting detail: RADIATION/ONCOLOGY SERIES
Discharge: HOME | End: 2024-08-06
Payer: MEDICARE

## 2024-08-06 ENCOUNTER — APPOINTMENT (OUTPATIENT)
Dept: RADIATION ONCOLOGY | Facility: HOSPITAL | Age: 64
End: 2024-08-06
Payer: MEDICARE

## 2024-08-06 DIAGNOSIS — C34.31 MALIGNANT NEOPLASM OF LOWER LOBE, RIGHT BRONCHUS OR LUNG (MULTI): ICD-10-CM

## 2024-08-06 DIAGNOSIS — Z51.0 ENCOUNTER FOR ANTINEOPLASTIC RADIATION THERAPY: ICD-10-CM

## 2024-08-06 LAB
RAD ONC MSQ ACTUAL FRACTIONS DELIVERED: 12
RAD ONC MSQ ACTUAL SESSION DELIVERED DOSE: 300 CGRAY
RAD ONC MSQ ACTUAL TOTAL DOSE: 3600 CGRAY
RAD ONC MSQ ELAPSED DAYS: 15
RAD ONC MSQ LAST DATE: NORMAL
RAD ONC MSQ PRESCRIBED FRACTIONAL DOSE: 300 CGRAY
RAD ONC MSQ PRESCRIBED NUMBER OF FRACTIONS: 15
RAD ONC MSQ PRESCRIBED TECHNIQUE: NORMAL
RAD ONC MSQ PRESCRIBED TOTAL DOSE: 4500 CGRAY
RAD ONC MSQ PRESCRIPTION PATTERN COMMENT: NORMAL
RAD ONC MSQ START DATE: NORMAL
RAD ONC MSQ TREATMENT COURSE NUMBER: 1
RAD ONC MSQ TREATMENT SITE: NORMAL

## 2024-08-06 PROCEDURE — 77386 HC INTENSITY-MODULATED RADIATION THERAPY (IMRT), COMPLEX: CPT | Performed by: RADIOLOGY

## 2024-08-07 ENCOUNTER — HOSPITAL ENCOUNTER (OUTPATIENT)
Dept: RADIATION ONCOLOGY | Facility: HOSPITAL | Age: 64
Setting detail: RADIATION/ONCOLOGY SERIES
Discharge: HOME | End: 2024-08-07
Payer: MEDICARE

## 2024-08-07 ENCOUNTER — APPOINTMENT (OUTPATIENT)
Dept: RADIATION ONCOLOGY | Facility: HOSPITAL | Age: 64
End: 2024-08-07
Payer: MEDICARE

## 2024-08-07 DIAGNOSIS — Z51.0 ENCOUNTER FOR ANTINEOPLASTIC RADIATION THERAPY: ICD-10-CM

## 2024-08-07 DIAGNOSIS — C34.31 MALIGNANT NEOPLASM OF LOWER LOBE, RIGHT BRONCHUS OR LUNG (MULTI): ICD-10-CM

## 2024-08-07 LAB
RAD ONC MSQ ACTUAL FRACTIONS DELIVERED: 13
RAD ONC MSQ ACTUAL SESSION DELIVERED DOSE: 300 CGRAY
RAD ONC MSQ ACTUAL TOTAL DOSE: 3900 CGRAY
RAD ONC MSQ ELAPSED DAYS: 16
RAD ONC MSQ LAST DATE: NORMAL
RAD ONC MSQ PRESCRIBED FRACTIONAL DOSE: 300 CGRAY
RAD ONC MSQ PRESCRIBED NUMBER OF FRACTIONS: 15
RAD ONC MSQ PRESCRIBED TECHNIQUE: NORMAL
RAD ONC MSQ PRESCRIBED TOTAL DOSE: 4500 CGRAY
RAD ONC MSQ PRESCRIPTION PATTERN COMMENT: NORMAL
RAD ONC MSQ START DATE: NORMAL
RAD ONC MSQ TREATMENT COURSE NUMBER: 1
RAD ONC MSQ TREATMENT SITE: NORMAL

## 2024-08-07 PROCEDURE — 77386 HC INTENSITY-MODULATED RADIATION THERAPY (IMRT), COMPLEX: CPT | Performed by: RADIOLOGY

## 2024-08-07 PROCEDURE — 77336 RADIATION PHYSICS CONSULT: CPT | Performed by: RADIOLOGY

## 2024-08-08 ENCOUNTER — HOSPITAL ENCOUNTER (OUTPATIENT)
Dept: RADIATION ONCOLOGY | Facility: HOSPITAL | Age: 64
Setting detail: RADIATION/ONCOLOGY SERIES
Discharge: HOME | End: 2024-08-08
Payer: MEDICARE

## 2024-08-08 ENCOUNTER — OFFICE VISIT (OUTPATIENT)
Dept: HEMATOLOGY/ONCOLOGY | Facility: HOSPITAL | Age: 64
End: 2024-08-08
Payer: MEDICARE

## 2024-08-08 ENCOUNTER — APPOINTMENT (OUTPATIENT)
Dept: RADIATION ONCOLOGY | Facility: HOSPITAL | Age: 64
End: 2024-08-08
Payer: MEDICARE

## 2024-08-08 ENCOUNTER — RADIATION ONCOLOGY OTV (OUTPATIENT)
Dept: RADIATION ONCOLOGY | Facility: HOSPITAL | Age: 64
End: 2024-08-08
Payer: MEDICAID

## 2024-08-08 VITALS
OXYGEN SATURATION: 96 % | HEART RATE: 95 BPM | RESPIRATION RATE: 18 BRPM | SYSTOLIC BLOOD PRESSURE: 150 MMHG | HEIGHT: 72 IN | TEMPERATURE: 97.7 F | WEIGHT: 232.7 LBS | DIASTOLIC BLOOD PRESSURE: 84 MMHG | BODY MASS INDEX: 31.52 KG/M2

## 2024-08-08 DIAGNOSIS — Z51.0 ENCOUNTER FOR ANTINEOPLASTIC RADIATION THERAPY: ICD-10-CM

## 2024-08-08 DIAGNOSIS — L27.0 RASH, DRUG: ICD-10-CM

## 2024-08-08 DIAGNOSIS — C34.31 MALIGNANT NEOPLASM OF LOWER LOBE, RIGHT BRONCHUS OR LUNG (MULTI): ICD-10-CM

## 2024-08-08 DIAGNOSIS — C34.90 SMALL CELL CARCINOMA OF LUNG (MULTI): Primary | ICD-10-CM

## 2024-08-08 LAB
RAD ONC MSQ ACTUAL FRACTIONS DELIVERED: 14
RAD ONC MSQ ACTUAL SESSION DELIVERED DOSE: 300 CGRAY
RAD ONC MSQ ACTUAL TOTAL DOSE: 4200 CGRAY
RAD ONC MSQ ELAPSED DAYS: 17
RAD ONC MSQ LAST DATE: NORMAL
RAD ONC MSQ PRESCRIBED FRACTIONAL DOSE: 300 CGRAY
RAD ONC MSQ PRESCRIBED NUMBER OF FRACTIONS: 15
RAD ONC MSQ PRESCRIBED TECHNIQUE: NORMAL
RAD ONC MSQ PRESCRIBED TOTAL DOSE: 4500 CGRAY
RAD ONC MSQ PRESCRIPTION PATTERN COMMENT: NORMAL
RAD ONC MSQ START DATE: NORMAL
RAD ONC MSQ TREATMENT COURSE NUMBER: 1
RAD ONC MSQ TREATMENT SITE: NORMAL

## 2024-08-08 PROCEDURE — 99215 OFFICE O/P EST HI 40 MIN: CPT | Performed by: INTERNAL MEDICINE

## 2024-08-08 PROCEDURE — 77386 HC INTENSITY-MODULATED RADIATION THERAPY (IMRT), COMPLEX: CPT | Performed by: RADIOLOGY

## 2024-08-08 RX ORDER — DIPHENHYDRAMINE HYDROCHLORIDE 50 MG/ML
50 INJECTION INTRAMUSCULAR; INTRAVENOUS AS NEEDED
Status: CANCELLED | OUTPATIENT
Start: 2024-08-29

## 2024-08-08 RX ORDER — FAMOTIDINE 10 MG/ML
20 INJECTION INTRAVENOUS ONCE AS NEEDED
Status: CANCELLED | OUTPATIENT
Start: 2024-09-19

## 2024-08-08 RX ORDER — PROCHLORPERAZINE EDISYLATE 5 MG/ML
10 INJECTION INTRAMUSCULAR; INTRAVENOUS EVERY 6 HOURS PRN
Status: CANCELLED | OUTPATIENT
Start: 2024-09-19

## 2024-08-08 RX ORDER — EPINEPHRINE 0.3 MG/.3ML
0.3 INJECTION SUBCUTANEOUS EVERY 5 MIN PRN
Status: CANCELLED | OUTPATIENT
Start: 2024-08-29

## 2024-08-08 RX ORDER — PROCHLORPERAZINE MALEATE 10 MG
10 TABLET ORAL EVERY 6 HOURS PRN
Status: CANCELLED | OUTPATIENT
Start: 2024-08-29

## 2024-08-08 RX ORDER — EPINEPHRINE 0.3 MG/.3ML
0.3 INJECTION SUBCUTANEOUS EVERY 5 MIN PRN
Status: CANCELLED | OUTPATIENT
Start: 2024-09-19

## 2024-08-08 RX ORDER — PROCHLORPERAZINE MALEATE 10 MG
10 TABLET ORAL EVERY 6 HOURS PRN
Status: CANCELLED | OUTPATIENT
Start: 2024-09-19

## 2024-08-08 RX ORDER — ALBUTEROL SULFATE 0.83 MG/ML
3 SOLUTION RESPIRATORY (INHALATION) AS NEEDED
Status: CANCELLED | OUTPATIENT
Start: 2024-09-19

## 2024-08-08 RX ORDER — ALBUTEROL SULFATE 0.83 MG/ML
3 SOLUTION RESPIRATORY (INHALATION) AS NEEDED
Status: CANCELLED | OUTPATIENT
Start: 2024-08-29

## 2024-08-08 RX ORDER — PANTOPRAZOLE SODIUM 40 MG/1
TABLET, DELAYED RELEASE ORAL
Qty: 30 TABLET | Refills: 1 | Status: SHIPPED | OUTPATIENT
Start: 2024-08-08

## 2024-08-08 RX ORDER — DIPHENHYDRAMINE HYDROCHLORIDE 50 MG/ML
50 INJECTION INTRAMUSCULAR; INTRAVENOUS AS NEEDED
Status: CANCELLED | OUTPATIENT
Start: 2024-09-19

## 2024-08-08 RX ORDER — PROCHLORPERAZINE EDISYLATE 5 MG/ML
10 INJECTION INTRAMUSCULAR; INTRAVENOUS EVERY 6 HOURS PRN
Status: CANCELLED | OUTPATIENT
Start: 2024-08-29

## 2024-08-08 RX ORDER — FAMOTIDINE 10 MG/ML
20 INJECTION INTRAVENOUS ONCE AS NEEDED
Status: CANCELLED | OUTPATIENT
Start: 2024-08-29

## 2024-08-08 RX ORDER — PREDNISONE 20 MG/1
TABLET ORAL
Qty: 48 TABLET | Refills: 0 | Status: SHIPPED | OUTPATIENT
Start: 2024-08-08

## 2024-08-08 ASSESSMENT — PAIN SCALES - GENERAL: PAINLEVEL: 0-NO PAIN

## 2024-08-08 NOTE — PATIENT INSTRUCTIONS
For the rash (that is caused by the immunotherapy atezolizumab you have been getting for the small cell lung cancer), please take prednisone (20 mg tablet) as instructed:  5 tablets for 3 days (until 08/11)  4 tablets for 3 days (from 08/12 until 08/14)  3 tablets for 3 days (08/15 until 08/17)  2 tablets for 3 days (08/18 until 08/20)  1 tablet for 3 days (08/21 until 08/23)  1/2 tablet for 3 days (08/24 until 26) and then stop   Always take prednisone with food as it can make your stomach irritated   If the rash worsens please call [phone]    Also while taking prednisone please take protonix (pantoprazole) 30 minutes before breakfast and prednisone

## 2024-08-08 NOTE — PROGRESS NOTES
Radiation Oncology On Treatment Visit    Patient Name:  Dane Molina  MRN:  [id number]  :  1960    Referring Provider: Tori Galvin MD  Primary Care Provider: Darwin Early MD  Care Team: Patient Care Team:  Darwin Early MD as PCP - General (Internal Medicine)  Tori Galvin MD as Medical Oncologist (Hematology and Oncology)  Ivania Carrillo MD as Consulting Physician (Hematology and Oncology)    Date of Service: 2024     Diagnosis:   Specialty Problems          Radiation Oncology Problems    Small cell carcinoma of lung (Multi)         Treatment Summary:  IMRT: Right Lung or bronchus    Treatment Period Technique Fraction Dose Fractions Total Dose   Course 1 2024-2024  (days elapsed: 17)         [facility] 2024-2024 VMAT 300 / 300 cGy 14 / 15 4200 / 4,500 cGy       Chemotherapy Summary (if applicable):   fosaprepitant (Emend) 150 mg in sodium chloride 0.9% 250 mL IV, 150 mg, intravenous, Once, 1 of 6 cycles    Administration: 150 mg (3/19/2024)        CARBOplatin (Paraplatin) 426 mg in sodium chloride 0.9% 142.6 mL IV, 426 mg, intravenous, Once, 1 of 6 cycles    Administration: 426 mg (3/19/2024)        etoposide (Toposar) 234 mg in sodium chloride 0.9% 1,011.7 mL IV, 100 mg/m2 = 234 mg, intravenous, Once, 1 of 6 cycles    Administration: 234 mg (3/19/2024), 234 mg (3/20/2024), 234 mg (3/21/2024)        methylPREDNISolone sod succinate (PF) (SOLU-Medrol) 40 mg/mL injection 40 mg, 40 mg, intravenous, As needed, 1 of 6 cycles        palonosetron (Aloxi) injection 250 mcg, 250 mcg, intravenous, Once, 1 of 6 cycles    Administration: 250 mcg (3/19/2024)    fosaprepitant (Emend) 150 mg in sodium chloride 0.9% 250 mL IV, 150 mg, intravenous, Once, 4 of 4 cycles    Administration: 150 mg (2024), 150 mg (2024), 150 mg (2024)        CARBOplatin (Paraplatin) in sodium chloride 0.9% 100 mL IV, , intravenous, Once, 4 of 4 cycles    Dose modification:  366 mg (original dose 422.5 mg, Cycle 3, Reason: Protocol Driven)    Administration: 471 mg (4/8/2024), 370 mg (4/29/2024), 466 mg (5/20/2024)        etoposide (Toposar) 232 mg in sodium chloride 0.9% 1,000 mL IV, 100 mg/m2, intravenous, Once, 4 of 4 cycles    Administration: 232 mg (4/8/2024), 232 mg (4/9/2024), 232 mg (4/10/2024), 232 mg (4/29/2024), 232 mg (4/30/2024), 232 mg (5/1/2024), 232 mg (5/20/2024), 232 mg (5/21/2024), 232 mg (5/22/2024)        methylPREDNISolone sod succinate (SOLU-Medrol) 40 mg/mL injection 40 mg, 40 mg, intravenous, As needed, 4 of 4 cycles        palonosetron (Aloxi) injection 250 mcg, 0.25 mg, intravenous, Once, 4 of 4 cycles    Administration: 250 mcg (4/8/2024), 250 mcg (4/29/2024), 250 mcg (5/20/2024)        atezolizumab (Tecentriq) 1,200 mg in sodium chloride 0.9% 270 mL IV, 1,200 mg, intravenous, Once, 4 of 4 cycles    Administration: 1,200 mg (4/8/2024), 1,200 mg (4/29/2024), 1,200 mg (5/20/2024)    methylPREDNISolone sod succinate (SOLU-Medrol) 40 mg/mL injection 40 mg, 40 mg, intravenous, As needed, 2 of 17 cycles        atezolizumab (Tecentriq) 1,200 mg in sodium chloride 0.9% 287 mL IV, 1,200 mg, intravenous, Once, 2 of 17 cycles    Administration: 1,200 mg (6/21/2024)       SUBJECTIVE: Tolerating well. Denies significant skin changes, breathing difficulty. Took carafate once. No further esophagitis symptoms. No concurrent chemotherapy.     OBJECTIVE:   Vital Signs:  /84   Pulse 95   Temp 36.5 °C (97.7 °F) (Temporal)   Resp 18   Ht 1.829 m (6')   Wt 106 kg (232 lb 11.2 oz)   SpO2 96%   BMI 31.56 kg/m²    Pain Scale: The patient's current pain level was assessed.  They report currently having a pain of 0 out of 10.    Other Pertinent Findings: Sitting comfortably. Alert, oriented. Breathing well on room air. No wheezing. Ambulatory.    Toxicity Assessment          7/26/2024    16:24 8/2/2024    16:53 8/8/2024    16:20   Toxicity Assessment   Adverse Events  Reviewed (WDL) No (Exceptions to WDL) No (Exceptions to WDL) No (Exceptions to WDL)   Treatment Site Thoracic Thoracic Thoracic   Anorexia Grade 0 Grade 0 Grade 0   Dehydration Grade 0 Grade 0 Grade 0   Dermatitis Radiation Grade 0 Grade 0 Grade 0   Fatigue Grade 0 Grade 0 Grade 0   Nausea Grade 0 Grade 0 Grade 0   Pain Grade 0 Grade 0 Grade 0   Dysphagia Grade 0     Esophageal Pain  Grade 1       pt. with 1 episode of esophageal pain after taking meds Grade 0   Cough Grade 0  Grade 1       chronic cough   Dyspnea Grade 0  Grade 0   Hypoxia Grade 0  Grade 0          Assessment / Plan:  Dosimetry/IGRT reviewed.  The patient is tolerating radiation therapy as anticipated.    Continue per current treatment plan.   Scheduled to finish tomorrow.  Scheduled to see Dr. Galvin later today to discuss next steps for Infusions.  RTC 1 month for follow up with AKIN Levy. Imaging to be coordinated with infusions.           To Conde MD, MMM  Senior Attending Physician, [facility]  Professor, [employer]   Our [place]: “To Heal, To Teach, To Discover.”  RN partner: [phone]/ [email or web address]    Phone (scheduling): [phone]/ [email or web address]  Proton Therapy (scheduling): [phone]/ [email or web address]  Phone (after hours): [phone]

## 2024-08-08 NOTE — PROGRESS NOTES
Patient ID: Dane Molina is a 64 y.o. male    Primary Care Provider: Darwin Early MD    DIAGNOSIS AND STAGING  cT3 pN2 pM1b small cell lung cancer (INSM1 and TTF1+) of the right lower lobe, diagnosed on 02/05/2024 through bronchoscopy     SITES OF DISEASE  Right lower lobe   Levels 4R and 7 nodes   Liver, confirmed by biopsy      MOLECULAR GENOMICS  Not performed   RB loss by IHC      PRIOR THERAPIES  Carboplatin/etoposide C1D1 on 03/19/24 with addition of atezolizumab to C2 on 04/08/2024; C4D1 on 05/20/24     CURRENT THERAPY  Maintenance atezolizumab since 06/21/24  Completes 45 Gy in 15 fractions to chest on 08/09/24 (Dr. To Conde)      CURRENT ONCOLOGICAL PROBLEMS  Grade 3 immune related rash    HISTORY OF PRESENT ILLNESS  Patient presented to the emergency room on 01/04/2023 with a hypertensive urgency and cough.  He has a history of noncompliance with antihypertensive medications.  He also complained of dyspnea and underwent a CT chest without IV contrast that demonstrated a right lower lobe paramediastinal mass measuring 3.1 cm with adjacent pulmonary nodule measuring 1.8 cm.  Mediastinal lymphadenopathy was demonstrated, including a subcarinal node measuring 1.7 cm.  There was right hilar lymphadenopathy, 2 cm in short axis.  On 01/15/2024 the patient underwent a PET scan that demonstrated avidity of the aforementioned masses/lymph nodes, as well as a lesion in the liver, SUV max 3.3, right hepatic lobe.  On 02/05/2024, patient underwent a bronchoscopy:  A. LYMPH NODE 4 R PULMONARY FINE NEEDLE ASPIRATION , CYTOLOGY AND CELL BLOCK:    Positive for malignant cells   Metastatic small cell carcinoma, see note  B. LYMPH NODE 7 PULMONARY FINE NEEDLE ASPIRATION , CYTOLOGY AND CELL BLOCK:    Positive for malignant cells  Metastatic small cell carcinoma, see note          Note: Immunostains demonstrate the lesional cells to be diffusely positive for CAM 5.2, INSM1, TTF-1.  The proliferative  marker Ki-67 is greater than 90%.  Immunostain for retinoblastoma shows loss of staining.  Immunostain for p40 is negative.  The morphology and immunohistochemical profile are consistent with the above diagnosis.  On 02/29/24 the pt is seen by med onc for the first time. Denies any systemic sx - denies lack of appetite, fatigue or unintentional weight loss. Denies new localized pain, headaches or neuro sx.  Denies new respiratory sx.  He is claustrophobic and MRI brain was scheduled for today but pt could not go through with the test. He also has mild CKD, which limits his ability to receive intravenous contrast for CT.   03/04/24: MRI brain shows no ICMA, but MRI liver shows a 1.9 cm hepatic lesion with T2 hyperintensity and peripheral enhancement   03/19/2024: C1 D1 carboplatin/etoposide  03/22/24: Liver biopsy:  FINAL DIAGNOSIS   A. RIGHT LIVER MASS BIOPSY:      -- LIVER TISSUE INVOLVED BY SMALL CELL CARCINOMA, SEE NOTE     Note: Clinical history of lung small cell carcinoma noted. The findings most likely represent metastasis from the known primary lung tumor.     04/04/24: discussed with patient liver biopsy results and recommendations to add atezolizumab to his regimen  He has met with radiation oncology, and we will consider consolidative TRT pending response to cytotoxic chemotherapy    04/08/2024: C2 D1 carboplatin/etoposide/atezolizumab    05/20/24: C4 D1 carboplatin/etoposide/atezolizumab    06/10/2024: CT chest/abdomen/pelvis demonstrating continue his improvement of metastatic lymphadenopathy within the mediastinum/right hilum, with no progression within the liver    06/13/2024: Referral for radiation oncology/TRT.  Patient to proceed with maintenance atezolizumab    06/21/24: begins maintenance atezolizumab     08/09/24: completes 45 Gy in 15 fractions to chest (To Conde)     08/08/24: prescribed prednisone 1 mg/Kg for grade 3 IO-induced rash with peeling of hands        PAST MEDICAL  HISTORY  CKD - creatinine 1.68  Neck pain (injury at work)   Hypertension  Iron deficiency anemia (hx of blood transfusion in the 90's)    SURGICAL HISTORY  Ankle fracture and has a latoya in place      SOCIAL HISTORY  Former 35-40 pack-year smoker, quit at age 57  Smokes cigars now   History of alcohol addiction, sober for the past 15 years   Has one chlid ([place]a)  Single      FAMILY HISTORY  Lung CA (mother)  Mother had CKD and required HD    CURRENT MEDS REVIEWED       ALLERGIES REVIEWED        SUBJECTIVE:  + rash over several weeks   This is diffuse, over his hands, arms and trunk  He has been using topical hydrocortisone that was prescribed by ER but this is far from resolved   States his stamina and appetite are sustained     A 13 point review of systems was performed, with significant findings documented above in subjective history.    OBJECTIVE:  There were no vitals filed for this visit.  /84 mmHg   POX 96% RA      There is no height or weight on file to calculate BSA.     Wt Readings from Last 5 Encounters:   08/08/24 106 kg (232 lb 11.2 oz)   08/02/24 104 kg (230 lb 6.1 oz)   07/16/24 108 kg (238 lb 1.6 oz)   06/21/24 108 kg (237 lb)   06/17/24 110 kg (243 lb)     ECOGSCORE: 1- Restricted in physically strenuous activity.  Carries out light duty.    Physical Exam  Constitutional:       Appearance: Normal appearance.   HENT:      Head: Normocephalic and atraumatic.      Mouth/Throat:      Mouth: Mucous membranes are moist.      Pharynx: Oropharynx is clear. No oropharyngeal exudate or posterior oropharyngeal erythema.   Eyes:      General: No scleral icterus.     Extraocular Movements: Extraocular movements intact.      Conjunctiva/sclera: Conjunctivae normal.   Cardiovascular:      Rate and Rhythm: Normal rate and regular rhythm.   Pulmonary:      Effort: Pulmonary effort is normal.   Abdominal:      Palpations: Abdomen is soft. There is no mass.      Tenderness: There is no abdominal tenderness. There  is no guarding.   Musculoskeletal:      Right lower leg: No edema.      Left lower leg: No edema.   Skin:     General: Skin is warm.      Findings: Erythema and rash present.      Comments: Diffuse rash over upper extremities leading to peeling of skin over his hands.  Maculopapular rash noted over trunk   Neurological:      General: No focal deficit present.      Mental Status: He is alert and oriented to person, place, and time.      Motor: No weakness.      Gait: Gait normal.   Psychiatric:         Mood and Affect: Mood normal.         Behavior: Behavior normal.         Thought Content: Thought content normal.         Judgment: Judgment normal.          Diagnostic Results   Results:  Labs:  Lab Results   Component Value Date    WBC 7.5 07/16/2024    HGB 10.7 (L) 07/16/2024    HCT 32.5 (L) 07/16/2024    MCV 96 07/16/2024     07/16/2024      Lab Results   Component Value Date    NEUTROABS 4.47 07/16/2024        Lab Results   Component Value Date    GLUCOSE 103 (H) 07/16/2024    CALCIUM 8.9 07/16/2024     07/16/2024    K 3.8 07/16/2024    CO2 24 07/16/2024     07/16/2024    BUN 33 (H) 07/16/2024    CREATININE 1.85 (H) 07/16/2024    MG 2.07 07/16/2024     Lab Results   Component Value Date    ALT 15 07/16/2024    AST 12 07/16/2024    ALKPHOS 87 07/16/2024    BILITOT 0.4 07/16/2024    BILIDIR 0.1 12/21/2023      Lab Results   Component Value Date    ACTH 13.8 06/21/2024    CORTISOL 10.4 06/21/2024    TSH 0.70 06/21/2024     CT chest/abdomen/pelvis on 06/10/2024:    IMPRESSION:  1. Mild acute diverticulitis of the mid sigmoid colon with mild  pericolonic fat stranding and focal area of micro perforation versus  prominent gas-filled diverticula. No pericolonic fluid collection.  Findings are seen superimposed on chronic diverticulitis.      2. Interval improvement in metastatic lymphadenopathy within the  mediastinum as well as previously measured right hilar lesion with  only subtle residual  nodularity demonstrated.      3. Subtle ill-defined hypodensity within the right lobe of the liver  not significantly changed from the prior examination with focal  heterogeneity nonspecific.      Assessment/Plan     Small cell carcinoma of lung (Multi), Clinical: Stage FREDRICK (cT3, cN2, pM1b)  This is a former cigarette smoker with T3 pN2 pM1b small cell lung cancer of the RLL - extensive stage.  S/p 4 cycles carboplatin/etoposide + atezolizumab added to cycle 2, on 04/08/2024 - C4D1 on 05/20/2024  On maintenance atezolizumab since 06/21/24 -   This has been held due to ongoing TRT as above   Completing treatment tomorrow (45 Gy in 15 fx per Dr. Conde)   Repeat scans every 8 weeks - ordered today to be obtained prior to his follow up with me on 08/25      Rash, immune related and grade 3 I provided him with the following instructions:   For the rash (that is caused by the immunotherapy atezolizumab you have been getting for the small cell lung cancer), please take prednisone (20 mg tablet) as instructed:  5 tablets for 3 days (until 08/11)  4 tablets for 3 days (from 08/12 until 08/14)  3 tablets for 3 days (08/15 until 08/17)  2 tablets for 3 days (08/18 until 08/20)  1 tablet for 3 days (08/21 until 08/23)  1/2 tablet for 3 days (08/24 until 26) and then stop   Always take prednisone with food as it can make your stomach irritated   If the rash worsens please call [phone]  Also while taking prednisone please take protonix (pantoprazole) 30 minutes before breakfast and prednisone     Will continue to hold atezolizumab until resolution of this rash  I will see him in 08/25/2024 for reassessment      CNS monitoring   MRI on 07/06/24 shows no ICM   Repeat every 3 months -next ~ 10/06/24      Tori Galvin MD, MS  Thoracic Medical Oncology   [address] [place] [address]  Phone: [phone]

## 2024-08-09 ENCOUNTER — HOSPITAL ENCOUNTER (OUTPATIENT)
Dept: RADIATION ONCOLOGY | Facility: HOSPITAL | Age: 64
Setting detail: RADIATION/ONCOLOGY SERIES
Discharge: HOME | End: 2024-08-09
Payer: MEDICARE

## 2024-08-09 ENCOUNTER — APPOINTMENT (OUTPATIENT)
Dept: RADIATION ONCOLOGY | Facility: HOSPITAL | Age: 64
End: 2024-08-09
Payer: MEDICARE

## 2024-08-09 ENCOUNTER — LAB (OUTPATIENT)
Dept: LAB | Facility: HOSPITAL | Age: 64
End: 2024-08-09
Payer: MEDICARE

## 2024-08-09 ENCOUNTER — DOCUMENTATION (OUTPATIENT)
Dept: RADIATION ONCOLOGY | Facility: HOSPITAL | Age: 64
End: 2024-08-09

## 2024-08-09 DIAGNOSIS — I25.10 CORONARY ARTERY CALCIFICATION: ICD-10-CM

## 2024-08-09 DIAGNOSIS — Z51.0 ENCOUNTER FOR ANTINEOPLASTIC RADIATION THERAPY: ICD-10-CM

## 2024-08-09 DIAGNOSIS — Z72.0 TOBACCO ABUSE: ICD-10-CM

## 2024-08-09 DIAGNOSIS — I42.0 DILATED CARDIOMYOPATHY (MULTI): ICD-10-CM

## 2024-08-09 DIAGNOSIS — I25.10 CORONARY ARTERY DISEASE INVOLVING NATIVE CORONARY ARTERY OF NATIVE HEART WITHOUT ANGINA PECTORIS: ICD-10-CM

## 2024-08-09 DIAGNOSIS — C34.90 SMALL CELL CARCINOMA OF LUNG (MULTI): ICD-10-CM

## 2024-08-09 DIAGNOSIS — R06.01 ORTHOPNEA: ICD-10-CM

## 2024-08-09 DIAGNOSIS — C34.31 MALIGNANT NEOPLASM OF LOWER LOBE, RIGHT BRONCHUS OR LUNG (MULTI): ICD-10-CM

## 2024-08-09 DIAGNOSIS — R06.00 DYSPNEA, UNSPECIFIED TYPE: ICD-10-CM

## 2024-08-09 DIAGNOSIS — R06.09 DOE (DYSPNEA ON EXERTION): ICD-10-CM

## 2024-08-09 DIAGNOSIS — R06.09 DYSPNEA ON EXERTION: ICD-10-CM

## 2024-08-09 DIAGNOSIS — C34.90 SMALL CELL LUNG CANCER (MULTI): ICD-10-CM

## 2024-08-09 DIAGNOSIS — I50.23 ACUTE ON CHRONIC SYSTOLIC HEART FAILURE (MULTI): ICD-10-CM

## 2024-08-09 DIAGNOSIS — I25.84 CORONARY ARTERY CALCIFICATION: ICD-10-CM

## 2024-08-09 LAB
ALBUMIN SERPL BCP-MCNC: 4.2 G/DL (ref 3.4–5)
ALP SERPL-CCNC: 74 U/L (ref 33–136)
ALT SERPL W P-5'-P-CCNC: 12 U/L (ref 10–52)
ANION GAP SERPL CALC-SCNC: 14 MMOL/L (ref 10–20)
AST SERPL W P-5'-P-CCNC: 12 U/L (ref 9–39)
BASOPHILS # BLD AUTO: 0.01 X10*3/UL (ref 0–0.1)
BASOPHILS NFR BLD AUTO: 0.2 %
BILIRUB SERPL-MCNC: 0.3 MG/DL (ref 0–1.2)
BUN SERPL-MCNC: 32 MG/DL (ref 6–23)
CALCIUM SERPL-MCNC: 8.9 MG/DL (ref 8.6–10.3)
CHLORIDE SERPL-SCNC: 108 MMOL/L (ref 98–107)
CO2 SERPL-SCNC: 21 MMOL/L (ref 21–32)
CORTIS SERPL-MCNC: 21.9 UG/DL (ref 2.5–20)
CREAT SERPL-MCNC: 1.64 MG/DL (ref 0.5–1.3)
EGFRCR SERPLBLD CKD-EPI 2021: 47 ML/MIN/1.73M*2
EOSINOPHIL # BLD AUTO: 0 X10*3/UL (ref 0–0.7)
EOSINOPHIL NFR BLD AUTO: 0 %
ERYTHROCYTE [DISTWIDTH] IN BLOOD BY AUTOMATED COUNT: 16.3 % (ref 11.5–14.5)
GLUCOSE SERPL-MCNC: 159 MG/DL (ref 74–99)
HCT VFR BLD AUTO: 37.2 % (ref 41–52)
HGB BLD-MCNC: 12.1 G/DL (ref 13.5–17.5)
IMM GRANULOCYTES # BLD AUTO: 0.02 X10*3/UL (ref 0–0.7)
IMM GRANULOCYTES NFR BLD AUTO: 0.4 % (ref 0–0.9)
LDH SERPL L TO P-CCNC: 143 U/L (ref 84–246)
LYMPHOCYTES # BLD AUTO: 0.32 X10*3/UL (ref 1.2–4.8)
LYMPHOCYTES NFR BLD AUTO: 6.8 %
MAGNESIUM SERPL-MCNC: 1.86 MG/DL (ref 1.6–2.4)
MCH RBC QN AUTO: 32.2 PG (ref 26–34)
MCHC RBC AUTO-ENTMCNC: 32.5 G/DL (ref 32–36)
MCV RBC AUTO: 99 FL (ref 80–100)
MONOCYTES # BLD AUTO: 0.1 X10*3/UL (ref 0.1–1)
MONOCYTES NFR BLD AUTO: 2.1 %
NEUTROPHILS # BLD AUTO: 4.25 X10*3/UL (ref 1.2–7.7)
NEUTROPHILS NFR BLD AUTO: 90.5 %
NRBC BLD-RTO: 0 /100 WBCS (ref 0–0)
PHOSPHATE SERPL-MCNC: 3.3 MG/DL (ref 2.5–4.9)
PLATELET # BLD AUTO: 192 X10*3/UL (ref 150–450)
POTASSIUM SERPL-SCNC: 4.3 MMOL/L (ref 3.5–5.3)
PROT SERPL-MCNC: 7.6 G/DL (ref 6.4–8.2)
RAD ONC MSQ ACTUAL FRACTIONS DELIVERED: 15
RAD ONC MSQ ACTUAL SESSION DELIVERED DOSE: 300 CGRAY
RAD ONC MSQ ACTUAL TOTAL DOSE: 4500 CGRAY
RAD ONC MSQ ELAPSED DAYS: 18
RAD ONC MSQ LAST DATE: NORMAL
RAD ONC MSQ PRESCRIBED FRACTIONAL DOSE: 300 CGRAY
RAD ONC MSQ PRESCRIBED NUMBER OF FRACTIONS: 15
RAD ONC MSQ PRESCRIBED TECHNIQUE: NORMAL
RAD ONC MSQ PRESCRIBED TOTAL DOSE: 4500 CGRAY
RAD ONC MSQ PRESCRIPTION PATTERN COMMENT: NORMAL
RAD ONC MSQ START DATE: NORMAL
RAD ONC MSQ TREATMENT COURSE NUMBER: 1
RAD ONC MSQ TREATMENT SITE: NORMAL
RBC # BLD AUTO: 3.76 X10*6/UL (ref 4.5–5.9)
SODIUM SERPL-SCNC: 139 MMOL/L (ref 136–145)
T4 FREE SERPL-MCNC: 1.07 NG/DL (ref 0.78–1.48)
TSH SERPL-ACNC: 0.43 MIU/L (ref 0.44–3.98)
WBC # BLD AUTO: 4.7 X10*3/UL (ref 4.4–11.3)

## 2024-08-09 PROCEDURE — 36415 COLL VENOUS BLD VENIPUNCTURE: CPT

## 2024-08-09 PROCEDURE — 85025 COMPLETE CBC W/AUTO DIFF WBC: CPT

## 2024-08-09 PROCEDURE — 84100 ASSAY OF PHOSPHORUS: CPT

## 2024-08-09 PROCEDURE — 77386 HC INTENSITY-MODULATED RADIATION THERAPY (IMRT), COMPLEX: CPT | Performed by: RADIOLOGY

## 2024-08-09 PROCEDURE — 83735 ASSAY OF MAGNESIUM: CPT

## 2024-08-09 PROCEDURE — 80053 COMPREHEN METABOLIC PANEL: CPT

## 2024-08-09 PROCEDURE — 83615 LACTATE (LD) (LDH) ENZYME: CPT

## 2024-08-09 PROCEDURE — 82533 TOTAL CORTISOL: CPT

## 2024-08-09 PROCEDURE — 84443 ASSAY THYROID STIM HORMONE: CPT

## 2024-08-09 PROCEDURE — 84439 ASSAY OF FREE THYROXINE: CPT

## 2024-08-15 ENCOUNTER — APPOINTMENT (OUTPATIENT)
Dept: HEMATOLOGY/ONCOLOGY | Facility: HOSPITAL | Age: 64
End: 2024-08-15
Payer: COMMERCIAL

## 2024-08-21 NOTE — PATIENT INSTRUCTIONS
Mr. Vela,    It was a pleasure to see you in the office today. We discussed the followings:     Lung cancer: for this please follow up with your oncologist  Shortness of breath: this is likely from your heart. Please follow up with your cardiologist. Given your breathing test did not show COPD, I am stopping your Symbicort. Please continue albuterol as needed  Smoking: please make every effort to quit smoking.    Please return to the office in 3 months.

## 2024-08-22 ENCOUNTER — APPOINTMENT (OUTPATIENT)
Dept: HEMATOLOGY/ONCOLOGY | Facility: HOSPITAL | Age: 64
End: 2024-08-22
Payer: COMMERCIAL

## 2024-08-26 ENCOUNTER — HOSPITAL ENCOUNTER (OUTPATIENT)
Dept: RADIOLOGY | Facility: HOSPITAL | Age: 64
Discharge: HOME | End: 2024-08-26
Payer: MEDICARE

## 2024-08-26 DIAGNOSIS — C34.90 SMALL CELL CARCINOMA OF LUNG (MULTI): ICD-10-CM

## 2024-08-26 PROCEDURE — 74177 CT ABD & PELVIS W/CONTRAST: CPT | Performed by: RADIOLOGY

## 2024-08-26 PROCEDURE — 74177 CT ABD & PELVIS W/CONTRAST: CPT

## 2024-08-26 PROCEDURE — 71260 CT THORAX DX C+: CPT | Performed by: RADIOLOGY

## 2024-08-26 PROCEDURE — 2550000001 HC RX 255 CONTRASTS: Mod: SE | Performed by: INTERNAL MEDICINE

## 2024-08-29 ENCOUNTER — LAB (OUTPATIENT)
Dept: LAB | Facility: HOSPITAL | Age: 64
End: 2024-08-29
Payer: MEDICARE

## 2024-08-29 ENCOUNTER — OFFICE VISIT (OUTPATIENT)
Dept: HEMATOLOGY/ONCOLOGY | Facility: HOSPITAL | Age: 64
End: 2024-08-29
Payer: MEDICARE

## 2024-08-29 ENCOUNTER — APPOINTMENT (OUTPATIENT)
Dept: HEMATOLOGY/ONCOLOGY | Facility: HOSPITAL | Age: 64
End: 2024-08-29
Payer: MEDICARE

## 2024-08-29 VITALS
TEMPERATURE: 95.5 F | HEART RATE: 101 BPM | BODY MASS INDEX: 32.26 KG/M2 | DIASTOLIC BLOOD PRESSURE: 101 MMHG | OXYGEN SATURATION: 99 % | RESPIRATION RATE: 18 BRPM | SYSTOLIC BLOOD PRESSURE: 163 MMHG | WEIGHT: 237.88 LBS

## 2024-08-29 DIAGNOSIS — I25.84 CORONARY ARTERY CALCIFICATION: ICD-10-CM

## 2024-08-29 DIAGNOSIS — I25.10 CORONARY ARTERY DISEASE INVOLVING NATIVE CORONARY ARTERY OF NATIVE HEART WITHOUT ANGINA PECTORIS: ICD-10-CM

## 2024-08-29 DIAGNOSIS — I25.10 CORONARY ARTERY CALCIFICATION: ICD-10-CM

## 2024-08-29 DIAGNOSIS — Z72.0 TOBACCO ABUSE: ICD-10-CM

## 2024-08-29 DIAGNOSIS — C34.90 SMALL CELL LUNG CANCER (MULTI): ICD-10-CM

## 2024-08-29 DIAGNOSIS — R06.00 DYSPNEA, UNSPECIFIED TYPE: ICD-10-CM

## 2024-08-29 DIAGNOSIS — I42.0 DILATED CARDIOMYOPATHY (MULTI): ICD-10-CM

## 2024-08-29 DIAGNOSIS — R06.01 ORTHOPNEA: ICD-10-CM

## 2024-08-29 DIAGNOSIS — I50.23 ACUTE ON CHRONIC SYSTOLIC HEART FAILURE (MULTI): ICD-10-CM

## 2024-08-29 DIAGNOSIS — C34.90 SMALL CELL CARCINOMA OF LUNG (MULTI): ICD-10-CM

## 2024-08-29 DIAGNOSIS — C34.90 SMALL CELL CARCINOMA OF LUNG (MULTI): Primary | ICD-10-CM

## 2024-08-29 DIAGNOSIS — R06.09 DOE (DYSPNEA ON EXERTION): ICD-10-CM

## 2024-08-29 DIAGNOSIS — R06.09 DYSPNEA ON EXERTION: ICD-10-CM

## 2024-08-29 LAB
ALBUMIN SERPL BCP-MCNC: 3.8 G/DL (ref 3.4–5)
ALP SERPL-CCNC: 58 U/L (ref 33–136)
ALT SERPL W P-5'-P-CCNC: 11 U/L (ref 10–52)
ANION GAP SERPL CALC-SCNC: 16 MMOL/L (ref 10–20)
AST SERPL W P-5'-P-CCNC: 9 U/L (ref 9–39)
BASOPHILS # BLD AUTO: 0.02 X10*3/UL (ref 0–0.1)
BASOPHILS NFR BLD AUTO: 0.3 %
BILIRUB SERPL-MCNC: 0.3 MG/DL (ref 0–1.2)
BNP SERPL-MCNC: 228 PG/ML (ref 0–99)
BUN SERPL-MCNC: 23 MG/DL (ref 6–23)
CALCIUM SERPL-MCNC: 8.4 MG/DL (ref 8.6–10.3)
CHLORIDE SERPL-SCNC: 107 MMOL/L (ref 98–107)
CO2 SERPL-SCNC: 22 MMOL/L (ref 21–32)
CREAT SERPL-MCNC: 1.5 MG/DL (ref 0.5–1.3)
EGFRCR SERPLBLD CKD-EPI 2021: 52 ML/MIN/1.73M*2
EOSINOPHIL # BLD AUTO: 0 X10*3/UL (ref 0–0.7)
EOSINOPHIL NFR BLD AUTO: 0 %
ERYTHROCYTE [DISTWIDTH] IN BLOOD BY AUTOMATED COUNT: 16.8 % (ref 11.5–14.5)
GLUCOSE SERPL-MCNC: 126 MG/DL (ref 74–99)
HCT VFR BLD AUTO: 39.5 % (ref 41–52)
HGB BLD-MCNC: 12.6 G/DL (ref 13.5–17.5)
IMM GRANULOCYTES # BLD AUTO: 0.04 X10*3/UL (ref 0–0.7)
IMM GRANULOCYTES NFR BLD AUTO: 0.7 % (ref 0–0.9)
LYMPHOCYTES # BLD AUTO: 0.41 X10*3/UL (ref 1.2–4.8)
LYMPHOCYTES NFR BLD AUTO: 7.1 %
MCH RBC QN AUTO: 31.8 PG (ref 26–34)
MCHC RBC AUTO-ENTMCNC: 31.9 G/DL (ref 32–36)
MCV RBC AUTO: 100 FL (ref 80–100)
MONOCYTES # BLD AUTO: 0.35 X10*3/UL (ref 0.1–1)
MONOCYTES NFR BLD AUTO: 6.1 %
NEUTROPHILS # BLD AUTO: 4.93 X10*3/UL (ref 1.2–7.7)
NEUTROPHILS NFR BLD AUTO: 85.8 %
NRBC BLD-RTO: 0 /100 WBCS (ref 0–0)
PLATELET # BLD AUTO: 269 X10*3/UL (ref 150–450)
POTASSIUM SERPL-SCNC: 3.9 MMOL/L (ref 3.5–5.3)
PROT SERPL-MCNC: 7.1 G/DL (ref 6.4–8.2)
RBC # BLD AUTO: 3.96 X10*6/UL (ref 4.5–5.9)
SODIUM SERPL-SCNC: 141 MMOL/L (ref 136–145)
WBC # BLD AUTO: 5.8 X10*3/UL (ref 4.4–11.3)

## 2024-08-29 PROCEDURE — 36415 COLL VENOUS BLD VENIPUNCTURE: CPT

## 2024-08-29 PROCEDURE — 99214 OFFICE O/P EST MOD 30 MIN: CPT | Performed by: INTERNAL MEDICINE

## 2024-08-29 PROCEDURE — 82024 ASSAY OF ACTH: CPT

## 2024-08-29 PROCEDURE — 85025 COMPLETE CBC W/AUTO DIFF WBC: CPT

## 2024-08-29 PROCEDURE — 99214 OFFICE O/P EST MOD 30 MIN: CPT | Mod: GC | Performed by: INTERNAL MEDICINE

## 2024-08-29 PROCEDURE — 3080F DIAST BP >= 90 MM HG: CPT | Performed by: INTERNAL MEDICINE

## 2024-08-29 PROCEDURE — 80053 COMPREHEN METABOLIC PANEL: CPT

## 2024-08-29 PROCEDURE — 83880 ASSAY OF NATRIURETIC PEPTIDE: CPT

## 2024-08-29 PROCEDURE — 3077F SYST BP >= 140 MM HG: CPT | Performed by: INTERNAL MEDICINE

## 2024-08-29 RX ORDER — METOPROLOL SUCCINATE 100 MG/1
100 TABLET, EXTENDED RELEASE ORAL DAILY
Qty: 90 TABLET | Refills: 0 | Status: SHIPPED | OUTPATIENT
Start: 2024-08-29 | End: 2025-08-29

## 2024-08-29 ASSESSMENT — PAIN SCALES - GENERAL: PAINLEVEL: 0-NO PAIN

## 2024-08-29 NOTE — PROGRESS NOTES
Patient ID: Dane Molina is a 64 y.o. male    Primary Care Provider: Darwin Early MD    DIAGNOSIS AND STAGING  cT3 pN2 pM1b small cell lung cancer (INSM1 and TTF1+) of the right lower lobe, diagnosed on 02/05/2024 through bronchoscopy     SITES OF DISEASE  Right lower lobe   Levels 4R and 7 nodes   Liver, confirmed by biopsy      MOLECULAR GENOMICS  Not performed   RB loss by IHC      PRIOR THERAPIES  Carboplatin/etoposide C1D1 on 03/19/24 with addition of atezolizumab to C2 on 04/08/2024; C4D1 on 05/20/24     CURRENT THERAPY  Maintenance atezolizumab since 06/21/24  Completes 45 Gy in 15 fractions to chest on 08/09/24 (Dr. To Conde)      CURRENT ONCOLOGICAL PROBLEMS  Grade 3 immune related rash    HISTORY OF PRESENT ILLNESS  Patient presented to the emergency room on 01/04/2023 with a hypertensive urgency and cough.  He has a history of noncompliance with antihypertensive medications.  He also complained of dyspnea and underwent a CT chest without IV contrast that demonstrated a right lower lobe paramediastinal mass measuring 3.1 cm with adjacent pulmonary nodule measuring 1.8 cm.  Mediastinal lymphadenopathy was demonstrated, including a subcarinal node measuring 1.7 cm.  There was right hilar lymphadenopathy, 2 cm in short axis.  On 01/15/2024 the patient underwent a PET scan that demonstrated avidity of the aforementioned masses/lymph nodes, as well as a lesion in the liver, SUV max 3.3, right hepatic lobe.  On 02/05/2024, patient underwent a bronchoscopy:  A. LYMPH NODE 4 R PULMONARY FINE NEEDLE ASPIRATION , CYTOLOGY AND CELL BLOCK:    Positive for malignant cells   Metastatic small cell carcinoma, see note  B. LYMPH NODE 7 PULMONARY FINE NEEDLE ASPIRATION , CYTOLOGY AND CELL BLOCK:    Positive for malignant cells  Metastatic small cell carcinoma, see note          Note: Immunostains demonstrate the lesional cells to be diffusely positive for CAM 5.2, INSM1, TTF-1.  The proliferative  marker Ki-67 is greater than 90%.  Immunostain for retinoblastoma shows loss of staining.  Immunostain for p40 is negative.  The morphology and immunohistochemical profile are consistent with the above diagnosis.  On 02/29/24 the pt is seen by med onc for the first time. Denies any systemic sx - denies lack of appetite, fatigue or unintentional weight loss. Denies new localized pain, headaches or neuro sx.  Denies new respiratory sx.  He is claustrophobic and MRI brain was scheduled for today but pt could not go through with the test. He also has mild CKD, which limits his ability to receive intravenous contrast for CT.   03/04/24: MRI brain shows no ICMA, but MRI liver shows a 1.9 cm hepatic lesion with T2 hyperintensity and peripheral enhancement   03/19/2024: C1 D1 carboplatin/etoposide  03/22/24: Liver biopsy:  FINAL DIAGNOSIS   A. RIGHT LIVER MASS BIOPSY:      -- LIVER TISSUE INVOLVED BY SMALL CELL CARCINOMA, SEE NOTE     Note: Clinical history of lung small cell carcinoma noted. The findings most likely represent metastasis from the known primary lung tumor.     04/04/24: discussed with patient liver biopsy results and recommendations to add atezolizumab to his regimen  He has met with radiation oncology, and we will consider consolidative TRT pending response to cytotoxic chemotherapy    04/08/2024: C2 D1 carboplatin/etoposide/atezolizumab    05/20/24: C4 D1 carboplatin/etoposide/atezolizumab    06/10/2024: CT chest/abdomen/pelvis demonstrating continue his improvement of metastatic lymphadenopathy within the mediastinum/right hilum, with no progression within the liver    06/13/2024: Referral for radiation oncology/TRT.  Patient to proceed with maintenance atezolizumab    06/21/24: begins maintenance atezolizumab     08/09/24: completes 45 Gy in 15 fractions to chest (To Conde)     08/08/24: prescribed prednisone 1 mg/Kg for grade 3 IO-induced rash with peeling of hands     08/29/24: last steroid  taken last week but did not complete full prescription. Rash on chest/abdomen improved but has some peeling of the hands/arms. Will have patient take remaining prednisone and follow up in 3 weeks       PAST MEDICAL HISTORY  CKD - creatinine 1.68  Neck pain (injury at work)   Hypertension  Iron deficiency anemia (hx of blood transfusion in the 90's)    SURGICAL HISTORY  Ankle fracture and has a latoya in place      SOCIAL HISTORY  Former 35-40 pack-year smoker, quit at age 57  Smokes cigars now   History of alcohol addiction, sober for the past 15 years   Has one chlid ([place])  Single      FAMILY HISTORY  Lung CA (mother)  Mother had CKD and required HD    CURRENT MEDS REVIEWED       ALLERGIES REVIEWED        SUBJECTIVE:  Rash over chest and abdomen has resolved. Still having some peeling of the hands/arms. Did not complete full dose of steroid - has ~4 tabs left. Also using hydrocortisone cream. Denies SOB. Has a mild cough. Denies any diarrhea. Energy levels are stable. Reports he has run out of his cardiac meds (entresto & metoprolol) consistent with elevated BP.     A 13 point review of systems was performed, with significant findings documented above in subjective history.    OBJECTIVE:  Vitals:    08/29/24 1530   BP: (!) 163/101   Pulse: 101   Resp: 18   Temp: 35.3 °C (95.5 °F)   SpO2: 99%     /84 mmHg   POX 96% RA      Body surface area is 2.34 meters squared.     Wt Readings from Last 5 Encounters:   08/29/24 108 kg (237 lb 14 oz)   08/08/24 106 kg (232 lb 11.2 oz)   08/02/24 104 kg (230 lb 6.1 oz)   07/16/24 108 kg (238 lb 1.6 oz)   06/21/24 108 kg (237 lb)     ECOGSCORE: 1- Restricted in physically strenuous activity.  Carries out light duty.    Physical Exam  Constitutional:       Appearance: Normal appearance.   HENT:      Head: Normocephalic and atraumatic.      Mouth/Throat:      Mouth: Mucous membranes are moist.      Pharynx: Oropharynx is clear. No oropharyngeal exudate or posterior oropharyngeal  erythema.   Eyes:      General: No scleral icterus.     Extraocular Movements: Extraocular movements intact.      Conjunctiva/sclera: Conjunctivae normal.   Cardiovascular:      Rate and Rhythm: Normal rate and regular rhythm.   Pulmonary:      Effort: Pulmonary effort is normal.   Abdominal:      Palpations: Abdomen is soft. There is no mass.      Tenderness: There is no abdominal tenderness. There is no guarding.   Musculoskeletal:      Right lower leg: No edema.      Left lower leg: No edema.   Skin:     General: Skin is warm.      Findings: No erythema or rash.      Comments: peeling of skin over his forearms     Neurological:      General: No focal deficit present.      Mental Status: He is alert and oriented to person, place, and time.      Motor: No weakness.      Gait: Gait normal.   Psychiatric:         Mood and Affect: Mood normal.         Behavior: Behavior normal.         Thought Content: Thought content normal.         Judgment: Judgment normal.          Diagnostic Results   Results:  Labs:  Lab Results   Component Value Date    WBC 4.7 08/09/2024    HGB 12.1 (L) 08/09/2024    HCT 37.2 (L) 08/09/2024    MCV 99 08/09/2024     08/09/2024      Lab Results   Component Value Date    NEUTROABS 4.25 08/09/2024        Lab Results   Component Value Date    GLUCOSE 159 (H) 08/09/2024    CALCIUM 8.9 08/09/2024     08/09/2024    K 4.3 08/09/2024    CO2 21 08/09/2024     (H) 08/09/2024    BUN 32 (H) 08/09/2024    CREATININE 1.64 (H) 08/09/2024    MG 1.86 08/09/2024     Lab Results   Component Value Date    ALT 12 08/09/2024    AST 12 08/09/2024    ALKPHOS 74 08/09/2024    BILITOT 0.3 08/09/2024    BILIDIR 0.1 12/21/2023      Lab Results   Component Value Date    ACTH 13.8 06/21/2024    CORTISOL 21.9 (H) 08/09/2024    TSH 0.43 (L) 08/09/2024    FREET4 1.07 08/09/2024     CT chest/abdomen/pelvis on 06/10/2024:    IMPRESSION:  1. Mild acute diverticulitis of the mid sigmoid colon with  mild  pericolonic fat stranding and focal area of micro perforation versus  prominent gas-filled diverticula. No pericolonic fluid collection.  Findings are seen superimposed on chronic diverticulitis.      2. Interval improvement in metastatic lymphadenopathy within the  mediastinum as well as previously measured right hilar lesion with  only subtle residual nodularity demonstrated.      3. Subtle ill-defined hypodensity within the right lobe of the liver  not significantly changed from the prior examination with focal  heterogeneity nonspecific.      Assessment/Plan     Small cell carcinoma of lung (Multi), Clinical: Stage FREDRICK (cT3, cN2, pM1b)  This is a former cigarette smoker with T3 pN2 pM1b small cell lung cancer of the RLL - extensive stage.  S/p 4 cycles carboplatin/etoposide + atezolizumab added to cycle 2, on 04/08/2024 - C4D1 on 05/20/2024  On maintenance atezolizumab since 06/21/24 -   This has been held due to ongoing TRT as above   Completing treatment tomorrow (45 Gy in 15 fx per Dr. Conde)   CT CAP on 08/26 without evidence of progression  Continue to hold treatment for another 3 weeks while patient completes full course of prednisone      Rash, immune related and grade 3 I provided him with the following instructions:   For the rash (that is caused by the immunotherapy atezolizumab you have been getting for the small cell lung cancer), please take prednisone (20 mg tablet) as instructed:  5 tablets for 3 days (until 08/11)  4 tablets for 3 days (from 08/12 until 08/14)  3 tablets for 3 days (08/15 until 08/17)  2 tablets for 3 days (08/18 until 08/20)  1 tablet for 3 days (08/21 until 08/23)  1/2 tablet for 3 days (08/24 until 26) and then stop   Also while taking prednisone please take protonix (pantoprazole) 30 minutes before breakfast and prednisone     On 8/26 maculopapular rash on chest/abdomen has resolved but still has peeling to the hands  Patient still has ~4 tabs of prednisone and  states the last time he took this was last week. Instructed to complete above tapering regimen with remaining prescription   Knows to call the office if symptoms worsen  Will continue to hold atezolizumab and reassess in 3 weeks    CNS monitoring   MRI on 07/06/24 shows no ICM   Repeat every 3 months -next ~ 10/06/24      Patient seen and discussed with Dr. Kamar Duarte MD  Hematology-Oncology Fellow, PGY5    I saw and evaluated the patient. I personally obtained the key and critical portions of the history and physical exam or was physically present for key and critical portions performed by the resident/fellow. I reviewed the resident/fellow's documentation and discussed the patient with the resident/fellow. I agree with the resident/fellow's medical decision making as documented in the note.     Tori Galvin MD, MS

## 2024-08-31 LAB — ACTH PLAS-MCNC: <1.5 PG/ML (ref 7.2–63.3)

## 2024-09-06 ENCOUNTER — TELEPHONE (OUTPATIENT)
Dept: RADIATION ONCOLOGY | Facility: HOSPITAL | Age: 64
End: 2024-09-06
Payer: COMMERCIAL

## 2024-09-09 ENCOUNTER — HOSPITAL ENCOUNTER (OUTPATIENT)
Dept: RADIATION ONCOLOGY | Facility: HOSPITAL | Age: 64
Setting detail: RADIATION/ONCOLOGY SERIES
Discharge: HOME | End: 2024-09-09
Payer: MEDICARE

## 2024-09-09 VITALS
OXYGEN SATURATION: 97 % | SYSTOLIC BLOOD PRESSURE: 174 MMHG | BODY MASS INDEX: 32.14 KG/M2 | DIASTOLIC BLOOD PRESSURE: 94 MMHG | HEART RATE: 94 BPM | TEMPERATURE: 97.2 F | RESPIRATION RATE: 18 BRPM | WEIGHT: 237 LBS

## 2024-09-09 DIAGNOSIS — C34.31 MALIGNANT NEOPLASM OF LOWER LOBE, RIGHT BRONCHUS OR LUNG (MULTI): ICD-10-CM

## 2024-09-09 DIAGNOSIS — C34.90 SMALL CELL CARCINOMA OF LUNG (MULTI): Primary | ICD-10-CM

## 2024-09-09 PROCEDURE — 99213 OFFICE O/P EST LOW 20 MIN: CPT | Performed by: NURSE PRACTITIONER

## 2024-09-09 ASSESSMENT — ENCOUNTER SYMPTOMS
FATIGUE: 0
SHORTNESS OF BREATH: 0
PSYCHIATRIC NEGATIVE: 1
ACTIVITY CHANGE: 0
APPETITE CHANGE: 0
GASTROINTESTINAL NEGATIVE: 1
CHILLS: 0
CHEST TIGHTNESS: 0
NEUROLOGICAL NEGATIVE: 1
MUSCULOSKELETAL NEGATIVE: 1
DIAPHORESIS: 0
APNEA: 0
OCCASIONAL FEELINGS OF UNSTEADINESS: 0
CHOKING: 0
STRIDOR: 0
DEPRESSION: 0
CARDIOVASCULAR NEGATIVE: 1
UNEXPECTED WEIGHT CHANGE: 0
COUGH: 1
FEVER: 0
LOSS OF SENSATION IN FEET: 0
HEMATOLOGIC/LYMPHATIC NEGATIVE: 1
WHEEZING: 0

## 2024-09-09 ASSESSMENT — PAIN SCALES - GENERAL: PAINLEVEL: 0-NO PAIN

## 2024-09-09 ASSESSMENT — PATIENT HEALTH QUESTIONNAIRE - PHQ9
1. LITTLE INTEREST OR PLEASURE IN DOING THINGS: NOT AT ALL
SUM OF ALL RESPONSES TO PHQ9 QUESTIONS 1 AND 2: 0
2. FEELING DOWN, DEPRESSED OR HOPELESS: NOT AT ALL

## 2024-09-09 NOTE — PROGRESS NOTES
Patient ID: [id number]       Diagnosis: 64 y.o. 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  Molecular and ancillary testing: diffusely positive for CAM 5.2, INSM1, TTF-1.  The proliferative marker Ki-67 is greater than 90%.  Rb+     His oncological work up and treatment history is as below:  01/4/24: Patient presented to the emergency room with a hypertensive urgency and cough.  He has a history of noncompliance with antihypertensive medications.  He also complained of dyspnea at this time. CT chest without IV contrast demonstrated a right lower lobe paramediastinal mass measuring 3.1 cm with adjacent pulmonary nodule measuring 1.8 cm.  Mediastinal lymphadenopathy was demonstrated, including a subcarinal node measuring 1.7 cm.  There was right hilar lymphadenopathy, 2 cm in short axis.  PET Scan 01/15/2024 demonstrated an FDG avid paramediastinal mass in the right lower lobe measuring to 3.0 cm (SUV max 6.1). Another FDG avid pulmonary lesion the right lower lobe measuring 1.9 cm, (SUV max of 3.5.) 1.0 cm lesion adjacent to the major fissure in the right lower lobe (SUV max 1.6). Bulky FDG avid right hilar nodes with SUV max of 9.6, subcarinal node (SUV max of 11.0). Right paratracheal node (SUV max of 11.0). Additonally, there was a lesion in the R hepatic lobe of the liver. (SUV max 3.3).  02/05/2024, patient underwent a bronchoscopy: 4R & 7 were + for malignant cells; diffusely positive for CAM 5.2, INSM1, TTF-1.  The proliferative marker Ki-67 is greater than 90%.    PFTs 2/19/24: FVC 71% predicted, FEV1 65% of predicted, DLCO 82% of predicted.  Brain MRI 03/04/2024, negative for mets. Liver MRI with 1.9cm indeterminate lesion.   03/07/2024: consent obtained for carboplatin/etoposide   03/18/2024: anticipating start of carbo/etoposide   03/22/24: Biopsy of Liver lesion    A. RIGHT LIVER MASS BIOPSY:      -- LIVER TISSUE INVOLVED BY SMALL CELL CARCINOMA, SEE NOTE     Note: Clinical history of lung  small cell carcinoma noted. The findings most likely represent metastasis from the known primary lung tumor.      Treatment Summary:          IMRT: Right Lung or bronchus     Treatment Period Technique Fraction Dose Fractions Total Dose   Course 1 7/22/2024-8/9/2024  (days elapsed: 18)         RtlungHilMed 7/22/2024-8/9/2024 VMAT 300 / 300 cGy 15 / 15 4500 / 4,500 cGy      PRIOR THERAPIES  Carboplatin/etoposide C1D1 on 03/19/24 with addition of atezolizumab to C2 on 04/08/2024; C4D1 on 05/20/24     CURRENT THERAPY  Maintenance atezolizumab since 06/21/24           History of presenting illness    Dane Molina is a 64 y.o. male who presents today for follow up one month s/p RT to right hilum/mediastinum for lung SCLC. Patient is doing well. Energy is slowly improving. Appetite good. Weight stable. Denies changes in breathing. Has the occasional productive cough. Phlegm is a light yellow. Denies SOB, chest pain, back pain or fevers. No oxygen or inhaler use. Denies any neurological symptoms. Atezolizumab was held as he completed a course of steroids for grad 3 rash. Per patient he is no longer on steroids and the rash has resolved. He is scheduled for follow up with Dr. Ospina on 9/18/24 with infusion planned for next day. Imaging due around beginning of October. BP was slightly elevated upon arrival. Per patient he took his medications late today. Patient is asymptomatic.     Review of systems:  Review of Systems   Constitutional:  Negative for activity change, appetite change, chills, diaphoresis, fatigue, fever and unexpected weight change.   HENT: Negative.     Respiratory:  Positive for cough. Negative for apnea, choking, chest tightness, shortness of breath, wheezing and stridor.    Cardiovascular: Negative.    Gastrointestinal: Negative.    Musculoskeletal: Negative.    Skin: Negative.    Neurological: Negative.    Hematological: Negative.    Psychiatric/Behavioral: Negative.         Past Medical history  He   has a past surgical history that includes Esophagogastroduodenoscopy; Colonoscopy; and ORIF ankle fracture (1993).      Last recorded vital:  BP (!) 174/94   Pulse 94   Temp 36.2 °C (97.2 °F) (Skin)   Resp 18   Wt 108 kg (237 lb)   SpO2 97%   BMI 32.14 kg/m²     Physical exam  Physical Exam  Constitutional:       General: He is not in acute distress.     Appearance: Normal appearance. He is not ill-appearing, toxic-appearing or diaphoretic.   HENT:      Head: Normocephalic.   Cardiovascular:      Rate and Rhythm: Normal rate and regular rhythm.      Pulses: Normal pulses.      Heart sounds: Normal heart sounds.   Pulmonary:      Effort: Pulmonary effort is normal.      Breath sounds: Normal breath sounds.   Abdominal:      General: Bowel sounds are normal. There is no distension.      Palpations: Abdomen is soft. There is no mass.      Tenderness: There is no abdominal tenderness. There is no guarding or rebound.      Hernia: No hernia is present.   Musculoskeletal:         General: Normal range of motion.      Cervical back: Normal range of motion and neck supple.   Skin:     General: Skin is warm and dry.   Neurological:      General: No focal deficit present.      Mental Status: He is alert and oriented to person, place, and time.   Psychiatric:         Mood and Affect: Mood normal.         Behavior: Behavior normal.         Thought Content: Thought content normal.         Judgment: Judgment normal.             Radiology results:  CT chest abdomen pelvis w IV contrast 08/26/2024    Narrative  Interpreted By:  Prosper French and Awan Komal  STUDY:  CT CHEST ABDOMEN PELVIS W IV CONTRAST;  8/26/2024 3:58 pm    INDICATION:  Signs/Symptoms:SCLC, extensive stage. 64-year-old male with  metastatic small-cell lung cancer diagnosed in 02/2024 and has been  receiving C1D1 carboplatin/etoposide. Atezolizumab has been added due  to metastasis in the liver. Patient has been referred for Radiation  Oncology/ TRT on  06/30/2024 and received 1st session on 08/09/2024.    COMPARISON:  CT chest abdomen pelvis 06/10/2024.    ACCESSION NUMBER(S):  [id number]    ORDERING CLINICIAN:  DAYANARA GRAY    TECHNIQUE:  CT of the chest, abdomen, and pelvis was performed.  Contiguous axial  images were obtained at 3 mm slice thickness through the chest,  abdomen and pelvis. Coronal and sagittal reconstructions at 3 mm  slice thickness were performed.  75 ml of contrast Omnipaque 350 were administered intravenously  without immediate complication.    FINDINGS:  CHEST:    LUNG/PLEURA/LARGE AIRWAYS:    No lung masses, pulmonary nodules or consolidations are present.  There is no pleural effusion or pneumothorax.    MEDIASTINUM AND HARMEET:  Mediastinum demonstrates no lymphadenopathy. The previously enlarged  right paratracheal and pretracheal lymph nodes have been resolved.  There is also improvement in the previously measured subcentimeter  subcarinal lymph node. The esophagus is normal.    VESSELS:  Ascending thoracic aorta measures 3.5 cm. Pulmonary arteries are  normal in caliber. Mild atherosclerotic changes are noted of the  aorta and branching vessels.  Mild coronary artery calcifications are  present.    HEART:  The heart is normal in size.  There is no pericardial effusion.      CHEST WALL AND LOWER NECK:  The soft tissues of the chest wall demonstrate no gross abnormality.  The visualized thyroid gland appears within normal limits.    ABDOMEN:    LIVER:  Significant improvement to almost complete resolution of multiple  subcentimeter liver lesions.    BILE DUCTS:  The intrahepatic and extrahepatic ducts are not dilated.    GALLBLADDER:  The gallbladder is contracted and similar in appearance to the prior  exam.    PANCREAS:  The pancreas appears unremarkable without evidence of ductal  dilatation or masses.    SPLEEN:  The spleen is normal in size without focal lesions.    ADRENAL GLANDS:  Bilateral adrenal glands appear  normal.    KIDNEYS AND URETERS:  The kidneys are normal in size and enhance symmetrically.  No  hydroureteronephrosis or nephroureterolithiasis is identified.    PELVIS:    BLADDER:  The urinary bladder appears normal without abnormal wall thickening.    REPRODUCTIVE ORGANS:  No pelvic masses. There is no pelvic lymphadenopathy. No free fluid.    BOWEL:  The stomach is normal in appearance without wall thickening or  obstruction.  The small and large bowel are normal in caliber and  demonstrate no wall thickening.  The appendix is not definitely  visualized. There is however no pericecal stranding or fluid.  There  is a new soft tissue seen at the site of prior contained perforation  which most likely represents a phlegmonous tissue with no cecilia  abscess. There is improvement in the previously observed mild acute  diverticulitis with no pericolonic fat stranding    VESSELS:  There is no aneurysmal dilatation of the abdominal aorta. The IVC  appears normal.    PERITONEUM/RETROPERITONEUM/LYMPH NODES:  There is no free or loculated fluid collection, no free  intraperitoneal air. The retroperitoneum appears normal. No  abdominopelvic lymphadenopathy is present.    BONE AND SOFT TISSUE:  Mild-to-moderate degenerative changes within the visualized spine.  The abdominal wall soft tissues appear normal.    Impression  Interval improvement in the overall tumor burden with decreasing  intrathoracic lymph nodes and liver metastasis.    At the site of a prior contained perforation related to sigmoid  diverticulitis, phlegmonous tissue is seen on current scan without  evidence of drainable abscess. Surrounding fat stranding as  significantly improved in the interim. Short-term follow-up scan in  4-6 weeks or earlier as per clinical need is recommended I personally  reviewed the images/study and I agree with the findings as stated by  Resident Sho Agosto. This study was interpreted at [facility]  [place], [place].    MACRO:  None    Signed by: Prosper French 8/26/2024 10:12 PM  Dictation workstation:   [id number]     MR brain w and wo IV contrast 07/06/2024    Narrative  Interpreted By:  Fahad Sargent,  STUDY:  MR BRAIN W AND WO IV CONTRAST;  7/6/2024 10:55 am    INDICATION:  Signs/Symptoms:MRI Surveillance to rule out brain mets.    COMPARISON:  03/04/2024    ACCESSION NUMBER(S):  [id number]    ORDERING CLINICIAN:  JHONATAN WAGNER    TECHNIQUE:  Axial diffusion, axial T2, axial FLAIR, axial gradient echo T2, axial  T1, post gadolinium volumetric T1, as well as post gadolinium axial  T1 weighted MRI images of the brain were obtained. The patient  received  20 mL of  Dotarem gadolinium intravenously.    FINDINGS:  The diffusion weighted images fail to demonstrate abnormal diffusion  restriction to suggest acute infarction.    There is again evidence of mild-to-moderate brain parenchymal volume  loss.    Mild nonspecific white matter changes are again noted within the  cerebral hemispheres bilaterally which while nonspecific, given the  patient's age, likely represent sequelae of more remote small-vessel  ischemic change.    There is a developmental venous anomaly/venous angioma within the  left frontal lobe.    There is again evidence of a similar focal area of diminished  enhancement along the left aspect of the pituitary gland measuring  approximately 9 mm by 5 mm in transcoronal dimensions as seen on  coronal post gadolinium volumetric T1 slice 144 of 229 raising the  possibility of underlying pituitary adenoma.    No abnormal intracranial enhancing mass lesion to suggest brain  metastasis is noted.    There is again evidence of complete opacification of the maxillary  sinuses bilaterally as well as mild mucosal thickening noted within  scattered ethmoid air cells.    There is again evidence of an ovoid approximately 11 mm retention  cyst along the pharyngeal mucosal space of the posterior  nasopharynx  centered slightly to the left of midline.    Impression  No abnormal intracranial enhancing mass lesion to suggest brain  metastasis is noted.    There is again evidence of mild-to-moderate brain parenchymal volume  loss.    Mild nonspecific white matter changes are again noted within the  cerebral hemispheres bilaterally which while nonspecific, given the  patient's age, likely represent sequelae of more remote small-vessel  ischemic change.    There is a developmental venous anomaly/venous angioma within the  left frontal lobe.    There is again evidence of a similar focal area of diminished  enhancement along the left aspect of the pituitary gland measuring  approximately 9 mm by 5 mm in transcoronal dimensions as seen on  coronal post gadolinium volumetric T1 slice 144 of 229 raising the  possibility of underlying pituitary adenoma.    There is again evidence of complete opacification of the maxillary  sinuses bilaterally as well as mild mucosal thickening noted within  scattered ethmoid air cells.    There is again evidence of an ovoid approximately 11 mm retention  cyst along the pharyngeal mucosal space of the posterior nasopharynx  centered slightly to the left of midline.    MACRO:  None.    Signed by: Fahad Sargent 7/8/2024 7:41 AM  Dictation workstation:   [id number]     Plan:  Assessment/Plan     64 year old male one month s/p completion of chemoRT for SCLC. Patient is doing well with no acute complaints related to treatment. Atezolizumab currently on hold as he completed course of steroids for grade 3 rash. Rash has resolved. Steroid taper completed. Follow up with Dr. Ospina 9/18/24 with plans for infusion 9/19/24. Imaging due beginning of October. Patient to continue systemic therapy, imaging and follow up with med onc as scheduled. Discussed blood pressure and importance of taking meds as prescribed. Discussed signs and symptoms that would require ED visit. Patient verbalized  understanding.  Patient to return to clinic in January unless needs to be seen sooner. Instructed to call with any questions or concerns.

## 2024-09-17 NOTE — PROGRESS NOTES
Patient ID: Dane Molina is a 64 y.o. male    Primary Care Provider: Darwin Early MD    DIAGNOSIS AND STAGING  cT3 pN2 pM1b small cell lung cancer (INSM1 and TTF1+) of the right lower lobe, diagnosed on 02/05/2024 through bronchoscopy     SITES OF DISEASE  Right lower lobe   Levels 4R and 7 nodes   Liver, confirmed by biopsy      MOLECULAR GENOMICS  Not performed   RB loss by IHC      PRIOR THERAPIES  03/19/2024-05/20/2024: 4 cycles carboplatin/etoposide with addition of atezolizumab to C2 on 04/08/2024 08/09/2024: Completion 45 cGy/15f to chest by Dr. Conde     CURRENT THERAPY  Maintenance atezolizumab since 06/21/24    CURRENT ONCOLOGICAL PROBLEMS  Grade 3 immune related rash now grade I    HISTORY OF PRESENT ILLNESS  Patient presented to the emergency room on 01/04/2023 with a hypertensive urgency and cough.  He has a history of noncompliance with antihypertensive medications.  He also complained of dyspnea and underwent a CT chest without IV contrast that demonstrated a right lower lobe paramediastinal mass measuring 3.1 cm with adjacent pulmonary nodule measuring 1.8 cm.  Mediastinal lymphadenopathy was demonstrated, including a subcarinal node measuring 1.7 cm.  There was right hilar lymphadenopathy, 2 cm in short axis.  On 01/15/2024 the patient underwent a PET scan that demonstrated avidity of the aforementioned masses/lymph nodes, as well as a lesion in the liver, SUV max 3.3, right hepatic lobe.  On 02/05/2024, patient underwent a bronchoscopy:  A. LYMPH NODE 4 R PULMONARY FINE NEEDLE ASPIRATION , CYTOLOGY AND CELL BLOCK:    Positive for malignant cells   Metastatic small cell carcinoma, see note  B. LYMPH NODE 7 PULMONARY FINE NEEDLE ASPIRATION , CYTOLOGY AND CELL BLOCK:    Positive for malignant cells  Metastatic small cell carcinoma, see note          Note: Immunostains demonstrate the lesional cells to be diffusely positive for CAM 5.2, INSM1, TTF-1.  The proliferative marker Ki-67 is  greater than 90%.  Immunostain for retinoblastoma shows loss of staining.  Immunostain for p40 is negative.  The morphology and immunohistochemical profile are consistent with the above diagnosis.  On 02/29/24 the pt is seen by med onc for the first time. Denies any systemic sx - denies lack of appetite, fatigue or unintentional weight loss. Denies new localized pain, headaches or neuro sx.  Denies new respiratory sx.  He is claustrophobic and MRI brain was scheduled for today but pt could not go through with the test. He also has mild CKD, which limits his ability to receive intravenous contrast for CT.   03/04/24: MRI brain shows no ICMA, but MRI liver shows a 1.9 cm hepatic lesion with T2 hyperintensity and peripheral enhancement   03/19/2024: C1 D1 carboplatin/etoposide  03/22/24: Liver biopsy:  FINAL DIAGNOSIS   A. RIGHT LIVER MASS BIOPSY:      -- LIVER TISSUE INVOLVED BY SMALL CELL CARCINOMA, SEE NOTE     Note: Clinical history of lung small cell carcinoma noted. The findings most likely represent metastasis from the known primary lung tumor.     04/04/24: discussed with patient liver biopsy results and recommendations to add atezolizumab to his regimen  He has met with radiation oncology, and we will consider consolidative TRT pending response to cytotoxic chemotherapy    04/08/2024: C2 D1 carboplatin/etoposide/atezolizumab    05/20/24: C4 D1 carboplatin/etoposide/atezolizumab    06/10/2024: CT chest/abdomen/pelvis demonstrating continue his improvement of metastatic lymphadenopathy within the mediastinum/right hilum, with no progression within the liver    06/13/2024: Referral for radiation oncology/TRT.  Patient to proceed with maintenance atezolizumab    06/21/24: begins maintenance atezolizumab     08/09/24: completes 45 Gy in 15 fractions to chest (To Conde)     08/08/24: prescribed prednisone 1 mg/Kg for grade 3 IO-induced rash with peeling of hands     08/29/24: last steroid taken last week but  did not complete full prescription. Rash on chest/abdomen improved but has some peeling of the hands/arms. Will have patient take remaining prednisone and follow up in 3 weeks       PAST MEDICAL HISTORY  CKD - creatinine 1.68  Neck pain (injury at work)   Hypertension  Iron deficiency anemia (hx of blood transfusion in the 90's)    SURGICAL HISTORY  Ankle fracture and has a latoya in place      SOCIAL HISTORY  Former 35-40 pack-year smoker, quit at age 57  Smokes cigars now   History of alcohol addiction, sober for the past 15 years   Has one chlid ([place])  Single      FAMILY HISTORY  Lung CA (mother)  Mother had CKD and required HD    CURRENT MEDS REVIEWED       ALLERGIES REVIEWED        SUBJECTIVE: Patient doing well today. His rash has completely resolved aside from his palms and back of hands. No other somatic complaints.    A 13 point review of systems was performed, with significant findings documented above in subjective history.    OBJECTIVE:  Vitals:    09/18/24 1353   BP: 125/75   Pulse: 73   Resp: 18   Temp: 37 °C (98.6 °F)   SpO2: 95%     Body surface area is 2.33 meters squared.     Wt Readings from Last 5 Encounters:   09/18/24 107 kg (236 lb 15.9 oz)   09/09/24 108 kg (237 lb)   08/29/24 108 kg (237 lb 14 oz)   08/08/24 106 kg (232 lb 11.2 oz)   08/02/24 104 kg (230 lb 6.1 oz)     ECOGSCORE: 1- Restricted in physically strenuous activity.  Carries out light duty.  Gen: A&O, NAD  Head: Normocephalic, atraumatic  Eyes: no scleral icterus  ENT: mucous membranes moist, no oropharyngeal lesions  Resp: Lungs CTAB  Cardiac: Normal rate, regular rhythm, no murmurs appreciated  Abdomen: Soft, nondistended, nontender, +BS  Neuro: CNII-XII grossly intact  Psych: appropriate mood & affect  Skin: warm, dry. Red rash on palms and back of hands, not peeling. Swelling present    Diagnostic Results   Results:  Labs:  Lab Results   Component Value Date    WBC 5.8 08/29/2024    HGB 12.6 (L) 08/29/2024    HCT 39.5 (L)  08/29/2024     08/29/2024     08/29/2024      Lab Results   Component Value Date    NEUTROABS 4.93 08/29/2024        Lab Results   Component Value Date    GLUCOSE 126 (H) 08/29/2024    CALCIUM 8.4 (L) 08/29/2024     08/29/2024    K 3.9 08/29/2024    CO2 22 08/29/2024     08/29/2024    BUN 23 08/29/2024    CREATININE 1.50 (H) 08/29/2024    MG 1.86 08/09/2024     Lab Results   Component Value Date    ALT 11 08/29/2024    AST 9 08/29/2024    ALKPHOS 58 08/29/2024    BILITOT 0.3 08/29/2024    BILIDIR 0.1 12/21/2023      Lab Results   Component Value Date    ACTH <1.5 (L) 08/29/2024    CORTISOL 21.9 (H) 08/09/2024    TSH 0.43 (L) 08/09/2024    FREET4 1.07 08/09/2024     CT chest/abdomen/pelvis on 06/10/2024:    IMPRESSION:  1. Mild acute diverticulitis of the mid sigmoid colon with mild  pericolonic fat stranding and focal area of micro perforation versus  prominent gas-filled diverticula. No pericolonic fluid collection.  Findings are seen superimposed on chronic diverticulitis.      2. Interval improvement in metastatic lymphadenopathy within the  mediastinum as well as previously measured right hilar lesion with  only subtle residual nodularity demonstrated.      3. Subtle ill-defined hypodensity within the right lobe of the liver  not significantly changed from the prior examination with focal  heterogeneity nonspecific.      Assessment/Plan   Small cell carcinoma of lung (Multi), Clinical: Stage FREDRICK (cT3, cN2, pM1b)  Mr. Dane Molina is a 65 YO with ES-SCLC seen for follow up. He is s/p 4 cycles platinum doublet with addition of atezo after c1 as well as consolidative RT. He has had 1 dose atezo which was held for grade III ICI dermatitis which is now grade I only on palms/hands.    We will resume atezo and follow for next cycle. Topical clobetasol prescribed for hands.    #T3 pN2 pM1b small cell lung cancer of the RLL - extensive stage.  -S/p 4 cycles carboplatin/etoposide + atezolizumab  added to cycle 2  -S/p 1 dose atezo c/b ICI dermatitis  -S/P TRT (45 Gy in 15 fx per Dr. Conde)   -Treat with atezolizumab tomorrow, topical steroid as below    #Grade I ICI dermatitis  -Was initially grade III now downgraded after PO steroid taper  -Start Clobetasol 0.5% ointment BID x 14d to palms    #CNS monitoring   -MRI on 07/06/24 shows no ICM   -Repeat every 3 months -next ~ 10/06/24    Greater than 40 min spent in care of this patient.    Nicolas Ospina MD  Thoracic Medical Oncology   [address] [address]  Phone: [phone]

## 2024-09-18 ENCOUNTER — OFFICE VISIT (OUTPATIENT)
Dept: HEMATOLOGY/ONCOLOGY | Facility: CLINIC | Age: 64
End: 2024-09-18
Payer: MEDICARE

## 2024-09-18 VITALS
HEART RATE: 73 BPM | BODY MASS INDEX: 32.14 KG/M2 | WEIGHT: 236.99 LBS | TEMPERATURE: 98.6 F | SYSTOLIC BLOOD PRESSURE: 125 MMHG | DIASTOLIC BLOOD PRESSURE: 75 MMHG | OXYGEN SATURATION: 95 % | RESPIRATION RATE: 18 BRPM

## 2024-09-18 DIAGNOSIS — C34.90 SMALL CELL CARCINOMA OF LUNG: Primary | ICD-10-CM

## 2024-09-18 PROCEDURE — 3074F SYST BP LT 130 MM HG: CPT | Performed by: STUDENT IN AN ORGANIZED HEALTH CARE EDUCATION/TRAINING PROGRAM

## 2024-09-18 PROCEDURE — 3078F DIAST BP <80 MM HG: CPT | Performed by: STUDENT IN AN ORGANIZED HEALTH CARE EDUCATION/TRAINING PROGRAM

## 2024-09-18 PROCEDURE — 99215 OFFICE O/P EST HI 40 MIN: CPT | Performed by: STUDENT IN AN ORGANIZED HEALTH CARE EDUCATION/TRAINING PROGRAM

## 2024-09-18 RX ORDER — PROCHLORPERAZINE EDISYLATE 5 MG/ML
10 INJECTION INTRAMUSCULAR; INTRAVENOUS EVERY 6 HOURS PRN
OUTPATIENT
Start: 2024-10-09

## 2024-09-18 RX ORDER — FAMOTIDINE 10 MG/ML
20 INJECTION INTRAVENOUS ONCE AS NEEDED
OUTPATIENT
Start: 2024-10-09

## 2024-09-18 RX ORDER — CLOBETASOL PROPIONATE 0.5 MG/G
OINTMENT TOPICAL 2 TIMES DAILY
Qty: 60 G | Refills: 0 | Status: SHIPPED | OUTPATIENT
Start: 2024-09-18 | End: 2024-10-02

## 2024-09-18 RX ORDER — ALBUTEROL SULFATE 0.83 MG/ML
3 SOLUTION RESPIRATORY (INHALATION) AS NEEDED
Status: CANCELLED | OUTPATIENT
Start: 2024-09-18

## 2024-09-18 RX ORDER — DIPHENHYDRAMINE HYDROCHLORIDE 50 MG/ML
50 INJECTION INTRAMUSCULAR; INTRAVENOUS AS NEEDED
Status: CANCELLED | OUTPATIENT
Start: 2024-09-18

## 2024-09-18 RX ORDER — PROCHLORPERAZINE MALEATE 10 MG
10 TABLET ORAL EVERY 6 HOURS PRN
OUTPATIENT
Start: 2024-10-09

## 2024-09-18 RX ORDER — EPINEPHRINE 0.3 MG/.3ML
0.3 INJECTION SUBCUTANEOUS EVERY 5 MIN PRN
OUTPATIENT
Start: 2024-10-09

## 2024-09-18 RX ORDER — FAMOTIDINE 10 MG/ML
20 INJECTION INTRAVENOUS ONCE AS NEEDED
Status: CANCELLED | OUTPATIENT
Start: 2024-09-18

## 2024-09-18 RX ORDER — EPINEPHRINE 0.3 MG/.3ML
0.3 INJECTION SUBCUTANEOUS EVERY 5 MIN PRN
Status: CANCELLED | OUTPATIENT
Start: 2024-09-18

## 2024-09-18 RX ORDER — ALBUTEROL SULFATE 0.83 MG/ML
3 SOLUTION RESPIRATORY (INHALATION) AS NEEDED
OUTPATIENT
Start: 2024-10-09

## 2024-09-18 RX ORDER — DIPHENHYDRAMINE HYDROCHLORIDE 50 MG/ML
50 INJECTION INTRAMUSCULAR; INTRAVENOUS AS NEEDED
OUTPATIENT
Start: 2024-10-09

## 2024-09-18 RX ORDER — PROCHLORPERAZINE EDISYLATE 5 MG/ML
10 INJECTION INTRAMUSCULAR; INTRAVENOUS EVERY 6 HOURS PRN
Status: CANCELLED | OUTPATIENT
Start: 2024-09-18

## 2024-09-18 RX ORDER — PROCHLORPERAZINE MALEATE 10 MG
10 TABLET ORAL EVERY 6 HOURS PRN
Status: CANCELLED | OUTPATIENT
Start: 2024-09-18

## 2024-09-18 ASSESSMENT — PAIN SCALES - GENERAL: PAINLEVEL: 6

## 2024-09-19 ENCOUNTER — INFUSION (OUTPATIENT)
Dept: HEMATOLOGY/ONCOLOGY | Facility: CLINIC | Age: 64
End: 2024-09-19
Payer: MEDICARE

## 2024-09-19 VITALS
WEIGHT: 240.74 LBS | BODY MASS INDEX: 32.65 KG/M2 | DIASTOLIC BLOOD PRESSURE: 82 MMHG | HEART RATE: 88 BPM | OXYGEN SATURATION: 95 % | TEMPERATURE: 97 F | RESPIRATION RATE: 16 BRPM | SYSTOLIC BLOOD PRESSURE: 144 MMHG

## 2024-09-19 DIAGNOSIS — C34.90 SMALL CELL CARCINOMA OF LUNG: ICD-10-CM

## 2024-09-19 PROCEDURE — 2500000004 HC RX 250 GENERAL PHARMACY W/ HCPCS (ALT 636 FOR OP/ED): Mod: SE | Performed by: STUDENT IN AN ORGANIZED HEALTH CARE EDUCATION/TRAINING PROGRAM

## 2024-09-19 PROCEDURE — 96413 CHEMO IV INFUSION 1 HR: CPT

## 2024-09-19 RX ORDER — PROCHLORPERAZINE MALEATE 10 MG
10 TABLET ORAL EVERY 6 HOURS PRN
Status: DISCONTINUED | OUTPATIENT
Start: 2024-09-19 | End: 2024-09-19 | Stop reason: HOSPADM

## 2024-09-19 RX ORDER — EPINEPHRINE 0.3 MG/.3ML
0.3 INJECTION SUBCUTANEOUS EVERY 5 MIN PRN
Status: DISCONTINUED | OUTPATIENT
Start: 2024-09-19 | End: 2024-09-19 | Stop reason: HOSPADM

## 2024-09-19 RX ORDER — PROCHLORPERAZINE EDISYLATE 5 MG/ML
10 INJECTION INTRAMUSCULAR; INTRAVENOUS EVERY 6 HOURS PRN
Status: DISCONTINUED | OUTPATIENT
Start: 2024-09-19 | End: 2024-09-19 | Stop reason: HOSPADM

## 2024-09-19 RX ORDER — ALBUTEROL SULFATE 0.83 MG/ML
3 SOLUTION RESPIRATORY (INHALATION) AS NEEDED
Status: DISCONTINUED | OUTPATIENT
Start: 2024-09-19 | End: 2024-09-19 | Stop reason: HOSPADM

## 2024-09-19 RX ORDER — DIPHENHYDRAMINE HYDROCHLORIDE 50 MG/ML
50 INJECTION INTRAMUSCULAR; INTRAVENOUS AS NEEDED
Status: DISCONTINUED | OUTPATIENT
Start: 2024-09-19 | End: 2024-09-19 | Stop reason: HOSPADM

## 2024-09-19 RX ORDER — FAMOTIDINE 10 MG/ML
20 INJECTION INTRAVENOUS ONCE AS NEEDED
Status: DISCONTINUED | OUTPATIENT
Start: 2024-09-19 | End: 2024-09-19 | Stop reason: HOSPADM

## 2024-09-19 ASSESSMENT — PAIN SCALES - GENERAL: PAINLEVEL_OUTOF10: 0-NO PAIN

## 2024-09-19 NOTE — PATIENT INSTRUCTIONS
To reach your treatment team please call [phone] options are:  Option 1 for scheduling  Option 3 for radiation oncology  Option 4 for medication refills  Option 5 for medical questions or concerns -> palliative care option 1; nurse option 2; other needs option 3.  Option 6 for all other concerns

## 2024-09-26 NOTE — PROGRESS NOTES
Dane Molina  [id number]  1960    IMRT: Right Lung or bronchus    Treatment Period Technique Fraction Dose Fractions Total Dose   Course 1 7/22/2024-8/9/2024  (days elapsed: 18)         RtlungHilMed 7/22/2024-8/9/2024 VMAT 300 / 300 cGy 15 / 15 4500 / 4,500 cGy        Patient Disposition:  The patient is scheduled for a follow up at our clinic with AKIN Zuniga on 9/9/24. The patient is encouraged to contact the radiation department for any questions or concerns.     Clarissa Gil, RN, BSN  Nurse Partner to Dr. Conde  Radiation Oncology, [facility]  Phone - [phone]  Fax - [phone]

## 2024-10-06 NOTE — RADIATION COMPLETION NOTES
Radiation Oncology Treatment Summary    Patient Name:  Dane Molina  MRN:  [id number]  :  1960    Referring Provider: Tori Galvin MD   Primary Care Provider: Darwin Early MD    Brief History: Dane Molina is a 64 y.o. male with Small cell carcinoma of lung, Clinical: Stage FREDRICK (cT3, cN2, pM1b) (INSM1 and TTF1+, Ki67 >90%, Rb+) of the right lower lobe, , diagnosed on 2024 through bronchoscopy with liver metastases at diagnosis (biopsy confirmed). PET-CT identifies right lower lobe with an adjoining pulmonary lesion adjacent to the major fissure in the right lower lobe, Bulky FDG avid right hilar nodes, subcarinal node, right paratracheal node. Bronchoscopy has confirmed 4R & 7 were + for malignant cells. Additonally, there was a lesion in the R hepatic lobe of the liver, biopsy confirmed as metastatic disease. Brain MRI is negative for mets. He has an excellent PS. He is s/p induction chemo/IO with excellent response in thorax and relative stability in liver. Given his excellent PS, good response to chemotherapy and stable extra-thoracic burden, I have recommended proceeding with TRT. With regards to radiation dose, clinical studies have used a variety of dose-regimens. NCCN guidelines recommend using 30 Gy up to definitive dosing regimens in patients with a longer life expectancy. In Mr. Molina's situation, he has had an excellent response to systemic therapy and had only one site of extra-thoracic disease, which has been stable. As such, should dosimetry allow, we will consider treating to a dose of 45 Gy in 15 fractions to the thoracic disease. The patient completed radiotherapy as outlined below.    Radiation Treatment Summary:    IMRT: Right Lung or bronchus    Treatment Period Technique Fraction Dose Fractions Total Dose   Course 1 2024-2024  (days elapsed: 18)         RtlungHilMed 2024-2024 VMAT 300 / 300 cGy 15 / 15 4500 / 4,500 cGy     Please see the  patient's Mosaiq chart for further details regarding the radiation plan, including beam energy.    Concurrent Chemotherapy:  Treatment Plans       Name Type Plan Dates Plan Provider         Active    Atezolizumab, 21 Day Cycles Oncology Treatment  6/21/2024 - Present Tori Galvin MD                    CTCAE Toxicity Overview:   Toxicity Assessment          8/8/2024    16:20   Toxicity Assessment   Adverse Events Reviewed (WDL) No (Exceptions to WDL)   Treatment Site Thoracic   Anorexia Grade 0   Dehydration Grade 0   Dermatitis Radiation Grade 0   Fatigue Grade 0   Nausea Grade 0   Pain Grade 0   Esophageal Pain Grade 0   Cough Grade 1       chronic cough   Dyspnea Grade 0   Hypoxia Grade 0     Patient Disposition: The patient is scheduled for a follow up at our clinic with AKIN Zuniga on 9/9/24.   CT imaging will be planned by Dr. Galvin's team.  The patient is encouraged to contact the radiation department for any questions or concerns.       To Conde MD, MMM  Senior Attending Physician, [facility]  Professor, [employer]   Our [place]: “To Heal, To Teach, To Discover.”  RN partner: [phone]/ [email or web address]    Phone (scheduling): [phone]/ [email or web address]  Proton Therapy (scheduling): [phone]/ [email or web address]  Phone (after hours): [phone]

## 2024-10-17 DIAGNOSIS — C34.91 SMALL CELL CARCINOMA OF RIGHT LUNG, UNSPECIFIED PART OF LUNG: ICD-10-CM

## 2024-10-28 ENCOUNTER — LAB (OUTPATIENT)
Dept: LAB | Facility: CLINIC | Age: 64
End: 2024-10-28
Payer: MEDICAID

## 2024-10-28 DIAGNOSIS — C34.91 SMALL CELL CARCINOMA OF RIGHT LUNG, UNSPECIFIED PART OF LUNG: ICD-10-CM

## 2024-10-28 DIAGNOSIS — C34.90 SMALL CELL CARCINOMA OF LUNG: ICD-10-CM

## 2024-10-28 LAB
ALBUMIN SERPL BCP-MCNC: 4.3 G/DL (ref 3.4–5)
ALP SERPL-CCNC: 59 U/L (ref 33–136)
ALT SERPL W P-5'-P-CCNC: 13 U/L (ref 10–52)
ANION GAP SERPL CALC-SCNC: 16 MMOL/L (ref 10–20)
AST SERPL W P-5'-P-CCNC: 12 U/L (ref 9–39)
BASOPHILS # BLD AUTO: 0.01 X10*3/UL (ref 0–0.1)
BASOPHILS NFR BLD AUTO: 0.2 %
BILIRUB SERPL-MCNC: 0.5 MG/DL (ref 0–1.2)
BUN SERPL-MCNC: 21 MG/DL (ref 6–23)
CALCIUM SERPL-MCNC: 8.8 MG/DL (ref 8.6–10.6)
CHLORIDE SERPL-SCNC: 109 MMOL/L (ref 98–107)
CO2 SERPL-SCNC: 23 MMOL/L (ref 21–32)
CORTIS SERPL-MCNC: 8.4 UG/DL (ref 2.5–20)
CREAT SERPL-MCNC: 1.63 MG/DL (ref 0.5–1.3)
EGFRCR SERPLBLD CKD-EPI 2021: 47 ML/MIN/1.73M*2
EOSINOPHIL # BLD AUTO: 0.11 X10*3/UL (ref 0–0.7)
EOSINOPHIL NFR BLD AUTO: 1.9 %
ERYTHROCYTE [DISTWIDTH] IN BLOOD BY AUTOMATED COUNT: 15.9 % (ref 11.5–14.5)
FT4I SERPL CALC-MCNC: 3.2 (ref 1.6–4.7)
GLUCOSE SERPL-MCNC: 94 MG/DL (ref 74–99)
HCT VFR BLD AUTO: 41.3 % (ref 41–52)
HGB BLD-MCNC: 13.4 G/DL (ref 13.5–17.5)
IMM GRANULOCYTES # BLD AUTO: 0.01 X10*3/UL (ref 0–0.7)
IMM GRANULOCYTES NFR BLD AUTO: 0.2 % (ref 0–0.9)
LYMPHOCYTES # BLD AUTO: 1.09 X10*3/UL (ref 1.2–4.8)
LYMPHOCYTES NFR BLD AUTO: 18.7 %
MAGNESIUM SERPL-MCNC: 1.91 MG/DL (ref 1.6–2.4)
MCH RBC QN AUTO: 31.4 PG (ref 26–34)
MCHC RBC AUTO-ENTMCNC: 32.4 G/DL (ref 32–36)
MCV RBC AUTO: 97 FL (ref 80–100)
MONOCYTES # BLD AUTO: 1.03 X10*3/UL (ref 0.1–1)
MONOCYTES NFR BLD AUTO: 17.6 %
NEUTROPHILS # BLD AUTO: 3.59 X10*3/UL (ref 1.2–7.7)
NEUTROPHILS NFR BLD AUTO: 61.4 %
NRBC BLD-RTO: ABNORMAL /100{WBCS}
PLATELET # BLD AUTO: 195 X10*3/UL (ref 150–450)
POTASSIUM SERPL-SCNC: 3.8 MMOL/L (ref 3.5–5.3)
PROT SERPL-MCNC: 6.9 G/DL (ref 6.4–8.2)
RBC # BLD AUTO: 4.27 X10*6/UL (ref 4.5–5.9)
SODIUM SERPL-SCNC: 144 MMOL/L (ref 136–145)
T3RU NFR SERPL: 36 % (ref 24–41)
T4 SERPL-MCNC: 9 UG/DL (ref 4.5–11.1)
TSH SERPL-ACNC: 1.17 MIU/L (ref 0.44–3.98)
WBC # BLD AUTO: 5.8 X10*3/UL (ref 4.4–11.3)

## 2024-10-28 PROCEDURE — 82024 ASSAY OF ACTH: CPT

## 2024-10-28 PROCEDURE — 84443 ASSAY THYROID STIM HORMONE: CPT

## 2024-10-28 PROCEDURE — 82533 TOTAL CORTISOL: CPT

## 2024-10-28 PROCEDURE — 36415 COLL VENOUS BLD VENIPUNCTURE: CPT

## 2024-10-28 PROCEDURE — 83735 ASSAY OF MAGNESIUM: CPT

## 2024-10-28 PROCEDURE — 85025 COMPLETE CBC W/AUTO DIFF WBC: CPT

## 2024-10-28 PROCEDURE — 80053 COMPREHEN METABOLIC PANEL: CPT

## 2024-10-28 PROCEDURE — 84436 ASSAY OF TOTAL THYROXINE: CPT

## 2024-10-28 RX ORDER — PROCHLORPERAZINE EDISYLATE 5 MG/ML
10 INJECTION INTRAMUSCULAR; INTRAVENOUS EVERY 6 HOURS PRN
OUTPATIENT
Start: 2024-10-31

## 2024-10-28 RX ORDER — DIPHENHYDRAMINE HYDROCHLORIDE 50 MG/ML
50 INJECTION INTRAMUSCULAR; INTRAVENOUS AS NEEDED
OUTPATIENT
Start: 2024-10-31

## 2024-10-28 RX ORDER — PROCHLORPERAZINE MALEATE 10 MG
10 TABLET ORAL EVERY 6 HOURS PRN
OUTPATIENT
Start: 2024-11-21

## 2024-10-28 RX ORDER — EPINEPHRINE 0.3 MG/.3ML
0.3 INJECTION SUBCUTANEOUS EVERY 5 MIN PRN
OUTPATIENT
Start: 2024-11-21

## 2024-10-28 RX ORDER — FAMOTIDINE 10 MG/ML
20 INJECTION INTRAVENOUS ONCE AS NEEDED
OUTPATIENT
Start: 2024-11-21

## 2024-10-28 RX ORDER — ALBUTEROL SULFATE 0.83 MG/ML
3 SOLUTION RESPIRATORY (INHALATION) AS NEEDED
OUTPATIENT
Start: 2024-10-31

## 2024-10-28 RX ORDER — DIPHENHYDRAMINE HYDROCHLORIDE 50 MG/ML
50 INJECTION INTRAMUSCULAR; INTRAVENOUS AS NEEDED
OUTPATIENT
Start: 2024-11-21

## 2024-10-28 RX ORDER — EPINEPHRINE 0.3 MG/.3ML
0.3 INJECTION SUBCUTANEOUS EVERY 5 MIN PRN
OUTPATIENT
Start: 2024-10-31

## 2024-10-28 RX ORDER — PROCHLORPERAZINE MALEATE 10 MG
10 TABLET ORAL EVERY 6 HOURS PRN
OUTPATIENT
Start: 2024-10-31

## 2024-10-28 RX ORDER — FAMOTIDINE 10 MG/ML
20 INJECTION INTRAVENOUS ONCE AS NEEDED
OUTPATIENT
Start: 2024-10-31

## 2024-10-28 RX ORDER — PROCHLORPERAZINE EDISYLATE 5 MG/ML
10 INJECTION INTRAMUSCULAR; INTRAVENOUS EVERY 6 HOURS PRN
OUTPATIENT
Start: 2024-11-21

## 2024-10-28 RX ORDER — ALBUTEROL SULFATE 0.83 MG/ML
3 SOLUTION RESPIRATORY (INHALATION) AS NEEDED
OUTPATIENT
Start: 2024-11-21

## 2024-10-29 ENCOUNTER — OFFICE VISIT (OUTPATIENT)
Dept: HEMATOLOGY/ONCOLOGY | Facility: HOSPITAL | Age: 64
End: 2024-10-29
Payer: MEDICARE

## 2024-10-29 ENCOUNTER — APPOINTMENT (OUTPATIENT)
Dept: HEMATOLOGY/ONCOLOGY | Facility: CLINIC | Age: 64
End: 2024-10-29
Payer: MEDICAID

## 2024-10-29 ENCOUNTER — APPOINTMENT (OUTPATIENT)
Dept: HEMATOLOGY/ONCOLOGY | Facility: HOSPITAL | Age: 64
End: 2024-10-29
Payer: MEDICAID

## 2024-10-29 VITALS
DIASTOLIC BLOOD PRESSURE: 110 MMHG | WEIGHT: 235.67 LBS | RESPIRATION RATE: 20 BRPM | BODY MASS INDEX: 31.96 KG/M2 | OXYGEN SATURATION: 98 % | HEART RATE: 80 BPM | TEMPERATURE: 97.3 F | SYSTOLIC BLOOD PRESSURE: 160 MMHG

## 2024-10-29 DIAGNOSIS — R06.00 DYSPNEA, UNSPECIFIED TYPE: ICD-10-CM

## 2024-10-29 DIAGNOSIS — I25.10 CORONARY ARTERY DISEASE INVOLVING NATIVE CORONARY ARTERY OF NATIVE HEART WITHOUT ANGINA PECTORIS: ICD-10-CM

## 2024-10-29 DIAGNOSIS — C34.90 SMALL CELL LUNG CANCER: ICD-10-CM

## 2024-10-29 DIAGNOSIS — R06.01 ORTHOPNEA: ICD-10-CM

## 2024-10-29 DIAGNOSIS — Z72.0 TOBACCO ABUSE: ICD-10-CM

## 2024-10-29 DIAGNOSIS — I50.23 ACUTE ON CHRONIC SYSTOLIC HEART FAILURE: ICD-10-CM

## 2024-10-29 DIAGNOSIS — F40.240 CLAUSTROPHOBIA: ICD-10-CM

## 2024-10-29 DIAGNOSIS — R06.09 DYSPNEA ON EXERTION: ICD-10-CM

## 2024-10-29 DIAGNOSIS — R06.09 DOE (DYSPNEA ON EXERTION): ICD-10-CM

## 2024-10-29 DIAGNOSIS — C34.90 SMALL CELL CARCINOMA OF LUNG: ICD-10-CM

## 2024-10-29 DIAGNOSIS — I25.10 CORONARY ARTERY CALCIFICATION: ICD-10-CM

## 2024-10-29 DIAGNOSIS — I42.0 DILATED CARDIOMYOPATHY (MULTI): ICD-10-CM

## 2024-10-29 DIAGNOSIS — C34.00 SMALL CELL CARCINOMA OF HILUM OF LUNG, UNSPECIFIED LATERALITY: ICD-10-CM

## 2024-10-29 DIAGNOSIS — C34.00 SMALL CELL CARCINOMA OF HILUM OF LUNG, UNSPECIFIED LATERALITY: Primary | ICD-10-CM

## 2024-10-29 PROCEDURE — 4004F PT TOBACCO SCREEN RCVD TLK: CPT | Performed by: STUDENT IN AN ORGANIZED HEALTH CARE EDUCATION/TRAINING PROGRAM

## 2024-10-29 PROCEDURE — 3080F DIAST BP >= 90 MM HG: CPT | Performed by: STUDENT IN AN ORGANIZED HEALTH CARE EDUCATION/TRAINING PROGRAM

## 2024-10-29 PROCEDURE — 99215 OFFICE O/P EST HI 40 MIN: CPT | Performed by: STUDENT IN AN ORGANIZED HEALTH CARE EDUCATION/TRAINING PROGRAM

## 2024-10-29 PROCEDURE — 3077F SYST BP >= 140 MM HG: CPT | Performed by: STUDENT IN AN ORGANIZED HEALTH CARE EDUCATION/TRAINING PROGRAM

## 2024-10-29 PROCEDURE — G2211 COMPLEX E/M VISIT ADD ON: HCPCS | Performed by: STUDENT IN AN ORGANIZED HEALTH CARE EDUCATION/TRAINING PROGRAM

## 2024-10-29 RX ORDER — METOPROLOL SUCCINATE 100 MG/1
100 TABLET, EXTENDED RELEASE ORAL DAILY
Qty: 90 TABLET | Refills: 0 | Status: SHIPPED | OUTPATIENT
Start: 2024-10-29 | End: 2025-10-29

## 2024-10-29 RX ORDER — LORAZEPAM 0.5 MG/1
1 TABLET ORAL
Qty: 3 TABLET | Refills: 0 | Status: SHIPPED | OUTPATIENT
Start: 2024-10-29 | End: 2024-10-30

## 2024-10-29 RX ORDER — TRIAMTERENE/HYDROCHLOROTHIAZID 37.5-25 MG
1 TABLET ORAL EVERY MORNING
Qty: 30 TABLET | Refills: 2 | Status: SHIPPED | OUTPATIENT
Start: 2024-10-29

## 2024-10-29 RX ORDER — ATORVASTATIN CALCIUM 40 MG/1
40 TABLET, FILM COATED ORAL DAILY
Qty: 30 TABLET | Refills: 2 | Status: SHIPPED | OUTPATIENT
Start: 2024-10-29

## 2024-10-29 ASSESSMENT — PAIN SCALES - GENERAL: PAINLEVEL_OUTOF10: 0-NO PAIN

## 2024-10-30 ENCOUNTER — INFUSION (OUTPATIENT)
Dept: HEMATOLOGY/ONCOLOGY | Facility: CLINIC | Age: 64
End: 2024-10-30
Payer: MEDICARE

## 2024-10-30 ENCOUNTER — APPOINTMENT (OUTPATIENT)
Dept: HEMATOLOGY/ONCOLOGY | Facility: HOSPITAL | Age: 64
End: 2024-10-30
Payer: MEDICARE

## 2024-10-30 VITALS
RESPIRATION RATE: 16 BRPM | HEART RATE: 74 BPM | DIASTOLIC BLOOD PRESSURE: 92 MMHG | SYSTOLIC BLOOD PRESSURE: 144 MMHG | WEIGHT: 238.54 LBS | TEMPERATURE: 97.3 F | BODY MASS INDEX: 32.35 KG/M2

## 2024-10-30 DIAGNOSIS — C34.00 SMALL CELL CARCINOMA OF HILUM OF LUNG, UNSPECIFIED LATERALITY: ICD-10-CM

## 2024-10-30 LAB — ACTH PLAS-MCNC: 14.7 PG/ML (ref 7.2–63.3)

## 2024-10-30 PROCEDURE — 2500000004 HC RX 250 GENERAL PHARMACY W/ HCPCS (ALT 636 FOR OP/ED): Mod: SE | Performed by: STUDENT IN AN ORGANIZED HEALTH CARE EDUCATION/TRAINING PROGRAM

## 2024-10-30 PROCEDURE — 96365 THER/PROPH/DIAG IV INF INIT: CPT | Mod: INF

## 2024-10-30 PROCEDURE — 96413 CHEMO IV INFUSION 1 HR: CPT

## 2024-10-30 RX ORDER — DIPHENHYDRAMINE HYDROCHLORIDE 50 MG/ML
50 INJECTION INTRAMUSCULAR; INTRAVENOUS AS NEEDED
Status: DISCONTINUED | OUTPATIENT
Start: 2024-10-30 | End: 2024-10-30 | Stop reason: HOSPADM

## 2024-10-30 RX ORDER — EPINEPHRINE 0.3 MG/.3ML
0.3 INJECTION SUBCUTANEOUS EVERY 5 MIN PRN
Status: DISCONTINUED | OUTPATIENT
Start: 2024-10-30 | End: 2024-10-30 | Stop reason: HOSPADM

## 2024-10-30 RX ORDER — PROCHLORPERAZINE EDISYLATE 5 MG/ML
10 INJECTION INTRAMUSCULAR; INTRAVENOUS EVERY 6 HOURS PRN
Status: DISCONTINUED | OUTPATIENT
Start: 2024-10-30 | End: 2024-10-30 | Stop reason: HOSPADM

## 2024-10-30 RX ORDER — HEPARIN SODIUM,PORCINE/PF 10 UNIT/ML
50 SYRINGE (ML) INTRAVENOUS AS NEEDED
OUTPATIENT
Start: 2024-10-30

## 2024-10-30 RX ORDER — FAMOTIDINE 10 MG/ML
20 INJECTION INTRAVENOUS ONCE AS NEEDED
Status: DISCONTINUED | OUTPATIENT
Start: 2024-10-30 | End: 2024-10-30 | Stop reason: HOSPADM

## 2024-10-30 RX ORDER — ALBUTEROL SULFATE 0.83 MG/ML
3 SOLUTION RESPIRATORY (INHALATION) AS NEEDED
Status: DISCONTINUED | OUTPATIENT
Start: 2024-10-30 | End: 2024-10-30 | Stop reason: HOSPADM

## 2024-10-30 RX ORDER — PROCHLORPERAZINE MALEATE 10 MG
10 TABLET ORAL EVERY 6 HOURS PRN
Status: DISCONTINUED | OUTPATIENT
Start: 2024-10-30 | End: 2024-10-30 | Stop reason: HOSPADM

## 2024-10-30 RX ORDER — HEPARIN 100 UNIT/ML
500 SYRINGE INTRAVENOUS AS NEEDED
OUTPATIENT
Start: 2024-10-30

## 2024-10-30 ASSESSMENT — PAIN SCALES - GENERAL: PAINLEVEL_OUTOF10: 0-NO PAIN

## 2024-11-15 ENCOUNTER — HOSPITAL ENCOUNTER (OUTPATIENT)
Dept: RADIOLOGY | Facility: CLINIC | Age: 64
Discharge: HOME | End: 2024-11-15
Payer: MEDICARE

## 2024-11-15 DIAGNOSIS — C34.00 SMALL CELL CARCINOMA OF HILUM OF LUNG, UNSPECIFIED LATERALITY: ICD-10-CM

## 2024-11-15 PROCEDURE — 70553 MRI BRAIN STEM W/O & W/DYE: CPT

## 2024-11-15 PROCEDURE — 2550000001 HC RX 255 CONTRASTS: Performed by: STUDENT IN AN ORGANIZED HEALTH CARE EDUCATION/TRAINING PROGRAM

## 2024-11-15 PROCEDURE — A9575 INJ GADOTERATE MEGLUMI 0.1ML: HCPCS | Performed by: STUDENT IN AN ORGANIZED HEALTH CARE EDUCATION/TRAINING PROGRAM

## 2024-11-15 PROCEDURE — 74177 CT ABD & PELVIS W/CONTRAST: CPT

## 2024-11-15 RX ORDER — GADOTERATE MEGLUMINE 376.9 MG/ML
20 INJECTION INTRAVENOUS
Status: COMPLETED | OUTPATIENT
Start: 2024-11-15 | End: 2024-11-15

## 2024-11-19 ENCOUNTER — TELEMEDICINE (OUTPATIENT)
Dept: HEMATOLOGY/ONCOLOGY | Facility: HOSPITAL | Age: 64
End: 2024-11-19
Payer: MEDICARE

## 2024-11-19 ENCOUNTER — APPOINTMENT (OUTPATIENT)
Dept: HEMATOLOGY/ONCOLOGY | Facility: HOSPITAL | Age: 64
End: 2024-11-19
Payer: COMMERCIAL

## 2024-11-19 DIAGNOSIS — C34.00 SMALL CELL CARCINOMA OF HILUM OF LUNG, UNSPECIFIED LATERALITY: Primary | ICD-10-CM

## 2024-11-19 PROCEDURE — 99213 OFFICE O/P EST LOW 20 MIN: CPT | Performed by: STUDENT IN AN ORGANIZED HEALTH CARE EDUCATION/TRAINING PROGRAM

## 2024-11-19 RX ORDER — DIPHENHYDRAMINE HYDROCHLORIDE 50 MG/ML
50 INJECTION INTRAMUSCULAR; INTRAVENOUS AS NEEDED
OUTPATIENT
Start: 2025-01-02

## 2024-11-19 RX ORDER — PROCHLORPERAZINE MALEATE 10 MG
10 TABLET ORAL EVERY 6 HOURS PRN
OUTPATIENT
Start: 2025-01-02

## 2024-11-19 RX ORDER — SILDENAFIL 50 MG/1
50 TABLET, FILM COATED ORAL DAILY PRN
Qty: 10 TABLET | Refills: 0 | Status: SHIPPED | OUTPATIENT
Start: 2024-11-19 | End: 2024-12-19

## 2024-11-19 RX ORDER — PROCHLORPERAZINE EDISYLATE 5 MG/ML
10 INJECTION INTRAMUSCULAR; INTRAVENOUS EVERY 6 HOURS PRN
OUTPATIENT
Start: 2025-01-02

## 2024-11-19 RX ORDER — FAMOTIDINE 10 MG/ML
20 INJECTION INTRAVENOUS ONCE AS NEEDED
OUTPATIENT
Start: 2025-01-02

## 2024-11-19 RX ORDER — EPINEPHRINE 0.3 MG/.3ML
0.3 INJECTION SUBCUTANEOUS EVERY 5 MIN PRN
OUTPATIENT
Start: 2025-01-02

## 2024-11-19 RX ORDER — ALBUTEROL SULFATE 0.83 MG/ML
3 SOLUTION RESPIRATORY (INHALATION) AS NEEDED
OUTPATIENT
Start: 2025-01-02

## 2024-11-19 NOTE — PROGRESS NOTES
Patient ID: Dane Molina is a 64 y.o. male    Primary Care Provider: Darwin Early MD    DIAGNOSIS AND STAGING  cT3 pN2 pM1b small cell lung cancer (INSM1 and TTF1+) of the right lower lobe, diagnosed on 02/05/2024 through bronchoscopy     SITES OF DISEASE  Right lower lobe   Levels 4R and 7 nodes   Liver, confirmed by biopsy      MOLECULAR GENOMICS  Not performed   RB loss by IHC      PRIOR THERAPIES  03/19/2024-05/20/2024: 4 cycles carboplatin/etoposide with addition of atezolizumab to C2 on 04/08/2024 08/09/2024: Completion 45 cGy/15f to chest by Dr. Conde     CURRENT THERAPY  Maintenance atezolizumab since 06/21/24    CURRENT ONCOLOGICAL PROBLEMS  Grade 3 immune related rash now grade I    HISTORY OF PRESENT ILLNESS  Patient presented to the emergency room on 01/04/2023 with a hypertensive urgency and cough.  He has a history of noncompliance with antihypertensive medications.  He also complained of dyspnea and underwent a CT chest without IV contrast that demonstrated a right lower lobe paramediastinal mass measuring 3.1 cm with adjacent pulmonary nodule measuring 1.8 cm.  Mediastinal lymphadenopathy was demonstrated, including a subcarinal node measuring 1.7 cm.  There was right hilar lymphadenopathy, 2 cm in short axis.  On 01/15/2024 the patient underwent a PET scan that demonstrated avidity of the aforementioned masses/lymph nodes, as well as a lesion in the liver, SUV max 3.3, right hepatic lobe.  On 02/05/2024, patient underwent a bronchoscopy:  A. LYMPH NODE 4 R PULMONARY FINE NEEDLE ASPIRATION , CYTOLOGY AND CELL BLOCK:    Positive for malignant cells   Metastatic small cell carcinoma, see note  B. LYMPH NODE 7 PULMONARY FINE NEEDLE ASPIRATION , CYTOLOGY AND CELL BLOCK:    Positive for malignant cells  Metastatic small cell carcinoma, see note          Note: Immunostains demonstrate the lesional cells to be diffusely positive for CAM 5.2, INSM1, TTF-1.  The proliferative marker Ki-67 is  greater than 90%.  Immunostain for retinoblastoma shows loss of staining.  Immunostain for p40 is negative.  The morphology and immunohistochemical profile are consistent with the above diagnosis.  On 02/29/24 the pt is seen by med onc for the first time. Denies any systemic sx - denies lack of appetite, fatigue or unintentional weight loss. Denies new localized pain, headaches or neuro sx.  Denies new respiratory sx.  He is claustrophobic and MRI brain was scheduled for today but pt could not go through with the test. He also has mild CKD, which limits his ability to receive intravenous contrast for CT.   03/04/24: MRI brain shows no ICMA, but MRI liver shows a 1.9 cm hepatic lesion with T2 hyperintensity and peripheral enhancement   03/19/2024: C1 D1 carboplatin/etoposide  03/22/24: Liver biopsy:  FINAL DIAGNOSIS   A. RIGHT LIVER MASS BIOPSY:      -- LIVER TISSUE INVOLVED BY SMALL CELL CARCINOMA, SEE NOTE     Note: Clinical history of lung small cell carcinoma noted. The findings most likely represent metastasis from the known primary lung tumor.     04/04/24: discussed with patient liver biopsy results and recommendations to add atezolizumab to his regimen  He has met with radiation oncology, and we will consider consolidative TRT pending response to cytotoxic chemotherapy    04/08/2024: C2 D1 carboplatin/etoposide/atezolizumab    05/20/24: C4 D1 carboplatin/etoposide/atezolizumab    06/10/2024: CT chest/abdomen/pelvis demonstrating continue his improvement of metastatic lymphadenopathy within the mediastinum/right hilum, with no progression within the liver    06/13/2024: Referral for radiation oncology/TRT.  Patient to proceed with maintenance atezolizumab    06/21/24: begins maintenance atezolizumab     08/09/24: completes 45 Gy in 15 fractions to chest (To Conde)     08/08/24: prescribed prednisone 1 mg/Kg for grade 3 IO-induced rash with peeling of hands     08/29/24: last steroid taken last week but  did not complete full prescription. Rash on chest/abdomen improved but has some peeling of the hands/arms. Will have patient take remaining prednisone and follow up in 3 weeks    11/15/24: CT chest abdomen pelvis without any evidence of metastatic disease        PAST MEDICAL HISTORY  CKD - creatinine 1.68  Neck pain (injury at work)   Hypertension  Iron deficiency anemia (hx of blood transfusion in the 90's)    SURGICAL HISTORY  Ankle fracture and has a latoya in place      SOCIAL HISTORY  Former 35-40 pack-year smoker, quit at age 57  Smokes cigars now   History of alcohol addiction, sober for the past 15 years   Has one chlid (Roula)  Single      FAMILY HISTORY  Lung CA (mother)  Mother had CKD and required HD    CURRENT MEDS REVIEWED       ALLERGIES REVIEWED        SUBJECTIVE: Patient doing well. Tolerating atezo without major issue. Notes the rash on his palms is starting to come back. Has not used clobetazol in over a month. Also inquires about treatment for ED. No additional somatic complaints.    A 13 point review of systems was performed, with significant findings documented above in subjective history.    OBJECTIVE:  Wt Readings from Last 5 Encounters:   10/30/24 108 kg (238 lb 8.6 oz)   10/29/24 107 kg (235 lb 10.8 oz)   11/15/24 108 kg (237 lb)   09/19/24 109 kg (240 lb 11.9 oz)   09/18/24 107 kg (236 lb 15.9 oz)     Diagnostic Results   Results:  Labs:  Lab Results   Component Value Date    WBC 5.8 10/28/2024    HGB 13.4 (L) 10/28/2024    HCT 41.3 10/28/2024    MCV 97 10/28/2024     10/28/2024      Lab Results   Component Value Date    NEUTROABS 3.59 10/28/2024        Lab Results   Component Value Date    GLUCOSE 94 10/28/2024    CALCIUM 8.8 10/28/2024     10/28/2024    K 3.8 10/28/2024    CO2 23 10/28/2024     (H) 10/28/2024    BUN 21 10/28/2024    CREATININE 1.63 (H) 10/28/2024    MG 1.91 10/28/2024     Lab Results   Component Value Date    ALT 13 10/28/2024    AST 12 10/28/2024     ALKPHOS 59 10/28/2024    BILITOT 0.5 10/28/2024    BILIDIR 0.1 12/21/2023      Lab Results   Component Value Date    ACTH 14.7 10/28/2024    CORTISOL 8.4 10/28/2024    TSH 1.17 10/28/2024    FREET4 1.07 08/09/2024     CT chest/abdomen/pelvis on 06/10/2024:    IMPRESSION:  1. Mild acute diverticulitis of the mid sigmoid colon with mild  pericolonic fat stranding and focal area of micro perforation versus  prominent gas-filled diverticula. No pericolonic fluid collection.  Findings are seen superimposed on chronic diverticulitis.      2. Interval improvement in metastatic lymphadenopathy within the  mediastinum as well as previously measured right hilar lesion with  only subtle residual nodularity demonstrated.      3. Subtle ill-defined hypodensity within the right lobe of the liver  not significantly changed from the prior examination with focal  heterogeneity nonspecific.    CT chest/abdomen/pelvis 11/15/24:   IMPRESSION:  CHEST/ABDOMEN-PELVIS:  1.  No definite metastatic disease in the chest, abdomen and pelvis.  2. Continued decrease in the phlegmonous soft tissue adjacent to the  sigmoid colon consistent with the improving inflammation.  3. Gastric rugal fold thickening in the fundus and proximal stomach  which can be seen with gastritis. Correlate with clinical symptoms.  4. Other chronic and incidental findings as described above    Assessment/Plan   Small cell carcinoma of lung, Clinical: Stage FREDRICK (cT3, cN2, pM1b)  Mr. Dane Molina is a 63 YO with ES-SCLC seen for follow up. He is s/p 4 cycles platinum doublet with addition of atezo after c1 as well as consolidative RT. He has had 1 dose atezo which was held for grade III ICI dermatitis which is now grade I only on palms/hands.    We resumed atezo and he tolerated well with topical clobetasol. Most recent scans 11/15/24 personally reviewed showing no obvious metastatic disease. MRI brain without metastatic disease. Will continue on atezo and resume  clobetazol for 1 week for palm ICI dermatitis.     #T3 pN2 pM1b small cell lung cancer of the RLL - extensive stage.  -S/p 4 cycles carboplatin/etoposide + atezolizumab added to cycle 2  -S/p 1 dose atezo c/b ICI dermatitis  -S/P TRT (45 Gy in 15 fx per Dr. Conde)   -s/p C4 of atezo, rash stable  - Most recent CT CAP 11/15/24 showing no evidence of metastatic disease   - Continue w/ atezo cycle 5 11/21    #Grade I ICI dermatitis - resolved  -Was initially grade III now downgraded after PO steroid taper  -Start Clobetasol 0.5% ointment BID x 14d to palms however completed.  -Will resume for 1 week given worsening rash    #CNS monitoring   -MRI on 11/15/24 showing no ICM (stable 7mm mass w/in pituitary gland thought to be pituitary adenoma given stability on imaging)   -Repeat every 3 months -02/2025    Patient was discussed with Dr. Ospina.    Michael Cesar MD  Internal Medicine PGY-2    I saw and evaluated the patient. I personally obtained the key and critical portions of the history and physical exam or was physically present for key and critical portions performed by the resident/fellow. I reviewed the resident/fellow's documentation and discussed the patient with the resident/fellow. I agree with the resident/fellow's medical decision making as documented in the note.     Nicolas Ospina MD  Thoracic Medical Oncology   [address] [id number]  Phone: [phone]

## 2024-11-21 ENCOUNTER — INFUSION (OUTPATIENT)
Dept: HEMATOLOGY/ONCOLOGY | Facility: HOSPITAL | Age: 64
End: 2024-11-21
Payer: MEDICARE

## 2024-11-21 ENCOUNTER — LAB (OUTPATIENT)
Dept: LAB | Facility: HOSPITAL | Age: 64
End: 2024-11-21
Payer: MEDICARE

## 2024-11-21 VITALS
RESPIRATION RATE: 18 BRPM | DIASTOLIC BLOOD PRESSURE: 80 MMHG | OXYGEN SATURATION: 96 % | HEART RATE: 88 BPM | TEMPERATURE: 99.3 F | WEIGHT: 234.35 LBS | BODY MASS INDEX: 31.78 KG/M2 | SYSTOLIC BLOOD PRESSURE: 122 MMHG

## 2024-11-21 DIAGNOSIS — C34.00 SMALL CELL CARCINOMA OF HILUM OF LUNG, UNSPECIFIED LATERALITY: ICD-10-CM

## 2024-11-21 LAB
ALBUMIN SERPL BCP-MCNC: 4.2 G/DL (ref 3.4–5)
ALP SERPL-CCNC: 69 U/L (ref 33–136)
ALT SERPL W P-5'-P-CCNC: 11 U/L (ref 10–52)
ANION GAP SERPL CALC-SCNC: 17 MMOL/L (ref 10–20)
AST SERPL W P-5'-P-CCNC: 11 U/L (ref 9–39)
BASOPHILS # BLD AUTO: 0.03 X10*3/UL (ref 0–0.1)
BASOPHILS NFR BLD AUTO: 0.4 %
BILIRUB SERPL-MCNC: 0.3 MG/DL (ref 0–1.2)
BUN SERPL-MCNC: 28 MG/DL (ref 6–23)
CALCIUM SERPL-MCNC: 8.7 MG/DL (ref 8.6–10.3)
CHLORIDE SERPL-SCNC: 108 MMOL/L (ref 98–107)
CO2 SERPL-SCNC: 22 MMOL/L (ref 21–32)
CORTIS AM PEAK SERPL-MSCNC: 5 UG/DL (ref 5–20)
CREAT SERPL-MCNC: 1.69 MG/DL (ref 0.5–1.3)
EGFRCR SERPLBLD CKD-EPI 2021: 45 ML/MIN/1.73M*2
EOSINOPHIL # BLD AUTO: 0.16 X10*3/UL (ref 0–0.7)
EOSINOPHIL NFR BLD AUTO: 2.2 %
ERYTHROCYTE [DISTWIDTH] IN BLOOD BY AUTOMATED COUNT: 15.3 % (ref 11.5–14.5)
GLUCOSE SERPL-MCNC: 112 MG/DL (ref 74–99)
HCT VFR BLD AUTO: 40.3 % (ref 41–52)
HGB BLD-MCNC: 13.5 G/DL (ref 13.5–17.5)
IMM GRANULOCYTES # BLD AUTO: 0.04 X10*3/UL (ref 0–0.7)
IMM GRANULOCYTES NFR BLD AUTO: 0.6 % (ref 0–0.9)
LYMPHOCYTES # BLD AUTO: 1.01 X10*3/UL (ref 1.2–4.8)
LYMPHOCYTES NFR BLD AUTO: 14 %
MCH RBC QN AUTO: 32.1 PG (ref 26–34)
MCHC RBC AUTO-ENTMCNC: 33.5 G/DL (ref 32–36)
MCV RBC AUTO: 96 FL (ref 80–100)
MONOCYTES # BLD AUTO: 1.04 X10*3/UL (ref 0.1–1)
MONOCYTES NFR BLD AUTO: 14.4 %
NEUTROPHILS # BLD AUTO: 4.94 X10*3/UL (ref 1.2–7.7)
NEUTROPHILS NFR BLD AUTO: 68.4 %
NRBC BLD-RTO: 0 /100 WBCS (ref 0–0)
PLATELET # BLD AUTO: 196 X10*3/UL (ref 150–450)
POTASSIUM SERPL-SCNC: 3.8 MMOL/L (ref 3.5–5.3)
PROT SERPL-MCNC: 7.2 G/DL (ref 6.4–8.2)
RBC # BLD AUTO: 4.2 X10*6/UL (ref 4.5–5.9)
SODIUM SERPL-SCNC: 143 MMOL/L (ref 136–145)
TSH SERPL-ACNC: 1.69 MIU/L (ref 0.44–3.98)
WBC # BLD AUTO: 7.2 X10*3/UL (ref 4.4–11.3)

## 2024-11-21 PROCEDURE — 82533 TOTAL CORTISOL: CPT

## 2024-11-21 PROCEDURE — 85025 COMPLETE CBC W/AUTO DIFF WBC: CPT

## 2024-11-21 PROCEDURE — 36415 COLL VENOUS BLD VENIPUNCTURE: CPT

## 2024-11-21 PROCEDURE — 96413 CHEMO IV INFUSION 1 HR: CPT

## 2024-11-21 PROCEDURE — 84443 ASSAY THYROID STIM HORMONE: CPT

## 2024-11-21 PROCEDURE — 84075 ASSAY ALKALINE PHOSPHATASE: CPT

## 2024-11-21 PROCEDURE — 2500000004 HC RX 250 GENERAL PHARMACY W/ HCPCS (ALT 636 FOR OP/ED): Mod: JZ,JG | Performed by: STUDENT IN AN ORGANIZED HEALTH CARE EDUCATION/TRAINING PROGRAM

## 2024-11-21 RX ORDER — FAMOTIDINE 10 MG/ML
20 INJECTION INTRAVENOUS ONCE AS NEEDED
Status: DISCONTINUED | OUTPATIENT
Start: 2024-11-21 | End: 2024-11-21 | Stop reason: HOSPADM

## 2024-11-21 RX ORDER — ALBUTEROL SULFATE 0.83 MG/ML
3 SOLUTION RESPIRATORY (INHALATION) AS NEEDED
Status: DISCONTINUED | OUTPATIENT
Start: 2024-11-21 | End: 2024-11-21 | Stop reason: HOSPADM

## 2024-11-21 RX ORDER — EPINEPHRINE 0.3 MG/.3ML
0.3 INJECTION SUBCUTANEOUS EVERY 5 MIN PRN
Status: DISCONTINUED | OUTPATIENT
Start: 2024-11-21 | End: 2024-11-21 | Stop reason: HOSPADM

## 2024-11-21 RX ORDER — DIPHENHYDRAMINE HYDROCHLORIDE 50 MG/ML
50 INJECTION INTRAMUSCULAR; INTRAVENOUS AS NEEDED
Status: DISCONTINUED | OUTPATIENT
Start: 2024-11-21 | End: 2024-11-21 | Stop reason: HOSPADM

## 2024-11-21 RX ORDER — PROCHLORPERAZINE EDISYLATE 5 MG/ML
10 INJECTION INTRAMUSCULAR; INTRAVENOUS EVERY 6 HOURS PRN
Status: DISCONTINUED | OUTPATIENT
Start: 2024-11-21 | End: 2024-11-21 | Stop reason: HOSPADM

## 2024-11-21 RX ORDER — PROCHLORPERAZINE MALEATE 10 MG
10 TABLET ORAL EVERY 6 HOURS PRN
Status: DISCONTINUED | OUTPATIENT
Start: 2024-11-21 | End: 2024-11-21 | Stop reason: HOSPADM

## 2024-11-21 ASSESSMENT — PAIN SCALES - GENERAL: PAINLEVEL_OUTOF10: 0-NO PAIN

## 2024-12-09 DIAGNOSIS — C34.00 SMALL CELL CARCINOMA OF HILUM OF LUNG, UNSPECIFIED LATERALITY: ICD-10-CM

## 2024-12-09 NOTE — PROGRESS NOTES
Patient ID: Dane Molina is a 64 y.o. male    Primary Care Provider: Darwin Early MD    DIAGNOSIS AND STAGING  cT3 pN2 pM1b small cell lung cancer (INSM1 and TTF1+) of the right lower lobe, diagnosed on 02/05/2024 through bronchoscopy     SITES OF DISEASE  Right lower lobe   Levels 4R and 7 nodes   Liver, confirmed by biopsy      MOLECULAR GENOMICS  Not performed   RB loss by IHC      PRIOR THERAPIES  03/19/2024-05/20/2024: 4 cycles carboplatin/etoposide with addition of atezolizumab to C2 on 04/08/2024 08/09/2024: Completion 45 cGy/15f to chest by Dr. Conde     CURRENT THERAPY  Maintenance atezolizumab since 06/21/24    CURRENT ONCOLOGICAL PROBLEMS  Grade 3 immune related rash now grade I    HISTORY OF PRESENT ILLNESS  Patient presented to the emergency room on 01/04/2023 with a hypertensive urgency and cough.  He has a history of noncompliance with antihypertensive medications.  He also complained of dyspnea and underwent a CT chest without IV contrast that demonstrated a right lower lobe paramediastinal mass measuring 3.1 cm with adjacent pulmonary nodule measuring 1.8 cm.  Mediastinal lymphadenopathy was demonstrated, including a subcarinal node measuring 1.7 cm.  There was right hilar lymphadenopathy, 2 cm in short axis.  On 01/15/2024 the patient underwent a PET scan that demonstrated avidity of the aforementioned masses/lymph nodes, as well as a lesion in the liver, SUV max 3.3, right hepatic lobe.  On 02/05/2024, patient underwent a bronchoscopy:  A. LYMPH NODE 4 R PULMONARY FINE NEEDLE ASPIRATION , CYTOLOGY AND CELL BLOCK:    Positive for malignant cells   Metastatic small cell carcinoma, see note  B. LYMPH NODE 7 PULMONARY FINE NEEDLE ASPIRATION , CYTOLOGY AND CELL BLOCK:    Positive for malignant cells  Metastatic small cell carcinoma, see note          Note: Immunostains demonstrate the lesional cells to be diffusely positive for CAM 5.2, INSM1, TTF-1.  The proliferative marker Ki-67 is  greater than 90%.  Immunostain for retinoblastoma shows loss of staining.  Immunostain for p40 is negative.  The morphology and immunohistochemical profile are consistent with the above diagnosis.  On 02/29/24 the pt is seen by med onc for the first time. Denies any systemic sx - denies lack of appetite, fatigue or unintentional weight loss. Denies new localized pain, headaches or neuro sx.  Denies new respiratory sx.  He is claustrophobic and MRI brain was scheduled for today but pt could not go through with the test. He also has mild CKD, which limits his ability to receive intravenous contrast for CT.   03/04/24: MRI brain shows no ICMA, but MRI liver shows a 1.9 cm hepatic lesion with T2 hyperintensity and peripheral enhancement   03/19/2024: C1 D1 carboplatin/etoposide  03/22/24: Liver biopsy:  FINAL DIAGNOSIS   A. RIGHT LIVER MASS BIOPSY:      -- LIVER TISSUE INVOLVED BY SMALL CELL CARCINOMA, SEE NOTE     Note: Clinical history of lung small cell carcinoma noted. The findings most likely represent metastasis from the known primary lung tumor.     04/04/24: discussed with patient liver biopsy results and recommendations to add atezolizumab to his regimen  He has met with radiation oncology, and we will consider consolidative TRT pending response to cytotoxic chemotherapy    04/08/2024: C2 D1 carboplatin/etoposide/atezolizumab    05/20/24: C4 D1 carboplatin/etoposide/atezolizumab    06/10/2024: CT chest/abdomen/pelvis demonstrating continue his improvement of metastatic lymphadenopathy within the mediastinum/right hilum, with no progression within the liver    06/13/2024: Referral for radiation oncology/TRT.  Patient to proceed with maintenance atezolizumab    06/21/24: begins maintenance atezolizumab     08/09/24: completes 45 Gy in 15 fractions to chest (To Conde)     08/08/24: prescribed prednisone 1 mg/Kg for grade 3 IO-induced rash with peeling of hands     08/29/24: last steroid taken last week but  did not complete full prescription. Rash on chest/abdomen improved but has some peeling of the hands/arms. Will have patient take remaining prednisone and follow up in 3 weeks       PAST MEDICAL HISTORY  CKD - creatinine 1.68  Neck pain (injury at work)   Hypertension  Iron deficiency anemia (hx of blood transfusion in the 90's)    SURGICAL HISTORY  Ankle fracture and has a latoya in place      SOCIAL HISTORY  Former 35-40 pack-year smoker, quit at age 57  Smokes cigars now   History of alcohol addiction, sober for the past 15 years   Has one chlid (Roula)  Single      FAMILY HISTORY  Lung CA (mother)  Mother had CKD and required HD    CURRENT MEDS REVIEWED       ALLERGIES REVIEWED        SUBJECTIVE:     Patient doing well.  He utilized clobetasol for his rash on palms and it has resolved. Now only using cocoa butter. No additional somatic complaints, weight is stable, no dysgeusia.    A 13 point review of systems was performed, with significant findings documented above in subjective history.    OBJECTIVE:  There were no vitals filed for this visit.      There is no height or weight on file to calculate BSA.     Wt Readings from Last 5 Encounters:   11/21/24 106 kg (234 lb 5.6 oz)   10/30/24 108 kg (238 lb 8.6 oz)   10/29/24 107 kg (235 lb 10.8 oz)   11/15/24 108 kg (237 lb)   09/19/24 109 kg (240 lb 11.9 oz)     ECOGSCORE: 1- Restricted in physically strenuous activity.  Carries out light duty.  Gen: A&O, NAD  Head: Normocephalic, atraumatic  Eyes: no scleral icterus  ENT: mucous membranes moist, no oropharyngeal lesions  Resp: Lungs CTAB  Cardiac: Normal rate, regular rhythm, no murmurs appreciated  Abdomen: Soft, nondistended, nontender, +BS  Neuro: CNII-XII grossly intact  Psych: appropriate mood & affect  Skin: warm, dry. Red rash on palms and back of hands, not peeling. Swelling present    Diagnostic Results   Results:  Labs:  Lab Results   Component Value Date    WBC 7.2 11/21/2024    HGB 13.5 11/21/2024    HCT  40.3 (L) 11/21/2024    MCV 96 11/21/2024     11/21/2024      Lab Results   Component Value Date    NEUTROABS 4.94 11/21/2024        Lab Results   Component Value Date    GLUCOSE 112 (H) 11/21/2024    CALCIUM 8.7 11/21/2024     11/21/2024    K 3.8 11/21/2024    CO2 22 11/21/2024     (H) 11/21/2024    BUN 28 (H) 11/21/2024    CREATININE 1.69 (H) 11/21/2024    MG 1.91 10/28/2024     Lab Results   Component Value Date    ALT 11 11/21/2024    AST 11 11/21/2024    ALKPHOS 69 11/21/2024    BILITOT 0.3 11/21/2024    BILIDIR 0.1 12/21/2023      Lab Results   Component Value Date    ACTH 14.7 10/28/2024    CORTISOL 5.0 11/21/2024    TSH 1.69 11/21/2024    FREET4 1.07 08/09/2024     CT chest/abdomen/pelvis on 06/10/2024:    IMPRESSION:  1. Mild acute diverticulitis of the mid sigmoid colon with mild  pericolonic fat stranding and focal area of micro perforation versus  prominent gas-filled diverticula. No pericolonic fluid collection.  Findings are seen superimposed on chronic diverticulitis.      2. Interval improvement in metastatic lymphadenopathy within the  mediastinum as well as previously measured right hilar lesion with  only subtle residual nodularity demonstrated.      3. Subtle ill-defined hypodensity within the right lobe of the liver  not significantly changed from the prior examination with focal  heterogeneity nonspecific.    Assessment/Plan   Small cell carcinoma of lung, Clinical: Stage FREDRICK (cT3, cN2, pM1b)  Mr. Dane Molina is a 63 YO with ES-SCLC seen for follow up. He is s/p 4 cycles platinum doublet with addition of atezo after c1 as well as consolidative RT. He has had 1 dose atezo which was held for grade III ICI dermatitis which is now grade I only on palms/hands.    We resumed atezo and he tolerated well with topical clobetasol. Most recent scans 11/15/24 personally reviewed showing no obvious metastatic disease. MRI brain without metastatic disease.     Will continue atezolizumab.  Not currently requiring clobetasol, only topical creams. Follow on 1/23 with scans.    #T3 pN2 pM1b small cell lung cancer of the RLL - extensive stage.  -S/p 4 cycles carboplatin/etoposide + atezolizumab added to cycle 2  -S/p 1 dose atezo c/b ICI dermatitis  -S/P TRT (45 Gy in 15 fx per Dr. Conde)   -Treat with atezolizumab c5 on 12/12, cycle 6 on 1/2/25 (no provider visit) and follow on 1/23 with CT CAP.    #Grade I ICI dermatitis - resolved  -Was initially grade III now downgraded after PO steroid taper  -Completed Clobetasol 0.5% ointment BID x 14d. Now using cocoa butter PRN     #CNS monitoring   -MRI on 07/06/24 shows no ICM   -Repeat every 3 months - next 02/25    Nicolas Ospina MD  Thoracic Medical Oncology   [address] [place] [address]  Phone: [phone]

## 2024-12-10 ENCOUNTER — APPOINTMENT (OUTPATIENT)
Dept: HEMATOLOGY/ONCOLOGY | Facility: HOSPITAL | Age: 64
End: 2024-12-10
Payer: MEDICARE

## 2024-12-10 DIAGNOSIS — C34.00 SMALL CELL CARCINOMA OF HILUM OF LUNG, UNSPECIFIED LATERALITY: Primary | ICD-10-CM

## 2024-12-10 PROCEDURE — 99443 PR PHYS/QHP TELEPHONE EVALUATION 21-30 MIN: CPT | Performed by: STUDENT IN AN ORGANIZED HEALTH CARE EDUCATION/TRAINING PROGRAM

## 2024-12-10 RX ORDER — EPINEPHRINE 0.3 MG/.3ML
0.3 INJECTION SUBCUTANEOUS EVERY 5 MIN PRN
OUTPATIENT
Start: 2025-01-24

## 2024-12-10 RX ORDER — PROCHLORPERAZINE MALEATE 10 MG
10 TABLET ORAL EVERY 6 HOURS PRN
OUTPATIENT
Start: 2025-01-24

## 2024-12-10 RX ORDER — FAMOTIDINE 10 MG/ML
20 INJECTION INTRAVENOUS ONCE AS NEEDED
OUTPATIENT
Start: 2025-01-24

## 2024-12-10 RX ORDER — ALBUTEROL SULFATE 0.83 MG/ML
3 SOLUTION RESPIRATORY (INHALATION) AS NEEDED
OUTPATIENT
Start: 2025-01-24

## 2024-12-10 RX ORDER — DIPHENHYDRAMINE HYDROCHLORIDE 50 MG/ML
50 INJECTION INTRAMUSCULAR; INTRAVENOUS AS NEEDED
OUTPATIENT
Start: 2025-01-24

## 2024-12-10 RX ORDER — PROCHLORPERAZINE EDISYLATE 5 MG/ML
10 INJECTION INTRAMUSCULAR; INTRAVENOUS EVERY 6 HOURS PRN
OUTPATIENT
Start: 2025-01-24

## 2024-12-11 ENCOUNTER — TELEPHONE (OUTPATIENT)
Dept: HEMATOLOGY/ONCOLOGY | Facility: HOSPITAL | Age: 64
End: 2024-12-11
Payer: MEDICARE

## 2024-12-11 RX ORDER — SILDENAFIL 50 MG/1
TABLET, FILM COATED ORAL
Qty: 10 TABLET | Refills: 0 | OUTPATIENT
Start: 2024-12-11

## 2024-12-11 NOTE — TELEPHONE ENCOUNTER
Called patient because he spoke to one of the [facility] schedulers. [facility]. Patient will be seeing Dr. Ospina in [place]. Patient is a thoracic patient. Dr. Ospina sees lung diagnosis patients in [place]. Carissa stated that he understood. He will see Dr. Ospina in [place].

## 2024-12-12 ENCOUNTER — LAB (OUTPATIENT)
Dept: LAB | Facility: HOSPITAL | Age: 64
End: 2024-12-12
Payer: MEDICARE

## 2024-12-12 ENCOUNTER — INFUSION (OUTPATIENT)
Dept: HEMATOLOGY/ONCOLOGY | Facility: HOSPITAL | Age: 64
End: 2024-12-12
Payer: MEDICARE

## 2024-12-12 ENCOUNTER — APPOINTMENT (OUTPATIENT)
Dept: HEMATOLOGY/ONCOLOGY | Facility: HOSPITAL | Age: 64
End: 2024-12-12
Payer: MEDICARE

## 2024-12-12 VITALS
HEART RATE: 98 BPM | SYSTOLIC BLOOD PRESSURE: 120 MMHG | RESPIRATION RATE: 18 BRPM | BODY MASS INDEX: 31.93 KG/M2 | WEIGHT: 235.45 LBS | TEMPERATURE: 99.3 F | OXYGEN SATURATION: 98 % | DIASTOLIC BLOOD PRESSURE: 75 MMHG

## 2024-12-12 DIAGNOSIS — C34.90 SMALL CELL CARCINOMA OF LUNG, UNSPECIFIED LATERALITY, UNSPECIFIED PART OF LUNG: Primary | ICD-10-CM

## 2024-12-12 DIAGNOSIS — C34.90 SMALL CELL CARCINOMA OF LUNG, UNSPECIFIED LATERALITY, UNSPECIFIED PART OF LUNG: ICD-10-CM

## 2024-12-12 DIAGNOSIS — I10 ESSENTIAL (PRIMARY) HYPERTENSION: ICD-10-CM

## 2024-12-12 DIAGNOSIS — C34.00 SMALL CELL CARCINOMA OF HILUM OF LUNG, UNSPECIFIED LATERALITY: ICD-10-CM

## 2024-12-12 LAB
ALBUMIN SERPL BCP-MCNC: 4.5 G/DL (ref 3.4–5)
ALP SERPL-CCNC: 62 U/L (ref 33–136)
ALT SERPL W P-5'-P-CCNC: 10 U/L (ref 10–52)
ANION GAP SERPL CALC-SCNC: 12 MMOL/L (ref 10–20)
AST SERPL W P-5'-P-CCNC: 10 U/L (ref 9–39)
BASOPHILS # BLD AUTO: 0.03 X10*3/UL (ref 0–0.1)
BASOPHILS NFR BLD AUTO: 0.4 %
BILIRUB SERPL-MCNC: 0.6 MG/DL (ref 0–1.2)
BUN SERPL-MCNC: 25 MG/DL (ref 6–23)
CALCIUM SERPL-MCNC: 9.5 MG/DL (ref 8.6–10.3)
CHLORIDE SERPL-SCNC: 103 MMOL/L (ref 98–107)
CO2 SERPL-SCNC: 30 MMOL/L (ref 21–32)
CREAT SERPL-MCNC: 1.78 MG/DL (ref 0.5–1.3)
EGFRCR SERPLBLD CKD-EPI 2021: 42 ML/MIN/1.73M*2
EOSINOPHIL # BLD AUTO: 0.09 X10*3/UL (ref 0–0.7)
EOSINOPHIL NFR BLD AUTO: 1.1 %
ERYTHROCYTE [DISTWIDTH] IN BLOOD BY AUTOMATED COUNT: 14.7 % (ref 11.5–14.5)
GLUCOSE SERPL-MCNC: 85 MG/DL (ref 74–99)
HCT VFR BLD AUTO: 40.2 % (ref 41–52)
HGB BLD-MCNC: 13.4 G/DL (ref 13.5–17.5)
IMM GRANULOCYTES # BLD AUTO: 0.03 X10*3/UL (ref 0–0.7)
IMM GRANULOCYTES NFR BLD AUTO: 0.4 % (ref 0–0.9)
LYMPHOCYTES # BLD AUTO: 1.23 X10*3/UL (ref 1.2–4.8)
LYMPHOCYTES NFR BLD AUTO: 15.5 %
MCH RBC QN AUTO: 32.5 PG (ref 26–34)
MCHC RBC AUTO-ENTMCNC: 33.3 G/DL (ref 32–36)
MCV RBC AUTO: 98 FL (ref 80–100)
MONOCYTES # BLD AUTO: 1.35 X10*3/UL (ref 0.1–1)
MONOCYTES NFR BLD AUTO: 17 %
NEUTROPHILS # BLD AUTO: 5.22 X10*3/UL (ref 1.2–7.7)
NEUTROPHILS NFR BLD AUTO: 65.6 %
NRBC BLD-RTO: 0 /100 WBCS (ref 0–0)
PLATELET # BLD AUTO: 192 X10*3/UL (ref 150–450)
POTASSIUM SERPL-SCNC: 3.7 MMOL/L (ref 3.5–5.3)
PROT SERPL-MCNC: 7.7 G/DL (ref 6.4–8.2)
RBC # BLD AUTO: 4.12 X10*6/UL (ref 4.5–5.9)
SODIUM SERPL-SCNC: 141 MMOL/L (ref 136–145)
TSH SERPL-ACNC: 0.53 MIU/L (ref 0.44–3.98)
WBC # BLD AUTO: 8 X10*3/UL (ref 4.4–11.3)

## 2024-12-12 PROCEDURE — 85025 COMPLETE CBC W/AUTO DIFF WBC: CPT

## 2024-12-12 PROCEDURE — 84443 ASSAY THYROID STIM HORMONE: CPT

## 2024-12-12 PROCEDURE — 84075 ASSAY ALKALINE PHOSPHATASE: CPT

## 2024-12-12 PROCEDURE — 2500000004 HC RX 250 GENERAL PHARMACY W/ HCPCS (ALT 636 FOR OP/ED): Mod: JZ,JG | Performed by: STUDENT IN AN ORGANIZED HEALTH CARE EDUCATION/TRAINING PROGRAM

## 2024-12-12 PROCEDURE — 36415 COLL VENOUS BLD VENIPUNCTURE: CPT

## 2024-12-12 PROCEDURE — 96413 CHEMO IV INFUSION 1 HR: CPT

## 2024-12-12 RX ORDER — HEPARIN SODIUM,PORCINE/PF 10 UNIT/ML
50 SYRINGE (ML) INTRAVENOUS AS NEEDED
OUTPATIENT
Start: 2024-12-12

## 2024-12-12 RX ORDER — HEPARIN 100 UNIT/ML
500 SYRINGE INTRAVENOUS AS NEEDED
OUTPATIENT
Start: 2024-12-12

## 2024-12-12 NOTE — PROGRESS NOTES
Patient presents for Q21 days atezolizumab maintenance. Received ordered infusion without incidence and tolerated well. PIV removed and patient ambulated off the unit independently in stable condtion and is aware of upcoming provider appt with Dr. Ospina @ [facility].

## 2025-01-02 ENCOUNTER — APPOINTMENT (OUTPATIENT)
Dept: HEMATOLOGY/ONCOLOGY | Facility: HOSPITAL | Age: 65
End: 2025-01-02
Payer: MEDICARE

## 2025-01-02 ENCOUNTER — INFUSION (OUTPATIENT)
Dept: HEMATOLOGY/ONCOLOGY | Facility: HOSPITAL | Age: 65
End: 2025-01-02
Payer: MEDICAID

## 2025-01-02 ENCOUNTER — LAB (OUTPATIENT)
Dept: LAB | Facility: HOSPITAL | Age: 65
End: 2025-01-02
Payer: MEDICAID

## 2025-01-02 VITALS
DIASTOLIC BLOOD PRESSURE: 85 MMHG | BODY MASS INDEX: 32.89 KG/M2 | TEMPERATURE: 97.9 F | SYSTOLIC BLOOD PRESSURE: 147 MMHG | WEIGHT: 242.51 LBS | OXYGEN SATURATION: 98 % | HEART RATE: 79 BPM | RESPIRATION RATE: 18 BRPM

## 2025-01-02 DIAGNOSIS — C34.00 SMALL CELL CARCINOMA OF HILUM OF LUNG, UNSPECIFIED LATERALITY: ICD-10-CM

## 2025-01-02 LAB
ALBUMIN SERPL BCP-MCNC: 4.3 G/DL (ref 3.4–5)
ALP SERPL-CCNC: 76 U/L (ref 33–136)
ALT SERPL W P-5'-P-CCNC: 20 U/L (ref 10–52)
ANION GAP SERPL CALC-SCNC: 13 MMOL/L (ref 10–20)
AST SERPL W P-5'-P-CCNC: 16 U/L (ref 9–39)
BASOPHILS # BLD AUTO: 0.03 X10*3/UL (ref 0–0.1)
BASOPHILS NFR BLD AUTO: 0.5 %
BILIRUB SERPL-MCNC: 0.3 MG/DL (ref 0–1.2)
BUN SERPL-MCNC: 26 MG/DL (ref 6–23)
CALCIUM SERPL-MCNC: 9.2 MG/DL (ref 8.6–10.3)
CHLORIDE SERPL-SCNC: 102 MMOL/L (ref 98–107)
CO2 SERPL-SCNC: 27 MMOL/L (ref 21–32)
CORTIS AM PEAK SERPL-MSCNC: 6.4 UG/DL (ref 5–20)
CREAT SERPL-MCNC: 1.73 MG/DL (ref 0.5–1.3)
EGFRCR SERPLBLD CKD-EPI 2021: 44 ML/MIN/1.73M*2
EOSINOPHIL # BLD AUTO: 0.15 X10*3/UL (ref 0–0.7)
EOSINOPHIL NFR BLD AUTO: 2.4 %
ERYTHROCYTE [DISTWIDTH] IN BLOOD BY AUTOMATED COUNT: 14.8 % (ref 11.5–14.5)
GLUCOSE SERPL-MCNC: 96 MG/DL (ref 74–99)
HCT VFR BLD AUTO: 39.9 % (ref 41–52)
HGB BLD-MCNC: 13.4 G/DL (ref 13.5–17.5)
IMM GRANULOCYTES # BLD AUTO: 0.03 X10*3/UL (ref 0–0.7)
IMM GRANULOCYTES NFR BLD AUTO: 0.5 % (ref 0–0.9)
LYMPHOCYTES # BLD AUTO: 1.22 X10*3/UL (ref 1.2–4.8)
LYMPHOCYTES NFR BLD AUTO: 19.8 %
MCH RBC QN AUTO: 32.7 PG (ref 26–34)
MCHC RBC AUTO-ENTMCNC: 33.6 G/DL (ref 32–36)
MCV RBC AUTO: 97 FL (ref 80–100)
MONOCYTES # BLD AUTO: 1.24 X10*3/UL (ref 0.1–1)
MONOCYTES NFR BLD AUTO: 20.1 %
NEUTROPHILS # BLD AUTO: 3.5 X10*3/UL (ref 1.2–7.7)
NEUTROPHILS NFR BLD AUTO: 56.7 %
NRBC BLD-RTO: 0 /100 WBCS (ref 0–0)
PLATELET # BLD AUTO: 218 X10*3/UL (ref 150–450)
POTASSIUM SERPL-SCNC: 3.8 MMOL/L (ref 3.5–5.3)
PROT SERPL-MCNC: 7 G/DL (ref 6.4–8.2)
RBC # BLD AUTO: 4.1 X10*6/UL (ref 4.5–5.9)
SODIUM SERPL-SCNC: 138 MMOL/L (ref 136–145)
TSH SERPL-ACNC: 1.16 MIU/L (ref 0.44–3.98)
WBC # BLD AUTO: 6.2 X10*3/UL (ref 4.4–11.3)

## 2025-01-02 PROCEDURE — 84443 ASSAY THYROID STIM HORMONE: CPT

## 2025-01-02 PROCEDURE — 85025 COMPLETE CBC W/AUTO DIFF WBC: CPT

## 2025-01-02 PROCEDURE — 36415 COLL VENOUS BLD VENIPUNCTURE: CPT

## 2025-01-02 PROCEDURE — 80053 COMPREHEN METABOLIC PANEL: CPT

## 2025-01-02 PROCEDURE — 2500000004 HC RX 250 GENERAL PHARMACY W/ HCPCS (ALT 636 FOR OP/ED): Mod: SE | Performed by: STUDENT IN AN ORGANIZED HEALTH CARE EDUCATION/TRAINING PROGRAM

## 2025-01-02 PROCEDURE — 96413 CHEMO IV INFUSION 1 HR: CPT

## 2025-01-02 PROCEDURE — 82533 TOTAL CORTISOL: CPT

## 2025-01-02 RX ORDER — DIPHENHYDRAMINE HYDROCHLORIDE 50 MG/ML
50 INJECTION INTRAMUSCULAR; INTRAVENOUS AS NEEDED
Status: DISCONTINUED | OUTPATIENT
Start: 2025-01-02 | End: 2025-01-02 | Stop reason: HOSPADM

## 2025-01-02 RX ORDER — ALBUTEROL SULFATE 0.83 MG/ML
3 SOLUTION RESPIRATORY (INHALATION) AS NEEDED
Status: DISCONTINUED | OUTPATIENT
Start: 2025-01-02 | End: 2025-01-02 | Stop reason: HOSPADM

## 2025-01-02 RX ORDER — EPINEPHRINE 0.3 MG/.3ML
0.3 INJECTION SUBCUTANEOUS EVERY 5 MIN PRN
Status: DISCONTINUED | OUTPATIENT
Start: 2025-01-02 | End: 2025-01-02 | Stop reason: HOSPADM

## 2025-01-02 RX ORDER — FAMOTIDINE 10 MG/ML
20 INJECTION INTRAVENOUS ONCE AS NEEDED
Status: DISCONTINUED | OUTPATIENT
Start: 2025-01-02 | End: 2025-01-02 | Stop reason: HOSPADM

## 2025-01-02 RX ADMIN — ATEZOLIZUMAB 1200 MG: 1200 INJECTION, SOLUTION INTRAVENOUS at 16:17

## 2025-01-02 ASSESSMENT — PAIN SCALES - GENERAL: PAINLEVEL_OUTOF10: 0-NO PAIN

## 2025-01-17 ENCOUNTER — HOSPITAL ENCOUNTER (OUTPATIENT)
Dept: RADIOLOGY | Facility: CLINIC | Age: 65
Discharge: HOME | End: 2025-01-17
Payer: MEDICAID

## 2025-01-17 DIAGNOSIS — C34.00 SMALL CELL CARCINOMA OF HILUM OF LUNG, UNSPECIFIED LATERALITY: ICD-10-CM

## 2025-01-17 PROCEDURE — 74177 CT ABD & PELVIS W/CONTRAST: CPT

## 2025-01-17 PROCEDURE — 2550000001 HC RX 255 CONTRASTS: Mod: SE | Performed by: STUDENT IN AN ORGANIZED HEALTH CARE EDUCATION/TRAINING PROGRAM

## 2025-01-17 RX ADMIN — IOHEXOL 75 ML: 350 INJECTION, SOLUTION INTRAVENOUS at 17:59

## 2025-01-20 NOTE — PROGRESS NOTES
Patient ID: Dane Molian is a 64 y.o. male    Primary Care Provider: Darwin Early MD    DIAGNOSIS AND STAGING  cT3 pN2 pM1b small cell lung cancer (INSM1 and TTF1+) of the right lower lobe, diagnosed on 02/05/2024 through bronchoscopy     SITES OF DISEASE  Right lower lobe   Levels 4R and 7 nodes   Liver, confirmed by biopsy      MOLECULAR GENOMICS  Not performed   RB loss by IHC      PRIOR THERAPIES  03/19/2024-05/20/2024: 4 cycles carboplatin/etoposide with addition of atezolizumab to C2 on 04/08/2024 08/09/2024: Completion 45 cGy/15f to chest by Dr. Conde     CURRENT THERAPY  Maintenance atezolizumab since 06/21/24    CURRENT ONCOLOGICAL PROBLEMS  Grade 3 immune related rash now grade I    HISTORY OF PRESENT ILLNESS  Patient presented to the emergency room on 01/04/2023 with a hypertensive urgency and cough.  He has a history of noncompliance with antihypertensive medications.  He also complained of dyspnea and underwent a CT chest without IV contrast that demonstrated a right lower lobe paramediastinal mass measuring 3.1 cm with adjacent pulmonary nodule measuring 1.8 cm.  Mediastinal lymphadenopathy was demonstrated, including a subcarinal node measuring 1.7 cm.  There was right hilar lymphadenopathy, 2 cm in short axis.  On 01/15/2024 the patient underwent a PET scan that demonstrated avidity of the aforementioned masses/lymph nodes, as well as a lesion in the liver, SUV max 3.3, right hepatic lobe.  On 02/05/2024, patient underwent a bronchoscopy:  A. LYMPH NODE 4 R PULMONARY FINE NEEDLE ASPIRATION , CYTOLOGY AND CELL BLOCK:    Positive for malignant cells   Metastatic small cell carcinoma, see note  B. LYMPH NODE 7 PULMONARY FINE NEEDLE ASPIRATION , CYTOLOGY AND CELL BLOCK:    Positive for malignant cells  Metastatic small cell carcinoma, see note          Note: Immunostains demonstrate the lesional cells to be diffusely positive for CAM 5.2, INSM1, TTF-1.  The proliferative marker Ki-67 is  greater than 90%.  Immunostain for retinoblastoma shows loss of staining.  Immunostain for p40 is negative.  The morphology and immunohistochemical profile are consistent with the above diagnosis.  On 02/29/24 the pt is seen by med onc for the first time. Denies any systemic sx - denies lack of appetite, fatigue or unintentional weight loss. Denies new localized pain, headaches or neuro sx.  Denies new respiratory sx.  He is claustrophobic and MRI brain was scheduled for today but pt could not go through with the test. He also has mild CKD, which limits his ability to receive intravenous contrast for CT.   03/04/24: MRI brain shows no ICMA, but MRI liver shows a 1.9 cm hepatic lesion with T2 hyperintensity and peripheral enhancement   03/19/2024: C1 D1 carboplatin/etoposide  03/22/24: Liver biopsy:  FINAL DIAGNOSIS   A. RIGHT LIVER MASS BIOPSY:      -- LIVER TISSUE INVOLVED BY SMALL CELL CARCINOMA, SEE NOTE     Note: Clinical history of lung small cell carcinoma noted. The findings most likely represent metastasis from the known primary lung tumor.     04/04/24: discussed with patient liver biopsy results and recommendations to add atezolizumab to his regimen  He has met with radiation oncology, and we will consider consolidative TRT pending response to cytotoxic chemotherapy    04/08/2024: C2 D1 carboplatin/etoposide/atezolizumab    05/20/24: C4 D1 carboplatin/etoposide/atezolizumab    06/10/2024: CT chest/abdomen/pelvis demonstrating continue his improvement of metastatic lymphadenopathy within the mediastinum/right hilum, with no progression within the liver    06/13/2024: Referral for radiation oncology/TRT.  Patient to proceed with maintenance atezolizumab    06/21/24: begins maintenance atezolizumab     08/09/24: completes 45 Gy in 15 fractions to chest (To Conde)     08/08/24: prescribed prednisone 1 mg/Kg for grade 3 IO-induced rash with peeling of hands     08/29/24: last steroid taken last week but  did not complete full prescription. Rash on chest/abdomen improved but has some peeling of the hands/arms. Will have patient take remaining prednisone and follow up in 3 weeks       PAST MEDICAL HISTORY  CKD - creatinine 1.68  Neck pain (injury at work)   Hypertension  Iron deficiency anemia (hx of blood transfusion in the 90's)    SURGICAL HISTORY  Ankle fracture and has a latoya in place      SOCIAL HISTORY  Former 35-40 pack-year smoker, quit at age 57  Smokes cigars now   History of alcohol addiction, sober for the past 15 years   Has one chlid (Roula)  Single      FAMILY HISTORY  Lung CA (mother)  Mother had CKD and required HD    CURRENT MEDS REVIEWED       ALLERGIES REVIEWED        SUBJECTIVE:   Patient doing well today. He saw mychart and is anxious about his scans showing progression in liver and new lung nodules. Otherwise without somatic complaint.    A 13 point review of systems was performed, with significant findings documented above in subjective history.    OBJECTIVE:  Vitals:    01/22/25 1531   BP: 122/79   Pulse: 97   Resp: 18   Temp: 37.6 °C (99.7 °F)   SpO2: 92%     Body surface area is 2.34 meters squared.     Wt Readings from Last 5 Encounters:   01/22/25 108 kg (239 lb)   01/02/25 110 kg (242 lb 8.1 oz)   12/12/24 107 kg (235 lb 7.2 oz)   11/21/24 106 kg (234 lb 5.6 oz)   10/30/24 108 kg (238 lb 8.6 oz)     ECOGSCORE: 1- Restricted in physically strenuous activity.  Carries out light duty.  Gen: A&O, NAD  Head: Normocephalic, atraumatic  Eyes: no scleral icterus  ENT: mucous membranes moist, no oropharyngeal lesions  Resp: Lungs CTAB  Cardiac: Normal rate, regular rhythm, no murmurs appreciated  Abdomen: Soft, nondistended, nontender, +BS  Neuro: CNII-XII grossly intact  Psych: appropriate mood & affect    Diagnostic Results   Results:  Labs:  Lab Results   Component Value Date    WBC 6.2 01/02/2025    HGB 13.4 (L) 01/02/2025    HCT 39.9 (L) 01/02/2025    MCV 97 01/02/2025     01/02/2025       Lab Results   Component Value Date    NEUTROABS 3.50 01/02/2025        Lab Results   Component Value Date    GLUCOSE 96 01/02/2025    CALCIUM 9.2 01/02/2025     01/02/2025    K 3.8 01/02/2025    CO2 27 01/02/2025     01/02/2025    BUN 26 (H) 01/02/2025    CREATININE 1.73 (H) 01/02/2025    MG 1.91 10/28/2024     Lab Results   Component Value Date    ALT 20 01/02/2025    AST 16 01/02/2025    ALKPHOS 76 01/02/2025    BILITOT 0.3 01/02/2025    BILIDIR 0.1 12/21/2023      Lab Results   Component Value Date    ACTH 14.7 10/28/2024    CORTISOL 6.4 01/02/2025    TSH 1.16 01/02/2025    FREET4 1.07 08/09/2024     CT chest/abdomen/pelvis on 01/17/2025:    IMPRESSION:  Lung cancer restaging scan. When compared to the prior examination  dated 11/15/2024, there has been interval development of several new  pulmonary nodules of the right lower lobe and a hepatic lesion  concerning for metastatic disease, further evaluation with PET-CT is  recommended. Additional stable chronic and incidental findings  described above.    Assessment/Plan   Small cell carcinoma of lung, Clinical: Stage FREDRICK (cT3, cN2, pM1b)  Mr. Dane Molina is a 63 YO with ES-SCLC seen for follow up. He is s/p 4 cycles platinum doublet with addition of atezo after c1 as well as consolidative RT. He has had 1 dose atezo which was held for grade III ICI dermatitis which is now grade I only on palms/hands.    We resumed atezo and he tolerated well with topical clobetasol. Most recent scans 11/15/24 personally reviewed showing no obvious metastatic disease. MRI brain without metastatic disease.     Unfortunately with presumed progression in liver. We discussed the treatment options in second line including [facility] clinical trial and standard of care tarlatamab. He is interested in pursuing tarlatamab therapy and we have signed consents. Will proceed in 2 weeks time.    #T3 pN2 pM1b small cell lung cancer of the RLL - extensive stage.  -S/p 4 cycles  carboplatin/etoposide + atezolizumab added to cycle 2  -S/p 1 dose atezo c/b ICI dermatitis  -S/P TRT (45 Gy in 15 fx per Dr. Conde)   -PET Stat to confirm liver avidity  -Consented for tarlatamab to commence on 2/4. All questions answered    #Grade I ICI dermatitis - resolved  -Was initially grade III now downgraded after PO steroid taper  -Completed Clobetasol 0.5% ointment BID x 14d. Now using cocoa butter PRN     #CNS monitoring   -MRI on 07/06/24 shows no ICM   -Repeat every 3 months - next 02/25    Nicolas Ospina MD  Thoracic Medical Oncology   [id number] [place] [address]  Phone: [phone]

## 2025-01-22 ENCOUNTER — OFFICE VISIT (OUTPATIENT)
Dept: HEMATOLOGY/ONCOLOGY | Facility: CLINIC | Age: 65
End: 2025-01-22
Payer: MEDICAID

## 2025-01-22 VITALS
HEART RATE: 97 BPM | RESPIRATION RATE: 18 BRPM | DIASTOLIC BLOOD PRESSURE: 79 MMHG | OXYGEN SATURATION: 92 % | TEMPERATURE: 99.7 F | BODY MASS INDEX: 32.41 KG/M2 | SYSTOLIC BLOOD PRESSURE: 122 MMHG | WEIGHT: 239 LBS

## 2025-01-22 DIAGNOSIS — C34.00 SMALL CELL CARCINOMA OF HILUM OF LUNG, UNSPECIFIED LATERALITY: Primary | ICD-10-CM

## 2025-01-22 PROCEDURE — 3078F DIAST BP <80 MM HG: CPT | Performed by: STUDENT IN AN ORGANIZED HEALTH CARE EDUCATION/TRAINING PROGRAM

## 2025-01-22 PROCEDURE — 3074F SYST BP LT 130 MM HG: CPT | Performed by: STUDENT IN AN ORGANIZED HEALTH CARE EDUCATION/TRAINING PROGRAM

## 2025-01-22 PROCEDURE — 99215 OFFICE O/P EST HI 40 MIN: CPT | Performed by: STUDENT IN AN ORGANIZED HEALTH CARE EDUCATION/TRAINING PROGRAM

## 2025-01-22 RX ORDER — DEXAMETHASONE 4 MG/1
8 TABLET ORAL DAILY PRN
Qty: 10 TABLET | Refills: 0 | Status: SHIPPED | OUTPATIENT
Start: 2025-01-22

## 2025-01-22 RX ORDER — PROCHLORPERAZINE MALEATE 10 MG
10 TABLET ORAL EVERY 6 HOURS PRN
OUTPATIENT
Start: 2025-02-11

## 2025-01-22 RX ORDER — PROCHLORPERAZINE MALEATE 10 MG
10 TABLET ORAL EVERY 6 HOURS PRN
OUTPATIENT
Start: 2025-02-04

## 2025-01-22 RX ORDER — PROCHLORPERAZINE MALEATE 10 MG
10 TABLET ORAL EVERY 6 HOURS PRN
Qty: 30 TABLET | Refills: 5 | Status: SHIPPED | OUTPATIENT
Start: 2025-01-22

## 2025-01-22 RX ORDER — FAMOTIDINE 10 MG/ML
20 INJECTION INTRAVENOUS ONCE AS NEEDED
OUTPATIENT
Start: 2025-02-11

## 2025-01-22 RX ORDER — DIPHENHYDRAMINE HYDROCHLORIDE 50 MG/ML
50 INJECTION INTRAMUSCULAR; INTRAVENOUS AS NEEDED
OUTPATIENT
Start: 2025-02-04

## 2025-01-22 RX ORDER — EPINEPHRINE 0.3 MG/.3ML
0.3 INJECTION SUBCUTANEOUS EVERY 5 MIN PRN
OUTPATIENT
Start: 2025-02-11

## 2025-01-22 RX ORDER — DIPHENHYDRAMINE HYDROCHLORIDE 50 MG/ML
50 INJECTION INTRAMUSCULAR; INTRAVENOUS AS NEEDED
OUTPATIENT
Start: 2025-02-11

## 2025-01-22 RX ORDER — FAMOTIDINE 10 MG/ML
20 INJECTION INTRAVENOUS ONCE AS NEEDED
OUTPATIENT
Start: 2025-02-18

## 2025-01-22 RX ORDER — PROCHLORPERAZINE EDISYLATE 5 MG/ML
10 INJECTION INTRAMUSCULAR; INTRAVENOUS EVERY 6 HOURS PRN
OUTPATIENT
Start: 2025-02-04

## 2025-01-22 RX ORDER — ALBUTEROL SULFATE 0.83 MG/ML
3 SOLUTION RESPIRATORY (INHALATION) AS NEEDED
OUTPATIENT
Start: 2025-02-18

## 2025-01-22 RX ORDER — EPINEPHRINE 0.3 MG/.3ML
0.3 INJECTION SUBCUTANEOUS EVERY 5 MIN PRN
OUTPATIENT
Start: 2025-02-04

## 2025-01-22 RX ORDER — DIPHENHYDRAMINE HYDROCHLORIDE 50 MG/ML
50 INJECTION INTRAMUSCULAR; INTRAVENOUS AS NEEDED
OUTPATIENT
Start: 2025-02-18

## 2025-01-22 RX ORDER — EPINEPHRINE 0.3 MG/.3ML
0.3 INJECTION SUBCUTANEOUS EVERY 5 MIN PRN
OUTPATIENT
Start: 2025-02-18

## 2025-01-22 RX ORDER — PROCHLORPERAZINE EDISYLATE 5 MG/ML
10 INJECTION INTRAMUSCULAR; INTRAVENOUS EVERY 6 HOURS PRN
OUTPATIENT
Start: 2025-02-11

## 2025-01-22 RX ORDER — PROCHLORPERAZINE MALEATE 10 MG
10 TABLET ORAL EVERY 6 HOURS PRN
OUTPATIENT
Start: 2025-02-18

## 2025-01-22 RX ORDER — FAMOTIDINE 10 MG/ML
20 INJECTION INTRAVENOUS ONCE AS NEEDED
OUTPATIENT
Start: 2025-02-04

## 2025-01-22 RX ORDER — PROCHLORPERAZINE EDISYLATE 5 MG/ML
10 INJECTION INTRAMUSCULAR; INTRAVENOUS EVERY 6 HOURS PRN
OUTPATIENT
Start: 2025-02-18

## 2025-01-22 RX ORDER — ALBUTEROL SULFATE 0.83 MG/ML
3 SOLUTION RESPIRATORY (INHALATION) AS NEEDED
OUTPATIENT
Start: 2025-02-04

## 2025-01-22 RX ORDER — ALBUTEROL SULFATE 0.83 MG/ML
3 SOLUTION RESPIRATORY (INHALATION) AS NEEDED
OUTPATIENT
Start: 2025-02-11

## 2025-01-22 ASSESSMENT — PAIN SCALES - GENERAL: PAINLEVEL_OUTOF10: 0-NO PAIN

## 2025-01-23 ENCOUNTER — HOSPITAL ENCOUNTER (OUTPATIENT)
Dept: RADIATION ONCOLOGY | Facility: HOSPITAL | Age: 65
Setting detail: RADIATION/ONCOLOGY SERIES
Discharge: HOME | End: 2025-01-23
Payer: MEDICAID

## 2025-01-23 ENCOUNTER — TELEPHONE (OUTPATIENT)
Dept: HEMATOLOGY/ONCOLOGY | Facility: HOSPITAL | Age: 65
End: 2025-01-23

## 2025-01-23 ENCOUNTER — PATIENT MESSAGE (OUTPATIENT)
Dept: HEMATOLOGY/ONCOLOGY | Facility: CLINIC | Age: 65
End: 2025-01-23
Payer: MEDICARE

## 2025-01-23 DIAGNOSIS — C34.90 SMALL CELL CARCINOMA OF LUNG: ICD-10-CM

## 2025-01-23 DIAGNOSIS — C34.81 SMALL CELL CARCINOMA OF OVERLAPPING SITES OF RIGHT LUNG: Primary | ICD-10-CM

## 2025-01-23 PROCEDURE — 99214 OFFICE O/P EST MOD 30 MIN: CPT | Mod: 95 | Performed by: NURSE PRACTITIONER

## 2025-01-23 PROCEDURE — 99214 OFFICE O/P EST MOD 30 MIN: CPT | Performed by: NURSE PRACTITIONER

## 2025-01-23 PROCEDURE — G2211 COMPLEX E/M VISIT ADD ON: HCPCS | Performed by: NURSE PRACTITIONER

## 2025-01-23 ASSESSMENT — ENCOUNTER SYMPTOMS
CHILLS: 0
PSYCHIATRIC NEGATIVE: 1
DIAPHORESIS: 0
APPETITE CHANGE: 0
MUSCULOSKELETAL NEGATIVE: 1
CHEST TIGHTNESS: 0
WHEEZING: 0
FATIGUE: 0
CARDIOVASCULAR NEGATIVE: 1
SHORTNESS OF BREATH: 0
COUGH: 1
APNEA: 0
FEVER: 0
CHOKING: 0
HEMATOLOGIC/LYMPHATIC NEGATIVE: 1
ACTIVITY CHANGE: 0
GASTROINTESTINAL NEGATIVE: 1
STRIDOR: 0
UNEXPECTED WEIGHT CHANGE: 0
NEUROLOGICAL NEGATIVE: 1

## 2025-01-23 NOTE — PROGRESS NOTES
Patient ID: [id number]       Diagnosis: 64 y.o. 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  Molecular and ancillary testing: diffusely positive for CAM 5.2, INSM1, TTF-1.  The proliferative marker Ki-67 is greater than 90%.  Rb+     His oncological work up and treatment history is as below:  01/4/24: Patient presented to the emergency room with a hypertensive urgency and cough.  He has a history of noncompliance with antihypertensive medications.  He also complained of dyspnea at this time. CT chest without IV contrast demonstrated a right lower lobe paramediastinal mass measuring 3.1 cm with adjacent pulmonary nodule measuring 1.8 cm.  Mediastinal lymphadenopathy was demonstrated, including a subcarinal node measuring 1.7 cm.  There was right hilar lymphadenopathy, 2 cm in short axis.  PET Scan 01/15/2024 demonstrated an FDG avid paramediastinal mass in the right lower lobe measuring to 3.0 cm (SUV max 6.1). Another FDG avid pulmonary lesion the right lower lobe measuring 1.9 cm, (SUV max of 3.5.) 1.0 cm lesion adjacent to the major fissure in the right lower lobe (SUV max 1.6). Bulky FDG avid right hilar nodes with SUV max of 9.6, subcarinal node (SUV max of 11.0). Right paratracheal node (SUV max of 11.0). Additonally, there was a lesion in the R hepatic lobe of the liver. (SUV max 3.3).  02/05/2024, patient underwent a bronchoscopy: 4R & 7 were + for malignant cells; diffusely positive for CAM 5.2, INSM1, TTF-1.  The proliferative marker Ki-67 is greater than 90%.    PFTs 2/19/24: FVC 71% predicted, FEV1 65% of predicted, DLCO 82% of predicted.  Brain MRI 03/04/2024, negative for mets. Liver MRI with 1.9cm indeterminate lesion.   03/07/2024: consent obtained for carboplatin/etoposide   03/18/2024: anticipating start of carbo/etoposide   03/22/24: Biopsy of Liver lesion    A. RIGHT LIVER MASS BIOPSY:      -- LIVER TISSUE INVOLVED BY SMALL CELL CARCINOMA, SEE NOTE     Note: Clinical history of lung  small cell carcinoma noted. The findings most likely represent metastasis from the known primary lung tumor.      Treatment Summary:          IMRT: Right Lung or bronchus     Treatment Period Technique Fraction Dose Fractions Total Dose   Course 1 7/22/2024-8/9/2024  (days elapsed: 18)         RtlungHilMed 7/22/2024-8/9/2024 VMAT 300 / 300 cGy 15 / 15 4500 / 4,500 cGy      PRIOR THERAPIES  Carboplatin/etoposide C1D1 on 03/19/24 with addition of atezolizumab to C2 on 04/08/2024; C4D1 on 05/20/24     CURRENT THERAPY  Maintenance atezolizumab since 06/21/24           History of presenting illness    Telehealth visit with Dane Molina. Patient is a 64 y.o. male who presents today for follow up 5 months s/p RT to right hilum/mediastinum for SCLC. Patient is doing well. Energy is not quite where he would like it but he is able to get things done. Appetite good. Weight stable. Denies changes in breathing. States he has good days and bad days. Struggles with the extreme cold. Has the occasional productive cough. Phlegm is a light yellow. Denies SOB, chest pain, back pain or fevers. No oxygen or inhaler use. He has an inhaler of needs it.  Denies any neurological symptoms. Atezolizumab was held as he completed a course of steroids for grad 3 rash. Rash resolved. States his hands are a little chapped. He resumes Atezolizumab without issues. He had a CT on 1/17/25 and follow up with Dr. Ospina. Scan shows concern for progression in Liver.    Review of systems:  Review of Systems   Constitutional:  Negative for activity change, appetite change, chills, diaphoresis, fatigue, fever and unexpected weight change.   HENT: Negative.     Respiratory:  Positive for cough. Negative for apnea, choking, chest tightness, shortness of breath, wheezing and stridor.    Cardiovascular: Negative.    Gastrointestinal: Negative.    Musculoskeletal: Negative.    Skin: Negative.    Neurological: Negative.    Hematological: Negative.     Psychiatric/Behavioral: Negative.         Past Medical history  He  has a past surgical history that includes Esophagogastroduodenoscopy; Colonoscopy; and ORIF ankle fracture (1993).          Radiology results:  CT chest abdomen pelvis w IV contrast 01/17/2025    Narrative  Interpreted By:  Prosper French and Jiang Sirui  STUDY:  CT CHEST ABDOMEN PELVIS W IV CONTRAST;  1/17/2025 5:58 pm    INDICATION:  Signs/Symptoms:SCLC.    Per clinical notes: Patient with history of small cell lung cancer  originally diagnosed in 2024 status post carboplatin/etoposide,  currently on atezolizumab.    ,C34.00 Malignant neoplasm of unspecified main bronchus (Multi)    COMPARISON:  Multiple prior CTs most recently dated 11/15/2024    ACCESSION NUMBER(S):  [id number]    ORDERING CLINICIAN:  LATISHA DANIELS    TECHNIQUE:  CT of the chest, abdomen, and pelvis was performed.  Contiguous axial  images were obtained at 3 mm slice thickness through the chest,  abdomen and pelvis. Coronal and sagittal reconstructions at 3 mm  slice thickness were performed. 75 ML of Omnipaque 350 was  administered intravenously without immediate complication.    FINDINGS:  CHEST:    LUNG/PLEURA/LARGE AIRWAYS:  Trachea and central airways are patent. No consolidation, pleural  effusion, or pneumothorax.    Interval development of several new pulmonary nodules of the right  lower lobe, largest of which measures 0.8 cm (series 202, image 148)  as well as a pleural-based nodule of the right lower lobe (series  202, image 230).    VESSELS:  Aorta and pulmonary arteries are normal caliber.  Moderate  atherosclerotic changes are noted of the aorta and branching vessels.  Moderate coronary artery calcifications are present.    HEART:  The heart is normal in size.  No pericardial effusion    MEDIASTINUM AND HARMEET:  No mediastinal, hilar or axillary lymphadenopathy is present.  The  esophagus is normal.    CHEST WALL AND LOWER NECK:  The soft tissues of the chest  wall demonstrate no gross abnormality.  The visualized thyroid gland appears within normal limits.    ABDOMEN:    LIVER:  Interval development of a ill-defined hypoattenuating lesion of  hepatic segment 8/7 measuring 4.2 x 3.9 x 4.5 cm (series 201, image  90 and series 203, image 54).    BILE DUCTS:  The intrahepatic and extrahepatic ducts are not dilated.    GALLBLADDER:  No calcified stones. No wall thickening.    PANCREAS:  Unremarkable    SPLEEN:  Unremarkable    ADRENAL GLANDS:  Unremarkable    KIDNEYS AND URETERS:  The kidneys are normal in size and enhance symmetrically.  No  hydroureteronephrosis or nephroureterolithiasis is identified.    PELVIS:    BLADDER:  The urinary bladder is decompressed, limited for evaluation.    REPRODUCTIVE ORGANS:  The prostate is nonenlarged.    BOWEL:  Mild rugal thickening in the fundus and proximal body of the stomach  which is similar to the prior examination. Otherwise, the stomach and  duodenum are unremarkable. The small and large bowel demonstrate no  abnormal bowel thickening or dilatation. Scattered colonic  diverticulosis without evidence of acute diverticulitis. The appendix  is unremarkable. Continued interval improvement of the phlegmonous  soft tissue of the site of a prior diverticulitis adjacent to the  sigmoid colon as best seen on series 201, image 170.      VESSELS:  Severe atherosclerotic calcification of the abdominal aorta without  AAA. The IVC is unremarkable. The portal venous vasculature is patent.    PERITONEUM/RETROPERITONEUM/LYMPH NODES:  No ascites or free air, no fluid collection.  No abdominopelvic  lymphadenopathy is present.    BONE AND SOFT TISSUE:  No suspicious osseous lesions are identified. Degenerative discogenic  disease is noted in the lower thoracic and lumbar spine.  The  abdominal wall soft tissues appear normal.    Impression  Lung cancer restaging scan. When compared to the prior examination  dated 11/15/2024, there has been  interval development of several new  pulmonary nodules of the right lower lobe and a hepatic lesion  concerning for metastatic disease, further evaluation with PET-CT is  recommended. Additional stable chronic and incidental findings  described above.    I personally reviewed the image(s) / study and I agree with the  findings as stated by Arley Andrea MD. This study was interpreted at  [facility], [place], [place].    MACRO:  None    Signed by: Prosper French 1/18/2025 11:04 AM  Dictation workstation:   [id number]     MR brain w and wo IV contrast 11/15/2024    Narrative  Interpreted By:  Fer Maria  and Shannan Renee  STUDY:  MR BRAIN W AND WO IV CONTRAST;  11/15/2024 4:58 pm    INDICATION:  Signs/Symptoms:SCLC.    ,C34.00 Malignant neoplasm of unspecified main bronchus (Multi)    COMPARISON:  MRI of the brain obtained July 6, 2024    ACCESSION NUMBER(S):  [id number]    ORDERING CLINICIAN:  LATISHA DANIELS    TECHNIQUE:  Axial T2, FLAIR, DWI, gradient echo T2 and precontrast T1 weighted  images of brain were acquired. Post contrast T1 weighted images were  acquired after administration of 20 ML Dotarem gadolinium based  intravenous contrast.    FINDINGS:  Midline brain structures appear intact on mid sagittal imaging. Mild  diffuse cerebral parenchymal volume loss is noted.    There is no territorial restricted diffusion to suggest acute  intracranial infarct. No suspicious intra-axial T2 signal  hypointensities noted on gradient sequencing.    No evidence of acute intracranial hemorrhage. No parenchymal mass or  mass effect. No midline shift. Gray-white differentiation is intact.  Scattered subcortical white matter FLAIR signal hyperintensities are  seen. These do not enhance. No leptomeningeal or pachymeningeal  enhancement.    7 mm hypoenhancing mass lesion to the left of midline within the  sella turcica probably extending beyond the pituitary capsule  caudally with minimal potential  involvement of the clivus left of  midline on image 42 series 101.    T2 vascular flow voids appear normal. No suspicious postcontrast  imaging of the vessels. There is no suspicious dural venous sinus  filling defect.    Uniform T2 signal hyperintensity fills the left maxillary sinus with  some involvement of the left anterior ethmoid air cells.    Normal signal and configuration of the orbits and globes.    There is no mastoid effusion. Small semilunar subcutaneous  nonenhancing mass demonstrating precontrast T1 signal hyperintensity  seen within the scalp overlying the right paramedian frontal bone  consistent with a sebaceous cyst.    Impression  1. No evidence of parenchymal metastatic deposit was identified.  2. No definite change in 7 mm hypoenhancing mass lesion to the left  of midline within the intrasellar pituitary gland probably with  minimal extension through the caudal aspect of the pituitary capsule  into the rostral clivus. Finding may reflect pituitary adenoma rather  than metastatic disease given relative stability. Please correlate  clinically and consider further follow-up exam if clinically  indicated.  3. Nonspecific white matter FLAIR signal hyperintensities most likely  representative of chronic ischemic microvascular changes.  4. Left maxillary and ethmoid paranasal sinus disease.    I personally reviewed the images/study and I agree with the findings  as stated. This study was interpreted at [facility], [place], [place].    MACRO:  None    Signed by: Fer Maria 11/18/2024 12:28 PM  Dictation workstation:   [id number]   Plan:  Assessment/Plan     64 year old male 5 months s/p completion of chemoRT for SCLC. Patient is doing well with no acute complaints related to treatment. Recent imaging showed concerned for progression in Liver. Dr. Ospina ordered STAT PET. Patient still has to schedule. Plan is to start second line therapy with Tarlatamab on 2/4/25.  Will order follow up based on patients PET CT results. Instructed to call with any questions or concerns.         I performed this visit using real- time telehealth tools , including an audio/video or telephone connection between Dane Molina  , who is in their home and Keturah Curry CNP at [facility].

## 2025-01-24 ENCOUNTER — APPOINTMENT (OUTPATIENT)
Dept: HEMATOLOGY/ONCOLOGY | Facility: CLINIC | Age: 65
End: 2025-01-24
Payer: MEDICAID

## 2025-01-24 ENCOUNTER — TELEPHONE (OUTPATIENT)
Dept: HEMATOLOGY/ONCOLOGY | Facility: CLINIC | Age: 65
End: 2025-01-24
Payer: MEDICARE

## 2025-01-24 NOTE — TELEPHONE ENCOUNTER
Called patient. He is amenable to PET scan prior to follow up on 2/4 for tarlatamab. All questions answered, he will call to schedule now.    Nicolas Ospina MD  Thoracic Medical Oncology   [address] [place], [place] [id number]  Phone: [phone]

## 2025-01-24 NOTE — TELEPHONE ENCOUNTER
Dr. Ospina called and spoke with patient.   
Patient calls with questions about scan and treatment.  He talked to the  but did not yet schedule.  I let him know that there was a scan and his treatments requested but not yet scheduled and he is asking about staying in the hospital after treatment.    He would like to talk to daiana if possible.    Please advise    Message sent   
hard copy, drawn during this pregnancy

## 2025-01-29 ENCOUNTER — TELEPHONE (OUTPATIENT)
Dept: HEMATOLOGY/ONCOLOGY | Facility: HOSPITAL | Age: 65
End: 2025-01-29

## 2025-02-04 ENCOUNTER — APPOINTMENT (OUTPATIENT)
Dept: HEMATOLOGY/ONCOLOGY | Facility: HOSPITAL | Age: 65
End: 2025-02-04
Payer: MEDICARE

## 2025-02-05 ENCOUNTER — TELEPHONE (OUTPATIENT)
Dept: HEMATOLOGY/ONCOLOGY | Facility: HOSPITAL | Age: 65
End: 2025-02-05
Payer: COMMERCIAL

## 2025-02-05 NOTE — TELEPHONE ENCOUNTER
This nurse saw that the pt isnt scheduled for an infusion. Per  note they tried to schedule pt but pt didn't understand why he was getting treated. This nurse called pt but no answer- could not leave VM due to no VM set up. Called Roula (pt daughter). Roula explained that  her father thought the provider said that he would have to stay at the hospital for his treatments. This nurse explained that the provider was telling the pt the adverse and side effects of the drug. There is a possibly that the pt could get admitted if pt does experience allergies or adverse reactions to the drug. Roula stated she understood. This nurse recommended for daughter to come with father for his appointments with medical oncology. This nurse asked Roula to talk to her father about scheduling the treatment. Roula stated she would talk to the pt. Roula stated she wanted to talk to Dr. Ospina regarding the recent PET scan. This nurse told Roual that the team will work on getting the pt scheduled for his treatment and will notify Dr. Ospina to call Roula.

## 2025-02-07 ENCOUNTER — HOSPITAL ENCOUNTER (OUTPATIENT)
Dept: RADIOLOGY | Facility: HOSPITAL | Age: 65
Discharge: HOME | End: 2025-02-07
Payer: COMMERCIAL

## 2025-02-07 ENCOUNTER — DOCUMENTATION (OUTPATIENT)
Dept: HEMATOLOGY/ONCOLOGY | Facility: HOSPITAL | Age: 65
End: 2025-02-07
Payer: COMMERCIAL

## 2025-02-07 DIAGNOSIS — C34.00 SMALL CELL CARCINOMA OF HILUM OF LUNG, UNSPECIFIED LATERALITY: ICD-10-CM

## 2025-02-07 DIAGNOSIS — C34.90 SMALL CELL CARCINOMA OF LUNG, UNSPECIFIED LATERALITY, UNSPECIFIED PART OF LUNG: Primary | ICD-10-CM

## 2025-02-07 DIAGNOSIS — C34.00 SMALL CELL CARCINOMA OF HILUM OF LUNG, UNSPECIFIED LATERALITY: Primary | ICD-10-CM

## 2025-02-07 LAB — GLUCOSE BLD MANUAL STRIP-MCNC: 106 MG/DL (ref 74–99)

## 2025-02-07 PROCEDURE — 82947 ASSAY GLUCOSE BLOOD QUANT: CPT

## 2025-02-07 PROCEDURE — 78815 PET IMAGE W/CT SKULL-THIGH: CPT | Mod: PS

## 2025-02-07 PROCEDURE — 3430000001 HC RX 343 DIAGNOSTIC RADIOPHARMACEUTICALS: Performed by: STUDENT IN AN ORGANIZED HEALTH CARE EDUCATION/TRAINING PROGRAM

## 2025-02-07 PROCEDURE — A9552 F18 FDG: HCPCS | Performed by: STUDENT IN AN ORGANIZED HEALTH CARE EDUCATION/TRAINING PROGRAM

## 2025-02-07 RX ORDER — FLUDEOXYGLUCOSE F 18 200 MCI/ML
12.34 INJECTION, SOLUTION INTRAVENOUS
Status: COMPLETED | OUTPATIENT
Start: 2025-02-07 | End: 2025-02-07

## 2025-02-07 RX ADMIN — FLUDEOXYGLUCOSE F 18 12.34 MILLICURIE: 200 INJECTION, SOLUTION INTRAVENOUS at 08:56

## 2025-02-07 NOTE — PROGRESS NOTES
Called and spoke with patient regarding upcoming appointment on Monday 2/10 for Tarlatamab.  Patient was instructed to go to lab prior to appointment with Dr Ospina and will go to infusion after seeing Dr Ospina.  Patient does understand he will be admitted to Observation status after infusion on both Day 1 and Day 8 of infusion.

## 2025-02-10 ENCOUNTER — LAB (OUTPATIENT)
Dept: LAB | Facility: HOSPITAL | Age: 65
End: 2025-02-10
Payer: COMMERCIAL

## 2025-02-10 ENCOUNTER — HOSPITAL ENCOUNTER (OUTPATIENT)
Facility: HOSPITAL | Age: 65
Setting detail: OBSERVATION
Discharge: HOME | End: 2025-02-11
Attending: INTERNAL MEDICINE
Payer: COMMERCIAL

## 2025-02-10 ENCOUNTER — INFUSION (OUTPATIENT)
Dept: HEMATOLOGY/ONCOLOGY | Facility: HOSPITAL | Age: 65
End: 2025-02-10
Payer: COMMERCIAL

## 2025-02-10 ENCOUNTER — APPOINTMENT (OUTPATIENT)
Dept: HEMATOLOGY/ONCOLOGY | Facility: HOSPITAL | Age: 65
End: 2025-02-10
Payer: COMMERCIAL

## 2025-02-10 ENCOUNTER — OFFICE VISIT (OUTPATIENT)
Dept: HEMATOLOGY/ONCOLOGY | Facility: HOSPITAL | Age: 65
End: 2025-02-10
Payer: COMMERCIAL

## 2025-02-10 VITALS
SYSTOLIC BLOOD PRESSURE: 163 MMHG | WEIGHT: 237.88 LBS | DIASTOLIC BLOOD PRESSURE: 100 MMHG | TEMPERATURE: 99 F | RESPIRATION RATE: 18 BRPM | HEART RATE: 86 BPM | BODY MASS INDEX: 32.26 KG/M2 | OXYGEN SATURATION: 96 %

## 2025-02-10 DIAGNOSIS — C34.90 SMALL CELL CARCINOMA OF LUNG, UNSPECIFIED LATERALITY, UNSPECIFIED PART OF LUNG: ICD-10-CM

## 2025-02-10 DIAGNOSIS — I25.10 CORONARY ARTERY CALCIFICATION: ICD-10-CM

## 2025-02-10 DIAGNOSIS — R06.01 ORTHOPNEA: ICD-10-CM

## 2025-02-10 DIAGNOSIS — C34.90 SMALL CELL LUNG CANCER: ICD-10-CM

## 2025-02-10 DIAGNOSIS — Z51.11 CHEMOTHERAPY MANAGEMENT, ENCOUNTER FOR: Primary | ICD-10-CM

## 2025-02-10 DIAGNOSIS — R06.00 DYSPNEA, UNSPECIFIED TYPE: ICD-10-CM

## 2025-02-10 DIAGNOSIS — I50.23 ACUTE ON CHRONIC SYSTOLIC HEART FAILURE: ICD-10-CM

## 2025-02-10 DIAGNOSIS — C34.00 SMALL CELL CARCINOMA OF HILUM OF LUNG, UNSPECIFIED LATERALITY: ICD-10-CM

## 2025-02-10 DIAGNOSIS — R21 RASH: ICD-10-CM

## 2025-02-10 DIAGNOSIS — R21 RASH AND OTHER NONSPECIFIC SKIN ERUPTION: ICD-10-CM

## 2025-02-10 DIAGNOSIS — Z72.0 TOBACCO ABUSE: ICD-10-CM

## 2025-02-10 DIAGNOSIS — R06.09 DYSPNEA ON EXERTION: ICD-10-CM

## 2025-02-10 DIAGNOSIS — I42.0 DILATED CARDIOMYOPATHY (MULTI): ICD-10-CM

## 2025-02-10 DIAGNOSIS — I25.10 CORONARY ARTERY DISEASE INVOLVING NATIVE CORONARY ARTERY OF NATIVE HEART WITHOUT ANGINA PECTORIS: ICD-10-CM

## 2025-02-10 DIAGNOSIS — R06.09 DOE (DYSPNEA ON EXERTION): ICD-10-CM

## 2025-02-10 LAB
ALBUMIN SERPL BCP-MCNC: 4.1 G/DL (ref 3.4–5)
ALP SERPL-CCNC: 66 U/L (ref 33–136)
ALT SERPL W P-5'-P-CCNC: 19 U/L (ref 10–52)
ANION GAP SERPL CALC-SCNC: 12 MMOL/L (ref 10–20)
APTT PPP: 39 SECONDS (ref 27–38)
AST SERPL W P-5'-P-CCNC: 15 U/L (ref 9–39)
BASOPHILS # BLD AUTO: 0.03 X10*3/UL (ref 0–0.1)
BASOPHILS NFR BLD AUTO: 0.6 %
BILIRUB SERPL-MCNC: 0.4 MG/DL (ref 0–1.2)
BUN SERPL-MCNC: 35 MG/DL (ref 6–23)
CALCIUM SERPL-MCNC: 9.2 MG/DL (ref 8.6–10.3)
CHLORIDE SERPL-SCNC: 107 MMOL/L (ref 98–107)
CO2 SERPL-SCNC: 25 MMOL/L (ref 21–32)
CREAT SERPL-MCNC: 1.88 MG/DL (ref 0.5–1.3)
CRP SERPL-MCNC: 0.99 MG/DL
EGFRCR SERPLBLD CKD-EPI 2021: 40 ML/MIN/1.73M*2
EOSINOPHIL # BLD AUTO: 0.11 X10*3/UL (ref 0–0.7)
EOSINOPHIL NFR BLD AUTO: 2.1 %
ERYTHROCYTE [DISTWIDTH] IN BLOOD BY AUTOMATED COUNT: 14.3 % (ref 11.5–14.5)
FERRITIN SERPL-MCNC: 187 NG/ML (ref 20–300)
FIBRINOGEN PPP-MCNC: 435 MG/DL (ref 200–400)
GLUCOSE SERPL-MCNC: 97 MG/DL (ref 74–99)
HCT VFR BLD AUTO: 41.2 % (ref 41–52)
HGB BLD-MCNC: 13.5 G/DL (ref 13.5–17.5)
IMM GRANULOCYTES # BLD AUTO: 0.03 X10*3/UL (ref 0–0.7)
IMM GRANULOCYTES NFR BLD AUTO: 0.6 % (ref 0–0.9)
INR PPP: 1.1 (ref 0.9–1.1)
LDH SERPL L TO P-CCNC: 166 U/L (ref 84–246)
LYMPHOCYTES # BLD AUTO: 1.03 X10*3/UL (ref 1.2–4.8)
LYMPHOCYTES NFR BLD AUTO: 20.1 %
MCH RBC QN AUTO: 31.9 PG (ref 26–34)
MCHC RBC AUTO-ENTMCNC: 32.8 G/DL (ref 32–36)
MCV RBC AUTO: 97 FL (ref 80–100)
MONOCYTES # BLD AUTO: 1.05 X10*3/UL (ref 0.1–1)
MONOCYTES NFR BLD AUTO: 20.5 %
NEUTROPHILS # BLD AUTO: 2.88 X10*3/UL (ref 1.2–7.7)
NEUTROPHILS NFR BLD AUTO: 56.1 %
NRBC BLD-RTO: 0 /100 WBCS (ref 0–0)
PLATELET # BLD AUTO: 192 X10*3/UL (ref 150–450)
POTASSIUM SERPL-SCNC: 4 MMOL/L (ref 3.5–5.3)
PROT SERPL-MCNC: 7.4 G/DL (ref 6.4–8.2)
PROTHROMBIN TIME: 12.5 SECONDS (ref 9.8–12.8)
RBC # BLD AUTO: 4.23 X10*6/UL (ref 4.5–5.9)
SODIUM SERPL-SCNC: 140 MMOL/L (ref 136–145)
WBC # BLD AUTO: 5.1 X10*3/UL (ref 4.4–11.3)

## 2025-02-10 PROCEDURE — 3080F DIAST BP >= 90 MM HG: CPT | Performed by: STUDENT IN AN ORGANIZED HEALTH CARE EDUCATION/TRAINING PROGRAM

## 2025-02-10 PROCEDURE — 96361 HYDRATE IV INFUSION ADD-ON: CPT | Mod: INF

## 2025-02-10 PROCEDURE — 82728 ASSAY OF FERRITIN: CPT

## 2025-02-10 PROCEDURE — 99215 OFFICE O/P EST HI 40 MIN: CPT | Performed by: STUDENT IN AN ORGANIZED HEALTH CARE EDUCATION/TRAINING PROGRAM

## 2025-02-10 PROCEDURE — 99223 1ST HOSP IP/OBS HIGH 75: CPT

## 2025-02-10 PROCEDURE — 36415 COLL VENOUS BLD VENIPUNCTURE: CPT

## 2025-02-10 PROCEDURE — 3077F SYST BP >= 140 MM HG: CPT | Performed by: STUDENT IN AN ORGANIZED HEALTH CARE EDUCATION/TRAINING PROGRAM

## 2025-02-10 PROCEDURE — 85025 COMPLETE CBC W/AUTO DIFF WBC: CPT

## 2025-02-10 PROCEDURE — 1170000001 HC PRIVATE ONCOLOGY ROOM DAILY

## 2025-02-10 PROCEDURE — 99215 OFFICE O/P EST HI 40 MIN: CPT | Mod: 25 | Performed by: STUDENT IN AN ORGANIZED HEALTH CARE EDUCATION/TRAINING PROGRAM

## 2025-02-10 PROCEDURE — 2500000004 HC RX 250 GENERAL PHARMACY W/ HCPCS (ALT 636 FOR OP/ED): Performed by: STUDENT IN AN ORGANIZED HEALTH CARE EDUCATION/TRAINING PROGRAM

## 2025-02-10 PROCEDURE — RXMED WILLOW AMBULATORY MEDICATION CHARGE

## 2025-02-10 PROCEDURE — 83615 LACTATE (LD) (LDH) ENZYME: CPT

## 2025-02-10 PROCEDURE — G0378 HOSPITAL OBSERVATION PER HR: HCPCS

## 2025-02-10 PROCEDURE — 96375 TX/PRO/DX INJ NEW DRUG ADDON: CPT | Mod: INF

## 2025-02-10 PROCEDURE — 2500000001 HC RX 250 WO HCPCS SELF ADMINISTERED DRUGS (ALT 637 FOR MEDICARE OP): Performed by: NURSE PRACTITIONER

## 2025-02-10 PROCEDURE — 84075 ASSAY ALKALINE PHOSPHATASE: CPT

## 2025-02-10 PROCEDURE — 96413 CHEMO IV INFUSION 1 HR: CPT

## 2025-02-10 PROCEDURE — 86140 C-REACTIVE PROTEIN: CPT

## 2025-02-10 PROCEDURE — 85384 FIBRINOGEN ACTIVITY: CPT

## 2025-02-10 PROCEDURE — 85610 PROTHROMBIN TIME: CPT

## 2025-02-10 RX ORDER — ATORVASTATIN CALCIUM 40 MG/1
40 TABLET, FILM COATED ORAL NIGHTLY
Status: DISCONTINUED | OUTPATIENT
Start: 2025-02-10 | End: 2025-02-11 | Stop reason: HOSPADM

## 2025-02-10 RX ORDER — EPINEPHRINE 0.3 MG/.3ML
0.3 INJECTION SUBCUTANEOUS EVERY 5 MIN PRN
Status: DISCONTINUED | OUTPATIENT
Start: 2025-02-10 | End: 2025-02-10 | Stop reason: HOSPADM

## 2025-02-10 RX ORDER — ALBUTEROL SULFATE 0.83 MG/ML
3 SOLUTION RESPIRATORY (INHALATION) AS NEEDED
Status: DISCONTINUED | OUTPATIENT
Start: 2025-02-10 | End: 2025-02-10 | Stop reason: HOSPADM

## 2025-02-10 RX ORDER — ATORVASTATIN CALCIUM 40 MG/1
40 TABLET, FILM COATED ORAL DAILY
Qty: 30 TABLET | Refills: 0 | Status: SHIPPED | OUTPATIENT
Start: 2025-02-10

## 2025-02-10 RX ORDER — DIPHENHYDRAMINE HYDROCHLORIDE 50 MG/ML
50 INJECTION INTRAMUSCULAR; INTRAVENOUS AS NEEDED
Status: DISCONTINUED | OUTPATIENT
Start: 2025-02-10 | End: 2025-02-10 | Stop reason: HOSPADM

## 2025-02-10 RX ORDER — PROCHLORPERAZINE MALEATE 10 MG
10 TABLET ORAL EVERY 6 HOURS PRN
Status: DISCONTINUED | OUTPATIENT
Start: 2025-02-10 | End: 2025-02-10 | Stop reason: HOSPADM

## 2025-02-10 RX ORDER — ALBUTEROL SULFATE 90 UG/1
2 INHALANT RESPIRATORY (INHALATION) 2 TIMES DAILY PRN
Status: DISCONTINUED | OUTPATIENT
Start: 2025-02-10 | End: 2025-02-11 | Stop reason: HOSPADM

## 2025-02-10 RX ORDER — FAMOTIDINE 10 MG/ML
20 INJECTION INTRAVENOUS ONCE AS NEEDED
Status: DISCONTINUED | OUTPATIENT
Start: 2025-02-10 | End: 2025-02-10 | Stop reason: HOSPADM

## 2025-02-10 RX ORDER — SACUBITRIL AND VALSARTAN 97; 103 MG/1; MG/1
1 TABLET, FILM COATED ORAL 2 TIMES DAILY
Status: DISCONTINUED | OUTPATIENT
Start: 2025-02-10 | End: 2025-02-11 | Stop reason: HOSPADM

## 2025-02-10 RX ORDER — PROCHLORPERAZINE MALEATE 10 MG
10 TABLET ORAL EVERY 6 HOURS PRN
Status: DISCONTINUED | OUTPATIENT
Start: 2025-02-10 | End: 2025-02-11 | Stop reason: HOSPADM

## 2025-02-10 RX ORDER — TRIAMTERENE/HYDROCHLOROTHIAZID 37.5-25 MG
1 TABLET ORAL EVERY MORNING
Status: DISCONTINUED | OUTPATIENT
Start: 2025-02-11 | End: 2025-02-11 | Stop reason: HOSPADM

## 2025-02-10 RX ORDER — PROCHLORPERAZINE EDISYLATE 5 MG/ML
10 INJECTION INTRAMUSCULAR; INTRAVENOUS EVERY 6 HOURS PRN
Status: DISCONTINUED | OUTPATIENT
Start: 2025-02-10 | End: 2025-02-10 | Stop reason: HOSPADM

## 2025-02-10 RX ADMIN — ATORVASTATIN CALCIUM 40 MG: 40 TABLET, FILM COATED ORAL at 20:32

## 2025-02-10 RX ADMIN — Medication 1 MG: at 10:08

## 2025-02-10 RX ADMIN — DEXAMETHASONE SODIUM PHOSPHATE 8 MG: 4 INJECTION, SOLUTION INTRA-ARTICULAR; INTRALESIONAL; INTRAMUSCULAR; INTRAVENOUS; SOFT TISSUE at 09:02

## 2025-02-10 RX ADMIN — SODIUM CHLORIDE 1000 ML: 9 INJECTION, SOLUTION INTRAVENOUS at 11:21

## 2025-02-10 RX ADMIN — SACUBITRIL AND VALSARTAN 1 TABLET: 97; 103 TABLET, FILM COATED ORAL at 16:12

## 2025-02-10 RX ADMIN — SACUBITRIL AND VALSARTAN 1 TABLET: 97; 103 TABLET, FILM COATED ORAL at 22:27

## 2025-02-10 SDOH — HEALTH STABILITY: MENTAL HEALTH: HOW OFTEN DO YOU HAVE SIX OR MORE DRINKS ON ONE OCCASION?: NEVER

## 2025-02-10 SDOH — HEALTH STABILITY: MENTAL HEALTH: HOW OFTEN DO YOU HAVE A DRINK CONTAINING ALCOHOL?: NEVER

## 2025-02-10 SDOH — SOCIAL STABILITY: SOCIAL INSECURITY: ABUSE: ADULT

## 2025-02-10 SDOH — HEALTH STABILITY: MENTAL HEALTH: EXPERIENCED ANY OF THE FOLLOWING LIFE EVENTS: OTHER (COMMENT)

## 2025-02-10 SDOH — ECONOMIC STABILITY: FOOD INSECURITY: WITHIN THE PAST 12 MONTHS, YOU WORRIED THAT YOUR FOOD WOULD RUN OUT BEFORE YOU GOT THE MONEY TO BUY MORE.: NEVER TRUE

## 2025-02-10 SDOH — SOCIAL STABILITY: SOCIAL INSECURITY: POSSIBLE ABUSE REPORTED TO:: OTHER (COMMENT)

## 2025-02-10 SDOH — SOCIAL STABILITY: SOCIAL NETWORK: HOW OFTEN DO YOU ATTEND CHURCH OR RELIGIOUS SERVICES?: NEVER

## 2025-02-10 SDOH — SOCIAL STABILITY: SOCIAL INSECURITY
WITHIN THE LAST YEAR, HAVE YOU BEEN KICKED, HIT, SLAPPED, OR OTHERWISE PHYSICALLY HURT BY YOUR PARTNER OR EX-PARTNER?: NO

## 2025-02-10 SDOH — HEALTH STABILITY: MENTAL HEALTH: HOW MANY DRINKS CONTAINING ALCOHOL DO YOU HAVE ON A TYPICAL DAY WHEN YOU ARE DRINKING?: PATIENT DOES NOT DRINK

## 2025-02-10 SDOH — SOCIAL STABILITY: SOCIAL INSECURITY: HAS ANYONE EVER THREATENED TO HURT YOUR FAMILY OR YOUR PETS?: NO

## 2025-02-10 SDOH — SOCIAL STABILITY: SOCIAL INSECURITY: DO YOU FEEL ANYONE HAS EXPLOITED OR TAKEN ADVANTAGE OF YOU FINANCIALLY OR OF YOUR PERSONAL PROPERTY?: NO

## 2025-02-10 SDOH — HEALTH STABILITY: MENTAL HEALTH
DO YOU FEEL STRESS - TENSE, RESTLESS, NERVOUS, OR ANXIOUS, OR UNABLE TO SLEEP AT NIGHT BECAUSE YOUR MIND IS TROUBLED ALL THE TIME - THESE DAYS?: NOT AT ALL

## 2025-02-10 SDOH — ECONOMIC STABILITY: HOUSING INSECURITY: IN THE LAST 12 MONTHS, WAS THERE A TIME WHEN YOU WERE NOT ABLE TO PAY THE MORTGAGE OR RENT ON TIME?: NO

## 2025-02-10 SDOH — HEALTH STABILITY: PHYSICAL HEALTH
HOW OFTEN DO YOU NEED TO HAVE SOMEONE HELP YOU WHEN YOU READ INSTRUCTIONS, PAMPHLETS, OR OTHER WRITTEN MATERIAL FROM YOUR DOCTOR OR PHARMACY?: OFTEN

## 2025-02-10 SDOH — HEALTH STABILITY: PHYSICAL HEALTH: ON AVERAGE, HOW MANY MINUTES DO YOU ENGAGE IN EXERCISE AT THIS LEVEL?: 10 MIN

## 2025-02-10 SDOH — SOCIAL STABILITY: SOCIAL INSECURITY: WITHIN THE LAST YEAR, HAVE YOU BEEN HUMILIATED OR EMOTIONALLY ABUSED IN OTHER WAYS BY YOUR PARTNER OR EX-PARTNER?: NO

## 2025-02-10 SDOH — SOCIAL STABILITY: SOCIAL INSECURITY: ARE YOU MARRIED, WIDOWED, DIVORCED, SEPARATED, NEVER MARRIED, OR LIVING WITH A PARTNER?: MARRIED

## 2025-02-10 SDOH — SOCIAL STABILITY: SOCIAL INSECURITY
WITHIN THE LAST YEAR, HAVE YOU BEEN RAPED OR FORCED TO HAVE ANY KIND OF SEXUAL ACTIVITY BY YOUR PARTNER OR EX-PARTNER?: NO

## 2025-02-10 SDOH — ECONOMIC STABILITY: INCOME INSECURITY: IN THE PAST 12 MONTHS HAS THE ELECTRIC, GAS, OIL, OR WATER COMPANY THREATENED TO SHUT OFF SERVICES IN YOUR HOME?: NO

## 2025-02-10 SDOH — SOCIAL STABILITY: SOCIAL INSECURITY: WITHIN THE LAST YEAR, HAVE YOU BEEN AFRAID OF YOUR PARTNER OR EX-PARTNER?: NO

## 2025-02-10 SDOH — SOCIAL STABILITY: SOCIAL NETWORK: HOW OFTEN DO YOU GET TOGETHER WITH FRIENDS OR RELATIVES?: NEVER

## 2025-02-10 SDOH — SOCIAL STABILITY: SOCIAL INSECURITY: ARE THERE ANY APPARENT SIGNS OF INJURIES/BEHAVIORS THAT COULD BE RELATED TO ABUSE/NEGLECT?: NO

## 2025-02-10 SDOH — SOCIAL STABILITY: SOCIAL INSECURITY: HAVE YOU HAD ANY THOUGHTS OF HARMING ANYONE ELSE?: NO

## 2025-02-10 SDOH — ECONOMIC STABILITY: TRANSPORTATION INSECURITY: IN THE PAST 12 MONTHS, HAS LACK OF TRANSPORTATION KEPT YOU FROM MEDICAL APPOINTMENTS OR FROM GETTING MEDICATIONS?: NO

## 2025-02-10 SDOH — ECONOMIC STABILITY: FOOD INSECURITY: WITHIN THE PAST 12 MONTHS, THE FOOD YOU BOUGHT JUST DIDN'T LAST AND YOU DIDN'T HAVE MONEY TO GET MORE.: NEVER TRUE

## 2025-02-10 SDOH — SOCIAL STABILITY: SOCIAL INSECURITY: WERE YOU ABLE TO COMPLETE ALL THE BEHAVIORAL HEALTH SCREENINGS?: YES

## 2025-02-10 SDOH — SOCIAL STABILITY: SOCIAL INSECURITY: DOES ANYONE TRY TO KEEP YOU FROM HAVING/CONTACTING OTHER FRIENDS OR DOING THINGS OUTSIDE YOUR HOME?: NO

## 2025-02-10 SDOH — ECONOMIC STABILITY: HOUSING INSECURITY: AT ANY TIME IN THE PAST 12 MONTHS, WERE YOU HOMELESS OR LIVING IN A SHELTER (INCLUDING NOW)?: NO

## 2025-02-10 SDOH — SOCIAL STABILITY: SOCIAL NETWORK
DO YOU BELONG TO ANY CLUBS OR ORGANIZATIONS SUCH AS CHURCH GROUPS, UNIONS, FRATERNAL OR ATHLETIC GROUPS, OR SCHOOL GROUPS?: NO

## 2025-02-10 SDOH — SOCIAL STABILITY: SOCIAL NETWORK: HOW OFTEN DO YOU ATTEND MEETINGS OF THE CLUBS OR ORGANIZATIONS YOU BELONG TO?: NEVER

## 2025-02-10 SDOH — ECONOMIC STABILITY: FOOD INSECURITY: HOW HARD IS IT FOR YOU TO PAY FOR THE VERY BASICS LIKE FOOD, HOUSING, MEDICAL CARE, AND HEATING?: NOT HARD AT ALL

## 2025-02-10 SDOH — SOCIAL STABILITY: SOCIAL INSECURITY: ARE YOU OR HAVE YOU BEEN THREATENED OR ABUSED PHYSICALLY, EMOTIONALLY, OR SEXUALLY BY ANYONE?: NO

## 2025-02-10 SDOH — ECONOMIC STABILITY: HOUSING INSECURITY: IN THE PAST 12 MONTHS, HOW MANY TIMES HAVE YOU MOVED WHERE YOU WERE LIVING?: 0

## 2025-02-10 SDOH — SOCIAL STABILITY: SOCIAL NETWORK: IN A TYPICAL WEEK, HOW MANY TIMES DO YOU TALK ON THE PHONE WITH FAMILY, FRIENDS, OR NEIGHBORS?: TWICE A WEEK

## 2025-02-10 SDOH — SOCIAL STABILITY: SOCIAL INSECURITY: DO YOU FEEL UNSAFE GOING BACK TO THE PLACE WHERE YOU ARE LIVING?: NO

## 2025-02-10 SDOH — SOCIAL STABILITY: SOCIAL INSECURITY: HAVE YOU HAD THOUGHTS OF HARMING ANYONE ELSE?: NO

## 2025-02-10 SDOH — HEALTH STABILITY: PHYSICAL HEALTH: ON AVERAGE, HOW MANY DAYS PER WEEK DO YOU ENGAGE IN MODERATE TO STRENUOUS EXERCISE (LIKE A BRISK WALK)?: 1 DAY

## 2025-02-10 ASSESSMENT — ENCOUNTER SYMPTOMS
EYES NEGATIVE: 1
ALLERGIC/IMMUNOLOGIC NEGATIVE: 1
MUSCULOSKELETAL NEGATIVE: 1
CONSTITUTIONAL NEGATIVE: 1
CARDIOVASCULAR NEGATIVE: 1
PSYCHIATRIC NEGATIVE: 1
HEMATOLOGIC/LYMPHATIC NEGATIVE: 1
RESPIRATORY NEGATIVE: 1
ENDOCRINE NEGATIVE: 1
GASTROINTESTINAL NEGATIVE: 1
NEUROLOGICAL NEGATIVE: 1

## 2025-02-10 ASSESSMENT — COLUMBIA-SUICIDE SEVERITY RATING SCALE - C-SSRS
1. IN THE PAST MONTH, HAVE YOU WISHED YOU WERE DEAD OR WISHED YOU COULD GO TO SLEEP AND NOT WAKE UP?: NO
2. HAVE YOU ACTUALLY HAD ANY THOUGHTS OF KILLING YOURSELF?: NO
6. HAVE YOU EVER DONE ANYTHING, STARTED TO DO ANYTHING, OR PREPARED TO DO ANYTHING TO END YOUR LIFE?: NO

## 2025-02-10 ASSESSMENT — PAIN SCALES - GENERAL
PAINLEVEL_OUTOF10: 0 - NO PAIN
PAINLEVEL_OUTOF10: 0 - NO PAIN
PAINLEVEL_OUTOF10: 0-NO PAIN

## 2025-02-10 ASSESSMENT — LIFESTYLE VARIABLES
HOW MANY STANDARD DRINKS CONTAINING ALCOHOL DO YOU HAVE ON A TYPICAL DAY: PATIENT DOES NOT DRINK
HOW OFTEN DO YOU HAVE A DRINK CONTAINING ALCOHOL: NEVER
PRESCIPTION_ABUSE_PAST_12_MONTHS: NO
SUBSTANCE_ABUSE_PAST_12_MONTHS: NO
SKIP TO QUESTIONS 9-10: 1
AUDIT-C TOTAL SCORE: 0
AUDIT-C TOTAL SCORE: 0
SKIP TO QUESTIONS 9-10: 1
AUDIT-C TOTAL SCORE: 0
HOW OFTEN DO YOU HAVE 6 OR MORE DRINKS ON ONE OCCASION: NEVER

## 2025-02-10 ASSESSMENT — COGNITIVE AND FUNCTIONAL STATUS - GENERAL
MOBILITY SCORE: 24
DAILY ACTIVITIY SCORE: 24
DAILY ACTIVITIY SCORE: 24
MOBILITY SCORE: 24
PATIENT BASELINE BEDBOUND: NO

## 2025-02-10 ASSESSMENT — ACTIVITIES OF DAILY LIVING (ADL)
WALKS IN HOME: INDEPENDENT
LACK_OF_TRANSPORTATION: NO
PATIENT'S MEMORY ADEQUATE TO SAFELY COMPLETE DAILY ACTIVITIES?: YES
GROOMING: INDEPENDENT
DRESSING YOURSELF: INDEPENDENT
HEARING - RIGHT EAR: FUNCTIONAL
ADEQUATE_TO_COMPLETE_ADL: YES
HEARING - LEFT EAR: FUNCTIONAL
FEEDING YOURSELF: INDEPENDENT
BATHING: INDEPENDENT
JUDGMENT_ADEQUATE_SAFELY_COMPLETE_DAILY_ACTIVITIES: YES
TOILETING: INDEPENDENT

## 2025-02-10 ASSESSMENT — PATIENT HEALTH QUESTIONNAIRE - PHQ9
1. LITTLE INTEREST OR PLEASURE IN DOING THINGS: NOT AT ALL
2. FEELING DOWN, DEPRESSED OR HOPELESS: NOT AT ALL
SUM OF ALL RESPONSES TO PHQ9 QUESTIONS 1 & 2: 0

## 2025-02-10 ASSESSMENT — PAIN - FUNCTIONAL ASSESSMENT: PAIN_FUNCTIONAL_ASSESSMENT: 0-10

## 2025-02-10 NOTE — H&P
History Of Present Illness  Dane Molina is a 64 y.o. male with a PMHx of SCLC (previously on atezolizumab, now on Tarlatamab), CKD, COPD, HLD and HTN that presents as a direct admission for CRS monitoring s/p dose #1 of Tarlatamab (C1D1) on 2/10. On admission, pt feeling well. Denies current fever/chills, SOB, N/V/D/C or vision changes. DC pending 24 hour observation for CRS and ICANS toxicity.      Past Medical History  He has a past medical history of CKD (chronic kidney disease), COPD (chronic obstructive pulmonary disease) (Multi), Hyperlipidemia, Hypertension, and SOB (shortness of breath).    Surgical History  He has a past surgical history that includes Esophagogastroduodenoscopy; Colonoscopy; and ORIF ankle fracture (1993).    Oncology History  Primary Onc - Dr. Betancur    DIAGNOSIS AND STAGING  cT3 pN2 pM1b small cell lung cancer (INSM1 and TTF1+) of the right lower lobe, diagnosed on 02/05/2024 through bronchoscopy    PRIOR THERAPIES  - 03/19/2024-05/20/2024: 4 cycles carboplatin/etoposide with addition of atezolizumab to C2 on 04/08/2024  - 08/09/2024: Completion 45 cGy/15f to chest by Dr. Conde    CURRENT THERAPY  Maintenance atezolizumab since 06/21/24    History of Present Illness  - 01/04/2023 - presented with hypertensive urgency and cough. He has a history of noncompliance with antihypertensive medications. He also complained of dyspnea and underwent a CT chest without IV contrast that demonstrated a right lower lobe paramediastinal mass with mediastinal lymphadenopathy  - 02/05/2024: s/p bronchoscopy, positive for malignant cells, metastatic small cell carcinoma  - 02/29/2024: pt seen by Med Onc for initial visit  - 03/04/24: MRI brain shows no ICMA, but MRI liver shows a 1.9 cm hepatic lesion with T2 hyperintensity and peripheral enhancement   - 03/19/2024: C1 D1 carboplatin/etoposide  - 03/22/24: Liver biopsy: confirmed liver tissue involved by small cell CA  - 04/04/24: discussed with  patient liver biopsy results and recommendations to add atezolizumab to his regimen. Met w/ radiation oncology to consider consolidative TRT pending response to cytotoxic chemotherapy   - 04/08/2024: C2 D1 carboplatin/etoposide/atezolizumab   - 05/20/24: C4 D1 carboplatin/etoposide/atezolizumab   - 06/10/2024: CT chest/abdomen/pelvis demonstrating continue his improvement of metastatic lymphadenopathy within the mediastinum/right hilum, with no progression within the liver   - 06/13/2024: Referral for radiation oncology/TRT.  Patient to proceed with maintenance atezolizumab   - 06/21/24: begans maintenance atezolizumab    - 08/09/24: completes 45 Gy in 15 fractions to chest (To Conde)    - 08/08/24: prescribed prednisone 1 mg/Kg for grade 3 IO-induced rash with peeling of hands    - 08/29/24: last steroid taken last week but did not complete full prescription. Rash on chest/abdomen improved but has some peeling of the hands/arms. Will have patient take remaining prednisone and follow up in 3 weeks  - 02/10/25: C1D1 Tarlatamab      Social History  He reports that he has been smoking cigars and cigarettes. He started smoking about 47 years ago. He has never used smokeless tobacco. He reports that he does not currently use alcohol. He reports that he does not currently use drugs.     Allergies  Patient has no known allergies.    Review of Systems   Constitutional: Negative.    HENT: Negative.     Eyes: Negative.    Respiratory: Negative.     Cardiovascular: Negative.    Gastrointestinal: Negative.    Endocrine: Negative.    Genitourinary: Negative.    Musculoskeletal: Negative.    Allergic/Immunologic: Negative.    Neurological: Negative.    Hematological: Negative.    Psychiatric/Behavioral: Negative.       Physical Exam  Vitals reviewed.   Constitutional:       Appearance: Normal appearance.   HENT:      Head: Normocephalic and atraumatic.      Nose: Nose normal.      Mouth/Throat:      Mouth: Mucous  "membranes are moist.      Pharynx: Oropharynx is clear.   Eyes:      Extraocular Movements: Extraocular movements intact.      Pupils: Pupils are equal, round, and reactive to light.   Cardiovascular:      Rate and Rhythm: Normal rate and regular rhythm.      Pulses: Normal pulses.      Heart sounds: Normal heart sounds.   Pulmonary:      Effort: Pulmonary effort is normal.      Breath sounds: Normal breath sounds.   Abdominal:      General: Bowel sounds are normal.      Palpations: Abdomen is soft.   Musculoskeletal:         General: Normal range of motion.   Skin:     General: Skin is warm.   Neurological:      General: No focal deficit present.      Mental Status: He is alert and oriented to person, place, and time. Mental status is at baseline.   Psychiatric:         Mood and Affect: Mood normal.         Behavior: Behavior normal.       Last Recorded Vitals  Pulse 86, resp. rate 19, height 1.829 m (6' 0.01\"), weight 108 kg (238 lb 1.6 oz), SpO2 98%.    Relevant Results  Scheduled medications  atorvastatin, 40 mg, oral, Nightly  sacubitriL-valsartan, 1 tablet, oral, BID  [START ON 2/11/2025] triamterene-hydrochlorothiazid, 1 tablet, oral, q AM      Continuous medications     PRN medications  PRN medications: albuterol, alteplase, prochlorperazine      Assessment/Plan   Assessment & Plan  Chemotherapy management, encounter for    Dane Molina is a 64 y.o. male with a PMHx of SCLC (previously on atezolizumab, now on Tarlatamab), CKD, COPD, HLD and HTN that presents as a direct admission for CRS monitoring s/p dose #1 of Tarlatamab (C1D1) on 2/10. DC pending 24 hour observation for CRS and ICANS toxicity.     #C1D1 Tarlatamab (IMDELLTRA)  - s/p C1D1 Tarlatamab infusion 2/10  - Admit for 24 hrs observation for CRS/ICANS monitoring then dc tomorrow 2/11 if no concerns  - Plan: Cycle 1 (step-up dosing): Day 1 (1 mg), Day 8 (10 mg) and Day 15 (10 mg)  - Cytokine release syndrome (CRS) - symptoms including fever, low " blood pressure, fatigue, tachycardia, headache, shortness of breath, N/V, CNS (confusion, anxiety, balance issues), bleeding.   - Instructions for discharge: For fever of >=100.4°F (38°C), instructed patient to take acetaminophen (Tylenol) 650-1000 mg mg once. After 30 minutes, check temperature again. If the fever resolves, no further action is needed. If the fever is still present, take dexamethasone 8 mg  once and wait another 30 minutes. Recheck your temperature. If the fever persists 30 minutes after taking dexamethasone, go to the Emergency Department immediately. For shortness of breath, instructed patient to take dexamethasone 8 mg x1 and go immediately to the Emergency Department.   - Neurologic/Immune effector cell-associated neurotoxicity syndrome - including trouble speaking, confusion, seizure, numbness, weakness, insomnia.     #SCLC of RLL on atezolizumab  - Dx 01/2023  - 02/05/2024: s/p bronchoscopy, positive for malignant cells, metastatic small cell carcinoma  - 03/04/24: MRI liver c/f mets  - s/p 4 cycles carboplatin/etoposide + atezolizumab added to cycle 2  - s/p 1 dose atezo c/b ICI dermatitis  - s/p TRT (45 Gy in 15 fx per Dr. Conde)   - Admit 2/10 for C1D1 Tarlatamab infusion    #COPD  - previously on Symbicort, not currently taking  - Albuterol PRN    #HTN  - continue home Lipitor 40mg  - continue home Maxzide daily    # Hx HF  - pt completed stress test 3/15/24: moderately reduced LV function 33%, moderate-severely dilated LV  - ECHO 3/22/24: LVSF moderately to severely decreased w/ 25-30% EF  - continue w/ Entresto daily    # Prophy  - SCDs    #DISPO  - Full Code - confirmed on admission  - NOK: Roula Miller (daughter) #[phone]  - Plan for CRS and ICANS monitoring x24hrs and dc tomorrow 2/11/25 if no acute issues     I spent 90 minutes in the professional and overall care of this patient.    Assessment and plan as above discussed with attending physician Dr. Sauceda.       Rebekah BROOKS  Karie, APRN-CNP

## 2025-02-10 NOTE — CARE PLAN
Problem: Fall/Injury  Goal: Not fall by end of shift  Outcome: Progressing  Goal: Be free from injury by end of the shift  Outcome: Progressing  Goal: Verbalize understanding of personal risk factors for fall in the hospital  Outcome: Progressing  Goal: Verbalize understanding of risk factor reduction measures to prevent injury from fall in the home  Outcome: Progressing  Goal: Use assistive devices by end of the shift  Outcome: Progressing  Goal: Pace activities to prevent fatigue by end of the shift  Outcome: Progressing   The patient's goals for the shift include Remain HDS    The clinical goals for the shift include Remain HDS

## 2025-02-10 NOTE — PROGRESS NOTES
Pharmacy Medication History Review    Dane Molina is a 64 y.o. male admitted for Chemotherapy management, encounter for. Pharmacy reviewed the patient's tcftn-of-ersbdhxtl medications and allergies for accuracy.    Medications ADDED:  Vitamin D 3  Medications CHANGED:  None   Medications REMOVED:   None      The list below reflects the updated PTA list.   Prior to Admission Medications   Prescriptions Last Dose Informant   LORazepam (Ativan) 0.5 mg tablet Not Taking Self   Sig: Take 2 tablets (1 mg) by mouth 1 time in imaging for 1 day. Take 45 min before scheduled scan. Take additional 0.5 mg if needed just before or during imaging.  to take patient home.   Patient not taking: Reported on 2/10/2025   OLANZapine (ZyPREXA) 5 mg tablet Not Taking Self   Sig: Take 1 tablet (5 mg) by mouth once daily at bedtime. For 4 days starting the evening of treatment.   Patient not taking: Reported on 2/10/2025   Symbicort 80-4.5 mcg/actuation inhaler  Self   Sig: Inhale 2 puffs once daily.   albuterol 90 mcg/actuation inhaler  Self   Sig: Inhale 2 puffs 2 times a day as needed for wheezing.   atorvastatin (Lipitor) 40 mg tablet  Self   Sig: TAKE 1 TABLET(40 MG) BY MOUTH DAILY   Patient not taking: Reported on 2/10/2025   cetirizine (ZyrTEC) 10 mg tablet Not Taking Self   Sig: Take 1 tablet (10 mg) by mouth once daily.   Patient not taking: Reported on 2/10/2025   cholecalciferol, vitamin D3, (VITAMIN D3 ORAL)  Self   Sig: Take 1 Dose by mouth once daily.   dexAMETHasone (Decadron) 4 mg tablet More than a month Self   Sig: Take 2 tablets (8 mg) by mouth once daily as needed (for fever or shortness of breath as instructed by care team.).   Patient not taking: Reported on 2/10/2025   diphenhydrAMINE (BenadryL) 25 mg capsule  Self   Sig: Take 1 capsule (25 mg) by mouth as needed at bedtime for itching or allergies. Patient requests refill.    famotidine (Pepcid) 20 mg tablet Not Taking Self   Sig: Take 1 tablet (20 mg) by  mouth once daily.   Patient not taking: Reported on 2/10/2025   metoprolol succinate XL (Toprol-XL) 100 mg 24 hr tablet Not Taking Self   Sig: Take 1 tablet (100 mg) by mouth once daily. Do not crush or chew.   Patient not taking: Reported on 2/10/2025   nicotine (Nicoderm CQ) 14 mg/24 hr patch  Self   Sig: Place 1 patch over 24 hours on the skin once every 24 hours.   Patient not taking: Reported on 4/25/2024  Patient never received this medication    ondansetron (Zofran) 8 mg tablet  Self   Sig: Take 1 tablet (8 mg) by mouth every 8 hours if needed for nausea or vomiting.   pantoprazole (ProtoNix) 40 mg EC tablet  Self   Sig: Do not crush, chew, or split. Take it 30 minutes before breakfast while on prednisone   Patient not taking: Reported on 2/10/2025   predniSONE (Deltasone) 20 mg tablet  Self   Sig: Take 5 tablets for 3 days and then taper as instructed (every 3 days decreased the dose by 20 mg)   Patient not taking: Reported on 10/29/2024   prochlorperazine (Compazine) 10 mg tablet Not Taking Self   Sig: Take 1 tablet (10 mg) by mouth every 6 hours if needed for nausea or vomiting.   Patient not taking: Reported on 2/10/2025   prochlorperazine (Compazine) 10 mg tablet  Self   Sig: Take 1 tablet (10 mg) by mouth every 6 hours if needed for nausea or vomiting.   sacubitriL-valsartan 97 mg-103 mg (Entresto)  mg tablet Not Taking Self   Sig: Take 1 tablet by mouth 2 times a day.   Patient not taking: Reported on 2/10/2025 Patient requests refill.    sildenafil (Viagra) 50 mg tablet Not Taking Self   Sig: Take 1 tablet (50 mg) by mouth once daily as needed for erectile dysfunction.   Patient not taking: Reported on 2/10/2025   triamterene-hydrochlorothiazid (Maxzide-25) 37.5-25 mg tablet  Self   Sig: Take 1 tablet by mouth once daily in the morning.   varenicline (Chantix) 0.5 mg tablet  Self   Sig: Take 1 tablet daily for 3 days, followed by 1 tablet twice a day for 3 days, followed by 2 tablets twice  "daily.  Take it with full glass of water.   Patient not taking: Reported on 2/10/2025  Patient never received this medication       Facility-Administered Medications: None        The list below reflects the updated allergy list. Please review each documented allergy for additional clarification and justification.  Allergies  Reviewed by Denia Fan on 2/10/2025   No Known Allergies         Patient declines M2B at discharge.     Sources:   [facility]  Pharmacy dispense history  Patient interview Moderate historian  Chart Review  Care Everywhere     Additional Comments:  None       Denia Fan  Pharmacy Technician  02/10/25     Secure Chat preferred   If no response call [phone] or [employer] \"Med Rec\"   "

## 2025-02-10 NOTE — PROGRESS NOTES
Patient ID: Dane Molina is a 64 y.o. male    Primary Care Provider: Darwin Early MD    DIAGNOSIS AND STAGING  cT3 pN2 pM1b small cell lung cancer (INSM1 and TTF1+) of the right lower lobe, diagnosed on 02/05/2024 through bronchoscopy     SITES OF DISEASE  Right lower lobe   Levels 4R and 7 nodes   Liver, confirmed by biopsy      MOLECULAR GENOMICS  Not performed   RB loss by IHC      PRIOR THERAPIES  03/19/2024-05/20/2024: 4 cycles carboplatin/etoposide with addition of atezolizumab to C2 on 04/08/2024 08/09/2024: Completion 45 cGy/15f to chest by Dr. Conde     CURRENT THERAPY  Maintenance atezolizumab since 06/21/24    CURRENT ONCOLOGICAL PROBLEMS  Grade 3 immune related rash now grade I    HISTORY OF PRESENT ILLNESS  Patient presented to the emergency room on 01/04/2023 with a hypertensive urgency and cough.  He has a history of noncompliance with antihypertensive medications.  He also complained of dyspnea and underwent a CT chest without IV contrast that demonstrated a right lower lobe paramediastinal mass measuring 3.1 cm with adjacent pulmonary nodule measuring 1.8 cm.  Mediastinal lymphadenopathy was demonstrated, including a subcarinal node measuring 1.7 cm.  There was right hilar lymphadenopathy, 2 cm in short axis.  On 01/15/2024 the patient underwent a PET scan that demonstrated avidity of the aforementioned masses/lymph nodes, as well as a lesion in the liver, SUV max 3.3, right hepatic lobe.  On 02/05/2024, patient underwent a bronchoscopy:  A. LYMPH NODE 4 R PULMONARY FINE NEEDLE ASPIRATION , CYTOLOGY AND CELL BLOCK:    Positive for malignant cells   Metastatic small cell carcinoma, see note  B. LYMPH NODE 7 PULMONARY FINE NEEDLE ASPIRATION , CYTOLOGY AND CELL BLOCK:    Positive for malignant cells  Metastatic small cell carcinoma, see note          Note: Immunostains demonstrate the lesional cells to be diffusely positive for CAM 5.2, INSM1, TTF-1.  The proliferative marker Ki-67 is  greater than 90%.  Immunostain for retinoblastoma shows loss of staining.  Immunostain for p40 is negative.  The morphology and immunohistochemical profile are consistent with the above diagnosis.  On 02/29/24 the pt is seen by med onc for the first time. Denies any systemic sx - denies lack of appetite, fatigue or unintentional weight loss. Denies new localized pain, headaches or neuro sx.  Denies new respiratory sx.  He is claustrophobic and MRI brain was scheduled for today but pt could not go through with the test. He also has mild CKD, which limits his ability to receive intravenous contrast for CT.   03/04/24: MRI brain shows no ICMA, but MRI liver shows a 1.9 cm hepatic lesion with T2 hyperintensity and peripheral enhancement   03/19/2024: C1 D1 carboplatin/etoposide  03/22/24: Liver biopsy:  FINAL DIAGNOSIS   A. RIGHT LIVER MASS BIOPSY:      -- LIVER TISSUE INVOLVED BY SMALL CELL CARCINOMA, SEE NOTE     Note: Clinical history of lung small cell carcinoma noted. The findings most likely represent metastasis from the known primary lung tumor.     04/04/24: discussed with patient liver biopsy results and recommendations to add atezolizumab to his regimen  He has met with radiation oncology, and we will consider consolidative TRT pending response to cytotoxic chemotherapy    04/08/2024: C2 D1 carboplatin/etoposide/atezolizumab    05/20/24: C4 D1 carboplatin/etoposide/atezolizumab    06/10/2024: CT chest/abdomen/pelvis demonstrating continue his improvement of metastatic lymphadenopathy within the mediastinum/right hilum, with no progression within the liver    06/13/2024: Referral for radiation oncology/TRT.  Patient to proceed with maintenance atezolizumab    06/21/24: begins maintenance atezolizumab     08/09/24: completes 45 Gy in 15 fractions to chest (To Conde)     08/08/24: prescribed prednisone 1 mg/Kg for grade 3 IO-induced rash with peeling of hands     08/29/24: last steroid taken last week but  did not complete full prescription. Rash on chest/abdomen improved but has some peeling of the hands/arms. Will have patient take remaining prednisone and follow up in 3 weeks       PAST MEDICAL HISTORY  CKD - creatinine 1.68  Neck pain (injury at work)   Hypertension  Iron deficiency anemia (hx of blood transfusion in the 90's)    SURGICAL HISTORY  Ankle fracture and has a latoya in place      SOCIAL HISTORY  Former 35-40 pack-year smoker, quit at age 57  Smokes cigars now   History of alcohol addiction, sober for the past 15 years   Has one chlid (Roula)  Single      FAMILY HISTORY  Lung CA (mother)  Mother had CKD and required HD    CURRENT MEDS REVIEWED       ALLERGIES REVIEWED        SUBJECTIVE:  Patient doing well today. He was traveling in [place]. He denies dyspnea, chest pain, cough. He is anxious about treatment.    A 13 point review of systems was performed, with significant findings documented above in subjective history.    OBJECTIVE:  Vitals:    02/10/25 0803   BP: (!) 163/100   Pulse: 86   Resp: 18   Temp: 37.2 °C (99 °F)   SpO2: 96%     Body surface area is 2.34 meters squared.     Wt Readings from Last 5 Encounters:   02/10/25 108 kg (237 lb 14 oz)   01/22/25 108 kg (239 lb)   01/02/25 110 kg (242 lb 8.1 oz)   12/12/24 107 kg (235 lb 7.2 oz)   11/21/24 106 kg (234 lb 5.6 oz)     ECOGSCORE: 1- Restricted in physically strenuous activity.  Carries out light duty.  Gen: A&O, NAD  Head: Normocephalic, atraumatic  Eyes: no scleral icterus  ENT: mucous membranes moist, no oropharyngeal lesions  Resp: Lungs CTAB  Cardiac: Normal rate, regular rhythm, no murmurs appreciated  Abdomen: Soft, nondistended, nontender, +BS  Neuro: CNII-XII grossly intact  Psych: appropriate mood & affect    Diagnostic Results   Results:  Labs:  Lab Results   Component Value Date    WBC 5.1 02/10/2025    HGB 13.5 02/10/2025    HCT 41.2 02/10/2025    MCV 97 02/10/2025     02/10/2025      Lab Results   Component Value Date     NEUTROABS 2.88 02/10/2025        Lab Results   Component Value Date    GLUCOSE 96 01/02/2025    CALCIUM 9.2 01/02/2025     01/02/2025    K 3.8 01/02/2025    CO2 27 01/02/2025     01/02/2025    BUN 26 (H) 01/02/2025    CREATININE 1.73 (H) 01/02/2025    MG 1.91 10/28/2024     Lab Results   Component Value Date    ALT 20 01/02/2025    AST 16 01/02/2025    ALKPHOS 76 01/02/2025    BILITOT 0.3 01/02/2025    BILIDIR 0.1 12/21/2023      Lab Results   Component Value Date    ACTH 14.7 10/28/2024    CORTISOL 6.4 01/02/2025    TSH 1.16 01/02/2025    FREET4 1.07 08/09/2024     CT chest/abdomen/pelvis on 01/17/2025:    IMPRESSION:  Lung cancer restaging scan. When compared to the prior examination  dated 11/15/2024, there has been interval development of several new  pulmonary nodules of the right lower lobe and a hepatic lesion  concerning for metastatic disease, further evaluation with PET-CT is  recommended. Additional stable chronic and incidental findings  described above.    PET 2/7/25:    IMPRESSION:  1. Two adjacent mildly FDG avid right lower lobe lung nodules  compatible with disease recurrence. Additional subcentimeter right  lower lobe nodules do not demonstrate significant FDG avidity,  however metabolic activity can be underestimated in subcentimeter  lesions and attention on follow-up CT is recommended.  2. Increasingly hypermetabolic and enlarged bilateral axillary lymph  nodes suspicious for tad metastatic disease. Persistent mildly FDG  avid right hilar tad activity is suggestive of residual viable  neoplasm. Resolution of previously seen hypermetabolic mediastinal  lymphadenopathy.  3. Enlarging and increasingly hypermetabolic segment 8 liver  metastasis and new adjacent smaller segment 8 liver metastasis.  4. Nonspecific persistent somewhat focal FDG avidity in the sigmoid  colon with apparent underlying wall thickening and numerous  diverticula. Findings could represent sequela of  chronic  diverticulitis, however consider colonoscopy to exclude underlying  neoplasm if clinically warranted.    Assessment/Plan   Small cell carcinoma of lung, Clinical: Stage FREDRICK (cT3, cN2, pM1b)  Mr. Dane Molina is a 63 YO with ES-SCLC seen for follow up. He is s/p 4 cycles platinum doublet with addition of atezo after c1 as well as consolidative RT. He has had 1 dose atezo which was held for grade III ICI dermatitis which is now grade I only on palms/hands.    We resumed atezo and he tolerated well with topical clobetasol. Most recent scans 11/15/24 personally reviewed showing no obvious metastatic disease. MRI brain without metastatic disease.     Unfortunately with presumed progression in liver. We will begin with c1d1 tarlatamab on 2/10 with direct admission.    #T3 pN2 pM1b small cell lung cancer of the RLL - extensive stage.  -S/p 4 cycles carboplatin/etoposide + atezolizumab added to cycle 2  -S/p 1 dose atezo c/b ICI dermatitis  -S/P TRT (45 Gy in 15 fx per Dr. Conde)   -PET Stat confirming avidity in liver and RLL  -Will proceed with c1d1 today, direct admit for ICANS and hypersensitivity monitoring    #Grade I ICI dermatitis - resolved  -Was initially grade III now downgraded after PO steroid taper  -Completed Clobetasol 0.5% ointment BID x 14d. Now using cocoa butter PRN     #CNS monitoring   -MRI on 07/06/24 shows no ICM   -Repeat every 3 months - next 02/25    Nicolas Ospina MD  Thoracic Medical Oncology   [address] [place], [place] [id number]  Phone: [phone]

## 2025-02-10 NOTE — PROGRESS NOTES
Patient here for 1st dose tarlatamab. Patient tolerated dose well. Admitted for 24 hours observation. Report called to [facility] RN. Patient thren taken up to [facility] room 3003 by this RN.

## 2025-02-11 ENCOUNTER — PHARMACY VISIT (OUTPATIENT)
Dept: PHARMACY | Facility: CLINIC | Age: 65
End: 2025-02-11
Payer: COMMERCIAL

## 2025-02-11 ENCOUNTER — APPOINTMENT (OUTPATIENT)
Dept: HEMATOLOGY/ONCOLOGY | Facility: HOSPITAL | Age: 65
End: 2025-02-11
Payer: COMMERCIAL

## 2025-02-11 VITALS
TEMPERATURE: 99.9 F | DIASTOLIC BLOOD PRESSURE: 71 MMHG | OXYGEN SATURATION: 94 % | HEART RATE: 107 BPM | RESPIRATION RATE: 18 BRPM | SYSTOLIC BLOOD PRESSURE: 149 MMHG | HEIGHT: 72 IN | BODY MASS INDEX: 32.13 KG/M2 | WEIGHT: 237.22 LBS

## 2025-02-11 PROBLEM — Z51.11 ENCOUNTER FOR ANTINEOPLASTIC CHEMOTHERAPY: Status: ACTIVE | Noted: 2025-02-11

## 2025-02-11 LAB
ALBUMIN SERPL BCP-MCNC: 3.7 G/DL (ref 3.4–5)
ALP SERPL-CCNC: 58 U/L (ref 33–136)
ALT SERPL W P-5'-P-CCNC: 15 U/L (ref 10–52)
ANION GAP SERPL CALC-SCNC: 13 MMOL/L (ref 10–20)
AST SERPL W P-5'-P-CCNC: 14 U/L (ref 9–39)
BASOPHILS # BLD AUTO: 0.03 X10*3/UL (ref 0–0.1)
BASOPHILS NFR BLD AUTO: 0.3 %
BILIRUB SERPL-MCNC: 0.4 MG/DL (ref 0–1.2)
BUN SERPL-MCNC: 28 MG/DL (ref 6–23)
CALCIUM SERPL-MCNC: 8.8 MG/DL (ref 8.6–10.6)
CHLORIDE SERPL-SCNC: 110 MMOL/L (ref 98–107)
CO2 SERPL-SCNC: 21 MMOL/L (ref 21–32)
CREAT SERPL-MCNC: 1.62 MG/DL (ref 0.5–1.3)
EGFRCR SERPLBLD CKD-EPI 2021: 47 ML/MIN/1.73M*2
EOSINOPHIL # BLD AUTO: 0.04 X10*3/UL (ref 0–0.7)
EOSINOPHIL NFR BLD AUTO: 0.4 %
ERYTHROCYTE [DISTWIDTH] IN BLOOD BY AUTOMATED COUNT: 14.2 % (ref 11.5–14.5)
GLUCOSE SERPL-MCNC: 140 MG/DL (ref 74–99)
HCT VFR BLD AUTO: 40.2 % (ref 41–52)
HGB BLD-MCNC: 13.2 G/DL (ref 13.5–17.5)
IMM GRANULOCYTES # BLD AUTO: 0.07 X10*3/UL (ref 0–0.7)
IMM GRANULOCYTES NFR BLD AUTO: 0.6 % (ref 0–0.9)
LYMPHOCYTES # BLD AUTO: 0.37 X10*3/UL (ref 1.2–4.8)
LYMPHOCYTES NFR BLD AUTO: 3.4 %
MAGNESIUM SERPL-MCNC: 2 MG/DL (ref 1.6–2.4)
MCH RBC QN AUTO: 32.1 PG (ref 26–34)
MCHC RBC AUTO-ENTMCNC: 32.8 G/DL (ref 32–36)
MCV RBC AUTO: 98 FL (ref 80–100)
MONOCYTES # BLD AUTO: 1.31 X10*3/UL (ref 0.1–1)
MONOCYTES NFR BLD AUTO: 11.9 %
NEUTROPHILS # BLD AUTO: 9.15 X10*3/UL (ref 1.2–7.7)
NEUTROPHILS NFR BLD AUTO: 83.4 %
NRBC BLD-RTO: 0 /100 WBCS (ref 0–0)
PLATELET # BLD AUTO: 203 X10*3/UL (ref 150–450)
POTASSIUM SERPL-SCNC: 3.8 MMOL/L (ref 3.5–5.3)
PROT SERPL-MCNC: 6.6 G/DL (ref 6.4–8.2)
RBC # BLD AUTO: 4.11 X10*6/UL (ref 4.5–5.9)
SODIUM SERPL-SCNC: 140 MMOL/L (ref 136–145)
WBC # BLD AUTO: 11 X10*3/UL (ref 4.4–11.3)

## 2025-02-11 PROCEDURE — 36415 COLL VENOUS BLD VENIPUNCTURE: CPT

## 2025-02-11 PROCEDURE — 2500000001 HC RX 250 WO HCPCS SELF ADMINISTERED DRUGS (ALT 637 FOR MEDICARE OP): Performed by: NURSE PRACTITIONER

## 2025-02-11 PROCEDURE — 84075 ASSAY ALKALINE PHOSPHATASE: CPT

## 2025-02-11 PROCEDURE — 99239 HOSP IP/OBS DSCHRG MGMT >30: CPT

## 2025-02-11 PROCEDURE — 83735 ASSAY OF MAGNESIUM: CPT

## 2025-02-11 PROCEDURE — G0378 HOSPITAL OBSERVATION PER HR: HCPCS

## 2025-02-11 PROCEDURE — 85025 COMPLETE CBC W/AUTO DIFF WBC: CPT

## 2025-02-11 RX ORDER — DIPHENHYDRAMINE HCL 25 MG
25 CAPSULE ORAL DAILY PRN
Qty: 30 CAPSULE | Refills: 0 | Status: SHIPPED | OUTPATIENT
Start: 2025-02-11

## 2025-02-11 RX ORDER — DEXAMETHASONE 2 MG/1
4 TABLET ORAL
Qty: 6 TABLET | Refills: 0 | Status: SHIPPED | OUTPATIENT
Start: 2025-02-11 | End: 2025-02-11 | Stop reason: HOSPADM

## 2025-02-11 RX ORDER — ACETAMINOPHEN 325 MG/1
650 TABLET ORAL EVERY 6 HOURS PRN
Qty: 30 TABLET | Refills: 0 | Status: SHIPPED | OUTPATIENT
Start: 2025-02-11

## 2025-02-11 RX ADMIN — TRIAMTERENE AND HYDROCHLOROTHIAZIDE 1 TABLET: 37.5; 25 TABLET ORAL at 08:00

## 2025-02-11 RX ADMIN — SACUBITRIL AND VALSARTAN 1 TABLET: 97; 103 TABLET, FILM COATED ORAL at 08:00

## 2025-02-11 ASSESSMENT — COGNITIVE AND FUNCTIONAL STATUS - GENERAL
DAILY ACTIVITIY SCORE: 24
MOBILITY SCORE: 24

## 2025-02-11 NOTE — NURSING NOTE
Patient got discharged today. With meds to beds given to patient. PIV removed. Personal belongings given.

## 2025-02-11 NOTE — DISCHARGE SUMMARY
Discharge Diagnosis  Chemotherapy management, encounter for    Issues Requiring Follow-Up  SCLC    Hospital Course  Dane Molina is a 64 y.o. male with a PMHx of SCLC (previously on atezolizumab, now on Tarlatamab), CKD, COPD, HLD and HTN that presented as a direct admission for CRS monitoring s/p dose #1 of Tarlatamab (C1D1) on 2/10. Pt tolerated infusion well, c/o of some chills overnight, otherwise no other symptoms. No additional oxygen needed. Vitals stable, remained afebrile during stay.     On day of discharge, pt condition stable. Pt sent w/ prescription for Lipitor via [facility]. Benadryl and Tylenol sent to home pharmacy (d/t pt leaving this AM) (pt has Dexamethasone at home). Pt has FUV with Onc on 2/17 with Tarlatamab infusion for D8C1, along with on 2/25 with Onc and Tarlatamab infusion for D15C1. The following instructions were provided to patient prior to discharge. Pt to be discharged after 0900 on 2/11/25 (24 hours after Tarlatamab infusion).     Instructions for discharge: For fever of >=100.4°F (38°C), instructed patient to take acetaminophen (Tylenol) 650-1000 mg mg once. After 30 minutes, check temperature again. If the fever resolves, no further action is needed. If the fever is still present, take dexamethasone 8 mg once and wait another 30 minutes. Recheck your temperature. If the fever persists 30 minutes after taking dexamethasone, go to the Emergency Department immediately. For shortness of breath, instructed patient to take dexamethasone 8 mg x1 and go immediately to the Emergency Department.   - Neurologic/Immune effector cell-associated neurotoxicity syndrome - including trouble speaking, confusion, seizure, numbness, weakness, insomnia.    Pertinent Physical Exam At Time of Discharge  Physical Exam  Vitals reviewed.   Constitutional:       Appearance: Normal appearance.   HENT:      Head: Normocephalic and atraumatic.      Nose: Nose normal.      Mouth/Throat:      Mouth:  Mucous membranes are moist.      Pharynx: Oropharynx is clear.   Eyes:      Extraocular Movements: Extraocular movements intact.      Pupils: Pupils are equal, round, and reactive to light.   Cardiovascular:      Rate and Rhythm: Normal rate and regular rhythm.      Pulses: Normal pulses.      Heart sounds: Normal heart sounds.   Pulmonary:      Effort: Pulmonary effort is normal.      Breath sounds: Normal breath sounds.   Abdominal:      General: Bowel sounds are normal.      Palpations: Abdomen is soft.   Musculoskeletal:         General: Normal range of motion.   Skin:     General: Skin is warm.   Neurological:      General: No focal deficit present.      Mental Status: He is alert and oriented to person, place, and time. Mental status is at baseline.   Psychiatric:         Mood and Affect: Mood normal.         Behavior: Behavior normal.     Home Medications     Medication List      CHANGE how you take these medications    • prochlorperazine 10 mg tablet; Commonly known as: Compazine; Take 1   tablet (10 mg) by mouth every 6 hours if needed for nausea or vomiting.;   What changed: Another medication with the same name was removed. Continue   taking this medication, and follow the directions you see here.     CONTINUE taking these medications    • albuterol 90 mcg/actuation inhaler; Inhale 2 puffs 2 times a day as   needed for wheezing.  • atorvastatin 40 mg tablet; Commonly known as: Lipitor; Take 1 tablet (40   mg) by mouth once daily.  • cetirizine 10 mg tablet; Commonly known as: ZyrTEC; Take 1 tablet (10   mg) by mouth once daily.  • diphenhydrAMINE 25 mg capsule; Commonly known as: BenadryL; Take 1   capsule (25 mg) by mouth as needed at bedtime for itching or allergies.  • OLANZapine 5 mg tablet; Commonly known as: ZyPREXA; Take 1 tablet (5 mg)   by mouth once daily at bedtime. For 4 days starting the evening of   treatment.  • ondansetron 8 mg tablet; Commonly known as: Zofran; Take 1 tablet (8 mg)   by  mouth every 8 hours if needed for nausea or vomiting.  • sacubitriL-valsartan  mg tablet; Commonly known as: Entresto; Take   1 tablet by mouth 2 times a day.  • sildenafil 50 mg tablet; Commonly known as: Viagra; Take 1 tablet (50   mg) by mouth once daily as needed for erectile dysfunction.  • Symbicort 80-4.5 mcg/actuation inhaler; Generic drug:   budesonide-formoteroL; Inhale 2 puffs once daily.  • triamterene-hydrochlorothiazid 37.5-25 mg tablet; Commonly known as:   Maxzide-25; Take 1 tablet by mouth once daily in the morning.  • VITAMIN D3 ORAL     STOP taking these medications    • dexAMETHasone 4 mg tablet; Commonly known as: Decadron  • famotidine 20 mg tablet; Commonly known as: Pepcid  • LORazepam 0.5 mg tablet; Commonly known as: Ativan  • metoprolol succinate  mg 24 hr tablet; Commonly known as:   Toprol-XL  • nicotine 14 mg/24 hr patch; Commonly known as: Nicoderm CQ  • pantoprazole 40 mg EC tablet; Commonly known as: ProtoNix  • predniSONE 20 mg tablet; Commonly known as: Deltasone  • varenicline tartrate 0.5 mg tablet; Commonly known as: Chantix       Outpatient Follow-Up  Future Appointments   Date Time Provider Department [place]   2/17/2025  8:00 AM [facility] [place] [address] [facility]   2/17/2025  8:15 AM INF 16 [facility]   2/25/2025  8:00 AM Nicolas Ospina MD [place] [address] Academic   2/25/2025  8:15 AM INF 11 [facility]     I spent 30 minutes on discharge planning.     Rebekah Tiwari, APRN-CNP

## 2025-02-17 ENCOUNTER — INFUSION (OUTPATIENT)
Dept: HEMATOLOGY/ONCOLOGY | Facility: HOSPITAL | Age: 65
End: 2025-02-17
Payer: COMMERCIAL

## 2025-02-17 ENCOUNTER — OFFICE VISIT (OUTPATIENT)
Dept: HEMATOLOGY/ONCOLOGY | Facility: HOSPITAL | Age: 65
End: 2025-02-17
Payer: COMMERCIAL

## 2025-02-17 ENCOUNTER — LAB (OUTPATIENT)
Dept: LAB | Facility: HOSPITAL | Age: 65
End: 2025-02-17
Payer: COMMERCIAL

## 2025-02-17 ENCOUNTER — HOSPITAL ENCOUNTER (OUTPATIENT)
Facility: HOSPITAL | Age: 65
Setting detail: OBSERVATION
LOS: 1 days | Discharge: HOME | End: 2025-02-18
Attending: STUDENT IN AN ORGANIZED HEALTH CARE EDUCATION/TRAINING PROGRAM
Payer: COMMERCIAL

## 2025-02-17 VITALS
OXYGEN SATURATION: 95 % | BODY MASS INDEX: 31.87 KG/M2 | TEMPERATURE: 97.5 F | WEIGHT: 235.01 LBS | RESPIRATION RATE: 18 BRPM

## 2025-02-17 DIAGNOSIS — C34.00 SMALL CELL CARCINOMA OF HILUM OF LUNG, UNSPECIFIED LATERALITY: ICD-10-CM

## 2025-02-17 DIAGNOSIS — C34.90 SMALL CELL LUNG CANCER: ICD-10-CM

## 2025-02-17 LAB
ALBUMIN SERPL BCP-MCNC: 3.9 G/DL (ref 3.4–5)
ALP SERPL-CCNC: 58 U/L (ref 33–136)
ALT SERPL W P-5'-P-CCNC: 21 U/L (ref 10–52)
ANION GAP SERPL CALC-SCNC: 15 MMOL/L (ref 10–20)
APTT PPP: 35 SECONDS (ref 27–38)
AST SERPL W P-5'-P-CCNC: 15 U/L (ref 9–39)
BASOPHILS # BLD AUTO: 0.03 X10*3/UL (ref 0–0.1)
BASOPHILS NFR BLD AUTO: 0.5 %
BILIRUB SERPL-MCNC: 0.6 MG/DL (ref 0–1.2)
BUN SERPL-MCNC: 34 MG/DL (ref 6–23)
CALCIUM SERPL-MCNC: 9.1 MG/DL (ref 8.6–10.3)
CHLORIDE SERPL-SCNC: 105 MMOL/L (ref 98–107)
CO2 SERPL-SCNC: 25 MMOL/L (ref 21–32)
CREAT SERPL-MCNC: 1.87 MG/DL (ref 0.5–1.3)
CRP SERPL-MCNC: 2.12 MG/DL
EGFRCR SERPLBLD CKD-EPI 2021: 40 ML/MIN/1.73M*2
EOSINOPHIL # BLD AUTO: 0.15 X10*3/UL (ref 0–0.7)
EOSINOPHIL NFR BLD AUTO: 2.5 %
ERYTHROCYTE [DISTWIDTH] IN BLOOD BY AUTOMATED COUNT: 14.1 % (ref 11.5–14.5)
FERRITIN SERPL-MCNC: 338 NG/ML (ref 20–300)
FIBRINOGEN PPP-MCNC: 545 MG/DL (ref 200–400)
GLUCOSE SERPL-MCNC: 102 MG/DL (ref 74–99)
HCT VFR BLD AUTO: 39.7 % (ref 41–52)
HGB BLD-MCNC: 13.4 G/DL (ref 13.5–17.5)
IMM GRANULOCYTES # BLD AUTO: 0.05 X10*3/UL (ref 0–0.7)
IMM GRANULOCYTES NFR BLD AUTO: 0.8 % (ref 0–0.9)
INR PPP: 1.2 (ref 0.9–1.1)
LDH SERPL L TO P-CCNC: 168 U/L (ref 84–246)
LYMPHOCYTES # BLD AUTO: 1.14 X10*3/UL (ref 1.2–4.8)
LYMPHOCYTES NFR BLD AUTO: 19.3 %
MCH RBC QN AUTO: 32.4 PG (ref 26–34)
MCHC RBC AUTO-ENTMCNC: 33.8 G/DL (ref 32–36)
MCV RBC AUTO: 96 FL (ref 80–100)
MONOCYTES # BLD AUTO: 0.96 X10*3/UL (ref 0.1–1)
MONOCYTES NFR BLD AUTO: 16.3 %
NEUTROPHILS # BLD AUTO: 3.57 X10*3/UL (ref 1.2–7.7)
NEUTROPHILS NFR BLD AUTO: 60.6 %
NRBC BLD-RTO: 0 /100 WBCS (ref 0–0)
PLATELET # BLD AUTO: 211 X10*3/UL (ref 150–450)
POTASSIUM SERPL-SCNC: 3.9 MMOL/L (ref 3.5–5.3)
PROT SERPL-MCNC: 7.1 G/DL (ref 6.4–8.2)
PROTHROMBIN TIME: 13.1 SECONDS (ref 9.8–12.8)
RBC # BLD AUTO: 4.14 X10*6/UL (ref 4.5–5.9)
SODIUM SERPL-SCNC: 141 MMOL/L (ref 136–145)
WBC # BLD AUTO: 5.9 X10*3/UL (ref 4.4–11.3)

## 2025-02-17 PROCEDURE — 99214 OFFICE O/P EST MOD 30 MIN: CPT | Performed by: CLINICAL NURSE SPECIALIST

## 2025-02-17 PROCEDURE — 85610 PROTHROMBIN TIME: CPT

## 2025-02-17 PROCEDURE — 96372 THER/PROPH/DIAG INJ SC/IM: CPT | Mod: 59

## 2025-02-17 PROCEDURE — 2500000004 HC RX 250 GENERAL PHARMACY W/ HCPCS (ALT 636 FOR OP/ED): Performed by: STUDENT IN AN ORGANIZED HEALTH CARE EDUCATION/TRAINING PROGRAM

## 2025-02-17 PROCEDURE — 99223 1ST HOSP IP/OBS HIGH 75: CPT

## 2025-02-17 PROCEDURE — 36415 COLL VENOUS BLD VENIPUNCTURE: CPT

## 2025-02-17 PROCEDURE — 2500000002 HC RX 250 W HCPCS SELF ADMINISTERED DRUGS (ALT 637 FOR MEDICARE OP, ALT 636 FOR OP/ED)

## 2025-02-17 PROCEDURE — 2500000004 HC RX 250 GENERAL PHARMACY W/ HCPCS (ALT 636 FOR OP/ED)

## 2025-02-17 PROCEDURE — 82728 ASSAY OF FERRITIN: CPT

## 2025-02-17 PROCEDURE — G0378 HOSPITAL OBSERVATION PER HR: HCPCS

## 2025-02-17 PROCEDURE — S4991 NICOTINE PATCH NONLEGEND: HCPCS

## 2025-02-17 PROCEDURE — 96375 TX/PRO/DX INJ NEW DRUG ADDON: CPT | Mod: INF

## 2025-02-17 PROCEDURE — 99214 OFFICE O/P EST MOD 30 MIN: CPT | Mod: 25 | Performed by: CLINICAL NURSE SPECIALIST

## 2025-02-17 PROCEDURE — 85025 COMPLETE CBC W/AUTO DIFF WBC: CPT

## 2025-02-17 PROCEDURE — 96413 CHEMO IV INFUSION 1 HR: CPT

## 2025-02-17 PROCEDURE — 96361 HYDRATE IV INFUSION ADD-ON: CPT | Mod: INF

## 2025-02-17 PROCEDURE — 86140 C-REACTIVE PROTEIN: CPT

## 2025-02-17 PROCEDURE — 2500000004 HC RX 250 GENERAL PHARMACY W/ HCPCS (ALT 636 FOR OP/ED): Mod: JZ,TB | Performed by: STUDENT IN AN ORGANIZED HEALTH CARE EDUCATION/TRAINING PROGRAM

## 2025-02-17 PROCEDURE — 80053 COMPREHEN METABOLIC PANEL: CPT

## 2025-02-17 PROCEDURE — 4004F PT TOBACCO SCREEN RCVD TLK: CPT | Performed by: CLINICAL NURSE SPECIALIST

## 2025-02-17 PROCEDURE — 85384 FIBRINOGEN ACTIVITY: CPT

## 2025-02-17 PROCEDURE — 83615 LACTATE (LD) (LDH) ENZYME: CPT

## 2025-02-17 RX ORDER — ENOXAPARIN SODIUM 100 MG/ML
40 INJECTION SUBCUTANEOUS EVERY 24 HOURS
Status: DISCONTINUED | OUTPATIENT
Start: 2025-02-17 | End: 2025-02-17

## 2025-02-17 RX ORDER — ALBUTEROL SULFATE 0.83 MG/ML
3 SOLUTION RESPIRATORY (INHALATION) AS NEEDED
Status: DISCONTINUED | OUTPATIENT
Start: 2025-02-17 | End: 2025-02-17 | Stop reason: HOSPADM

## 2025-02-17 RX ORDER — PROCHLORPERAZINE EDISYLATE 5 MG/ML
10 INJECTION INTRAMUSCULAR; INTRAVENOUS EVERY 6 HOURS PRN
Status: DISCONTINUED | OUTPATIENT
Start: 2025-02-17 | End: 2025-02-17 | Stop reason: HOSPADM

## 2025-02-17 RX ORDER — ALBUTEROL SULFATE 90 UG/1
2 INHALANT RESPIRATORY (INHALATION) 2 TIMES DAILY PRN
Status: DISCONTINUED | OUTPATIENT
Start: 2025-02-17 | End: 2025-02-18 | Stop reason: HOSPADM

## 2025-02-17 RX ORDER — IBUPROFEN 200 MG
1 TABLET ORAL EVERY 24 HOURS
Status: DISCONTINUED | OUTPATIENT
Start: 2025-02-17 | End: 2025-02-18 | Stop reason: HOSPADM

## 2025-02-17 RX ORDER — DIPHENHYDRAMINE HCL 25 MG
25 CAPSULE ORAL DAILY PRN
Status: DISCONTINUED | OUTPATIENT
Start: 2025-02-17 | End: 2025-02-18 | Stop reason: HOSPADM

## 2025-02-17 RX ORDER — ATORVASTATIN CALCIUM 40 MG/1
40 TABLET, FILM COATED ORAL DAILY
Status: DISCONTINUED | OUTPATIENT
Start: 2025-02-17 | End: 2025-02-18 | Stop reason: HOSPADM

## 2025-02-17 RX ORDER — IBUPROFEN 200 MG
1 TABLET ORAL EVERY 24 HOURS
Qty: 30 PATCH | Refills: 0 | Status: SHIPPED | OUTPATIENT
Start: 2025-02-17 | End: 2025-03-19

## 2025-02-17 RX ORDER — PROCHLORPERAZINE MALEATE 10 MG
10 TABLET ORAL EVERY 6 HOURS PRN
Status: DISCONTINUED | OUTPATIENT
Start: 2025-02-17 | End: 2025-02-17 | Stop reason: HOSPADM

## 2025-02-17 RX ORDER — TRIAMTERENE/HYDROCHLOROTHIAZID 37.5-25 MG
1 TABLET ORAL EVERY MORNING
Status: DISCONTINUED | OUTPATIENT
Start: 2025-02-18 | End: 2025-02-18 | Stop reason: HOSPADM

## 2025-02-17 RX ORDER — OLANZAPINE 5 MG/1
5 TABLET ORAL NIGHTLY
Status: DISCONTINUED | OUTPATIENT
Start: 2025-02-17 | End: 2025-02-17

## 2025-02-17 RX ORDER — DIPHENHYDRAMINE HCL 25 MG
4 CAPSULE ORAL AS NEEDED
Qty: 100 EACH | Refills: 0 | Status: SHIPPED | OUTPATIENT
Start: 2025-02-17 | End: 2025-03-19

## 2025-02-17 RX ORDER — AMOXICILLIN 250 MG
2 CAPSULE ORAL DAILY
Status: DISCONTINUED | OUTPATIENT
Start: 2025-02-17 | End: 2025-02-18 | Stop reason: HOSPADM

## 2025-02-17 RX ORDER — AMOXICILLIN 250 MG
2 CAPSULE ORAL DAILY
Qty: 60 TABLET | Refills: 2 | Status: ON HOLD | OUTPATIENT
Start: 2025-02-17 | End: 2025-02-18

## 2025-02-17 RX ORDER — ONDANSETRON HYDROCHLORIDE 8 MG/1
8 TABLET, FILM COATED ORAL EVERY 8 HOURS PRN
Status: DISCONTINUED | OUTPATIENT
Start: 2025-02-17 | End: 2025-02-18 | Stop reason: HOSPADM

## 2025-02-17 RX ORDER — EPINEPHRINE 0.3 MG/.3ML
0.3 INJECTION SUBCUTANEOUS EVERY 5 MIN PRN
Status: DISCONTINUED | OUTPATIENT
Start: 2025-02-17 | End: 2025-02-17 | Stop reason: HOSPADM

## 2025-02-17 RX ORDER — DIPHENHYDRAMINE HYDROCHLORIDE 50 MG/ML
50 INJECTION INTRAMUSCULAR; INTRAVENOUS AS NEEDED
Status: DISCONTINUED | OUTPATIENT
Start: 2025-02-17 | End: 2025-02-17 | Stop reason: HOSPADM

## 2025-02-17 RX ORDER — FAMOTIDINE 10 MG/ML
20 INJECTION, SOLUTION INTRAVENOUS ONCE AS NEEDED
Status: DISCONTINUED | OUTPATIENT
Start: 2025-02-17 | End: 2025-02-17 | Stop reason: HOSPADM

## 2025-02-17 RX ORDER — SACUBITRIL AND VALSARTAN 97; 103 MG/1; MG/1
1 TABLET, FILM COATED ORAL 2 TIMES DAILY
Status: DISCONTINUED | OUTPATIENT
Start: 2025-02-17 | End: 2025-02-18 | Stop reason: HOSPADM

## 2025-02-17 RX ORDER — ACETAMINOPHEN 325 MG/1
650 TABLET ORAL EVERY 6 HOURS PRN
Status: DISCONTINUED | OUTPATIENT
Start: 2025-02-17 | End: 2025-02-18 | Stop reason: HOSPADM

## 2025-02-17 RX ORDER — OLANZAPINE 5 MG/1
5 TABLET ORAL NIGHTLY PRN
Status: DISCONTINUED | OUTPATIENT
Start: 2025-02-17 | End: 2025-02-18 | Stop reason: HOSPADM

## 2025-02-17 RX ORDER — HEPARIN SODIUM 5000 [USP'U]/ML
5000 INJECTION, SOLUTION INTRAVENOUS; SUBCUTANEOUS EVERY 8 HOURS
Status: DISCONTINUED | OUTPATIENT
Start: 2025-02-17 | End: 2025-02-18 | Stop reason: HOSPADM

## 2025-02-17 RX ADMIN — DEXAMETHASONE SODIUM PHOSPHATE 8 MG: 4 INJECTION, SOLUTION INTRA-ARTICULAR; INTRALESIONAL; INTRAMUSCULAR; INTRAVENOUS; SOFT TISSUE at 09:26

## 2025-02-17 RX ADMIN — Medication 10 MG: at 09:41

## 2025-02-17 RX ADMIN — NICOTINE 1 PATCH: 14 PATCH, EXTENDED RELEASE TRANSDERMAL at 18:15

## 2025-02-17 RX ADMIN — HEPARIN SODIUM 5000 UNITS: 5000 INJECTION, SOLUTION INTRAVENOUS; SUBCUTANEOUS at 18:14

## 2025-02-17 RX ADMIN — SODIUM CHLORIDE 1000 ML: 9 INJECTION, SOLUTION INTRAVENOUS at 10:48

## 2025-02-17 SDOH — SOCIAL STABILITY: SOCIAL INSECURITY: HAS ANYONE EVER THREATENED TO HURT YOUR FAMILY OR YOUR PETS?: NO

## 2025-02-17 SDOH — ECONOMIC STABILITY: FOOD INSECURITY: WITHIN THE PAST 12 MONTHS, THE FOOD YOU BOUGHT JUST DIDN'T LAST AND YOU DIDN'T HAVE MONEY TO GET MORE.: NEVER TRUE

## 2025-02-17 SDOH — ECONOMIC STABILITY: TRANSPORTATION INSECURITY: IN THE PAST 12 MONTHS, HAS LACK OF TRANSPORTATION KEPT YOU FROM MEDICAL APPOINTMENTS OR FROM GETTING MEDICATIONS?: NO

## 2025-02-17 SDOH — ECONOMIC STABILITY: HOUSING INSECURITY: AT ANY TIME IN THE PAST 12 MONTHS, WERE YOU HOMELESS OR LIVING IN A SHELTER (INCLUDING NOW)?: NO

## 2025-02-17 SDOH — SOCIAL STABILITY: SOCIAL INSECURITY: ABUSE: ADULT

## 2025-02-17 SDOH — SOCIAL STABILITY: SOCIAL INSECURITY: DO YOU FEEL UNSAFE GOING BACK TO THE PLACE WHERE YOU ARE LIVING?: NO

## 2025-02-17 SDOH — SOCIAL STABILITY: SOCIAL INSECURITY: HAVE YOU HAD THOUGHTS OF HARMING ANYONE ELSE?: NO

## 2025-02-17 SDOH — SOCIAL STABILITY: SOCIAL INSECURITY: WITHIN THE LAST YEAR, HAVE YOU BEEN HUMILIATED OR EMOTIONALLY ABUSED IN OTHER WAYS BY YOUR PARTNER OR EX-PARTNER?: NO

## 2025-02-17 SDOH — SOCIAL STABILITY: SOCIAL INSECURITY: HAVE YOU HAD ANY THOUGHTS OF HARMING ANYONE ELSE?: NO

## 2025-02-17 SDOH — SOCIAL STABILITY: SOCIAL INSECURITY: WITHIN THE LAST YEAR, HAVE YOU BEEN AFRAID OF YOUR PARTNER OR EX-PARTNER?: NO

## 2025-02-17 SDOH — ECONOMIC STABILITY: HOUSING INSECURITY: IN THE PAST 12 MONTHS, HOW MANY TIMES HAVE YOU MOVED WHERE YOU WERE LIVING?: 0

## 2025-02-17 SDOH — SOCIAL STABILITY: SOCIAL INSECURITY: WERE YOU ABLE TO COMPLETE ALL THE BEHAVIORAL HEALTH SCREENINGS?: YES

## 2025-02-17 SDOH — ECONOMIC STABILITY: FOOD INSECURITY: WITHIN THE PAST 12 MONTHS, YOU WORRIED THAT YOUR FOOD WOULD RUN OUT BEFORE YOU GOT THE MONEY TO BUY MORE.: NEVER TRUE

## 2025-02-17 SDOH — ECONOMIC STABILITY: INCOME INSECURITY: IN THE PAST 12 MONTHS HAS THE ELECTRIC, GAS, OIL, OR WATER COMPANY THREATENED TO SHUT OFF SERVICES IN YOUR HOME?: NO

## 2025-02-17 SDOH — ECONOMIC STABILITY: FOOD INSECURITY: HOW HARD IS IT FOR YOU TO PAY FOR THE VERY BASICS LIKE FOOD, HOUSING, MEDICAL CARE, AND HEATING?: NOT HARD AT ALL

## 2025-02-17 SDOH — SOCIAL STABILITY: SOCIAL INSECURITY: ARE YOU OR HAVE YOU BEEN THREATENED OR ABUSED PHYSICALLY, EMOTIONALLY, OR SEXUALLY BY ANYONE?: NO

## 2025-02-17 SDOH — ECONOMIC STABILITY: HOUSING INSECURITY: IN THE LAST 12 MONTHS, WAS THERE A TIME WHEN YOU WERE NOT ABLE TO PAY THE MORTGAGE OR RENT ON TIME?: NO

## 2025-02-17 SDOH — SOCIAL STABILITY: SOCIAL INSECURITY: DOES ANYONE TRY TO KEEP YOU FROM HAVING/CONTACTING OTHER FRIENDS OR DOING THINGS OUTSIDE YOUR HOME?: NO

## 2025-02-17 SDOH — SOCIAL STABILITY: SOCIAL INSECURITY: ARE THERE ANY APPARENT SIGNS OF INJURIES/BEHAVIORS THAT COULD BE RELATED TO ABUSE/NEGLECT?: NO

## 2025-02-17 SDOH — SOCIAL STABILITY: SOCIAL INSECURITY: DO YOU FEEL ANYONE HAS EXPLOITED OR TAKEN ADVANTAGE OF YOU FINANCIALLY OR OF YOUR PERSONAL PROPERTY?: NO

## 2025-02-17 ASSESSMENT — COGNITIVE AND FUNCTIONAL STATUS - GENERAL
DAILY ACTIVITIY SCORE: 24
MOBILITY SCORE: 24
MOBILITY SCORE: 24
DAILY ACTIVITIY SCORE: 24
PATIENT BASELINE BEDBOUND: NO

## 2025-02-17 ASSESSMENT — PATIENT HEALTH QUESTIONNAIRE - PHQ9
SUM OF ALL RESPONSES TO PHQ9 QUESTIONS 1 & 2: 0
1. LITTLE INTEREST OR PLEASURE IN DOING THINGS: NOT AT ALL
1. LITTLE INTEREST OR PLEASURE IN DOING THINGS: NOT AT ALL
2. FEELING DOWN, DEPRESSED OR HOPELESS: NOT AT ALL

## 2025-02-17 ASSESSMENT — ACTIVITIES OF DAILY LIVING (ADL)
HEARING - LEFT EAR: FUNCTIONAL
HEARING - RIGHT EAR: FUNCTIONAL
FEEDING YOURSELF: INDEPENDENT
ADEQUATE_TO_COMPLETE_ADL: YES
JUDGMENT_ADEQUATE_SAFELY_COMPLETE_DAILY_ACTIVITIES: YES
BATHING: INDEPENDENT
WALKS IN HOME: INDEPENDENT
GROOMING: INDEPENDENT
DRESSING YOURSELF: INDEPENDENT
LACK_OF_TRANSPORTATION: NO
LACK_OF_TRANSPORTATION: NO
PATIENT'S MEMORY ADEQUATE TO SAFELY COMPLETE DAILY ACTIVITIES?: YES
LACK_OF_TRANSPORTATION: NO
TOILETING: INDEPENDENT

## 2025-02-17 ASSESSMENT — LIFESTYLE VARIABLES
AUDIT-C TOTAL SCORE: 0
SKIP TO QUESTIONS 9-10: 1
AUDIT-C TOTAL SCORE: 0
HOW OFTEN DO YOU HAVE 6 OR MORE DRINKS ON ONE OCCASION: NEVER
HOW OFTEN DO YOU HAVE 6 OR MORE DRINKS ON ONE OCCASION: NEVER
AUDIT-C TOTAL SCORE: 0
AUDIT-C TOTAL SCORE: 0
SKIP TO QUESTIONS 9-10: 1
HOW OFTEN DO YOU HAVE A DRINK CONTAINING ALCOHOL: NEVER
HOW OFTEN DO YOU HAVE A DRINK CONTAINING ALCOHOL: NEVER
HOW MANY STANDARD DRINKS CONTAINING ALCOHOL DO YOU HAVE ON A TYPICAL DAY: PATIENT DOES NOT DRINK
HOW MANY STANDARD DRINKS CONTAINING ALCOHOL DO YOU HAVE ON A TYPICAL DAY: PATIENT DOES NOT DRINK

## 2025-02-17 ASSESSMENT — PAIN SCALES - GENERAL
PAINLEVEL_OUTOF10: 0 - NO PAIN
PAINLEVEL_OUTOF10: 0-NO PAIN
PAINLEVEL_OUTOF10: 0 - NO PAIN

## 2025-02-17 ASSESSMENT — PAIN - FUNCTIONAL ASSESSMENT: PAIN_FUNCTIONAL_ASSESSMENT: 0-10

## 2025-02-17 NOTE — PROGRESS NOTES
Patient here for D8 tarlatamab. Patient tolerated infusion without issue and admitted to [facility] 4002 for planned 24 hour observation. Patient transported upstairs by this RN.

## 2025-02-17 NOTE — PROGRESS NOTES
Patient ID: Dane Molina is a 64 y.o. male    Primary Care Provider: Darwin Early MD    DIAGNOSIS AND STAGING  cT3 pN2 pM1b small cell lung cancer (INSM1 and TTF1+) of the right lower lobe, diagnosed on 02/05/2024 through bronchoscopy     SITES OF DISEASE  Right lower lobe   Levels 4R and 7 nodes   Liver, confirmed by biopsy      MOLECULAR GENOMICS  Not performed   RB loss by IHC      PRIOR THERAPIES  03/19/2024-05/20/2024: 4 cycles carboplatin/etoposide with addition of atezolizumab to C2 on 04/08/2024 08/09/2024: Completion 45 cGy/15f to chest by Dr. Conde     CURRENT THERAPY  Maintenance atezolizumab since 06/21/24    CURRENT ONCOLOGICAL PROBLEMS  Grade 3 immune related rash now grade I    HISTORY OF PRESENT ILLNESS  Patient presented to the emergency room on 01/04/2023 with a hypertensive urgency and cough.  He has a history of noncompliance with antihypertensive medications.  He also complained of dyspnea and underwent a CT chest without IV contrast that demonstrated a right lower lobe paramediastinal mass measuring 3.1 cm with adjacent pulmonary nodule measuring 1.8 cm.  Mediastinal lymphadenopathy was demonstrated, including a subcarinal node measuring 1.7 cm.  There was right hilar lymphadenopathy, 2 cm in short axis.  On 01/15/2024 the patient underwent a PET scan that demonstrated avidity of the aforementioned masses/lymph nodes, as well as a lesion in the liver, SUV max 3.3, right hepatic lobe.  On 02/05/2024, patient underwent a bronchoscopy:  A. LYMPH NODE 4 R PULMONARY FINE NEEDLE ASPIRATION , CYTOLOGY AND CELL BLOCK:    Positive for malignant cells   Metastatic small cell carcinoma, see note  B. LYMPH NODE 7 PULMONARY FINE NEEDLE ASPIRATION , CYTOLOGY AND CELL BLOCK:    Positive for malignant cells  Metastatic small cell carcinoma, see note          Note: Immunostains demonstrate the lesional cells to be diffusely positive for CAM 5.2, INSM1, TTF-1.  The proliferative marker Ki-67 is  greater than 90%.  Immunostain for retinoblastoma shows loss of staining.  Immunostain for p40 is negative.  The morphology and immunohistochemical profile are consistent with the above diagnosis.  On 02/29/24 the pt is seen by med onc for the first time. Denies any systemic sx - denies lack of appetite, fatigue or unintentional weight loss. Denies new localized pain, headaches or neuro sx.  Denies new respiratory sx.  He is claustrophobic and MRI brain was scheduled for today but pt could not go through with the test. He also has mild CKD, which limits his ability to receive intravenous contrast for CT.   03/04/24: MRI brain shows no ICMA, but MRI liver shows a 1.9 cm hepatic lesion with T2 hyperintensity and peripheral enhancement   03/19/2024: C1 D1 carboplatin/etoposide  03/22/24: Liver biopsy:  FINAL DIAGNOSIS   A. RIGHT LIVER MASS BIOPSY:      -- LIVER TISSUE INVOLVED BY SMALL CELL CARCINOMA, SEE NOTE     Note: Clinical history of lung small cell carcinoma noted. The findings most likely represent metastasis from the known primary lung tumor.     04/04/24: discussed with patient liver biopsy results and recommendations to add atezolizumab to his regimen  He has met with radiation oncology, and we will consider consolidative TRT pending response to cytotoxic chemotherapy    04/08/2024: C2 D1 carboplatin/etoposide/atezolizumab    05/20/24: C4 D1 carboplatin/etoposide/atezolizumab    06/10/2024: CT chest/abdomen/pelvis demonstrating continue his improvement of metastatic lymphadenopathy within the mediastinum/right hilum, with no progression within the liver    06/13/2024: Referral for radiation oncology/TRT.  Patient to proceed with maintenance atezolizumab    06/21/24: begins maintenance atezolizumab     08/09/24: completes 45 Gy in 15 fractions to chest (To Conde)     08/08/24: prescribed prednisone 1 mg/Kg for grade 3 IO-induced rash with peeling of hands     08/29/24: last steroid taken last week but  did not complete full prescription. Rash on chest/abdomen improved but has some peeling of the hands/arms. Will have patient take remaining prednisone and follow up in 3 weeks       PAST MEDICAL HISTORY  CKD - creatinine 1.68  Neck pain (injury at work)   Hypertension  Iron deficiency anemia (hx of blood transfusion in the 90's)    SURGICAL HISTORY  Ankle fracture and has a latoya in place      SOCIAL HISTORY  Former 35-40 pack-year smoker, quit at age 57  Smokes cigars now   History of alcohol addiction, sober for the past 15 years   Has one chlid (Roula)  Single      FAMILY HISTORY  Lung CA (mother)  Mother had CKD and required HD    CURRENT MEDS REVIEWED       ALLERGIES REVIEWED        SUBJECTIVE:  Today Mr. Molina is here for day 8 of tarlatamab.  He reports that he thinks he had no side effects from the day 1 dose.  He did wake up the next morning with some muscle aching, which he thinks is because he fell asleep on the couch in the hospital room.  He is struggling with the smoking, but only buys single cigarettes.  ($1.00 a piece).       A 13 point review of systems was performed, with significant findings documented above in subjective history.    OBJECTIVE:  Vitals:    02/17/25 0808   Resp: 18   Temp: 36.4 °C (97.5 °F)   SpO2: 95%     Body surface area is 2.33 meters squared.     Wt Readings from Last 5 Encounters:   02/17/25 107 kg (235 lb 0.2 oz)   02/11/25 108 kg (237 lb 3.4 oz)   02/10/25 108 kg (237 lb 14 oz)   01/22/25 108 kg (239 lb)   01/02/25 110 kg (242 lb 8.1 oz)     ECOGSCORE: 1- Restricted in physically strenuous activity.  Carries out light duty.  Gen: A&O, NAD  Head: Normocephalic, atraumatic  Eyes: no scleral icterus  ENT: mucous membranes moist, no oropharyngeal lesions  Resp: Lungs CTAB  Cardiac: Normal rate, regular rhythm, no murmurs appreciated  Abdomen: Soft, nondistended, nontender, +BS  Neuro: CNII-XII grossly intact  Psych: appropriate mood & affect    Diagnostic Results    Results:  Labs:  Lab Results   Component Value Date    WBC 5.9 02/17/2025    HGB 13.4 (L) 02/17/2025    HCT 39.7 (L) 02/17/2025    MCV 96 02/17/2025     02/17/2025      Lab Results   Component Value Date    NEUTROABS 3.57 02/17/2025        Lab Results   Component Value Date    GLUCOSE 102 (H) 02/17/2025    CALCIUM 9.1 02/17/2025     02/17/2025    K 3.9 02/17/2025    CO2 25 02/17/2025     02/17/2025    BUN 34 (H) 02/17/2025    CREATININE 1.87 (H) 02/17/2025    MG 2.00 02/11/2025     Lab Results   Component Value Date    ALT 21 02/17/2025    AST 15 02/17/2025    ALKPHOS 58 02/17/2025    BILITOT 0.6 02/17/2025    BILIDIR 0.1 12/21/2023      Lab Results   Component Value Date    ACTH 14.7 10/28/2024    CORTISOL 6.4 01/02/2025    TSH 1.16 01/02/2025    FREET4 1.07 08/09/2024     CT chest/abdomen/pelvis on 01/17/2025:    IMPRESSION:  Lung cancer restaging scan. When compared to the prior examination  dated 11/15/2024, there has been interval development of several new  pulmonary nodules of the right lower lobe and a hepatic lesion  concerning for metastatic disease, further evaluation with PET-CT is  recommended. Additional stable chronic and incidental findings  described above.    PET 2/7/25:    IMPRESSION:  1. Two adjacent mildly FDG avid right lower lobe lung nodules  compatible with disease recurrence. Additional subcentimeter right  lower lobe nodules do not demonstrate significant FDG avidity,  however metabolic activity can be underestimated in subcentimeter  lesions and attention on follow-up CT is recommended.  2. Increasingly hypermetabolic and enlarged bilateral axillary lymph  nodes suspicious for tad metastatic disease. Persistent mildly FDG  avid right hilar tad activity is suggestive of residual viable  neoplasm. Resolution of previously seen hypermetabolic mediastinal  lymphadenopathy.  3. Enlarging and increasingly hypermetabolic segment 8 liver  metastasis and new adjacent  smaller segment 8 liver metastasis.  4. Nonspecific persistent somewhat focal FDG avidity in the sigmoid  colon with apparent underlying wall thickening and numerous  diverticula. Findings could represent sequela of chronic  diverticulitis, however consider colonoscopy to exclude underlying  neoplasm if clinically warranted.    Assessment/Plan   Small cell carcinoma of lung, Clinical: Stage FREDRICK (cT3, cN2, pM1b)  Mr. Dane Molina is a 65 YO with ES-SCLC seen for follow up. He is s/p 4 cycles platinum doublet with addition of atezo after c1 as well as consolidative RT. He has had 1 dose atezo which was held for grade III ICI dermatitis which is now grade I only on palms/hands.    We resumed atezo and he tolerated well with topical clobetasol. Most recent scans 11/15/24 personally reviewed showing no obvious metastatic disease. MRI brain without metastatic disease.     Unfortunately with presumed progression in liver. We began c1d1 tarlatamab on 2/10 with direct admission.  He did well, and is here for day 8 dosing.      #T3 pN2 pM1b small cell lung cancer of the RLL - extensive stage.  -S/p 4 cycles carboplatin/etoposide + atezolizumab added to cycle 2  -S/p 1 dose atezo c/b ICI dermatitis  -S/P TRT (45 Gy in 15 fx per Dr. Conde)   -PET Stat confirming avidity in liver and RLL  -Will proceed with c1d8 today, direct admit for ICANS and hypersensitivity monitoring    #Grade I ICI dermatitis - resolved  -Was initially grade III now downgraded after PO steroid taper  -Completed Clobetasol 0.5% ointment BID x 14d. Now using cocoa butter PRN     #CNS monitoring   -MRI on 07/06/24 shows no ICM   -Repeat every 3 months - next 02/25    [facility]  [facility]or[facility] Medical Oncology   [id number] [place] [id number]  Phone: [phone]

## 2025-02-17 NOTE — H&P
History Of Present Illness  Dane Molina is a 64 y.o. male with a PMHx of SCLC (previously on atezolizumab, now on Tarlatamab), CKD, COPD, HLD and HTN that presents as a direct admission for CRS monitoring s/p C1D8 Tarlatamab (2/17). On admission, pt feeling well. States his infusion went well. Denies current fever/chills, SOB, N/V/D/C or vision changes. ROS: A complete review of systems was performed and is negative except for as mentioned above in the HPI      Past Medical History  He has a past medical history of CKD (chronic kidney disease), COPD (chronic obstructive pulmonary disease) (Multi), Hyperlipidemia, Hypertension, and SOB (shortness of breath).    Surgical History  He has a past surgical history that includes Esophagogastroduodenoscopy; Colonoscopy; and ORIF ankle fracture (1993).    Oncology History   Small cell carcinoma of lung   3/7/2024 Initial Diagnosis    Small cell lung cancer (CMS/HCC)     3/19/2024 - 3/21/2024 Chemotherapy    CARBOplatin / Etoposide, 21 Day Cycles - Lung     3/19/2024 - 5/22/2024 Chemotherapy    Atezolizumab + CARBOplatin / Etoposide, 21 Day Cycles     4/4/2024 Cancer Staged    Staging form: Lung, AJCC 8th Edition, Clinical stage from 4/4/2024: Stage FREDRICK (cT3, cN2, pM1b) - Signed by Tori aGlvin MD on 4/4/2024 6/21/2024 - 1/2/2025 Chemotherapy    Atezolizumab, 21 Day Cycles     2/10/2025 -  Chemotherapy    Tarlatamab, 28 Day Cycles          Social History  He reports that he has been smoking cigars and cigarettes. He started smoking about 47 years ago. He has never used smokeless tobacco. He reports that he does not currently use alcohol. He reports that he does not currently use drugs.     Allergies  Patient has no known allergies.       Physical Exam  HENT:      Nose: Nose normal.   Eyes:      Pupils: Pupils are equal, round, and reactive to light.   Cardiovascular:      Rate and Rhythm: Normal rate.   Pulmonary:      Effort: Pulmonary effort is normal.    Abdominal:      General: Abdomen is flat.   Musculoskeletal:         General: Normal range of motion.      Cervical back: Normal range of motion.   Neurological:      General: No focal deficit present.      Mental Status: He is alert.   Psychiatric:         Mood and Affect: Mood normal.          Last Recorded Vitals  There were no vitals taken for this visit.    Relevant Results  No results found.       Assessment/Plan   Assessment & Plan  Encounter for antineoplastic chemotherapy      Dane Molina is a 64 y.o. male with a PMHx of SCLC (previously on atezolizumab, now on Tarlatamab), CKD, COPD, HLD and HTN that presents as a direct admission for CRS monitoring s/p C1D8 Tarlatamab (2/17). DC pending 24 hour observation for CRS and ICANS toxicity.     #C1D8 Tarlatamab (IMDELLTRA)  - s/p C1D8 Tarlatamab infusion 2/17  - Admit for 24 hrs observation for CRS/ICANS monitoring then dc tomorrow 2/18 if no concerns  - Plan: Cycle 1 (step-up dosing): Day 1 (1 mg), Day 8 (10 mg) and Day 15 (10 mg)  - Cytokine release syndrome (CRS) - symptoms including fever, low blood pressure, fatigue, tachycardia, headache, shortness of breath, N/V, CNS (confusion, anxiety, balance issues), bleeding.   - Instructions for discharge: For fever of >=100.4°F (38°C), instructed patient to take acetaminophen (Tylenol) 650-1000 mg mg once. After 30 minutes, check temperature again. If the fever resolves, no further action is needed. If the fever is still present, take dexamethasone 8 mg  once and wait another 30 minutes. Recheck your temperature. If the fever persists 30 minutes after taking dexamethasone, go to the Emergency Department immediately. For shortness of breath, instructed patient to take dexamethasone 8 mg x1 and go immediately to the Emergency Department.   - Neurologic/Immune effector cell-associated neurotoxicity syndrome - including trouble speaking, confusion, seizure, numbness, weakness, insomnia.      #SCLC of RLL on  atezolizumab  - Dx 01/2023  - 02/05/2024: s/p bronchoscopy, positive for malignant cells, metastatic small cell carcinoma  - 03/04/24: MRI liver c/f mets  - s/p 4 cycles carboplatin/etoposide + atezolizumab added to cycle 2  - s/p 1 dose atezo c/b ICI dermatitis  - s/p TRT (45 Gy in 15 fx per Dr. Conde)   - s/p 24 hour observation (2/10) for C1D1 Tarlatamab infusion  - 2/17 admitted for 24 hours observation after C1D8 Tarlatamab    #CKD  - sCr 1.87 on admission (2/17) BL ~ 1.5-1.8  - CTM, avoid nephrotoxic medications     #COPD  - previously on Symbicort, not currently taking  - Albuterol PRN     #HTN  - continue home Lipitor 40mg  - continue home Maxzide daily     #Hx HF  - pt completed stress test 3/15/24: moderately reduced LV function 33%, moderate-severely dilated LV  - ECHO 3/22/24: LVSF moderately to severely decreased w/ 25-30% EF  - continue w/ Entresto daily    #Grade I ICI dermatitis - resolved  - Was initially grade III now downgraded after PO steroid taper  - Completed Clobetasol 0.5% ointment BID x 14d. Now using cocoa butter PRN     #Prophy  - heparin, SCDs     #DISPO  - Full Code - confirmed on admission  - NOK: Roula Miller (daughter) #[phone]  - Plan for CRS and ICANS monitoring x 24 hrs and dc tomorrow 2/18/25 if no acute issues      I spent 90 minutes in the professional and overall care of this patient.    Assessment and plan as above to be discussed in AM with attending physician Dr. Faby Jimenez PA-C

## 2025-02-18 ENCOUNTER — PHARMACY VISIT (OUTPATIENT)
Dept: PHARMACY | Facility: CLINIC | Age: 65
End: 2025-02-18
Payer: COMMERCIAL

## 2025-02-18 VITALS
OXYGEN SATURATION: 93 % | SYSTOLIC BLOOD PRESSURE: 120 MMHG | DIASTOLIC BLOOD PRESSURE: 81 MMHG | RESPIRATION RATE: 18 BRPM | HEART RATE: 97 BPM | BODY MASS INDEX: 33.93 KG/M2 | WEIGHT: 236.99 LBS | TEMPERATURE: 99.1 F | HEIGHT: 70 IN

## 2025-02-18 LAB
ALBUMIN SERPL BCP-MCNC: 3.7 G/DL (ref 3.4–5)
ALP SERPL-CCNC: 54 U/L (ref 33–136)
ALT SERPL W P-5'-P-CCNC: 16 U/L (ref 10–52)
ANION GAP SERPL CALC-SCNC: 14 MMOL/L (ref 10–20)
AST SERPL W P-5'-P-CCNC: 11 U/L (ref 9–39)
BASOPHILS # BLD AUTO: 0.04 X10*3/UL (ref 0–0.1)
BASOPHILS NFR BLD AUTO: 0.4 %
BILIRUB SERPL-MCNC: 0.4 MG/DL (ref 0–1.2)
BUN SERPL-MCNC: 32 MG/DL (ref 6–23)
CALCIUM SERPL-MCNC: 9 MG/DL (ref 8.6–10.6)
CHLORIDE SERPL-SCNC: 106 MMOL/L (ref 98–107)
CO2 SERPL-SCNC: 23 MMOL/L (ref 21–32)
CREAT SERPL-MCNC: 1.67 MG/DL (ref 0.5–1.3)
EGFRCR SERPLBLD CKD-EPI 2021: 46 ML/MIN/1.73M*2
EOSINOPHIL # BLD AUTO: 0.04 X10*3/UL (ref 0–0.7)
EOSINOPHIL NFR BLD AUTO: 0.4 %
ERYTHROCYTE [DISTWIDTH] IN BLOOD BY AUTOMATED COUNT: 14 % (ref 11.5–14.5)
GLUCOSE SERPL-MCNC: 101 MG/DL (ref 74–99)
HCT VFR BLD AUTO: 37.4 % (ref 41–52)
HGB BLD-MCNC: 12.2 G/DL (ref 13.5–17.5)
IMM GRANULOCYTES # BLD AUTO: 0.11 X10*3/UL (ref 0–0.7)
IMM GRANULOCYTES NFR BLD AUTO: 1.1 % (ref 0–0.9)
LYMPHOCYTES # BLD AUTO: 0.63 X10*3/UL (ref 1.2–4.8)
LYMPHOCYTES NFR BLD AUTO: 6.1 %
MAGNESIUM SERPL-MCNC: 1.83 MG/DL (ref 1.6–2.4)
MCH RBC QN AUTO: 31.3 PG (ref 26–34)
MCHC RBC AUTO-ENTMCNC: 32.6 G/DL (ref 32–36)
MCV RBC AUTO: 96 FL (ref 80–100)
MONOCYTES # BLD AUTO: 1.15 X10*3/UL (ref 0.1–1)
MONOCYTES NFR BLD AUTO: 11.1 %
NEUTROPHILS # BLD AUTO: 8.43 X10*3/UL (ref 1.2–7.7)
NEUTROPHILS NFR BLD AUTO: 80.9 %
NRBC BLD-RTO: 0 /100 WBCS (ref 0–0)
PLATELET # BLD AUTO: 217 X10*3/UL (ref 150–450)
POTASSIUM SERPL-SCNC: 4 MMOL/L (ref 3.5–5.3)
PROT SERPL-MCNC: 6.8 G/DL (ref 6.4–8.2)
RBC # BLD AUTO: 3.9 X10*6/UL (ref 4.5–5.9)
SODIUM SERPL-SCNC: 139 MMOL/L (ref 136–145)
WBC # BLD AUTO: 10.4 X10*3/UL (ref 4.4–11.3)

## 2025-02-18 PROCEDURE — G0378 HOSPITAL OBSERVATION PER HR: HCPCS

## 2025-02-18 PROCEDURE — 36415 COLL VENOUS BLD VENIPUNCTURE: CPT

## 2025-02-18 PROCEDURE — 84075 ASSAY ALKALINE PHOSPHATASE: CPT

## 2025-02-18 PROCEDURE — 83735 ASSAY OF MAGNESIUM: CPT

## 2025-02-18 PROCEDURE — 99239 HOSP IP/OBS DSCHRG MGMT >30: CPT

## 2025-02-18 PROCEDURE — 85025 COMPLETE CBC W/AUTO DIFF WBC: CPT

## 2025-02-18 PROCEDURE — 2500000001 HC RX 250 WO HCPCS SELF ADMINISTERED DRUGS (ALT 637 FOR MEDICARE OP)

## 2025-02-18 PROCEDURE — RXMED WILLOW AMBULATORY MEDICATION CHARGE

## 2025-02-18 RX ORDER — DEXAMETHASONE 4 MG/1
8 TABLET ORAL DAILY PRN
Qty: 10 TABLET | Refills: 0 | Status: SHIPPED | OUTPATIENT
Start: 2025-02-18

## 2025-02-18 RX ORDER — ACETAMINOPHEN 325 MG/1
650 TABLET ORAL ONCE
Status: COMPLETED | OUTPATIENT
Start: 2025-02-18 | End: 2025-02-18

## 2025-02-18 RX ORDER — ACETAMINOPHEN 325 MG/1
650 TABLET ORAL EVERY 6 HOURS PRN
Qty: 30 TABLET | Refills: 0 | Status: SHIPPED | OUTPATIENT
Start: 2025-02-18 | End: 2025-02-18 | Stop reason: HOSPADM

## 2025-02-18 RX ORDER — ACETAMINOPHEN 325 MG/1
650 TABLET ORAL EVERY 6 HOURS PRN
Qty: 30 TABLET | Refills: 0 | Status: SHIPPED | OUTPATIENT
Start: 2025-02-18 | End: 2025-02-28

## 2025-02-18 RX ORDER — AMOXICILLIN 250 MG
2 CAPSULE ORAL DAILY
Qty: 60 TABLET | Refills: 2 | Status: SHIPPED | OUTPATIENT
Start: 2025-02-18 | End: 2025-05-19

## 2025-02-18 RX ORDER — POLYETHYLENE GLYCOL 3350 17 G/17G
17 POWDER, FOR SOLUTION ORAL DAILY
Qty: 510 G | Refills: 1 | Status: SHIPPED | OUTPATIENT
Start: 2025-02-18 | End: 2025-04-19

## 2025-02-18 RX ADMIN — ACETAMINOPHEN 650 MG: 325 TABLET ORAL at 05:59

## 2025-02-18 RX ADMIN — SENNOSIDES AND DOCUSATE SODIUM 2 TABLET: 50; 8.6 TABLET ORAL at 08:39

## 2025-02-18 RX ADMIN — TRIAMTERENE AND HYDROCHLOROTHIAZIDE 1 TABLET: 37.5; 25 TABLET ORAL at 08:37

## 2025-02-18 RX ADMIN — ATORVASTATIN CALCIUM 40 MG: 40 TABLET, FILM COATED ORAL at 08:36

## 2025-02-18 RX ADMIN — SACUBITRIL AND VALSARTAN 1 TABLET: 97; 103 TABLET, FILM COATED ORAL at 08:36

## 2025-02-18 NOTE — DISCHARGE INSTRUCTIONS
Instructions for discharge: For fever of >=100.4°F (38°C), instructed patient to take acetaminophen (Tylenol) 650-1000 mg mg once. After 30 minutes, check temperature again. If the fever resolves, no further action is needed. If the fever is still present, take dexamethasone 8 mg once and wait another 30 minutes. Recheck your temperature. If the fever persists 30 minutes after taking dexamethasone, go to the Emergency Department immediately. For shortness of breath, instructed patient to take dexamethasone 8 mg x1 and go immediately to the Emergency Department.   - Neurologic/Immune effector cell-associated neurotoxicity syndrome - including trouble speaking, confusion, seizure, numbness, weakness, insomnia.

## 2025-02-18 NOTE — CARE PLAN
The patient's goals for the shift include      The clinical goals for the shift include pt will remain afebrile throughout shift      Problem: Pain - Adult  Goal: Verbalizes/displays adequate comfort level or baseline comfort level  Outcome: Adequate for Discharge     Problem: Safety - Adult  Goal: Free from fall injury  Outcome: Adequate for Discharge     Problem: Discharge Planning  Goal: Discharge to home or other facility with appropriate resources  Outcome: Adequate for Discharge     Problem: Chronic Conditions and Co-morbidities  Goal: Patient's chronic conditions and co-morbidity symptoms are monitored and maintained or improved  Outcome: Adequate for Discharge     Problem: Nutrition  Goal: Nutrient intake appropriate for maintaining nutritional needs  Outcome: Adequate for Discharge

## 2025-02-18 NOTE — DISCHARGE SUMMARY
Discharge Diagnosis  Encounter for antineoplastic chemotherapy    Test Results Pending At Discharge  Pending Labs       Order Current Status    CBC and Auto Differential Collected (02/18/25 0715)    Comprehensive Metabolic Panel Collected (02/18/25 0715)    Magnesium Collected (02/18/25 0715)            Hospital Course  Dane Molina is a 64 y.o. male with a PMHx of SCLC (previously on atezolizumab, now on Tarlatamab), CKD, COPD, HLD and HTN that presents as a direct admission for CRS monitoring s/p C1D8 Tarlatamab (2/17). DC pending 24 hour observation for CRS and ICANS toxicity. Overnight, pt with one time temperature of 38.0, his other vital signs were stable. No hypoxia or hypotension. Per protocol, pt was given po Tylenol 650mg x 1 --> fever resolved. Dr. Hobbs and Dr. Ospina was notified. Pt is not neutropenic, so infectious workup or antibiotics were not started. Pt discharged in stable condition. Pt educated on the importance of taking home medications as prescribed and attending otpt appointments. FUV listed below. Pt sent rx of dexamethasone, tylenol, miralx and senna MTB.    Instructions for discharge given to patient: For fever of >=100.4°F (38°C), instructed patient to take acetaminophen (Tylenol) 650-1000 mg mg once. After 30 minutes, check temperature again. If the fever resolves, no further action is needed. If the fever is still present, take dexamethasone 8 mg  once and wait another 30 minutes. Recheck your temperature. If the fever persists 30 minutes after taking dexamethasone, go to the Emergency Department immediately. For shortness of breath, instructed patient to take dexamethasone 8 mg x1 and go immediately to the Emergency Department.   - Neurologic/Immune effector cell-associated neurotoxicity syndrome - including trouble speaking, confusion, seizure, numbness, weakness, insomnia.        Pertinent Physical Exam At Time of Discharge  On the day of discharge, the patient reported  feeling well and pain was controlled. Vitals and labs were stable. Pt denies chest pain, SOB, N/V/D/C, fever, chills. ROS: A complete review of systems was performed and is negative except for as mentioned above in the HP   On exam:  Constitutional: Awake, NAD  ENMT: mucous membranes moist, no apparent injury, no lesions seen  Head/Neck: NCAT  Respiratory/Thorax: CTAB, no wheezing  Cardiovascular: RRR, S1+S2, no murmur  Gastrointestinal: Soft, NT, nondistended, +BS  Extremities: No LE edema  Psychological: Appropriate mood and behavior  Skin: no rash noted    >30 minutes was spent on the discharge and planning of this patient.    Assessment and plan as above discussed with attending physician Dr. Hobbs    Home Medications     Medication List      START taking these medications     polyethylene glycol 17 gram/dose powder; Commonly known as: Glycolax,   Miralax; Mix 17 g of powder and drink once daily.     CONTINUE taking these medications     acetaminophen 325 mg tablet; Commonly known as: Tylenol; Take 2 tablets   (650 mg) by mouth every 6 hours if needed for mild pain (1 - 3) for up to   10 days.   atorvastatin 40 mg tablet; Commonly known as: Lipitor; Take 1 tablet (40   mg) by mouth once daily.   dexAMETHasone 4 mg tablet; Commonly known as: Decadron; Take 2 tablets   (8 mg) by mouth once daily as needed (for fever or shortness of breath as   instructed by care team.).   diphenhydrAMINE 25 mg capsule; Commonly known as: BenadryL; Take 1   capsule (25 mg) by mouth once daily as needed for itching.   nicotine 14 mg/24 hr patch; Commonly known as: Nicoderm CQ; Place 1   patch over 24 hours on the skin once every 24 hours.   nicotine polacrilex 4 mg gum; Commonly known as: Nicorette; Chew 1 each   (4 mg) if needed for smoking cessation.   prochlorperazine 10 mg tablet; Commonly known as: Compazine; Take 1   tablet (10 mg) by mouth every 6 hours if needed for nausea or vomiting.   sacubitriL-valsartan  mg  tablet; Commonly known as: Entresto; Take   1 tablet by mouth 2 times a day.   sennosides-docusate sodium 8.6-50 mg tablet; Commonly known as:   Michelle-Colace; Take 2 tablets by mouth once daily. As needed for   constipation   sildenafil 50 mg tablet; Commonly known as: Viagra; Take 1 tablet (50   mg) by mouth once daily as needed for erectile dysfunction.   triamterene-hydrochlorothiazid 37.5-25 mg tablet; Commonly known as:   Maxzide-25; Take 1 tablet by mouth once daily in the morning.   VITAMIN D3 ORAL       Outpatient Follow-Up  Future Appointments   Date Time Provider Department Center   2/25/2025  8:00 AM Nicolas Ospina MD [id number]   2/25/2025  8:15 AM INF 11 [facility]       Zoraida Jimenez PA-C

## 2025-02-18 NOTE — PROGRESS NOTES
Pharmacy Medication History Review    Dane Molina is a 64 y.o. male admitted for Encounter for antineoplastic chemotherapy. Pharmacy reviewed the patient's [id number] medications and allergies for accuracy.    Medications ADDED:  None  Medications CHANGED:  None  Medications REMOVED:   Zyrtec  Albuterol inhaler  Zyprexa  Zofran  Symbicort inhaler     The list below reflects the updated PTA list.   Prior to Admission Medications   Prescriptions Informant   acetaminophen (Tylenol) 325 mg tablet Self   Sig: Take 2 tablets (650 mg) by mouth every 6 hours if needed for mild pain (1 - 3).   atorvastatin (Lipitor) 40 mg tablet Self   Sig: Take 1 tablet (40 mg) by mouth once daily.   cholecalciferol, vitamin D3, (VITAMIN D3 ORAL) Self   Sig: Take 1 capsule by mouth once daily.   dexAMETHasone (Decadron) 4 mg tablet Self   Sig: Take 2 tablets (8 mg) by mouth once daily as needed (for fever or shortness of breath as instructed by care team.).   diphenhydrAMINE (BenadryL) 25 mg capsule Self   Sig: Take 1 capsule (25 mg) by mouth once daily as needed for itching.   nicotine (Nicoderm CQ) 14 mg/24 hr patch Self   Sig: Place 1 patch over 24 hours on the skin once every 24 hours.  Per chart review, ordered 2/17/2025 from office visit    nicotine polacrilex (Nicorette) 4 mg gum Self   Sig: Chew 1 each (4 mg) if needed for smoking cessation.  Per chart review, ordered 2/17/2025 from office visit    prochlorperazine (Compazine) 10 mg tablet Self   Sig: Take 1 tablet (10 mg) by mouth every 6 hours if needed for nausea or vomiting.   sacubitriL-valsartan 97 mg-103 mg (Entresto)  mg tablet Self   Sig: Take 1 tablet by mouth 2 times a day.  Last pharmacy dispense history 8/29/2024 for 90-day supply. Patient was not certain whether he is taking this medication    sennosides-docusate sodium (Michelle-Colace) 8.6-50 mg tablet Self   Sig: Take 2 tablets by mouth once daily. As needed for constipation  Per chart review, ordered  "2/17/2025 from office visit    sildenafil (Viagra) 50 mg tablet Self   Sig: Take 1 tablet (50 mg) by mouth once daily as needed for erectile dysfunction.   triamterene-hydrochlorothiazid (Maxzide-25) 37.5-25 mg tablet Self   Sig: Take 1 tablet by mouth once daily in the morning.      Facility-Administered Medications: None        The list below reflects the updated allergy list. Please review each documented allergy for additional clarification and justification.  Allergies  Reviewed by Santos Richardson PharmD on 2/18/2025   No Known Allergies         Patient declines M2B at discharge.     [facility]  Pharmacy dispense history  Patient interview Moderate historian  Chart Review  Care Everywhere  2/17/2025  oncology office visit, 2/11/2025  oncology discharge summary    Additional Comments:  No recent dispense history per [facility] report  Patient was able to recall PTA medications with prompting      Santos Richardson PharmD  Transitions of Care Pharmacist  02/18/25     Secure Chat preferred   If no response call [phone] or Vocera \"Med Rec\"    "

## 2025-02-21 RX ORDER — FAMOTIDINE 10 MG/ML
20 INJECTION, SOLUTION INTRAVENOUS ONCE AS NEEDED
OUTPATIENT
Start: 2025-04-08

## 2025-02-21 RX ORDER — PROCHLORPERAZINE MALEATE 10 MG
10 TABLET ORAL EVERY 6 HOURS PRN
OUTPATIENT
Start: 2025-04-08

## 2025-02-21 RX ORDER — FAMOTIDINE 10 MG/ML
20 INJECTION, SOLUTION INTRAVENOUS ONCE AS NEEDED
OUTPATIENT
Start: 2025-04-22

## 2025-02-21 RX ORDER — PROCHLORPERAZINE MALEATE 10 MG
10 TABLET ORAL EVERY 6 HOURS PRN
OUTPATIENT
Start: 2025-04-22

## 2025-02-21 RX ORDER — ALBUTEROL SULFATE 0.83 MG/ML
3 SOLUTION RESPIRATORY (INHALATION) AS NEEDED
OUTPATIENT
Start: 2025-04-22

## 2025-02-21 RX ORDER — PROCHLORPERAZINE EDISYLATE 5 MG/ML
10 INJECTION INTRAMUSCULAR; INTRAVENOUS EVERY 6 HOURS PRN
OUTPATIENT
Start: 2025-03-25

## 2025-02-21 RX ORDER — DIPHENHYDRAMINE HYDROCHLORIDE 50 MG/ML
50 INJECTION INTRAMUSCULAR; INTRAVENOUS AS NEEDED
OUTPATIENT
Start: 2025-04-22

## 2025-02-21 RX ORDER — DIPHENHYDRAMINE HYDROCHLORIDE 50 MG/ML
50 INJECTION INTRAMUSCULAR; INTRAVENOUS AS NEEDED
OUTPATIENT
Start: 2025-03-25

## 2025-02-21 RX ORDER — FAMOTIDINE 10 MG/ML
20 INJECTION, SOLUTION INTRAVENOUS ONCE AS NEEDED
OUTPATIENT
Start: 2025-03-25

## 2025-02-21 RX ORDER — EPINEPHRINE 0.3 MG/.3ML
0.3 INJECTION SUBCUTANEOUS EVERY 5 MIN PRN
OUTPATIENT
Start: 2025-03-11

## 2025-02-21 RX ORDER — FAMOTIDINE 10 MG/ML
20 INJECTION, SOLUTION INTRAVENOUS ONCE AS NEEDED
OUTPATIENT
Start: 2025-03-11

## 2025-02-21 RX ORDER — PROCHLORPERAZINE EDISYLATE 5 MG/ML
10 INJECTION INTRAMUSCULAR; INTRAVENOUS EVERY 6 HOURS PRN
OUTPATIENT
Start: 2025-04-22

## 2025-02-21 RX ORDER — DIPHENHYDRAMINE HYDROCHLORIDE 50 MG/ML
50 INJECTION INTRAMUSCULAR; INTRAVENOUS AS NEEDED
OUTPATIENT
Start: 2025-03-11

## 2025-02-21 RX ORDER — PROCHLORPERAZINE EDISYLATE 5 MG/ML
10 INJECTION INTRAMUSCULAR; INTRAVENOUS EVERY 6 HOURS PRN
OUTPATIENT
Start: 2025-04-08

## 2025-02-21 RX ORDER — PROCHLORPERAZINE MALEATE 10 MG
10 TABLET ORAL EVERY 6 HOURS PRN
OUTPATIENT
Start: 2025-03-11

## 2025-02-21 RX ORDER — EPINEPHRINE 0.3 MG/.3ML
0.3 INJECTION SUBCUTANEOUS EVERY 5 MIN PRN
OUTPATIENT
Start: 2025-04-08

## 2025-02-21 RX ORDER — EPINEPHRINE 0.3 MG/.3ML
0.3 INJECTION SUBCUTANEOUS EVERY 5 MIN PRN
OUTPATIENT
Start: 2025-03-25

## 2025-02-21 RX ORDER — PROCHLORPERAZINE MALEATE 10 MG
10 TABLET ORAL EVERY 6 HOURS PRN
OUTPATIENT
Start: 2025-03-25

## 2025-02-21 RX ORDER — EPINEPHRINE 0.3 MG/.3ML
0.3 INJECTION SUBCUTANEOUS EVERY 5 MIN PRN
OUTPATIENT
Start: 2025-04-22

## 2025-02-21 RX ORDER — ALBUTEROL SULFATE 0.83 MG/ML
3 SOLUTION RESPIRATORY (INHALATION) AS NEEDED
OUTPATIENT
Start: 2025-03-25

## 2025-02-21 RX ORDER — ALBUTEROL SULFATE 0.83 MG/ML
3 SOLUTION RESPIRATORY (INHALATION) AS NEEDED
OUTPATIENT
Start: 2025-04-08

## 2025-02-21 RX ORDER — PROCHLORPERAZINE EDISYLATE 5 MG/ML
10 INJECTION INTRAMUSCULAR; INTRAVENOUS EVERY 6 HOURS PRN
OUTPATIENT
Start: 2025-03-11

## 2025-02-21 RX ORDER — ALBUTEROL SULFATE 0.83 MG/ML
3 SOLUTION RESPIRATORY (INHALATION) AS NEEDED
OUTPATIENT
Start: 2025-03-11

## 2025-02-21 RX ORDER — DIPHENHYDRAMINE HYDROCHLORIDE 50 MG/ML
50 INJECTION INTRAMUSCULAR; INTRAVENOUS AS NEEDED
OUTPATIENT
Start: 2025-04-08

## 2025-02-21 NOTE — PROGRESS NOTES
Patient ID: Dane Molina is a 64 y.o. male    Primary Care Provider: Darwin Early MD    DIAGNOSIS AND STAGING  cT3 pN2 pM1b small cell lung cancer (INSM1 and TTF1+) of the right lower lobe, diagnosed on 02/05/2024 through bronchoscopy     SITES OF DISEASE  Right lower lobe   Levels 4R and 7 nodes   Liver, confirmed by biopsy      MOLECULAR GENOMICS  Not performed   RB loss by IHC      PRIOR THERAPIES  03/19/2024-05/20/2024: 4 cycles carboplatin/etoposide with addition of atezolizumab to C2 on 04/08/2024 08/09/2024: Completion 45 cGy/15f to chest by Dr. Conde     CURRENT THERAPY  Maintenance atezolizumab since 06/21/24    CURRENT ONCOLOGICAL PROBLEMS  Grade 3 immune related rash now grade I    HISTORY OF PRESENT ILLNESS  Patient presented to the emergency room on 01/04/2023 with a hypertensive urgency and cough.  He has a history of noncompliance with antihypertensive medications.  He also complained of dyspnea and underwent a CT chest without IV contrast that demonstrated a right lower lobe paramediastinal mass measuring 3.1 cm with adjacent pulmonary nodule measuring 1.8 cm.  Mediastinal lymphadenopathy was demonstrated, including a subcarinal node measuring 1.7 cm.  There was right hilar lymphadenopathy, 2 cm in short axis.  On 01/15/2024 the patient underwent a PET scan that demonstrated avidity of the aforementioned masses/lymph nodes, as well as a lesion in the liver, SUV max 3.3, right hepatic lobe.  On 02/05/2024, patient underwent a bronchoscopy:  A. LYMPH NODE 4 R PULMONARY FINE NEEDLE ASPIRATION , CYTOLOGY AND CELL BLOCK:    Positive for malignant cells   Metastatic small cell carcinoma, see note  B. LYMPH NODE 7 PULMONARY FINE NEEDLE ASPIRATION , CYTOLOGY AND CELL BLOCK:    Positive for malignant cells  Metastatic small cell carcinoma, see note          Note: Immunostains demonstrate the lesional cells to be diffusely positive for CAM 5.2, INSM1, TTF-1.  The proliferative marker Ki-67 is  greater than 90%.  Immunostain for retinoblastoma shows loss of staining.  Immunostain for p40 is negative.  The morphology and immunohistochemical profile are consistent with the above diagnosis.  On 02/29/24 the pt is seen by med onc for the first time. Denies any systemic sx - denies lack of appetite, fatigue or unintentional weight loss. Denies new localized pain, headaches or neuro sx.  Denies new respiratory sx.  He is claustrophobic and MRI brain was scheduled for today but pt could not go through with the test. He also has mild CKD, which limits his ability to receive intravenous contrast for CT.   03/04/24: MRI brain shows no ICMA, but MRI liver shows a 1.9 cm hepatic lesion with T2 hyperintensity and peripheral enhancement   03/19/2024: C1 D1 carboplatin/etoposide  03/22/24: Liver biopsy:  FINAL DIAGNOSIS   A. RIGHT LIVER MASS BIOPSY:      -- LIVER TISSUE INVOLVED BY SMALL CELL CARCINOMA, SEE NOTE     Note: Clinical history of lung small cell carcinoma noted. The findings most likely represent metastasis from the known primary lung tumor.     04/04/24: discussed with patient liver biopsy results and recommendations to add atezolizumab to his regimen  He has met with radiation oncology, and we will consider consolidative TRT pending response to cytotoxic chemotherapy    04/08/2024: C2 D1 carboplatin/etoposide/atezolizumab    05/20/24: C4 D1 carboplatin/etoposide/atezolizumab    06/10/2024: CT chest/abdomen/pelvis demonstrating continue his improvement of metastatic lymphadenopathy within the mediastinum/right hilum, with no progression within the liver    06/13/2024: Referral for radiation oncology/TRT.  Patient to proceed with maintenance atezolizumab    06/21/24: begins maintenance atezolizumab     08/09/24: completes 45 Gy in 15 fractions to chest (To Conde)     08/08/24: prescribed prednisone 1 mg/Kg for grade 3 IO-induced rash with peeling of hands     08/29/24: last steroid taken last week but  did not complete full prescription. Rash on chest/abdomen improved but has some peeling of the hands/arms. Will have patient take remaining prednisone and follow up in 3 weeks       PAST MEDICAL HISTORY  CKD - creatinine 1.68  Neck pain (injury at work)   Hypertension  Iron deficiency anemia (hx of blood transfusion in the 90's)    SURGICAL HISTORY  Ankle fracture and has a latoya in place      SOCIAL HISTORY  Former 35-40 pack-year smoker, quit at age 57  Smokes cigars now   History of alcohol addiction, sober for the past 15 years   Has one chlid (Roula)  Single      FAMILY HISTORY  Lung CA (mother)  Mother had CKD and required HD    CURRENT MEDS REVIEWED       ALLERGIES REVIEWED        SUBJECTIVE:  Today Mr. Molina is here for day 8 of tarlatamab.  He reports that he thinks he had no side effects from the day 1 dose.  He did wake up the next morning with some muscle aching, which he thinks is because he fell asleep on the couch in the hospital room.  He is struggling with the smoking, but only buys single cigarettes.  ($1.00 a piece).       A 13 point review of systems was performed, with significant findings documented above in subjective history.    OBJECTIVE:  There were no vitals filed for this visit.    There is no height or weight on file to calculate BSA.     Wt Readings from Last 5 Encounters:   02/17/25 107 kg (236 lb 15.9 oz)   02/17/25 107 kg (235 lb 0.2 oz)   02/11/25 108 kg (237 lb 3.4 oz)   02/10/25 108 kg (237 lb 14 oz)   01/22/25 108 kg (239 lb)     ECOGSCORE: 1- Restricted in physically strenuous activity.  Carries out light duty.  Gen: A&O, NAD  Head: Normocephalic, atraumatic  Eyes: no scleral icterus  ENT: mucous membranes moist, no oropharyngeal lesions  Resp: Lungs CTAB  Cardiac: Normal rate, regular rhythm, no murmurs appreciated  Abdomen: Soft, nondistended, nontender, +BS  Neuro: CNII-XII grossly intact  Psych: appropriate mood & affect    Diagnostic Results   Results:  Labs:  Lab Results    Component Value Date    WBC 10.4 02/18/2025    HGB 12.2 (L) 02/18/2025    HCT 37.4 (L) 02/18/2025    MCV 96 02/18/2025     02/18/2025      Lab Results   Component Value Date    NEUTROABS 8.43 (H) 02/18/2025        Lab Results   Component Value Date    GLUCOSE 101 (H) 02/18/2025    CALCIUM 9.0 02/18/2025     02/18/2025    K 4.0 02/18/2025    CO2 23 02/18/2025     02/18/2025    BUN 32 (H) 02/18/2025    CREATININE 1.67 (H) 02/18/2025    MG 1.83 02/18/2025     Lab Results   Component Value Date    ALT 16 02/18/2025    AST 11 02/18/2025    ALKPHOS 54 02/18/2025    BILITOT 0.4 02/18/2025    BILIDIR 0.1 12/21/2023      Lab Results   Component Value Date    ACTH 14.7 10/28/2024    CORTISOL 6.4 01/02/2025    TSH 1.16 01/02/2025    FREET4 1.07 08/09/2024     CT chest/abdomen/pelvis on 01/17/2025:    IMPRESSION:  Lung cancer restaging scan. When compared to the prior examination  dated 11/15/2024, there has been interval development of several new  pulmonary nodules of the right lower lobe and a hepatic lesion  concerning for metastatic disease, further evaluation with PET-CT is  recommended. Additional stable chronic and incidental findings  described above.    PET 2/7/25:    IMPRESSION:  1. Two adjacent mildly FDG avid right lower lobe lung nodules  compatible with disease recurrence. Additional subcentimeter right  lower lobe nodules do not demonstrate significant FDG avidity,  however metabolic activity can be underestimated in subcentimeter  lesions and attention on follow-up CT is recommended.  2. Increasingly hypermetabolic and enlarged bilateral axillary lymph  nodes suspicious for tad metastatic disease. Persistent mildly FDG  avid right hilar tad activity is suggestive of residual viable  neoplasm. Resolution of previously seen hypermetabolic mediastinal  lymphadenopathy.  3. Enlarging and increasingly hypermetabolic segment 8 liver  metastasis and new adjacent smaller segment 8 liver  metastasis.  4. Nonspecific persistent somewhat focal FDG avidity in the sigmoid  colon with apparent underlying wall thickening and numerous  diverticula. Findings could represent sequela of chronic  diverticulitis, however consider colonoscopy to exclude underlying  neoplasm if clinically warranted.    Assessment/Plan   Small cell carcinoma of lung, Clinical: Stage FREDRICK (cT3, cN2, pM1b)  Mr. Dane Molina is a 65 YO with ES-SCLC seen for follow up. He is s/p 4 cycles platinum doublet with addition of atezo after c1 as well as consolidative RT. He has had 1 dose atezo which was held for grade III ICI dermatitis which is now grade I only on palms/hands.    We resumed atezo and he tolerated well with topical clobetasol. Most recent scans 11/15/24 personally reviewed showing no obvious metastatic disease. MRI brain without metastatic disease.     Unfortunately with presumed progression in liver. We began c1d1 tarlatamab on 2/10 with direct admission.  He did well, and is here for day 8 dosing.      #T3 pN2 pM1b small cell lung cancer of the RLL - extensive stage.  -S/p 4 cycles carboplatin/etoposide + atezolizumab added to cycle 2  -S/p 1 dose atezo c/b ICI dermatitis  -S/P TRT (45 Gy in 15 fx per Dr. Conde)   -PET Stat confirming avidity in liver and RLL  -Will proceed with c1d8 today, direct admit for ICANS and hypersensitivity monitoring    #Grade I ICI dermatitis - resolved  -Was initially grade III now downgraded after PO steroid taper  -Completed Clobetasol 0.5% ointment BID x 14d. Now using cocoa butter PRN     #CNS monitoring   -MRI on 07/06/24 shows no ICM   -Repeat every 3 months - next 02/25    FER [facility]  Thoracic Medical Oncology   [id number] [id number]  Phone: [phone]

## 2025-02-25 ENCOUNTER — INFUSION (OUTPATIENT)
Dept: HEMATOLOGY/ONCOLOGY | Facility: HOSPITAL | Age: 65
End: 2025-02-25
Payer: COMMERCIAL

## 2025-02-25 ENCOUNTER — OFFICE VISIT (OUTPATIENT)
Dept: HEMATOLOGY/ONCOLOGY | Facility: HOSPITAL | Age: 65
End: 2025-02-25
Payer: COMMERCIAL

## 2025-02-25 ENCOUNTER — LAB (OUTPATIENT)
Dept: LAB | Facility: HOSPITAL | Age: 65
End: 2025-02-25
Payer: COMMERCIAL

## 2025-02-25 VITALS
BODY MASS INDEX: 33.18 KG/M2 | WEIGHT: 231.8 LBS | TEMPERATURE: 98.2 F | SYSTOLIC BLOOD PRESSURE: 118 MMHG | OXYGEN SATURATION: 95 % | RESPIRATION RATE: 20 BRPM | DIASTOLIC BLOOD PRESSURE: 73 MMHG

## 2025-02-25 VITALS
RESPIRATION RATE: 18 BRPM | HEART RATE: 73 BPM | DIASTOLIC BLOOD PRESSURE: 79 MMHG | TEMPERATURE: 98.6 F | SYSTOLIC BLOOD PRESSURE: 120 MMHG

## 2025-02-25 DIAGNOSIS — C34.00 SMALL CELL CARCINOMA OF HILUM OF LUNG, UNSPECIFIED LATERALITY: Primary | ICD-10-CM

## 2025-02-25 DIAGNOSIS — C34.00 SMALL CELL CARCINOMA OF HILUM OF LUNG, UNSPECIFIED LATERALITY: ICD-10-CM

## 2025-02-25 DIAGNOSIS — M54.50 ACUTE BILATERAL LOW BACK PAIN WITHOUT SCIATICA: ICD-10-CM

## 2025-02-25 LAB
ALBUMIN SERPL BCP-MCNC: 4 G/DL (ref 3.4–5)
ALP SERPL-CCNC: 58 U/L (ref 33–136)
ALT SERPL W P-5'-P-CCNC: 11 U/L (ref 10–52)
ANION GAP SERPL CALC-SCNC: 15 MMOL/L (ref 10–20)
APTT PPP: 32 SECONDS (ref 26–36)
AST SERPL W P-5'-P-CCNC: 11 U/L (ref 9–39)
BASOPHILS # BLD AUTO: 0.05 X10*3/UL (ref 0–0.1)
BASOPHILS NFR BLD AUTO: 0.8 %
BILIRUB SERPL-MCNC: 0.6 MG/DL (ref 0–1.2)
BUN SERPL-MCNC: 30 MG/DL (ref 6–23)
CALCIUM SERPL-MCNC: 9.2 MG/DL (ref 8.6–10.3)
CHLORIDE SERPL-SCNC: 107 MMOL/L (ref 98–107)
CO2 SERPL-SCNC: 23 MMOL/L (ref 21–32)
CREAT SERPL-MCNC: 1.69 MG/DL (ref 0.5–1.3)
CRP SERPL-MCNC: 1.65 MG/DL
EGFRCR SERPLBLD CKD-EPI 2021: 45 ML/MIN/1.73M*2
EOSINOPHIL # BLD AUTO: 0.11 X10*3/UL (ref 0–0.7)
EOSINOPHIL NFR BLD AUTO: 1.8 %
ERYTHROCYTE [DISTWIDTH] IN BLOOD BY AUTOMATED COUNT: 13.8 % (ref 11.5–14.5)
FERRITIN SERPL-MCNC: 373 NG/ML (ref 20–300)
FIBRINOGEN PPP-MCNC: 544 MG/DL (ref 200–400)
GLUCOSE SERPL-MCNC: 98 MG/DL (ref 74–99)
HCT VFR BLD AUTO: 40.6 % (ref 41–52)
HGB BLD-MCNC: 13.4 G/DL (ref 13.5–17.5)
IMM GRANULOCYTES # BLD AUTO: 0.02 X10*3/UL (ref 0–0.7)
IMM GRANULOCYTES NFR BLD AUTO: 0.3 % (ref 0–0.9)
INR PPP: 1.3 (ref 0.9–1.1)
LDH SERPL L TO P-CCNC: 154 U/L (ref 84–246)
LYMPHOCYTES # BLD AUTO: 0.97 X10*3/UL (ref 1.2–4.8)
LYMPHOCYTES NFR BLD AUTO: 15.5 %
MCH RBC QN AUTO: 32.1 PG (ref 26–34)
MCHC RBC AUTO-ENTMCNC: 33 G/DL (ref 32–36)
MCV RBC AUTO: 97 FL (ref 80–100)
MONOCYTES # BLD AUTO: 0.81 X10*3/UL (ref 0.1–1)
MONOCYTES NFR BLD AUTO: 13 %
NEUTROPHILS # BLD AUTO: 4.28 X10*3/UL (ref 1.2–7.7)
NEUTROPHILS NFR BLD AUTO: 68.6 %
NRBC BLD-RTO: 0 /100 WBCS (ref 0–0)
PLATELET # BLD AUTO: 249 X10*3/UL (ref 150–450)
POTASSIUM SERPL-SCNC: 4 MMOL/L (ref 3.5–5.3)
PROT SERPL-MCNC: 7.4 G/DL (ref 6.4–8.2)
PROTHROMBIN TIME: 14 SECONDS (ref 9.8–12.4)
RBC # BLD AUTO: 4.17 X10*6/UL (ref 4.5–5.9)
SODIUM SERPL-SCNC: 141 MMOL/L (ref 136–145)
WBC # BLD AUTO: 6.2 X10*3/UL (ref 4.4–11.3)

## 2025-02-25 PROCEDURE — 83615 LACTATE (LD) (LDH) ENZYME: CPT

## 2025-02-25 PROCEDURE — 85384 FIBRINOGEN ACTIVITY: CPT

## 2025-02-25 PROCEDURE — 99215 OFFICE O/P EST HI 40 MIN: CPT | Mod: 25 | Performed by: STUDENT IN AN ORGANIZED HEALTH CARE EDUCATION/TRAINING PROGRAM

## 2025-02-25 PROCEDURE — 82728 ASSAY OF FERRITIN: CPT

## 2025-02-25 PROCEDURE — 96413 CHEMO IV INFUSION 1 HR: CPT

## 2025-02-25 PROCEDURE — 86140 C-REACTIVE PROTEIN: CPT

## 2025-02-25 PROCEDURE — 2500000004 HC RX 250 GENERAL PHARMACY W/ HCPCS (ALT 636 FOR OP/ED): Performed by: STUDENT IN AN ORGANIZED HEALTH CARE EDUCATION/TRAINING PROGRAM

## 2025-02-25 PROCEDURE — 85730 THROMBOPLASTIN TIME PARTIAL: CPT

## 2025-02-25 PROCEDURE — 3078F DIAST BP <80 MM HG: CPT | Performed by: STUDENT IN AN ORGANIZED HEALTH CARE EDUCATION/TRAINING PROGRAM

## 2025-02-25 PROCEDURE — 96361 HYDRATE IV INFUSION ADD-ON: CPT | Mod: INF

## 2025-02-25 PROCEDURE — 84075 ASSAY ALKALINE PHOSPHATASE: CPT

## 2025-02-25 PROCEDURE — 85025 COMPLETE CBC W/AUTO DIFF WBC: CPT

## 2025-02-25 PROCEDURE — 99215 OFFICE O/P EST HI 40 MIN: CPT | Performed by: STUDENT IN AN ORGANIZED HEALTH CARE EDUCATION/TRAINING PROGRAM

## 2025-02-25 PROCEDURE — 3074F SYST BP LT 130 MM HG: CPT | Performed by: STUDENT IN AN ORGANIZED HEALTH CARE EDUCATION/TRAINING PROGRAM

## 2025-02-25 RX ORDER — LIDOCAINE 50 MG/G
1 PATCH TOPICAL DAILY
Qty: 30 PATCH | Refills: 0 | Status: SHIPPED | OUTPATIENT
Start: 2025-02-25 | End: 2025-03-27

## 2025-02-25 RX ORDER — FAMOTIDINE 10 MG/ML
20 INJECTION, SOLUTION INTRAVENOUS ONCE AS NEEDED
Status: DISCONTINUED | OUTPATIENT
Start: 2025-02-25 | End: 2025-02-25 | Stop reason: HOSPADM

## 2025-02-25 RX ORDER — ALBUTEROL SULFATE 0.83 MG/ML
3 SOLUTION RESPIRATORY (INHALATION) AS NEEDED
Status: DISCONTINUED | OUTPATIENT
Start: 2025-02-25 | End: 2025-02-25 | Stop reason: HOSPADM

## 2025-02-25 RX ORDER — EPINEPHRINE 0.3 MG/.3ML
0.3 INJECTION SUBCUTANEOUS EVERY 5 MIN PRN
Status: DISCONTINUED | OUTPATIENT
Start: 2025-02-25 | End: 2025-02-25 | Stop reason: HOSPADM

## 2025-02-25 RX ORDER — PROCHLORPERAZINE EDISYLATE 5 MG/ML
10 INJECTION INTRAMUSCULAR; INTRAVENOUS EVERY 6 HOURS PRN
Status: DISCONTINUED | OUTPATIENT
Start: 2025-02-25 | End: 2025-02-25 | Stop reason: HOSPADM

## 2025-02-25 RX ORDER — DIPHENHYDRAMINE HYDROCHLORIDE 50 MG/ML
50 INJECTION INTRAMUSCULAR; INTRAVENOUS AS NEEDED
Status: DISCONTINUED | OUTPATIENT
Start: 2025-02-25 | End: 2025-02-25 | Stop reason: HOSPADM

## 2025-02-25 RX ORDER — PROCHLORPERAZINE MALEATE 10 MG
10 TABLET ORAL EVERY 6 HOURS PRN
Status: DISCONTINUED | OUTPATIENT
Start: 2025-02-25 | End: 2025-02-25 | Stop reason: HOSPADM

## 2025-02-25 RX ADMIN — SODIUM CHLORIDE 1000 ML: 9 INJECTION, SOLUTION INTRAVENOUS at 11:12

## 2025-02-25 RX ADMIN — Medication 10 MG: at 10:12

## 2025-02-25 ASSESSMENT — PAIN SCALES - GENERAL: PAINLEVEL_OUTOF10: 0-NO PAIN

## 2025-02-25 NOTE — SIGNIFICANT EVENT
02/25/25 0941   Prechemo Checklist   Has the patient been in the hospital, ED, or urgent care since last date of service No   Chemo/Immuno Consent Completed and Signed Yes   Protocol/Indications Verified Yes   Confirmed to previous date/time of medication Yes   Compared to previous dose Yes   All medications are dated accurately Yes   Pregnancy Test Negative Not applicable   BSA/Weight-Height Verified Yes   Dose Calculations Verified (current, total, cumulative) Yes

## 2025-02-25 NOTE — PROGRESS NOTES
Patient ID: Dane Molina is a 64 y.o. male    Primary Care Provider: Darwin Early MD    DIAGNOSIS AND STAGING  cT3 pN2 pM1b small cell lung cancer (INSM1 and TTF1+) of the right lower lobe, diagnosed on 02/05/2024 through bronchoscopy     SITES OF DISEASE  Right lower lobe   Levels 4R and 7 nodes   Liver, confirmed by biopsy      MOLECULAR GENOMICS  Not performed   RB loss by IHC      PRIOR THERAPIES  03/19/2024-05/20/2024: 4 cycles carboplatin/etoposide with addition of atezolizumab to C2 on 04/08/2024 08/09/2024: Completion 45 cGy/15f to chest by Dr. Conde  06/21/2024: maintenance atezolizumab     CURRENT THERAPY  Tarlatamab since 2/10/2025    CURRENT ONCOLOGICAL PROBLEMS  Grade 3 immune related rash now grade I    HISTORY OF PRESENT ILLNESS  Patient presented to the emergency room on 01/04/2023 with a hypertensive urgency and cough.  He has a history of noncompliance with antihypertensive medications.  He also complained of dyspnea and underwent a CT chest without IV contrast that demonstrated a right lower lobe paramediastinal mass measuring 3.1 cm with adjacent pulmonary nodule measuring 1.8 cm.  Mediastinal lymphadenopathy was demonstrated, including a subcarinal node measuring 1.7 cm.  There was right hilar lymphadenopathy, 2 cm in short axis.  On 01/15/2024 the patient underwent a PET scan that demonstrated avidity of the aforementioned masses/lymph nodes, as well as a lesion in the liver, SUV max 3.3, right hepatic lobe.  On 02/05/2024, patient underwent a bronchoscopy:  A. LYMPH NODE 4 R PULMONARY FINE NEEDLE ASPIRATION , CYTOLOGY AND CELL BLOCK:    Positive for malignant cells   Metastatic small cell carcinoma, see note  B. LYMPH NODE 7 PULMONARY FINE NEEDLE ASPIRATION , CYTOLOGY AND CELL BLOCK:    Positive for malignant cells  Metastatic small cell carcinoma, see note          Note: Immunostains demonstrate the lesional cells to be diffusely positive for CAM 5.2, INSM1, TTF-1.  The  proliferative marker Ki-67 is greater than 90%.  Immunostain for retinoblastoma shows loss of staining.  Immunostain for p40 is negative.  The morphology and immunohistochemical profile are consistent with the above diagnosis.  On 02/29/24 the pt is seen by med onc for the first time. Denies any systemic sx - denies lack of appetite, fatigue or unintentional weight loss. Denies new localized pain, headaches or neuro sx.  Denies new respiratory sx.  He is claustrophobic and MRI brain was scheduled for today but pt could not go through with the test. He also has mild CKD, which limits his ability to receive intravenous contrast for CT.   03/04/24: MRI brain shows no ICMA, but MRI liver shows a 1.9 cm hepatic lesion with T2 hyperintensity and peripheral enhancement   03/19/2024: C1 D1 carboplatin/etoposide  03/22/24: Liver biopsy:  FINAL DIAGNOSIS   A. RIGHT LIVER MASS BIOPSY:      -- LIVER TISSUE INVOLVED BY SMALL CELL CARCINOMA, SEE NOTE     Note: Clinical history of lung small cell carcinoma noted. The findings most likely represent metastasis from the known primary lung tumor.     04/04/24: discussed with patient liver biopsy results and recommendations to add atezolizumab to his regimen  He has met with radiation oncology, and we will consider consolidative TRT pending response to cytotoxic chemotherapy    04/08/2024: C2 D1 carboplatin/etoposide/atezolizumab    05/20/24: C4 D1 carboplatin/etoposide/atezolizumab    06/10/2024: CT chest/abdomen/pelvis demonstrating continue his improvement of metastatic lymphadenopathy within the mediastinum/right hilum, with no progression within the liver    06/13/2024: Referral for radiation oncology/TRT.  Patient to proceed with maintenance atezolizumab    06/21/24: begins maintenance atezolizumab     08/09/24: completes 45 Gy in 15 fractions to chest (To Conde)     08/08/24: prescribed prednisone 1 mg/Kg for grade 3 IO-induced rash with peeling of hands     08/29/24:  last steroid taken last week but did not complete full prescription. Rash on chest/abdomen improved but has some peeling of the hands/arms. Will have patient take remaining prednisone and follow up in 3 weeks    02/07/25: PET/CT with increasing hypermetabolic liver met, new liver met concerning for progression    02/10/25: Started C1 tarlatamab     PAST MEDICAL HISTORY  CKD - creatinine 1.68  Neck pain (injury at work)   Hypertension  Iron deficiency anemia (hx of blood transfusion in the 90's)    SURGICAL HISTORY  Ankle fracture and has a latoya in place      SOCIAL HISTORY  Former 35-40 pack-year smoker, quit at age 57  Smokes cigars now   History of alcohol addiction, sober for the past 15 years   Has one chlid (Roula)  Single      FAMILY HISTORY  Lung CA (mother)  Mother had CKD and required HD    CURRENT MEDS REVIEWED       ALLERGIES REVIEWED        SUBJECTIVE:   Here today by himself for C1D15 of tarlatamab. Doing well after the first two treatments, denies fevers/chills, URI symptoms, n/v/d, neuropathy, confusion. Has had back pain since staying in the hospital, paraspinal, worse with flexion. Believes it was related to staying in the hospital bed. No significant impact on day to day activities. Requesting a note for work clearance stating that it is okay for him to drive. Significantly cut down on smoking, now 8-9 cigarettes per week.     A 13 point review of systems was performed, with significant findings documented above in subjective history.    OBJECTIVE:  Vitals:    02/25/25 0700   BP: 118/73   Resp: 20   Temp: 36.8 °C (98.2 °F)   SpO2: 95%       Body surface area is 2.28 meters squared.     Wt Readings from Last 5 Encounters:   02/25/25 105 kg (231 lb 12.8 oz)   02/17/25 107 kg (236 lb 15.9 oz)   02/17/25 107 kg (235 lb 0.2 oz)   02/11/25 108 kg (237 lb 3.4 oz)   02/10/25 108 kg (237 lb 14 oz)     ECOGSCORE: 1- Restricted in physically strenuous activity.  Carries out light duty.  Gen: A&O, NAD  Head:  Normocephalic, atraumatic  Eyes: no scleral icterus  ENT: mucous membranes moist, no oropharyngeal lesions  Resp: Lungs CTAB  Cardiac: Normal rate, regular rhythm, no murmurs appreciated  Abdomen: Soft, nondistended, nontender, +BS  Neuro: CNII-XII grossly intact  Psych: appropriate mood & affect    Diagnostic Results   Results:  Labs:  Lab Results   Component Value Date    WBC 10.4 02/18/2025    HGB 12.2 (L) 02/18/2025    HCT 37.4 (L) 02/18/2025    MCV 96 02/18/2025     02/18/2025      Lab Results   Component Value Date    NEUTROABS 8.43 (H) 02/18/2025        Lab Results   Component Value Date    GLUCOSE 101 (H) 02/18/2025    CALCIUM 9.0 02/18/2025     02/18/2025    K 4.0 02/18/2025    CO2 23 02/18/2025     02/18/2025    BUN 32 (H) 02/18/2025    CREATININE 1.67 (H) 02/18/2025    MG 1.83 02/18/2025     Lab Results   Component Value Date    ALT 16 02/18/2025    AST 11 02/18/2025    ALKPHOS 54 02/18/2025    BILITOT 0.4 02/18/2025    BILIDIR 0.1 12/21/2023      Lab Results   Component Value Date    ACTH 14.7 10/28/2024    CORTISOL 6.4 01/02/2025    TSH 1.16 01/02/2025    FREET4 1.07 08/09/2024     CT chest/abdomen/pelvis on 01/17/2025:    IMPRESSION:  Lung cancer restaging scan. When compared to the prior examination  dated 11/15/2024, there has been interval development of several new  pulmonary nodules of the right lower lobe and a hepatic lesion  concerning for metastatic disease, further evaluation with PET-CT is  recommended. Additional stable chronic and incidental findings  described above.    PET 2/7/25:    IMPRESSION:  1. Two adjacent mildly FDG avid right lower lobe lung nodules  compatible with disease recurrence. Additional subcentimeter right  lower lobe nodules do not demonstrate significant FDG avidity,  however metabolic activity can be underestimated in subcentimeter  lesions and attention on follow-up CT is recommended.  2. Increasingly hypermetabolic and enlarged bilateral axillary  lymph  nodes suspicious for tad metastatic disease. Persistent mildly FDG  avid right hilar tad activity is suggestive of residual viable  neoplasm. Resolution of previously seen hypermetabolic mediastinal  lymphadenopathy.  3. Enlarging and increasingly hypermetabolic segment 8 liver  metastasis and new adjacent smaller segment 8 liver metastasis.  4. Nonspecific persistent somewhat focal FDG avidity in the sigmoid  colon with apparent underlying wall thickening and numerous  diverticula. Findings could represent sequela of chronic  diverticulitis, however consider colonoscopy to exclude underlying  neoplasm if clinically warranted.    Assessment/Plan   Small cell carcinoma of lung, Clinical: Stage FREDRICK (cT3, cN2, pM1b)  Mr. Dane Molina is a 63 YO with ES-SCLC seen for follow up. He is s/p 4 cycles platinum doublet with addition of atezo after c1 as well as consolidative RT. He has had 1 dose atezo which was held for grade III ICI dermatitis which is now grade I only on palms/hands. We resumed atezo and he tolerated well with topical clobetasol. Most recent scans 11/15/24 personally reviewed showing no obvious metastatic disease. MRI brain without metastatic disease.     Unfortunately with presumed progression in liver. We began c1d1 tarlatamab on 2/10 with direct admission. He did well, and is here for day 15 dosing.      #T3 pN2 pM1b small cell lung cancer of the RLL - extensive stage.  -S/p 4 cycles carboplatin/etoposide + atezolizumab added to cycle 2  -S/p 1 dose atezo c/b ICI dermatitis  -S/P TRT (45 Gy in 15 fx per Dr. Conde)   -PET Stat confirming avidity in liver and RLL  -Will proceed with c1d15 today, plan for restaging scans after c2    #Grade I ICI dermatitis - resolved  -Was initially grade III now downgraded after PO steroid taper  -Completed Clobetasol 0.5% ointment BID x 14d. Now using cocoa butter PRN     #CNS monitoring   -MRI on 07/06/24 shows no ICM   -Repeat every 3 months - next  02/25    #Back pain  Likely MSK given paraspinal tenderness  -Lidocaine patch PRN    Patient seen and discussed with Dr. Bhavesh Richardson MD  IM PGY-2

## 2025-02-25 NOTE — PROGRESS NOTES
Pt seen and examined by provider today prior to tx appt.  No further subjective changes offered by pt to this RN    Pt tolerated infusion of Tarlatamab well to completion.  Tolerated 4 hrs post hydration as well.  He made it to 5.25 hrs post observation (C1D15 still indicates a 6-8 hr obs after drug ends) and got anxious to leave.  As he had done well and VS (stable and sim to arrival), this RN notified provider and released pt to home.  Pt aware of what to watch-for and could verbalize what to do if he started to not feel well (fever, chills, heart racing, foggy head, etc).    Provider aware and agreeable.

## 2025-03-09 NOTE — PROGRESS NOTES
Patient ID: Dane Molina is a 64 y.o. male    Primary Care Provider: Darwin Early MD    DIAGNOSIS AND STAGING  cT3 pN2 pM1b small cell lung cancer (INSM1 and TTF1+) of the right lower lobe, diagnosed on 02/05/2024 through bronchoscopy      SITES OF DISEASE  Right lower lobe   Levels 4R and 7 nodes   Liver, confirmed by biopsy      MOLECULAR GENOMICS  Not performed   RB loss by IHC      PRIOR THERAPIES  03/19/2024-05/20/2024: 4 cycles carboplatin/etoposide with addition of atezolizumab to C2 on 04/08/2024 08/09/2024: Completion 45 cGy/15f to chest by Dr. Conde  06/21/2024: maintenance atezolizumab     CURRENT THERAPY  Tarlatamab since 2/10/2025    CURRENT ONCOLOGICAL PROBLEMS  Grade 3 immune related rash now grade I     HISTORY OF PRESENT ILLNESS  Patient presented to the emergency room on 01/04/2023 with a hypertensive urgency and cough.  He has a history of noncompliance with antihypertensive medications.  He also complained of dyspnea and underwent a CT chest without IV contrast that demonstrated a right lower lobe paramediastinal mass measuring 3.1 cm with adjacent pulmonary nodule measuring 1.8 cm.  Mediastinal lymphadenopathy was demonstrated, including a subcarinal node measuring 1.7 cm.  There was right hilar lymphadenopathy, 2 cm in short axis.  On 01/15/2024 the patient underwent a PET scan that demonstrated avidity of the aforementioned masses/lymph nodes, as well as a lesion in the liver, SUV max 3.3, right hepatic lobe.  On 02/05/2024, patient underwent a bronchoscopy:  A. LYMPH NODE 4 R PULMONARY FINE NEEDLE ASPIRATION , CYTOLOGY AND CELL BLOCK:    Positive for malignant cells   Metastatic small cell carcinoma, see note  B. LYMPH NODE 7 PULMONARY FINE NEEDLE ASPIRATION , CYTOLOGY AND CELL BLOCK:    Positive for malignant cells  Metastatic small cell carcinoma, see note          Note: Immunostains demonstrate the lesional cells to be diffusely positive for CAM 5.2, INSM1, TTF-1.  The  proliferative marker Ki-67 is greater than 90%.  Immunostain for retinoblastoma shows loss of staining.  Immunostain for p40 is negative.  The morphology and immunohistochemical profile are consistent with the above diagnosis.  On 02/29/24 the pt is seen by med onc for the first time. Denies any systemic sx - denies lack of appetite, fatigue or unintentional weight loss. Denies new localized pain, headaches or neuro sx.  Denies new respiratory sx.  He is claustrophobic and MRI brain was scheduled for today but pt could not go through with the test. He also has mild CKD, which limits his ability to receive intravenous contrast for CT.   03/04/24: MRI brain shows no ICMA, but MRI liver shows a 1.9 cm hepatic lesion with T2 hyperintensity and peripheral enhancement   03/19/2024: C1 D1 carboplatin/etoposide  03/22/24: Liver biopsy:  FINAL DIAGNOSIS   A. RIGHT LIVER MASS BIOPSY:      -- LIVER TISSUE INVOLVED BY SMALL CELL CARCINOMA, SEE NOTE     Note: Clinical history of lung small cell carcinoma noted. The findings most likely represent metastasis from the known primary lung tumor.      04/04/24: discussed with patient liver biopsy results and recommendations to add atezolizumab to his regimen  He has met with radiation oncology, and we will consider consolidative TRT pending response to cytotoxic chemotherapy     04/08/2024: C2 D1 carboplatin/etoposide/atezolizumab     05/20/24: C4 D1 carboplatin/etoposide/atezolizumab     06/10/2024: CT chest/abdomen/pelvis demonstrating continue his improvement of metastatic lymphadenopathy within the mediastinum/right hilum, with no progression within the liver     06/13/2024: Referral for radiation oncology/TRT.  Patient to proceed with maintenance atezolizumab     06/21/24: begins maintenance atezolizumab      08/09/24: completes 45 Gy in 15 fractions to chest (To Conde)      08/08/24: prescribed prednisone 1 mg/Kg for grade 3 IO-induced rash with peeling of hands       08/29/24: last steroid taken last week but did not complete full prescription. Rash on chest/abdomen improved but has some peeling of the hands/arms. Will have patient take remaining prednisone and follow up in 3 weeks     02/07/25: PET/CT with increasing hypermetabolic liver met, new liver met concerning for progression     02/10/25: Started C1 tarlatamab       PAST MEDICAL HISTORY  CKD - creatinine 1.68  Neck pain (injury at work)   Hypertension  Iron deficiency anemia (hx of blood transfusion in the 90's)    SURGICAL HISTORY  Ankle fracture and has a latoya in place      SOCIAL HISTORY  Former 35-40 pack-year smoker, quit at age 57  Smokes cigars now   History of alcohol addiction, sober for the past 15 years   Has one chlid (Roula)  Single      FAMILY HISTORY  Lung CA (mother)  Mother had CKD and required HD    CURRENT MEDS REVIEWED       ALLERGIES REVIEWED       SUBJECTIVE:  Mr. Molina was seen in the infusion center today.  He reports feeling well with increased energy compared to before.  Appetite is also better.  Denies chest pain, SOB, abdominal pain, diarrhea/constipation, or urinary sxs.  His hands rash improving.  Back pain has almost resolved.    A 13 point review of systems was performed, with significant findings documented above in subjective history.    OBJECTIVE:  Temp 36.8- /77-RR 18-Sat 97%    Wt Readings from Last 5 Encounters:   03/11/25 107 kg (235 lb 9.6 oz)   02/25/25 105 kg (231 lb 12.8 oz)   02/17/25 107 kg (236 lb 15.9 oz)   02/17/25 107 kg (235 lb 0.2 oz)   02/11/25 108 kg (237 lb 3.4 oz)       Diagnostic Results   Results:  Labs:  Lab Results   Component Value Date    WBC 6.2 02/25/2025    HGB 13.4 (L) 02/25/2025    HCT 40.6 (L) 02/25/2025    MCV 97 02/25/2025     02/25/2025      Lab Results   Component Value Date    NEUTROABS 4.28 02/25/2025        Lab Results   Component Value Date    GLUCOSE 98 02/25/2025    CALCIUM 9.2 02/25/2025     02/25/2025    K 4.0 02/25/2025     CO2 23 02/25/2025     02/25/2025    BUN 30 (H) 02/25/2025    CREATININE 1.69 (H) 02/25/2025    MG 1.83 02/18/2025     Lab Results   Component Value Date    ALT 11 02/25/2025    AST 11 02/25/2025    ALKPHOS 58 02/25/2025    BILITOT 0.6 02/25/2025    BILIDIR 0.1 12/21/2023      Lab Results   Component Value Date    ACTH 14.7 10/28/2024    CORTISOL 6.4 01/02/2025    TSH 1.16 01/02/2025    FREET4 1.07 08/09/2024       PET 2/7/2025  1. Two adjacent mildly FDG avid right lower lobe lung nodules  compatible with disease recurrence. Additional subcentimeter right  lower lobe nodules do not demonstrate significant FDG avidity,  however metabolic activity can be underestimated in subcentimeter  lesions and attention on follow-up CT is recommended.  2. Increasingly hypermetabolic and enlarged bilateral axillary lymph  nodes suspicious for tad metastatic disease. Persistent mildly FDG  avid right hilar tad activity is suggestive of residual viable  neoplasm. Resolution of previously seen hypermetabolic mediastinal  lymphadenopathy.  3. Enlarging and increasingly hypermetabolic segment 8 liver  metastasis and new adjacent smaller segment 8 liver metastasis.  4. Nonspecific persistent somewhat focal FDG avidity in the sigmoid  colon with apparent underlying wall thickening and numerous  diverticula. Findings could represent sequela of chronic  diverticulitis, however consider colonoscopy to exclude underlying  neoplasm if clinically warranted.    Assessment/Plan     Small cell carcinoma of lung, Clinical: Stage FREDRICK (cT3, cN2, pM1b)  Mr. Dane Molina is a 63 YO with ES-SCLC seen for follow up. He is s/p 4 cycles platinum doublet with addition of atezo after c1 as well as consolidative RT. He has had 1 dose atezo which was held for grade III ICI dermatitis which is now grade I only on palms/hands. We resumed atezo and he tolerated well with topical clobetasol.      Unfortunately with presumed progression in liver. We  began c1d1 tarlatamab on 2/10 with direct admission that patient did well with. Presenting today for cycle 2.     #T3 pN2 pM1b small cell lung cancer of the RLL - extensive stage.  -S/p 4 cycles carboplatin/etoposide + atezolizumab added to cycle 2  -S/p 1 dose atezo c/b ICI dermatitis  -S/P TRT (45 Gy in 15 fx per Dr. Conde)   -PET Stat confirming avidity in liver and RLL  -Presenting today for c2. Plan to proceed with scans and MRI brain before next visit      #Grade I ICI dermatitis - resolved  -Was initially grade III now downgraded after PO steroid taper  -Completed Clobetasol 0.5% ointment BID x 14d. Now using cocoa butter PRN      #CNS monitoring   -MRI on 07/06/24 shows no ICM   -Repeat every 3 months - next prior to next visit     #Back pain  Likely MSK given paraspinal tenderness  -Lidocaine patch PRN    Navi Young MD  PGY-II  Department of Internal Medicine    Seen and discussed with Dr. Vela  [facility]   [address] [place], [place] [id number]  Phone: [phone]

## 2025-03-11 ENCOUNTER — LAB (OUTPATIENT)
Dept: LAB | Facility: HOSPITAL | Age: 65
End: 2025-03-11
Payer: COMMERCIAL

## 2025-03-11 ENCOUNTER — INFUSION (OUTPATIENT)
Dept: HEMATOLOGY/ONCOLOGY | Facility: HOSPITAL | Age: 65
End: 2025-03-11
Payer: COMMERCIAL

## 2025-03-11 ENCOUNTER — OFFICE VISIT (OUTPATIENT)
Dept: HEMATOLOGY/ONCOLOGY | Facility: HOSPITAL | Age: 65
End: 2025-03-11
Payer: COMMERCIAL

## 2025-03-11 VITALS
SYSTOLIC BLOOD PRESSURE: 121 MMHG | WEIGHT: 235.6 LBS | HEART RATE: 114 BPM | TEMPERATURE: 98.2 F | OXYGEN SATURATION: 97 % | DIASTOLIC BLOOD PRESSURE: 77 MMHG | BODY MASS INDEX: 33.73 KG/M2 | RESPIRATION RATE: 18 BRPM

## 2025-03-11 DIAGNOSIS — C34.00 SMALL CELL CARCINOMA OF HILUM OF LUNG, UNSPECIFIED LATERALITY: ICD-10-CM

## 2025-03-11 LAB
ALBUMIN SERPL BCP-MCNC: 4.2 G/DL (ref 3.4–5)
ALP SERPL-CCNC: 61 U/L (ref 33–136)
ALT SERPL W P-5'-P-CCNC: 15 U/L (ref 10–52)
ANION GAP SERPL CALC-SCNC: 13 MMOL/L (ref 10–20)
APTT PPP: 35 SECONDS (ref 26–36)
AST SERPL W P-5'-P-CCNC: 11 U/L (ref 9–39)
BASOPHILS # BLD AUTO: 0.02 X10*3/UL (ref 0–0.1)
BASOPHILS NFR BLD AUTO: 0.5 %
BILIRUB SERPL-MCNC: 0.4 MG/DL (ref 0–1.2)
BUN SERPL-MCNC: 24 MG/DL (ref 6–23)
CALCIUM SERPL-MCNC: 9.1 MG/DL (ref 8.6–10.3)
CHLORIDE SERPL-SCNC: 105 MMOL/L (ref 98–107)
CO2 SERPL-SCNC: 28 MMOL/L (ref 21–32)
CREAT SERPL-MCNC: 1.63 MG/DL (ref 0.5–1.3)
CRP SERPL-MCNC: 0.53 MG/DL
EGFRCR SERPLBLD CKD-EPI 2021: 47 ML/MIN/1.73M*2
EOSINOPHIL # BLD AUTO: 0.12 X10*3/UL (ref 0–0.7)
EOSINOPHIL NFR BLD AUTO: 2.9 %
ERYTHROCYTE [DISTWIDTH] IN BLOOD BY AUTOMATED COUNT: 14.1 % (ref 11.5–14.5)
FERRITIN SERPL-MCNC: 294 NG/ML (ref 20–300)
FIBRINOGEN PPP-MCNC: 420 MG/DL (ref 200–400)
GLUCOSE SERPL-MCNC: 110 MG/DL (ref 74–99)
HCT VFR BLD AUTO: 40.8 % (ref 41–52)
HGB BLD-MCNC: 13.7 G/DL (ref 13.5–17.5)
IMM GRANULOCYTES # BLD AUTO: 0.03 X10*3/UL (ref 0–0.7)
IMM GRANULOCYTES NFR BLD AUTO: 0.7 % (ref 0–0.9)
INR PPP: 1.2 (ref 0.9–1.1)
LDH SERPL L TO P-CCNC: 143 U/L (ref 84–246)
LYMPHOCYTES # BLD AUTO: 0.88 X10*3/UL (ref 1.2–4.8)
LYMPHOCYTES NFR BLD AUTO: 20.9 %
MCH RBC QN AUTO: 31.9 PG (ref 26–34)
MCHC RBC AUTO-ENTMCNC: 33.6 G/DL (ref 32–36)
MCV RBC AUTO: 95 FL (ref 80–100)
MONOCYTES # BLD AUTO: 0.78 X10*3/UL (ref 0.1–1)
MONOCYTES NFR BLD AUTO: 18.5 %
NEUTROPHILS # BLD AUTO: 2.38 X10*3/UL (ref 1.2–7.7)
NEUTROPHILS NFR BLD AUTO: 56.5 %
NRBC BLD-RTO: 0 /100 WBCS (ref 0–0)
PLATELET # BLD AUTO: 179 X10*3/UL (ref 150–450)
POTASSIUM SERPL-SCNC: 4 MMOL/L (ref 3.5–5.3)
PROT SERPL-MCNC: 7.3 G/DL (ref 6.4–8.2)
PROTHROMBIN TIME: 12.8 SECONDS (ref 9.8–12.4)
RBC # BLD AUTO: 4.3 X10*6/UL (ref 4.5–5.9)
SODIUM SERPL-SCNC: 142 MMOL/L (ref 136–145)
WBC # BLD AUTO: 4.2 X10*3/UL (ref 4.4–11.3)

## 2025-03-11 PROCEDURE — 83615 LACTATE (LD) (LDH) ENZYME: CPT

## 2025-03-11 PROCEDURE — 99215 OFFICE O/P EST HI 40 MIN: CPT | Performed by: STUDENT IN AN ORGANIZED HEALTH CARE EDUCATION/TRAINING PROGRAM

## 2025-03-11 PROCEDURE — 85025 COMPLETE CBC W/AUTO DIFF WBC: CPT

## 2025-03-11 PROCEDURE — 96413 CHEMO IV INFUSION 1 HR: CPT

## 2025-03-11 PROCEDURE — 86140 C-REACTIVE PROTEIN: CPT

## 2025-03-11 PROCEDURE — 2500000004 HC RX 250 GENERAL PHARMACY W/ HCPCS (ALT 636 FOR OP/ED): Mod: JZ,TB | Performed by: STUDENT IN AN ORGANIZED HEALTH CARE EDUCATION/TRAINING PROGRAM

## 2025-03-11 PROCEDURE — 4004F PT TOBACCO SCREEN RCVD TLK: CPT | Performed by: STUDENT IN AN ORGANIZED HEALTH CARE EDUCATION/TRAINING PROGRAM

## 2025-03-11 PROCEDURE — 36415 COLL VENOUS BLD VENIPUNCTURE: CPT

## 2025-03-11 PROCEDURE — 84075 ASSAY ALKALINE PHOSPHATASE: CPT

## 2025-03-11 PROCEDURE — 82728 ASSAY OF FERRITIN: CPT

## 2025-03-11 PROCEDURE — 85384 FIBRINOGEN ACTIVITY: CPT

## 2025-03-11 PROCEDURE — 85730 THROMBOPLASTIN TIME PARTIAL: CPT

## 2025-03-11 RX ORDER — PROCHLORPERAZINE EDISYLATE 5 MG/ML
10 INJECTION INTRAMUSCULAR; INTRAVENOUS EVERY 6 HOURS PRN
Status: DISCONTINUED | OUTPATIENT
Start: 2025-03-11 | End: 2025-03-11 | Stop reason: HOSPADM

## 2025-03-11 RX ORDER — DIPHENHYDRAMINE HYDROCHLORIDE 50 MG/ML
50 INJECTION INTRAMUSCULAR; INTRAVENOUS AS NEEDED
Status: DISCONTINUED | OUTPATIENT
Start: 2025-03-11 | End: 2025-03-11 | Stop reason: HOSPADM

## 2025-03-11 RX ORDER — FAMOTIDINE 10 MG/ML
20 INJECTION, SOLUTION INTRAVENOUS ONCE AS NEEDED
Status: DISCONTINUED | OUTPATIENT
Start: 2025-03-11 | End: 2025-03-11 | Stop reason: HOSPADM

## 2025-03-11 RX ORDER — PROCHLORPERAZINE MALEATE 10 MG
10 TABLET ORAL EVERY 6 HOURS PRN
Status: DISCONTINUED | OUTPATIENT
Start: 2025-03-11 | End: 2025-03-11 | Stop reason: HOSPADM

## 2025-03-11 RX ORDER — EPINEPHRINE 0.3 MG/.3ML
0.3 INJECTION SUBCUTANEOUS EVERY 5 MIN PRN
Status: DISCONTINUED | OUTPATIENT
Start: 2025-03-11 | End: 2025-03-11 | Stop reason: HOSPADM

## 2025-03-11 RX ORDER — ALBUTEROL SULFATE 0.83 MG/ML
3 SOLUTION RESPIRATORY (INHALATION) AS NEEDED
Status: DISCONTINUED | OUTPATIENT
Start: 2025-03-11 | End: 2025-03-11 | Stop reason: HOSPADM

## 2025-03-11 RX ADMIN — Medication 10 MG: at 09:15

## 2025-03-11 ASSESSMENT — PAIN SCALES - GENERAL: PAINLEVEL_OUTOF10: 0-NO PAIN

## 2025-03-11 NOTE — PROGRESS NOTES
Pt arrived ambulatory for infusion.  Denies any new or worsening symptoms  Tolerated infusion without issue.  Discharged in stable condition after observation period.

## 2025-03-21 ENCOUNTER — HOSPITAL ENCOUNTER (OUTPATIENT)
Dept: RADIOLOGY | Facility: CLINIC | Age: 65
Discharge: HOME | End: 2025-03-21
Payer: COMMERCIAL

## 2025-03-21 DIAGNOSIS — C34.00 SMALL CELL CARCINOMA OF HILUM OF LUNG, UNSPECIFIED LATERALITY: ICD-10-CM

## 2025-03-21 PROCEDURE — 2550000001 HC RX 255 CONTRASTS: Performed by: STUDENT IN AN ORGANIZED HEALTH CARE EDUCATION/TRAINING PROGRAM

## 2025-03-21 PROCEDURE — A9575 INJ GADOTERATE MEGLUMI 0.1ML: HCPCS | Performed by: STUDENT IN AN ORGANIZED HEALTH CARE EDUCATION/TRAINING PROGRAM

## 2025-03-21 PROCEDURE — 74177 CT ABD & PELVIS W/CONTRAST: CPT

## 2025-03-21 PROCEDURE — 70553 MRI BRAIN STEM W/O & W/DYE: CPT

## 2025-03-21 RX ORDER — GADOTERATE MEGLUMINE 376.9 MG/ML
20 INJECTION INTRAVENOUS
Status: COMPLETED | OUTPATIENT
Start: 2025-03-21 | End: 2025-03-21

## 2025-03-21 RX ADMIN — IOHEXOL 75 ML: 350 INJECTION, SOLUTION INTRAVENOUS at 14:41

## 2025-03-21 RX ADMIN — GADOTERATE MEGLUMINE 20 ML: 376.9 INJECTION INTRAVENOUS at 15:44

## 2025-03-24 NOTE — PROGRESS NOTES
Patient ID: Dane Molina is a 65 y.o. male    Primary Care Provider: Darwin Early MD    DIAGNOSIS AND STAGING  cT3 pN2 pM1b small cell lung cancer (INSM1 and TTF1+) of the right lower lobe, diagnosed on 02/05/2024 through bronchoscopy      SITES OF DISEASE  Right lower lobe   Levels 4R and 7 nodes   Liver, confirmed by biopsy      MOLECULAR GENOMICS  Not performed   RB loss by IHC      PRIOR THERAPIES  03/19/2024-05/20/2024: 4 cycles carboplatin/etoposide with addition of atezolizumab to C2 on 04/08/2024 08/09/2024: Completion 45 cGy/15f to chest by Dr. Conde  06/21/2024: maintenance atezolizumab     CURRENT THERAPY  Tarlatamab since 2/10/2025    CURRENT ONCOLOGICAL PROBLEMS  Grade 3 immune related rash now grade I     HISTORY OF PRESENT ILLNESS  Patient presented to the emergency room on 01/04/2023 with a hypertensive urgency and cough.  He has a history of noncompliance with antihypertensive medications.  He also complained of dyspnea and underwent a CT chest without IV contrast that demonstrated a right lower lobe paramediastinal mass measuring 3.1 cm with adjacent pulmonary nodule measuring 1.8 cm.  Mediastinal lymphadenopathy was demonstrated, including a subcarinal node measuring 1.7 cm.  There was right hilar lymphadenopathy, 2 cm in short axis.  On 01/15/2024 the patient underwent a PET scan that demonstrated avidity of the aforementioned masses/lymph nodes, as well as a lesion in the liver, SUV max 3.3, right hepatic lobe.  On 02/05/2024, patient underwent a bronchoscopy:  A. LYMPH NODE 4 R PULMONARY FINE NEEDLE ASPIRATION , CYTOLOGY AND CELL BLOCK:    Positive for malignant cells   Metastatic small cell carcinoma, see note  B. LYMPH NODE 7 PULMONARY FINE NEEDLE ASPIRATION , CYTOLOGY AND CELL BLOCK:    Positive for malignant cells  Metastatic small cell carcinoma, see note          Note: Immunostains demonstrate the lesional cells to be diffusely positive for CAM 5.2, INSM1, TTF-1.  The  proliferative marker Ki-67 is greater than 90%.  Immunostain for retinoblastoma shows loss of staining.  Immunostain for p40 is negative.  The morphology and immunohistochemical profile are consistent with the above diagnosis.  On 02/29/24 the pt is seen by med onc for the first time. Denies any systemic sx - denies lack of appetite, fatigue or unintentional weight loss. Denies new localized pain, headaches or neuro sx.  Denies new respiratory sx.  He is claustrophobic and MRI brain was scheduled for today but pt could not go through with the test. He also has mild CKD, which limits his ability to receive intravenous contrast for CT.   03/04/24: MRI brain shows no ICMA, but MRI liver shows a 1.9 cm hepatic lesion with T2 hyperintensity and peripheral enhancement   03/19/2024: C1 D1 carboplatin/etoposide  03/22/24: Liver biopsy:  FINAL DIAGNOSIS   A. RIGHT LIVER MASS BIOPSY:      -- LIVER TISSUE INVOLVED BY SMALL CELL CARCINOMA, SEE NOTE     Note: Clinical history of lung small cell carcinoma noted. The findings most likely represent metastasis from the known primary lung tumor.      04/04/24: discussed with patient liver biopsy results and recommendations to add atezolizumab to his regimen  He has met with radiation oncology, and we will consider consolidative TRT pending response to cytotoxic chemotherapy     04/08/2024: C2 D1 carboplatin/etoposide/atezolizumab     05/20/24: C4 D1 carboplatin/etoposide/atezolizumab     06/10/2024: CT chest/abdomen/pelvis demonstrating continue his improvement of metastatic lymphadenopathy within the mediastinum/right hilum, with no progression within the liver     06/13/2024: Referral for radiation oncology/TRT.  Patient to proceed with maintenance atezolizumab     06/21/24: begins maintenance atezolizumab      08/09/24: completes 45 Gy in 15 fractions to chest (To Conde)      08/08/24: prescribed prednisone 1 mg/Kg for grade 3 IO-induced rash with peeling of hands       08/29/24: last steroid taken last week but did not complete full prescription. Rash on chest/abdomen improved but has some peeling of the hands/arms. Will have patient take remaining prednisone and follow up in 3 weeks     02/07/25: PET/CT with increasing hypermetabolic liver met, new liver met concerning for progression     02/10/25: Started C1 tarlatamab       PAST MEDICAL HISTORY  CKD - creatinine 1.68  Neck pain (injury at work)   Hypertension  Iron deficiency anemia (hx of blood transfusion in the 90's)    SURGICAL HISTORY  Ankle fracture and has a latoya in place      SOCIAL HISTORY  Former 35-40 pack-year smoker, quit at age 57  Smokes cigars now   History of alcohol addiction, sober for the past 15 years   Has one chlid (Roula)  Single      FAMILY HISTORY  Lung CA (mother)  Mother had CKD and required HD    CURRENT MEDS REVIEWED       ALLERGIES REVIEWED       SUBJECTIVE: Patient doing well today. He is back to work. No hand rash, tolerating treatment without any difficulty.    A 13 point review of systems was performed, with significant findings documented above in subjective history.    OBJECTIVE:      2/18/2025     5:00 AM 2/18/2025     8:55 AM 2/25/2025     7:00 AM 2/25/2025     4:01 PM 3/11/2025     7:59 AM 3/21/2025     3:02 PM 3/25/2025     9:00 AM   Vitals   Systolic 169 120 118 120 121  173   Diastolic 98 81 73 79 77  95   BP Location Left arm Left arm Right arm  Right arm  Right arm   Heart Rate 104 97  73 114  88   Temp 38 °C (100.4 °F) 37.3 °C (99.1 °F) 36.8 °C (98.2 °F) 37 °C (98.6 °F) 36.8 °C (98.2 °F)  36 °C (96.8 °F)   Resp 18 18 20 18 18  18   Weight (lb)   231.8  235.6 235 235   BMI   33.18 kg/m2  33.73 kg/m2 33.64 kg/m2 33.64 kg/m2   BSA (m2)   2.28 m2  2.3 m2 2.3 m2 2.3 m2   Visit Report   Report Report Report         Wt Readings from Last 5 Encounters:   03/21/25 107 kg (235 lb)   03/11/25 107 kg (235 lb 9.6 oz)   02/25/25 105 kg (231 lb 12.8 oz)   02/17/25 107 kg (236 lb 15.9 oz)    02/17/25 107 kg (235 lb 0.2 oz)       Diagnostic Results   Results:  Labs:  Lab Results   Component Value Date    WBC 4.2 (L) 03/11/2025    HGB 13.7 03/11/2025    HCT 40.8 (L) 03/11/2025    MCV 95 03/11/2025     03/11/2025      Lab Results   Component Value Date    NEUTROABS 2.38 03/11/2025        Lab Results   Component Value Date    GLUCOSE 110 (H) 03/11/2025    CALCIUM 9.1 03/11/2025     03/11/2025    K 4.0 03/11/2025    CO2 28 03/11/2025     03/11/2025    BUN 24 (H) 03/11/2025    CREATININE 1.63 (H) 03/11/2025    MG 1.83 02/18/2025     Lab Results   Component Value Date    ALT 15 03/11/2025    AST 11 03/11/2025    ALKPHOS 61 03/11/2025    BILITOT 0.4 03/11/2025    BILIDIR 0.1 12/21/2023      Lab Results   Component Value Date    ACTH 14.7 10/28/2024    CORTISOL 6.4 01/02/2025    TSH 1.16 01/02/2025    FREET4 1.07 08/09/2024       CT CAP 3/21/25  ABDOMEN-PELVIS:  1. Decrease in the size of the dominant hepatic dome metastatic  implants. The previously described FDG avid adjacent smaller nodules  in PET scan dated 02/07/2025 is less conspicuous.  2. Colonic diverticulosis with segmental wall thickening of the  sigmoid colon could be related to chronic diverticulitis  3. No new concerning findings in the abdomen or pelvis.      MRI Brain 3/21/25  IMPRESSION  * There is no evidence of mass, cerebral infarction or hemorrhage.    Assessment/Plan     Small cell carcinoma of lung, Clinical: Stage FREDRICK (cT3, cN2, pM1b)  Mr. Dane Molina is a 65 YO with ES-SCLC seen for follow up. He is s/p 4 cycles platinum doublet with addition of atezo after c1 as well as consolidative RT. He has had 1 dose atezo which was held for grade III ICI dermatitis which is now grade I only on palms/hands. We resumed atezo and he tolerated well with topical clobetasol.      Unfortunately with presumed progression in liver. We began c1d1 tarlatamab on 2/10 and his most recent scans on 3/21 were personally reviewed with  improvement in disease. Specifically his dominant liver lesion has decreased from 5.7 cm to 2.2 cm since beginning treatment (based off PET LDCT).     #T3 pN2 pM1b small cell lung cancer of the RLL - extensive stage.  -S/p 4 cycles carboplatin/etoposide + atezolizumab added to cycle 2  -S/p 1 dose atezo c/b ICI dermatitis  -S/P TRT (45 Gy in 15 fx per Dr. Conde)   -Presenting today for c2d15.     #Grade I ICI dermatitis - resolved  -Was initially grade III now downgraded after PO steroid taper  -Completed Clobetasol 0.5% ointment BID x 14d. Now using cocoa butter PRN      #CNS monitoring   -MRI on 03/21/25 shows no ICM   -Repeat every 3 months - next end of June     #Back pain  Likely MSK given paraspinal tenderness  -Lidocaine patch PRN    Nicolas Ospina MD  Thoracic Medical Oncology   [id number] [place], [place] [id number]  Phone: [phone]

## 2025-03-25 ENCOUNTER — INFUSION (OUTPATIENT)
Dept: HEMATOLOGY/ONCOLOGY | Facility: HOSPITAL | Age: 65
End: 2025-03-25
Payer: COMMERCIAL

## 2025-03-25 ENCOUNTER — LAB (OUTPATIENT)
Dept: LAB | Facility: HOSPITAL | Age: 65
End: 2025-03-25
Payer: COMMERCIAL

## 2025-03-25 ENCOUNTER — OFFICE VISIT (OUTPATIENT)
Dept: HEMATOLOGY/ONCOLOGY | Facility: HOSPITAL | Age: 65
End: 2025-03-25
Payer: COMMERCIAL

## 2025-03-25 VITALS
OXYGEN SATURATION: 99 % | TEMPERATURE: 96.8 F | WEIGHT: 235 LBS | DIASTOLIC BLOOD PRESSURE: 95 MMHG | HEART RATE: 88 BPM | BODY MASS INDEX: 33.64 KG/M2 | SYSTOLIC BLOOD PRESSURE: 173 MMHG | RESPIRATION RATE: 18 BRPM

## 2025-03-25 DIAGNOSIS — R06.01 ORTHOPNEA: ICD-10-CM

## 2025-03-25 DIAGNOSIS — Z72.0 TOBACCO ABUSE: ICD-10-CM

## 2025-03-25 DIAGNOSIS — R06.00 DYSPNEA, UNSPECIFIED TYPE: ICD-10-CM

## 2025-03-25 DIAGNOSIS — R06.09 DYSPNEA ON EXERTION: ICD-10-CM

## 2025-03-25 DIAGNOSIS — I25.10 CORONARY ARTERY DISEASE INVOLVING NATIVE CORONARY ARTERY OF NATIVE HEART WITHOUT ANGINA PECTORIS: ICD-10-CM

## 2025-03-25 DIAGNOSIS — I25.10 CORONARY ARTERY CALCIFICATION: ICD-10-CM

## 2025-03-25 DIAGNOSIS — C34.90 SMALL CELL LUNG CANCER: ICD-10-CM

## 2025-03-25 DIAGNOSIS — R06.09 DOE (DYSPNEA ON EXERTION): ICD-10-CM

## 2025-03-25 DIAGNOSIS — C34.00 SMALL CELL CARCINOMA OF HILUM OF LUNG, UNSPECIFIED LATERALITY: ICD-10-CM

## 2025-03-25 DIAGNOSIS — I42.0 DILATED CARDIOMYOPATHY (MULTI): ICD-10-CM

## 2025-03-25 DIAGNOSIS — I50.23 ACUTE ON CHRONIC SYSTOLIC HEART FAILURE: ICD-10-CM

## 2025-03-25 LAB
ALBUMIN SERPL BCP-MCNC: 4.2 G/DL (ref 3.4–5)
ALP SERPL-CCNC: 61 U/L (ref 33–136)
ALT SERPL W P-5'-P-CCNC: 16 U/L (ref 10–52)
ANION GAP SERPL CALC-SCNC: 13 MMOL/L (ref 10–20)
APTT PPP: 38 SECONDS (ref 26–36)
AST SERPL W P-5'-P-CCNC: 14 U/L (ref 9–39)
BASOPHILS # BLD AUTO: 0.03 X10*3/UL (ref 0–0.1)
BASOPHILS NFR BLD AUTO: 0.6 %
BILIRUB SERPL-MCNC: 0.5 MG/DL (ref 0–1.2)
BUN SERPL-MCNC: 24 MG/DL (ref 6–23)
CALCIUM SERPL-MCNC: 9.3 MG/DL (ref 8.6–10.3)
CHLORIDE SERPL-SCNC: 108 MMOL/L (ref 98–107)
CO2 SERPL-SCNC: 26 MMOL/L (ref 21–32)
CREAT SERPL-MCNC: 1.65 MG/DL (ref 0.5–1.3)
CRP SERPL-MCNC: 0.68 MG/DL
EGFRCR SERPLBLD CKD-EPI 2021: 46 ML/MIN/1.73M*2
EOSINOPHIL # BLD AUTO: 0.08 X10*3/UL (ref 0–0.7)
EOSINOPHIL NFR BLD AUTO: 1.7 %
ERYTHROCYTE [DISTWIDTH] IN BLOOD BY AUTOMATED COUNT: 14.9 % (ref 11.5–14.5)
FERRITIN SERPL-MCNC: 227 NG/ML (ref 20–300)
FIBRINOGEN PPP-MCNC: 439 MG/DL (ref 200–400)
GLUCOSE SERPL-MCNC: 98 MG/DL (ref 74–99)
HCT VFR BLD AUTO: 40.9 % (ref 41–52)
HGB BLD-MCNC: 13.1 G/DL (ref 13.5–17.5)
IMM GRANULOCYTES # BLD AUTO: 0.05 X10*3/UL (ref 0–0.7)
IMM GRANULOCYTES NFR BLD AUTO: 1.1 % (ref 0–0.9)
INR PPP: 1.1 (ref 0.9–1.1)
LDH SERPL L TO P-CCNC: 147 U/L (ref 84–246)
LYMPHOCYTES # BLD AUTO: 1.11 X10*3/UL (ref 1.2–4.8)
LYMPHOCYTES NFR BLD AUTO: 24 %
MCH RBC QN AUTO: 31.3 PG (ref 26–34)
MCHC RBC AUTO-ENTMCNC: 32 G/DL (ref 32–36)
MCV RBC AUTO: 98 FL (ref 80–100)
MONOCYTES # BLD AUTO: 1.03 X10*3/UL (ref 0.1–1)
MONOCYTES NFR BLD AUTO: 22.3 %
NEUTROPHILS # BLD AUTO: 2.32 X10*3/UL (ref 1.2–7.7)
NEUTROPHILS NFR BLD AUTO: 50.3 %
NRBC BLD-RTO: 0 /100 WBCS (ref 0–0)
PLATELET # BLD AUTO: 207 X10*3/UL (ref 150–450)
POTASSIUM SERPL-SCNC: 4 MMOL/L (ref 3.5–5.3)
PROT SERPL-MCNC: 7.5 G/DL (ref 6.4–8.2)
PROTHROMBIN TIME: 12.3 SECONDS (ref 9.8–12.4)
RBC # BLD AUTO: 4.18 X10*6/UL (ref 4.5–5.9)
SODIUM SERPL-SCNC: 143 MMOL/L (ref 136–145)
WBC # BLD AUTO: 4.6 X10*3/UL (ref 4.4–11.3)

## 2025-03-25 PROCEDURE — 80053 COMPREHEN METABOLIC PANEL: CPT

## 2025-03-25 PROCEDURE — 83615 LACTATE (LD) (LDH) ENZYME: CPT

## 2025-03-25 PROCEDURE — 85384 FIBRINOGEN ACTIVITY: CPT

## 2025-03-25 PROCEDURE — 85025 COMPLETE CBC W/AUTO DIFF WBC: CPT

## 2025-03-25 PROCEDURE — 99215 OFFICE O/P EST HI 40 MIN: CPT | Performed by: STUDENT IN AN ORGANIZED HEALTH CARE EDUCATION/TRAINING PROGRAM

## 2025-03-25 PROCEDURE — 36415 COLL VENOUS BLD VENIPUNCTURE: CPT

## 2025-03-25 PROCEDURE — 86140 C-REACTIVE PROTEIN: CPT

## 2025-03-25 PROCEDURE — 99215 OFFICE O/P EST HI 40 MIN: CPT | Mod: 25 | Performed by: STUDENT IN AN ORGANIZED HEALTH CARE EDUCATION/TRAINING PROGRAM

## 2025-03-25 PROCEDURE — 85730 THROMBOPLASTIN TIME PARTIAL: CPT

## 2025-03-25 PROCEDURE — 2500000004 HC RX 250 GENERAL PHARMACY W/ HCPCS (ALT 636 FOR OP/ED): Mod: JZ,TB | Performed by: STUDENT IN AN ORGANIZED HEALTH CARE EDUCATION/TRAINING PROGRAM

## 2025-03-25 PROCEDURE — 96413 CHEMO IV INFUSION 1 HR: CPT

## 2025-03-25 PROCEDURE — 82728 ASSAY OF FERRITIN: CPT

## 2025-03-25 RX ORDER — TRIAMTERENE/HYDROCHLOROTHIAZID 37.5-25 MG
1 TABLET ORAL EVERY MORNING
Qty: 30 TABLET | Refills: 11 | Status: SHIPPED | OUTPATIENT
Start: 2025-03-25

## 2025-03-25 RX ORDER — FAMOTIDINE 10 MG/ML
20 INJECTION, SOLUTION INTRAVENOUS ONCE AS NEEDED
Status: DISCONTINUED | OUTPATIENT
Start: 2025-03-25 | End: 2025-03-25 | Stop reason: HOSPADM

## 2025-03-25 RX ORDER — PROCHLORPERAZINE MALEATE 10 MG
10 TABLET ORAL EVERY 6 HOURS PRN
Status: DISCONTINUED | OUTPATIENT
Start: 2025-03-25 | End: 2025-03-25 | Stop reason: HOSPADM

## 2025-03-25 RX ORDER — ALBUTEROL SULFATE 0.83 MG/ML
3 SOLUTION RESPIRATORY (INHALATION) AS NEEDED
Status: DISCONTINUED | OUTPATIENT
Start: 2025-03-25 | End: 2025-03-25 | Stop reason: HOSPADM

## 2025-03-25 RX ORDER — PROCHLORPERAZINE EDISYLATE 5 MG/ML
10 INJECTION INTRAMUSCULAR; INTRAVENOUS EVERY 6 HOURS PRN
Status: DISCONTINUED | OUTPATIENT
Start: 2025-03-25 | End: 2025-03-25 | Stop reason: HOSPADM

## 2025-03-25 RX ORDER — EPINEPHRINE 0.3 MG/.3ML
0.3 INJECTION SUBCUTANEOUS EVERY 5 MIN PRN
Status: DISCONTINUED | OUTPATIENT
Start: 2025-03-25 | End: 2025-03-25 | Stop reason: HOSPADM

## 2025-03-25 RX ORDER — DIPHENHYDRAMINE HYDROCHLORIDE 50 MG/ML
50 INJECTION, SOLUTION INTRAMUSCULAR; INTRAVENOUS AS NEEDED
Status: DISCONTINUED | OUTPATIENT
Start: 2025-03-25 | End: 2025-03-25 | Stop reason: HOSPADM

## 2025-03-25 RX ORDER — ATORVASTATIN CALCIUM 40 MG/1
40 TABLET, FILM COATED ORAL DAILY
Qty: 30 TABLET | Refills: 11 | Status: SHIPPED | OUTPATIENT
Start: 2025-03-25

## 2025-03-25 RX ADMIN — Medication 10 MG: at 10:19

## 2025-03-25 ASSESSMENT — PAIN SCALES - GENERAL: PAINLEVEL_OUTOF10: 0-NO PAIN

## 2025-03-25 NOTE — PROGRESS NOTES
Pt arrived ambulatory to infusion for treatment of tarlatamab. /95, pt states he ran out of his medication. Dr Ospina notified. Denies any new or worsening symptoms. Tolerated infusion without issue. Pt remained in infusion for 6 hour observation. Discharged in stable condition.

## 2025-04-03 NOTE — PROGRESS NOTES
Patient ID: Dane Molina is a 65 y.o. male    Primary Care Provider: Darwin Early MD    DIAGNOSIS AND STAGING  cT3 pN2 pM1b small cell lung cancer (INSM1 and TTF1+) of the right lower lobe, diagnosed on 02/05/2024 through bronchoscopy      SITES OF DISEASE  Right lower lobe   Levels 4R and 7 nodes   Liver, confirmed by biopsy      MOLECULAR GENOMICS  Not performed   RB loss by IHC      PRIOR THERAPIES  03/19/2024-05/20/2024: 4 cycles carboplatin/etoposide with addition of atezolizumab to C2 on 04/08/2024 08/09/2024: Completion 45 cGy/15f to chest by Dr. Conde  06/21/2024: maintenance atezolizumab     CURRENT THERAPY  Tarlatamab since 2/10/2025    CURRENT ONCOLOGICAL PROBLEMS  Grade 3 immune related rash now grade I     HISTORY OF PRESENT ILLNESS  Patient presented to the emergency room on 01/04/2023 with a hypertensive urgency and cough.  He has a history of noncompliance with antihypertensive medications.  He also complained of dyspnea and underwent a CT chest without IV contrast that demonstrated a right lower lobe paramediastinal mass measuring 3.1 cm with adjacent pulmonary nodule measuring 1.8 cm.  Mediastinal lymphadenopathy was demonstrated, including a subcarinal node measuring 1.7 cm.  There was right hilar lymphadenopathy, 2 cm in short axis.  On 01/15/2024 the patient underwent a PET scan that demonstrated avidity of the aforementioned masses/lymph nodes, as well as a lesion in the liver, SUV max 3.3, right hepatic lobe.  On 02/05/2024, patient underwent a bronchoscopy:  A. LYMPH NODE 4 R PULMONARY FINE NEEDLE ASPIRATION , CYTOLOGY AND CELL BLOCK:    Positive for malignant cells   Metastatic small cell carcinoma, see note  B. LYMPH NODE 7 PULMONARY FINE NEEDLE ASPIRATION , CYTOLOGY AND CELL BLOCK:    Positive for malignant cells  Metastatic small cell carcinoma, see note          Note: Immunostains demonstrate the lesional cells to be diffusely positive for CAM 5.2, INSM1, TTF-1.  The  proliferative marker Ki-67 is greater than 90%.  Immunostain for retinoblastoma shows loss of staining.  Immunostain for p40 is negative.  The morphology and immunohistochemical profile are consistent with the above diagnosis.  On 02/29/24 the pt is seen by med onc for the first time. Denies any systemic sx - denies lack of appetite, fatigue or unintentional weight loss. Denies new localized pain, headaches or neuro sx.  Denies new respiratory sx.  He is claustrophobic and MRI brain was scheduled for today but pt could not go through with the test. He also has mild CKD, which limits his ability to receive intravenous contrast for CT.   03/04/24: MRI brain shows no ICMA, but MRI liver shows a 1.9 cm hepatic lesion with T2 hyperintensity and peripheral enhancement   03/19/2024: C1 D1 carboplatin/etoposide  03/22/24: Liver biopsy:  FINAL DIAGNOSIS   A. RIGHT LIVER MASS BIOPSY:      -- LIVER TISSUE INVOLVED BY SMALL CELL CARCINOMA, SEE NOTE     Note: Clinical history of lung small cell carcinoma noted. The findings most likely represent metastasis from the known primary lung tumor.      04/04/24: discussed with patient liver biopsy results and recommendations to add atezolizumab to his regimen  He has met with radiation oncology, and we will consider consolidative TRT pending response to cytotoxic chemotherapy     04/08/2024: C2 D1 carboplatin/etoposide/atezolizumab     05/20/24: C4 D1 carboplatin/etoposide/atezolizumab     06/10/2024: CT chest/abdomen/pelvis demonstrating continue his improvement of metastatic lymphadenopathy within the mediastinum/right hilum, with no progression within the liver     06/13/2024: Referral for radiation oncology/TRT.  Patient to proceed with maintenance atezolizumab     06/21/24: begins maintenance atezolizumab      08/09/24: completes 45 Gy in 15 fractions to chest (To Conde)      08/08/24: prescribed prednisone 1 mg/Kg for grade 3 IO-induced rash with peeling of hands       08/29/24: last steroid taken last week but did not complete full prescription. Rash on chest/abdomen improved but has some peeling of the hands/arms. Will have patient take remaining prednisone and follow up in 3 weeks     02/07/25: PET/CT with increasing hypermetabolic liver met, new liver met concerning for progression     02/10/25: Started C1 tarlatamab       PAST MEDICAL HISTORY  CKD - creatinine 1.68  Neck pain (injury at work)   Hypertension  Iron deficiency anemia (hx of blood transfusion in the 90's)    SURGICAL HISTORY  Ankle fracture and has a latoya in place      SOCIAL HISTORY  Former 35-40 pack-year smoker, quit at age 57  Smokes cigars now   History of alcohol addiction, sober for the past 15 years   Has one chlid (Roula)  Single      FAMILY HISTORY  Lung CA (mother)  Mother had CKD and required HD    CURRENT MEDS REVIEWED       ALLERGIES REVIEWED       SUBJECTIVE: Patient doing well today. He is back to work. No hand rash, tolerating treatment without any difficulty.    A 13 point review of systems was performed, with significant findings documented above in subjective history.    OBJECTIVE:      2/18/2025     5:00 AM 2/18/2025     8:55 AM 2/25/2025     7:00 AM 2/25/2025     4:01 PM 3/11/2025     7:59 AM 3/21/2025     3:02 PM 3/25/2025     9:00 AM   Vitals   Systolic 169 120 118 120 121  173   Diastolic 98 81 73 79 77  95   BP Location Left arm Left arm Right arm  Right arm  Right arm   Heart Rate 104 97  73 114  88   Temp 38 °C (100.4 °F) 37.3 °C (99.1 °F) 36.8 °C (98.2 °F) 37 °C (98.6 °F) 36.8 °C (98.2 °F)  36 °C (96.8 °F)   Resp 18 18 20 18 18  18   Weight (lb)   231.8  235.6 235 235   BMI   33.18 kg/m2  33.73 kg/m2 33.64 kg/m2 33.64 kg/m2   BSA (m2)   2.28 m2  2.3 m2 2.3 m2 2.3 m2   Visit Report   Report Report Report  Report       Wt Readings from Last 5 Encounters:   03/25/25 107 kg (235 lb)   03/21/25 107 kg (235 lb)   03/11/25 107 kg (235 lb 9.6 oz)   02/25/25 105 kg (231 lb 12.8 oz)   02/17/25  107 kg (236 lb 15.9 oz)       Diagnostic Results   Results:  Labs:  Lab Results   Component Value Date    WBC 4.6 03/25/2025    HGB 13.1 (L) 03/25/2025    HCT 40.9 (L) 03/25/2025    MCV 98 03/25/2025     03/25/2025      Lab Results   Component Value Date    NEUTROABS 2.32 03/25/2025        Lab Results   Component Value Date    GLUCOSE 98 03/25/2025    CALCIUM 9.3 03/25/2025     03/25/2025    K 4.0 03/25/2025    CO2 26 03/25/2025     (H) 03/25/2025    BUN 24 (H) 03/25/2025    CREATININE 1.65 (H) 03/25/2025    MG 1.83 02/18/2025     Lab Results   Component Value Date    ALT 16 03/25/2025    AST 14 03/25/2025    ALKPHOS 61 03/25/2025    BILITOT 0.5 03/25/2025    BILIDIR 0.1 12/21/2023      Lab Results   Component Value Date    ACTH 14.7 10/28/2024    CORTISOL 6.4 01/02/2025    TSH 1.16 01/02/2025    FREET4 1.07 08/09/2024       CT CAP 3/21/25  ABDOMEN-PELVIS:  1. Decrease in the size of the dominant hepatic dome metastatic  implants. The previously described FDG avid adjacent smaller nodules  in PET scan dated 02/07/2025 is less conspicuous.  2. Colonic diverticulosis with segmental wall thickening of the  sigmoid colon could be related to chronic diverticulitis  3. No new concerning findings in the abdomen or pelvis.      MRI Brain 3/21/25  IMPRESSION  * There is no evidence of mass, cerebral infarction or hemorrhage.    Assessment/Plan     Small cell carcinoma of lung, Clinical: Stage FREDRICK (cT3, cN2, pM1b)  Mr. Dane Molina is a 63 YO with ES-SCLC seen for follow up. He is s/p 4 cycles platinum doublet with addition of atezo after c1 as well as consolidative RT. He has had 1 dose atezo which was held for grade III ICI dermatitis which is now grade I only on palms/hands. We resumed atezo and he tolerated well with topical clobetasol.      Unfortunately with presumed progression in liver. We began c1d1 tarlatamab on 2/10 and his most recent scans on 3/21 were personally reviewed with improvement in  disease. Specifically his dominant liver lesion has decreased from 5.7 cm to 2.2 cm since beginning treatment (based off PET LDCT).     #T3 pN2 pM1b small cell lung cancer of the RLL - extensive stage.  -S/p 4 cycles carboplatin/etoposide + atezolizumab added to cycle 2  -S/p 1 dose atezo c/b ICI dermatitis  -S/P TRT (45 Gy in 15 fx per Dr. Conde)   -Presenting today for c2d15.     #Grade I ICI dermatitis - resolved  -Was initially grade III now downgraded after PO steroid taper  -Completed Clobetasol 0.5% ointment BID x 14d. Now using cocoa butter PRN      #CNS monitoring   -MRI on 03/21/25 shows no ICM   -Repeat every 3 months - next end of June     #Back pain  Likely MSK given paraspinal tenderness  -Lidocaine patch PRN    Nicolas Ospina MD  Thoracic Medical Oncology   [address] [facility] [address]  Phone: [phone]

## 2025-04-04 ENCOUNTER — TELEPHONE (OUTPATIENT)
Dept: HEMATOLOGY/ONCOLOGY | Facility: HOSPITAL | Age: 65
End: 2025-04-04
Payer: COMMERCIAL

## 2025-04-04 DIAGNOSIS — C34.00 SMALL CELL CARCINOMA OF HILUM OF LUNG, UNSPECIFIED LATERALITY: Primary | ICD-10-CM

## 2025-04-04 NOTE — TELEPHONE ENCOUNTER
Spoke to pt regarding him wanting to change his appointment this following Tuesday to next Tuesday. I told him that there is an infusion appt on the 15th, that infusion can slot him. Pt stated he would gladly take that day. Will send pt [employer] message with details when infusion chair is confirmed. Pt understood that he will check [email or web address] message.

## 2025-04-07 ENCOUNTER — TELEPHONE (OUTPATIENT)
Dept: HEMATOLOGY/ONCOLOGY | Facility: HOSPITAL | Age: 65
End: 2025-04-07
Payer: COMMERCIAL

## 2025-04-07 NOTE — TELEPHONE ENCOUNTER
Called Mr Molina to confirm that his infusion and appointment was changed to 4/15. He will go to infusion at 1115 and Dr Ospina will see him downstairs.

## 2025-04-07 NOTE — TELEPHONE ENCOUNTER
Tried calling Mr Molina to let him know there was a mixup with his infusion appointment. Instead of coming in on 4/15, Dr Ospina will see him on 4/14 down in infusion. His infusion appointment was rescheduled to 4/14 at 0800. My chart message sent.

## 2025-04-08 ENCOUNTER — APPOINTMENT (OUTPATIENT)
Dept: HEMATOLOGY/ONCOLOGY | Facility: HOSPITAL | Age: 65
End: 2025-04-08
Payer: COMMERCIAL

## 2025-04-14 ENCOUNTER — LAB (OUTPATIENT)
Dept: LAB | Facility: HOSPITAL | Age: 65
End: 2025-04-14
Payer: COMMERCIAL

## 2025-04-14 ENCOUNTER — OFFICE VISIT (OUTPATIENT)
Dept: HEMATOLOGY/ONCOLOGY | Facility: HOSPITAL | Age: 65
End: 2025-04-14
Payer: COMMERCIAL

## 2025-04-14 ENCOUNTER — INFUSION (OUTPATIENT)
Dept: HEMATOLOGY/ONCOLOGY | Facility: HOSPITAL | Age: 65
End: 2025-04-14
Payer: COMMERCIAL

## 2025-04-14 VITALS
DIASTOLIC BLOOD PRESSURE: 88 MMHG | TEMPERATURE: 97.3 F | BODY MASS INDEX: 33.47 KG/M2 | SYSTOLIC BLOOD PRESSURE: 126 MMHG | WEIGHT: 233.8 LBS | OXYGEN SATURATION: 97 % | RESPIRATION RATE: 18 BRPM | HEART RATE: 95 BPM

## 2025-04-14 DIAGNOSIS — C34.00 SMALL CELL CARCINOMA OF HILUM OF LUNG, UNSPECIFIED LATERALITY: Primary | ICD-10-CM

## 2025-04-14 DIAGNOSIS — C34.00 SMALL CELL CARCINOMA OF HILUM OF LUNG, UNSPECIFIED LATERALITY: ICD-10-CM

## 2025-04-14 LAB
ALBUMIN SERPL BCP-MCNC: 4.3 G/DL (ref 3.4–5)
ALP SERPL-CCNC: 68 U/L (ref 33–136)
ALT SERPL W P-5'-P-CCNC: 17 U/L (ref 10–52)
ANION GAP SERPL CALC-SCNC: 13 MMOL/L (ref 10–20)
APTT PPP: 36 SECONDS (ref 26–36)
AST SERPL W P-5'-P-CCNC: 16 U/L (ref 9–39)
BASOPHILS # BLD AUTO: 0.04 X10*3/UL (ref 0–0.1)
BASOPHILS NFR BLD AUTO: 0.8 %
BILIRUB SERPL-MCNC: 0.4 MG/DL (ref 0–1.2)
BUN SERPL-MCNC: 31 MG/DL (ref 6–23)
CALCIUM SERPL-MCNC: 9.2 MG/DL (ref 8.6–10.3)
CHLORIDE SERPL-SCNC: 107 MMOL/L (ref 98–107)
CO2 SERPL-SCNC: 25 MMOL/L (ref 21–32)
CREAT SERPL-MCNC: 1.76 MG/DL (ref 0.5–1.3)
CRP SERPL-MCNC: 0.8 MG/DL
EGFRCR SERPLBLD CKD-EPI 2021: 43 ML/MIN/1.73M*2
EOSINOPHIL # BLD AUTO: 0.14 X10*3/UL (ref 0–0.7)
EOSINOPHIL NFR BLD AUTO: 2.7 %
ERYTHROCYTE [DISTWIDTH] IN BLOOD BY AUTOMATED COUNT: 15.5 % (ref 11.5–14.5)
FERRITIN SERPL-MCNC: 217 NG/ML (ref 20–300)
FIBRINOGEN PPP-MCNC: 409 MG/DL (ref 200–400)
GLUCOSE SERPL-MCNC: 105 MG/DL (ref 74–99)
HCT VFR BLD AUTO: 41.3 % (ref 41–52)
HGB BLD-MCNC: 13.3 G/DL (ref 13.5–17.5)
IMM GRANULOCYTES # BLD AUTO: 0.02 X10*3/UL (ref 0–0.7)
IMM GRANULOCYTES NFR BLD AUTO: 0.4 % (ref 0–0.9)
INR PPP: 1.1 (ref 0.9–1.1)
LDH SERPL L TO P-CCNC: 137 U/L (ref 84–246)
LYMPHOCYTES # BLD AUTO: 0.9 X10*3/UL (ref 1.2–4.8)
LYMPHOCYTES NFR BLD AUTO: 17.4 %
MCH RBC QN AUTO: 31.9 PG (ref 26–34)
MCHC RBC AUTO-ENTMCNC: 32.2 G/DL (ref 32–36)
MCV RBC AUTO: 99 FL (ref 80–100)
MONOCYTES # BLD AUTO: 0.91 X10*3/UL (ref 0.1–1)
MONOCYTES NFR BLD AUTO: 17.6 %
NEUTROPHILS # BLD AUTO: 3.15 X10*3/UL (ref 1.2–7.7)
NEUTROPHILS NFR BLD AUTO: 61.1 %
NRBC BLD-RTO: 0 /100 WBCS (ref 0–0)
PLATELET # BLD AUTO: 188 X10*3/UL (ref 150–450)
POTASSIUM SERPL-SCNC: 3.8 MMOL/L (ref 3.5–5.3)
PROT SERPL-MCNC: 7.3 G/DL (ref 6.4–8.2)
PROTHROMBIN TIME: 12 SECONDS (ref 9.8–12.4)
RBC # BLD AUTO: 4.17 X10*6/UL (ref 4.5–5.9)
SODIUM SERPL-SCNC: 141 MMOL/L (ref 136–145)
WBC # BLD AUTO: 5.2 X10*3/UL (ref 4.4–11.3)

## 2025-04-14 PROCEDURE — 2500000004 HC RX 250 GENERAL PHARMACY W/ HCPCS (ALT 636 FOR OP/ED): Mod: JZ,TB | Performed by: STUDENT IN AN ORGANIZED HEALTH CARE EDUCATION/TRAINING PROGRAM

## 2025-04-14 PROCEDURE — 86140 C-REACTIVE PROTEIN: CPT

## 2025-04-14 PROCEDURE — 36415 COLL VENOUS BLD VENIPUNCTURE: CPT

## 2025-04-14 PROCEDURE — 99215 OFFICE O/P EST HI 40 MIN: CPT | Performed by: STUDENT IN AN ORGANIZED HEALTH CARE EDUCATION/TRAINING PROGRAM

## 2025-04-14 PROCEDURE — 96413 CHEMO IV INFUSION 1 HR: CPT

## 2025-04-14 PROCEDURE — 83615 LACTATE (LD) (LDH) ENZYME: CPT

## 2025-04-14 PROCEDURE — 85025 COMPLETE CBC W/AUTO DIFF WBC: CPT

## 2025-04-14 PROCEDURE — 85384 FIBRINOGEN ACTIVITY: CPT

## 2025-04-14 PROCEDURE — 80053 COMPREHEN METABOLIC PANEL: CPT

## 2025-04-14 PROCEDURE — 82728 ASSAY OF FERRITIN: CPT

## 2025-04-14 PROCEDURE — 99215 OFFICE O/P EST HI 40 MIN: CPT | Mod: 25 | Performed by: STUDENT IN AN ORGANIZED HEALTH CARE EDUCATION/TRAINING PROGRAM

## 2025-04-14 PROCEDURE — 85610 PROTHROMBIN TIME: CPT

## 2025-04-14 RX ORDER — PROCHLORPERAZINE MALEATE 10 MG
10 TABLET ORAL EVERY 6 HOURS PRN
OUTPATIENT
Start: 2025-05-13

## 2025-04-14 RX ORDER — PROCHLORPERAZINE EDISYLATE 5 MG/ML
10 INJECTION INTRAMUSCULAR; INTRAVENOUS EVERY 6 HOURS PRN
OUTPATIENT
Start: 2025-05-27

## 2025-04-14 RX ORDER — FAMOTIDINE 10 MG/ML
20 INJECTION, SOLUTION INTRAVENOUS ONCE AS NEEDED
OUTPATIENT
Start: 2025-05-27

## 2025-04-14 RX ORDER — PROCHLORPERAZINE EDISYLATE 5 MG/ML
10 INJECTION INTRAMUSCULAR; INTRAVENOUS EVERY 6 HOURS PRN
OUTPATIENT
Start: 2025-05-13

## 2025-04-14 RX ORDER — EPINEPHRINE 0.3 MG/.3ML
0.3 INJECTION SUBCUTANEOUS EVERY 5 MIN PRN
OUTPATIENT
Start: 2025-05-13

## 2025-04-14 RX ORDER — DIPHENHYDRAMINE HYDROCHLORIDE 50 MG/ML
50 INJECTION, SOLUTION INTRAMUSCULAR; INTRAVENOUS AS NEEDED
OUTPATIENT
Start: 2025-05-13

## 2025-04-14 RX ORDER — PROCHLORPERAZINE MALEATE 10 MG
10 TABLET ORAL EVERY 6 HOURS PRN
Status: DISCONTINUED | OUTPATIENT
Start: 2025-04-14 | End: 2025-04-14 | Stop reason: HOSPADM

## 2025-04-14 RX ORDER — ALBUTEROL SULFATE 0.83 MG/ML
3 SOLUTION RESPIRATORY (INHALATION) AS NEEDED
OUTPATIENT
Start: 2025-05-27

## 2025-04-14 RX ORDER — PROCHLORPERAZINE MALEATE 10 MG
10 TABLET ORAL EVERY 6 HOURS PRN
OUTPATIENT
Start: 2025-05-27

## 2025-04-14 RX ORDER — EPINEPHRINE 0.3 MG/.3ML
0.3 INJECTION SUBCUTANEOUS EVERY 5 MIN PRN
OUTPATIENT
Start: 2025-05-27

## 2025-04-14 RX ORDER — DIPHENHYDRAMINE HYDROCHLORIDE 50 MG/ML
50 INJECTION, SOLUTION INTRAMUSCULAR; INTRAVENOUS AS NEEDED
OUTPATIENT
Start: 2025-05-27

## 2025-04-14 RX ORDER — FAMOTIDINE 10 MG/ML
20 INJECTION, SOLUTION INTRAVENOUS ONCE AS NEEDED
OUTPATIENT
Start: 2025-05-13

## 2025-04-14 RX ORDER — ALBUTEROL SULFATE 0.83 MG/ML
3 SOLUTION RESPIRATORY (INHALATION) AS NEEDED
OUTPATIENT
Start: 2025-05-13

## 2025-04-14 RX ORDER — PROCHLORPERAZINE EDISYLATE 5 MG/ML
10 INJECTION INTRAMUSCULAR; INTRAVENOUS EVERY 6 HOURS PRN
Status: DISCONTINUED | OUTPATIENT
Start: 2025-04-14 | End: 2025-04-14 | Stop reason: HOSPADM

## 2025-04-14 RX ADMIN — Medication 10 MG: at 09:13

## 2025-04-14 ASSESSMENT — PAIN SCALES - GENERAL: PAINLEVEL_OUTOF10: 0-NO PAIN

## 2025-04-14 NOTE — PROGRESS NOTES
Dane Molina arrived to infusion center for treatment of tarlatamab. Pt denies any new or worsening complaints. Pt tolerated infusion without issues. Pt observed for 3 hours post transfusion. VSS. Pt discharged home in stable condition.

## 2025-04-14 NOTE — PROGRESS NOTES
Patient ID: Dane Molina is a 65 y.o. male    Primary Care Provider: Darwin Early MD    DIAGNOSIS AND STAGING  cT3 pN2 pM1b small cell lung cancer (INSM1 and TTF1+) of the right lower lobe, diagnosed on 02/05/2024 through bronchoscopy      SITES OF DISEASE  Right lower lobe   Levels 4R and 7 nodes   Liver, confirmed by biopsy      MOLECULAR GENOMICS  Not performed   RB loss by IHC      PRIOR THERAPIES  03/19/2024-05/20/2024: 4 cycles carboplatin/etoposide with addition of atezolizumab to C2 on 04/08/2024 08/09/2024: Completion 45 cGy/15f to chest by Dr. Conde  06/21/2024: maintenance atezolizumab     CURRENT THERAPY  Tarlatamab since 2/10/2025    CURRENT ONCOLOGICAL PROBLEMS  Grade 3 immune related rash now grade I     HISTORY OF PRESENT ILLNESS  Patient presented to the emergency room on 01/04/2023 with a hypertensive urgency and cough.  He has a history of noncompliance with antihypertensive medications.  He also complained of dyspnea and underwent a CT chest without IV contrast that demonstrated a right lower lobe paramediastinal mass measuring 3.1 cm with adjacent pulmonary nodule measuring 1.8 cm.  Mediastinal lymphadenopathy was demonstrated, including a subcarinal node measuring 1.7 cm.  There was right hilar lymphadenopathy, 2 cm in short axis.  On 01/15/2024 the patient underwent a PET scan that demonstrated avidity of the aforementioned masses/lymph nodes, as well as a lesion in the liver, SUV max 3.3, right hepatic lobe.  On 02/05/2024, patient underwent a bronchoscopy:  A. LYMPH NODE 4 R PULMONARY FINE NEEDLE ASPIRATION , CYTOLOGY AND CELL BLOCK:    Positive for malignant cells   Metastatic small cell carcinoma, see note  B. LYMPH NODE 7 PULMONARY FINE NEEDLE ASPIRATION , CYTOLOGY AND CELL BLOCK:    Positive for malignant cells  Metastatic small cell carcinoma, see note          Note: Immunostains demonstrate the lesional cells to be diffusely positive for CAM 5.2, INSM1, TTF-1.  The  proliferative marker Ki-67 is greater than 90%.  Immunostain for retinoblastoma shows loss of staining.  Immunostain for p40 is negative.  The morphology and immunohistochemical profile are consistent with the above diagnosis.  On 02/29/24 the pt is seen by med onc for the first time. Denies any systemic sx - denies lack of appetite, fatigue or unintentional weight loss. Denies new localized pain, headaches or neuro sx.  Denies new respiratory sx.  He is claustrophobic and MRI brain was scheduled for today but pt could not go through with the test. He also has mild CKD, which limits his ability to receive intravenous contrast for CT.   03/04/24: MRI brain shows no ICMA, but MRI liver shows a 1.9 cm hepatic lesion with T2 hyperintensity and peripheral enhancement   03/19/2024: C1 D1 carboplatin/etoposide  03/22/24: Liver biopsy:  FINAL DIAGNOSIS   A. RIGHT LIVER MASS BIOPSY:      -- LIVER TISSUE INVOLVED BY SMALL CELL CARCINOMA, SEE NOTE     Note: Clinical history of lung small cell carcinoma noted. The findings most likely represent metastasis from the known primary lung tumor.      04/04/24: discussed with patient liver biopsy results and recommendations to add atezolizumab to his regimen  He has met with radiation oncology, and we will consider consolidative TRT pending response to cytotoxic chemotherapy     04/08/2024: C2 D1 carboplatin/etoposide/atezolizumab     05/20/24: C4 D1 carboplatin/etoposide/atezolizumab     06/10/2024: CT chest/abdomen/pelvis demonstrating continue his improvement of metastatic lymphadenopathy within the mediastinum/right hilum, with no progression within the liver     06/13/2024: Referral for radiation oncology/TRT.  Patient to proceed with maintenance atezolizumab     06/21/24: begins maintenance atezolizumab      08/09/24: completes 45 Gy in 15 fractions to chest (To Conde)      08/08/24: prescribed prednisone 1 mg/Kg for grade 3 IO-induced rash with peeling of hands       08/29/24: last steroid taken last week but did not complete full prescription. Rash on chest/abdomen improved but has some peeling of the hands/arms. Will have patient take remaining prednisone and follow up in 3 weeks     02/07/25: PET/CT with increasing hypermetabolic liver met, new liver met concerning for progression     02/10/25: Started C1 tarlatamab       PAST MEDICAL HISTORY  CKD - creatinine 1.68  Neck pain (injury at work)   Hypertension  Iron deficiency anemia (hx of blood transfusion in the 90's)    SURGICAL HISTORY  Ankle fracture and has a latoya in place      SOCIAL HISTORY  Former 35-40 pack-year smoker, quit at age 57  Smokes cigars now   History of alcohol addiction, sober for the past 15 years   Has one chlid (Roula)  Single      FAMILY HISTORY  Lung CA (mother)  Mother had CKD and required HD    CURRENT MEDS REVIEWED       ALLERGIES REVIEWED       SUBJECTIVE: Patient doing well today. He delayed by 6 days and is feeling great. He is tolerating treatment without any difficulty. His weight is stable.    A 13 point review of systems was performed, with significant findings documented above in subjective history.    OBJECTIVE:      2/18/2025     8:55 AM 2/25/2025     7:00 AM 2/25/2025     4:01 PM 3/11/2025     7:59 AM 3/21/2025     3:02 PM 3/25/2025     9:00 AM 4/14/2025     8:10 AM   Vitals   Systolic 120 118 120 121  173 126   Diastolic 81 73 79 77  95 88   BP Location Left arm Right arm  Right arm  Right arm Right arm   Heart Rate 97  73 114  88 95   Temp 37.3 °C (99.1 °F) 36.8 °C (98.2 °F) 37 °C (98.6 °F) 36.8 °C (98.2 °F)  36 °C (96.8 °F) 36.3 °C (97.3 °F)   Resp 18 20 18 18  18 18   Weight (lb)  231.8  235.6 235 235 233.8   BMI  33.18 kg/m2  33.73 kg/m2 33.64 kg/m2 33.64 kg/m2 33.47 kg/m2   BSA (m2)  2.28 m2  2.3 m2 2.3 m2 2.3 m2 2.29 m2   Visit Report  Report Report Report  Report        Wt Readings from Last 5 Encounters:   04/14/25 106 kg (233 lb 12.8 oz)   03/25/25 107 kg (235 lb)   03/21/25  107 kg (235 lb)   03/11/25 107 kg (235 lb 9.6 oz)   02/25/25 105 kg (231 lb 12.8 oz)       Diagnostic Results   Results:  Labs:  Lab Results   Component Value Date    WBC 5.2 04/14/2025    HGB 13.3 (L) 04/14/2025    HCT 41.3 04/14/2025    MCV 99 04/14/2025     04/14/2025      Lab Results   Component Value Date    NEUTROABS 3.15 04/14/2025        Lab Results   Component Value Date    GLUCOSE 98 03/25/2025    CALCIUM 9.3 03/25/2025     03/25/2025    K 4.0 03/25/2025    CO2 26 03/25/2025     (H) 03/25/2025    BUN 24 (H) 03/25/2025    CREATININE 1.65 (H) 03/25/2025    MG 1.83 02/18/2025     Lab Results   Component Value Date    ALT 16 03/25/2025    AST 14 03/25/2025    ALKPHOS 61 03/25/2025    BILITOT 0.5 03/25/2025    BILIDIR 0.1 12/21/2023      Lab Results   Component Value Date    ACTH 14.7 10/28/2024    CORTISOL 6.4 01/02/2025    TSH 1.16 01/02/2025    FREET4 1.07 08/09/2024       CT CAP 3/21/25  ABDOMEN-PELVIS:  1. Decrease in the size of the dominant hepatic dome metastatic  implants. The previously described FDG avid adjacent smaller nodules  in PET scan dated 02/07/2025 is less conspicuous.  2. Colonic diverticulosis with segmental wall thickening of the  sigmoid colon could be related to chronic diverticulitis  3. No new concerning findings in the abdomen or pelvis.      MRI Brain 3/21/25  IMPRESSION  * There is no evidence of mass, cerebral infarction or hemorrhage.    Assessment/Plan     Small cell carcinoma of lung, Clinical: Stage FREDRICK (cT3, cN2, pM1b)  Mr. Dane Molina is a 65 YO with ES-SCLC seen for follow up. He is s/p 4 cycles platinum doublet with addition of atezo after c1 as well as consolidative RT. He has had 1 dose atezo which was held for grade III ICI dermatitis which is now grade I only on palms/hands. We resumed atezo and he tolerated well with topical clobetasol.      Unfortunately with presumed progression in liver. We began c1d1 tarlatamab on 2/10 and his most recent  scans on 3/21 were personally reviewed with improvement in disease. Specifically his dominant liver lesion has decreased from 5.7 cm to 2.2 cm since beginning treatment (based off PET LDCT).     #T3 pN2 pM1b small cell lung cancer of the RLL - extensive stage.  -S/p 4 cycles carboplatin/etoposide + atezolizumab added to cycle 2  -S/p 1 dose atezo c/b ICI dermatitis  -S/P TRT (45 Gy in 15 fx per Dr. Conde)   -Presenting today for c3d1.     #Grade I ICI dermatitis - resolved  -Was initially grade III now downgraded after PO steroid taper  -Completed Clobetasol 0.5% ointment BID x 14d. Now using cocoa butter PRN      #CNS monitoring   -MRI on 03/21/25 shows no ICM   -Repeat every 3 months - next end of June     #Back pain  Likely MSK given paraspinal tenderness  -Lidocaine patch PRN    Nicolas Ospina MD  Thoracic Medical Oncology   [address] [place] [address]  Phone: [phone]

## 2025-04-15 ENCOUNTER — APPOINTMENT (OUTPATIENT)
Dept: HEMATOLOGY/ONCOLOGY | Facility: HOSPITAL | Age: 65
End: 2025-04-15
Payer: COMMERCIAL

## 2025-04-22 ENCOUNTER — APPOINTMENT (OUTPATIENT)
Dept: HEMATOLOGY/ONCOLOGY | Facility: HOSPITAL | Age: 65
End: 2025-04-22
Payer: COMMERCIAL

## 2025-04-28 NOTE — PROGRESS NOTES
Patient ID: Dane Molina is a 65 y.o. male    Primary Care Provider: Darwin Early MD    DIAGNOSIS AND STAGING  cT3 pN2 pM1b small cell lung cancer (INSM1 and TTF1+) of the right lower lobe, diagnosed on 02/05/2024 through bronchoscopy      SITES OF DISEASE  Right lower lobe   Levels 4R and 7 nodes   Liver, confirmed by biopsy      MOLECULAR GENOMICS  Not performed   RB loss by IHC      PRIOR THERAPIES  03/19/2024-05/20/2024: 4 cycles carboplatin/etoposide with addition of atezolizumab to C2 on 04/08/2024 08/09/2024: Completion 45 cGy/15f to chest by Dr. Conde  06/21/2024: maintenance atezolizumab     CURRENT THERAPY  Tarlatamab since 2/10/2025    CURRENT ONCOLOGICAL PROBLEMS  Grade 3 immune related rash now grade I     HISTORY OF PRESENT ILLNESS  Patient presented to the emergency room on 01/04/2023 with a hypertensive urgency and cough.  He has a history of noncompliance with antihypertensive medications.  He also complained of dyspnea and underwent a CT chest without IV contrast that demonstrated a right lower lobe paramediastinal mass measuring 3.1 cm with adjacent pulmonary nodule measuring 1.8 cm.  Mediastinal lymphadenopathy was demonstrated, including a subcarinal node measuring 1.7 cm.  There was right hilar lymphadenopathy, 2 cm in short axis.  On 01/15/2024 the patient underwent a PET scan that demonstrated avidity of the aforementioned masses/lymph nodes, as well as a lesion in the liver, SUV max 3.3, right hepatic lobe.  On 02/05/2024, patient underwent a bronchoscopy:  A. LYMPH NODE 4 R PULMONARY FINE NEEDLE ASPIRATION , CYTOLOGY AND CELL BLOCK:    Positive for malignant cells   Metastatic small cell carcinoma, see note  B. LYMPH NODE 7 PULMONARY FINE NEEDLE ASPIRATION , CYTOLOGY AND CELL BLOCK:    Positive for malignant cells  Metastatic small cell carcinoma, see note          Note: Immunostains demonstrate the lesional cells to be diffusely positive for CAM 5.2, INSM1, TTF-1.  The  proliferative marker Ki-67 is greater than 90%.  Immunostain for retinoblastoma shows loss of staining.  Immunostain for p40 is negative.  The morphology and immunohistochemical profile are consistent with the above diagnosis.  On 02/29/24 the pt is seen by med onc for the first time. Denies any systemic sx - denies lack of appetite, fatigue or unintentional weight loss. Denies new localized pain, headaches or neuro sx.  Denies new respiratory sx.  He is claustrophobic and MRI brain was scheduled for today but pt could not go through with the test. He also has mild CKD, which limits his ability to receive intravenous contrast for CT.   03/04/24: MRI brain shows no ICMA, but MRI liver shows a 1.9 cm hepatic lesion with T2 hyperintensity and peripheral enhancement   03/19/2024: C1 D1 carboplatin/etoposide  03/22/24: Liver biopsy:  FINAL DIAGNOSIS   A. RIGHT LIVER MASS BIOPSY:      -- LIVER TISSUE INVOLVED BY SMALL CELL CARCINOMA, SEE NOTE     Note: Clinical history of lung small cell carcinoma noted. The findings most likely represent metastasis from the known primary lung tumor.      04/04/24: discussed with patient liver biopsy results and recommendations to add atezolizumab to his regimen  He has met with radiation oncology, and we will consider consolidative TRT pending response to cytotoxic chemotherapy     04/08/2024: C2 D1 carboplatin/etoposide/atezolizumab     05/20/24: C4 D1 carboplatin/etoposide/atezolizumab     06/10/2024: CT chest/abdomen/pelvis demonstrating continue his improvement of metastatic lymphadenopathy within the mediastinum/right hilum, with no progression within the liver     06/13/2024: Referral for radiation oncology/TRT.  Patient to proceed with maintenance atezolizumab     06/21/24: begins maintenance atezolizumab      08/09/24: completes 45 Gy in 15 fractions to chest (To Conde)      08/08/24: prescribed prednisone 1 mg/Kg for grade 3 IO-induced rash with peeling of hands       08/29/24: last steroid taken last week but did not complete full prescription. Rash on chest/abdomen improved but has some peeling of the hands/arms. Will have patient take remaining prednisone and follow up in 3 weeks     02/07/25: PET/CT with increasing hypermetabolic liver met, new liver met concerning for progression     02/10/25: Started C1 tarlatamab       PAST MEDICAL HISTORY  CKD - creatinine 1.68  Neck pain (injury at work)   Hypertension  Iron deficiency anemia (hx of blood transfusion in the 90's)    SURGICAL HISTORY  Ankle fracture and has a latoya in place      SOCIAL HISTORY  Former 35-40 pack-year smoker, quit at age 57  Smokes cigars now   History of alcohol addiction, sober for the past 15 years   Has one chlid (Roula)  Single      FAMILY HISTORY  Lung CA (mother)  Mother had CKD and required HD    CURRENT MEDS REVIEWED       ALLERGIES REVIEWED       SUBJECTIVE: Patient doing well today. He is feeling great, denies acute complaints. His weight is stable.    A 13 point review of systems was performed, with significant findings documented above in subjective history.    OBJECTIVE:      3/11/2025     7:59 AM 3/21/2025     3:02 PM 3/25/2025     9:00 AM 4/14/2025     8:10 AM 4/29/2025     8:37 AM 4/29/2025    10:35 AM 4/29/2025    12:03 PM   Vitals   Systolic 121  173 126 116 107 104   Diastolic 77  95 88 81 68 66   BP Location Right arm  Right arm Right arm Right arm Right arm Right arm   Heart Rate 114  88 95 91 81 80   Temp 36.8 °C (98.2 °F)  36 °C (96.8 °F) 36.3 °C (97.3 °F) 36 °C (96.8 °F) 36.3 °C (97.3 °F) 36.5 °C (97.7 °F)   Resp 18  18 18 19 18 18   Weight (lb) 235.6 235 235 233.8 232.9     BMI 33.73 kg/m2 33.64 kg/m2 33.64 kg/m2 33.47 kg/m2 33.34 kg/m2     BSA (m2) 2.3 m2 2.3 m2 2.3 m2 2.29 m2 2.29 m2     Visit Report Report  Report Report          Wt Readings from Last 5 Encounters:   04/29/25 106 kg (232 lb 14.4 oz)   04/14/25 106 kg (233 lb 12.8 oz)   03/25/25 107 kg (235 lb)   03/21/25 107 kg  (235 lb)   03/11/25 107 kg (235 lb 9.6 oz)       Diagnostic Results   Results:  Labs:  Lab Results   Component Value Date    WBC 4.6 04/29/2025    HGB 13.8 04/29/2025    HCT 42.2 04/29/2025    MCV 98 04/29/2025     04/29/2025      Lab Results   Component Value Date    NEUTROABS 2.87 04/29/2025        Lab Results   Component Value Date    GLUCOSE 103 (H) 04/29/2025    CALCIUM 9.3 04/29/2025     04/29/2025    K 3.8 04/29/2025    CO2 24 04/29/2025     04/29/2025    BUN 26 (H) 04/29/2025    CREATININE 1.67 (H) 04/29/2025    MG 1.83 02/18/2025     Lab Results   Component Value Date    ALT 15 04/29/2025    AST 14 04/29/2025    ALKPHOS 61 04/29/2025    BILITOT 0.5 04/29/2025    BILIDIR 0.1 12/21/2023      Lab Results   Component Value Date    ACTH 14.7 10/28/2024    CORTISOL 6.4 01/02/2025    TSH 1.16 01/02/2025    FREET4 1.07 08/09/2024       CT CAP 3/21/25  ABDOMEN-PELVIS:  1. Decrease in the size of the dominant hepatic dome metastatic  implants. The previously described FDG avid adjacent smaller nodules  in PET scan dated 02/07/2025 is less conspicuous.  2. Colonic diverticulosis with segmental wall thickening of the  sigmoid colon could be related to chronic diverticulitis  3. No new concerning findings in the abdomen or pelvis.      MRI Brain 3/21/25  IMPRESSION  * There is no evidence of mass, cerebral infarction or hemorrhage.    Assessment/Plan     Small cell carcinoma of lung, Clinical: Stage FREDRICK (cT3, cN2, pM1b)  Mr. Dane Molina is a 63 YO with ES-SCLC seen for follow up. He is s/p 4 cycles platinum doublet with addition of atezo after c1 as well as consolidative RT. He has had 1 dose atezo which was held for grade III ICI dermatitis which is now grade I only on palms/hands. We resumed atezo and he tolerated well with topical clobetasol.      Unfortunately with presumed progression in liver. We began c1d1 tarlatamab on 2/10 and his most recent scans on 3/21 were personally reviewed with  improvement in disease. Specifically his dominant liver lesion has decreased from 5.7 cm to 2.2 cm since beginning treatment (based off PET LDCT).     #T3 pN2 pM1b small cell lung cancer of the RLL - extensive stage.  -S/p 4 cycles carboplatin/etoposide + atezolizumab added to cycle 2  -S/p 1 dose atezo c/b ICI dermatitis  -S/P TRT (45 Gy in 15 fx per Dr. Conde)   -Presenting today for c3d15. Scans prior to c5d1     #Grade I ICI dermatitis - resolved  -Was initially grade III now downgraded after PO steroid taper  -Completed Clobetasol 0.5% ointment BID x 14d. Now using cocoa butter PRN      #CNS monitoring   -MRI on 03/21/25 shows no ICM   -Repeat every 3 months - next end of June     #Back pain  Likely MSK given paraspinal tenderness  -Lidocaine patch PRN    Nicolas Ospina MD  Thoracic Medical Oncology   [id number] [place], [place] [address]  Phone: [phone]

## 2025-04-29 ENCOUNTER — OFFICE VISIT (OUTPATIENT)
Dept: HEMATOLOGY/ONCOLOGY | Facility: HOSPITAL | Age: 65
End: 2025-04-29
Payer: COMMERCIAL

## 2025-04-29 ENCOUNTER — INFUSION (OUTPATIENT)
Dept: HEMATOLOGY/ONCOLOGY | Facility: HOSPITAL | Age: 65
End: 2025-04-29
Payer: COMMERCIAL

## 2025-04-29 ENCOUNTER — LAB (OUTPATIENT)
Dept: LAB | Facility: HOSPITAL | Age: 65
End: 2025-04-29
Payer: COMMERCIAL

## 2025-04-29 VITALS
HEART RATE: 78 BPM | DIASTOLIC BLOOD PRESSURE: 66 MMHG | OXYGEN SATURATION: 98 % | TEMPERATURE: 97.9 F | WEIGHT: 232.9 LBS | RESPIRATION RATE: 18 BRPM | SYSTOLIC BLOOD PRESSURE: 115 MMHG | BODY MASS INDEX: 33.34 KG/M2

## 2025-04-29 DIAGNOSIS — R06.01 ORTHOPNEA: ICD-10-CM

## 2025-04-29 DIAGNOSIS — I50.23 ACUTE ON CHRONIC SYSTOLIC HEART FAILURE: ICD-10-CM

## 2025-04-29 DIAGNOSIS — R06.09 DOE (DYSPNEA ON EXERTION): ICD-10-CM

## 2025-04-29 DIAGNOSIS — Z72.0 TOBACCO ABUSE: ICD-10-CM

## 2025-04-29 DIAGNOSIS — C34.90 SMALL CELL LUNG CANCER: ICD-10-CM

## 2025-04-29 DIAGNOSIS — C34.00 SMALL CELL CARCINOMA OF HILUM OF LUNG, UNSPECIFIED LATERALITY: ICD-10-CM

## 2025-04-29 DIAGNOSIS — R06.00 DYSPNEA, UNSPECIFIED TYPE: ICD-10-CM

## 2025-04-29 DIAGNOSIS — I42.0 DILATED CARDIOMYOPATHY (MULTI): ICD-10-CM

## 2025-04-29 DIAGNOSIS — I25.10 CORONARY ARTERY DISEASE INVOLVING NATIVE CORONARY ARTERY OF NATIVE HEART WITHOUT ANGINA PECTORIS: ICD-10-CM

## 2025-04-29 DIAGNOSIS — R06.09 DYSPNEA ON EXERTION: ICD-10-CM

## 2025-04-29 DIAGNOSIS — I25.10 CORONARY ARTERY CALCIFICATION: ICD-10-CM

## 2025-04-29 LAB
ALBUMIN SERPL BCP-MCNC: 4.3 G/DL (ref 3.4–5)
ALP SERPL-CCNC: 61 U/L (ref 33–136)
ALT SERPL W P-5'-P-CCNC: 15 U/L (ref 10–52)
ANION GAP SERPL CALC-SCNC: 15 MMOL/L (ref 10–20)
APTT PPP: 35 SECONDS (ref 26–36)
AST SERPL W P-5'-P-CCNC: 14 U/L (ref 9–39)
BASOPHILS # BLD AUTO: 0.03 X10*3/UL (ref 0–0.1)
BASOPHILS NFR BLD AUTO: 0.7 %
BILIRUB SERPL-MCNC: 0.5 MG/DL (ref 0–1.2)
BUN SERPL-MCNC: 26 MG/DL (ref 6–23)
CALCIUM SERPL-MCNC: 9.3 MG/DL (ref 8.6–10.3)
CHLORIDE SERPL-SCNC: 107 MMOL/L (ref 98–107)
CO2 SERPL-SCNC: 24 MMOL/L (ref 21–32)
CREAT SERPL-MCNC: 1.67 MG/DL (ref 0.5–1.3)
CRP SERPL-MCNC: 0.74 MG/DL
EGFRCR SERPLBLD CKD-EPI 2021: 45 ML/MIN/1.73M*2
EOSINOPHIL # BLD AUTO: 0.1 X10*3/UL (ref 0–0.7)
EOSINOPHIL NFR BLD AUTO: 2.2 %
ERYTHROCYTE [DISTWIDTH] IN BLOOD BY AUTOMATED COUNT: 15.4 % (ref 11.5–14.5)
FERRITIN SERPL-MCNC: 200 NG/ML (ref 20–300)
FIBRINOGEN PPP-MCNC: 433 MG/DL (ref 200–400)
GLUCOSE SERPL-MCNC: 103 MG/DL (ref 74–99)
HCT VFR BLD AUTO: 42.2 % (ref 41–52)
HGB BLD-MCNC: 13.8 G/DL (ref 13.5–17.5)
IMM GRANULOCYTES # BLD AUTO: 0.02 X10*3/UL (ref 0–0.7)
IMM GRANULOCYTES NFR BLD AUTO: 0.4 % (ref 0–0.9)
INR PPP: 1.2 (ref 0.9–1.1)
LDH SERPL L TO P-CCNC: 142 U/L (ref 84–246)
LYMPHOCYTES # BLD AUTO: 0.74 X10*3/UL (ref 1.2–4.8)
LYMPHOCYTES NFR BLD AUTO: 16.1 %
MCH RBC QN AUTO: 32 PG (ref 26–34)
MCHC RBC AUTO-ENTMCNC: 32.7 G/DL (ref 32–36)
MCV RBC AUTO: 98 FL (ref 80–100)
MONOCYTES # BLD AUTO: 0.85 X10*3/UL (ref 0.1–1)
MONOCYTES NFR BLD AUTO: 18.4 %
NEUTROPHILS # BLD AUTO: 2.87 X10*3/UL (ref 1.2–7.7)
NEUTROPHILS NFR BLD AUTO: 62.2 %
NRBC BLD-RTO: 0 /100 WBCS (ref 0–0)
PLATELET # BLD AUTO: 214 X10*3/UL (ref 150–450)
POTASSIUM SERPL-SCNC: 3.8 MMOL/L (ref 3.5–5.3)
PROT SERPL-MCNC: 7.3 G/DL (ref 6.4–8.2)
PROTHROMBIN TIME: 12.8 SECONDS (ref 9.8–12.4)
RBC # BLD AUTO: 4.31 X10*6/UL (ref 4.5–5.9)
SODIUM SERPL-SCNC: 142 MMOL/L (ref 136–145)
WBC # BLD AUTO: 4.6 X10*3/UL (ref 4.4–11.3)

## 2025-04-29 PROCEDURE — 83615 LACTATE (LD) (LDH) ENZYME: CPT

## 2025-04-29 PROCEDURE — 96413 CHEMO IV INFUSION 1 HR: CPT

## 2025-04-29 PROCEDURE — 85384 FIBRINOGEN ACTIVITY: CPT

## 2025-04-29 PROCEDURE — 82728 ASSAY OF FERRITIN: CPT

## 2025-04-29 PROCEDURE — 85610 PROTHROMBIN TIME: CPT

## 2025-04-29 PROCEDURE — 86140 C-REACTIVE PROTEIN: CPT

## 2025-04-29 PROCEDURE — 85025 COMPLETE CBC W/AUTO DIFF WBC: CPT

## 2025-04-29 PROCEDURE — 36415 COLL VENOUS BLD VENIPUNCTURE: CPT

## 2025-04-29 PROCEDURE — 2500000004 HC RX 250 GENERAL PHARMACY W/ HCPCS (ALT 636 FOR OP/ED): Mod: JZ | Performed by: STUDENT IN AN ORGANIZED HEALTH CARE EDUCATION/TRAINING PROGRAM

## 2025-04-29 PROCEDURE — 84075 ASSAY ALKALINE PHOSPHATASE: CPT

## 2025-04-29 PROCEDURE — 99215 OFFICE O/P EST HI 40 MIN: CPT | Performed by: STUDENT IN AN ORGANIZED HEALTH CARE EDUCATION/TRAINING PROGRAM

## 2025-04-29 PROCEDURE — 99215 OFFICE O/P EST HI 40 MIN: CPT | Mod: 25 | Performed by: STUDENT IN AN ORGANIZED HEALTH CARE EDUCATION/TRAINING PROGRAM

## 2025-04-29 RX ORDER — FAMOTIDINE 10 MG/ML
20 INJECTION, SOLUTION INTRAVENOUS ONCE AS NEEDED
Status: DISCONTINUED | OUTPATIENT
Start: 2025-04-29 | End: 2025-04-29 | Stop reason: HOSPADM

## 2025-04-29 RX ORDER — ALBUTEROL SULFATE 0.83 MG/ML
3 SOLUTION RESPIRATORY (INHALATION) AS NEEDED
Status: DISCONTINUED | OUTPATIENT
Start: 2025-04-29 | End: 2025-04-29 | Stop reason: HOSPADM

## 2025-04-29 RX ORDER — ATORVASTATIN CALCIUM 40 MG/1
40 TABLET, FILM COATED ORAL DAILY
Qty: 30 TABLET | Refills: 11 | Status: SHIPPED | OUTPATIENT
Start: 2025-04-29

## 2025-04-29 RX ORDER — HEPARIN SODIUM,PORCINE/PF 10 UNIT/ML
50 SYRINGE (ML) INTRAVENOUS AS NEEDED
OUTPATIENT
Start: 2025-04-29

## 2025-04-29 RX ORDER — EPINEPHRINE 0.3 MG/.3ML
0.3 INJECTION SUBCUTANEOUS EVERY 5 MIN PRN
Status: DISCONTINUED | OUTPATIENT
Start: 2025-04-29 | End: 2025-04-29 | Stop reason: HOSPADM

## 2025-04-29 RX ORDER — TRIAMTERENE/HYDROCHLOROTHIAZID 37.5-25 MG
1 TABLET ORAL EVERY MORNING
Qty: 30 TABLET | Refills: 11 | Status: SHIPPED | OUTPATIENT
Start: 2025-04-29

## 2025-04-29 RX ORDER — DIPHENHYDRAMINE HYDROCHLORIDE 50 MG/ML
50 INJECTION, SOLUTION INTRAMUSCULAR; INTRAVENOUS AS NEEDED
Status: DISCONTINUED | OUTPATIENT
Start: 2025-04-29 | End: 2025-04-29 | Stop reason: HOSPADM

## 2025-04-29 RX ORDER — PROCHLORPERAZINE MALEATE 10 MG
10 TABLET ORAL EVERY 6 HOURS PRN
Status: DISCONTINUED | OUTPATIENT
Start: 2025-04-29 | End: 2025-04-29 | Stop reason: HOSPADM

## 2025-04-29 RX ORDER — PROCHLORPERAZINE EDISYLATE 5 MG/ML
10 INJECTION INTRAMUSCULAR; INTRAVENOUS EVERY 6 HOURS PRN
Status: DISCONTINUED | OUTPATIENT
Start: 2025-04-29 | End: 2025-04-29 | Stop reason: HOSPADM

## 2025-04-29 RX ORDER — HEPARIN 100 UNIT/ML
500 SYRINGE INTRAVENOUS AS NEEDED
OUTPATIENT
Start: 2025-04-29

## 2025-04-29 RX ADMIN — Medication 10 MG: at 09:30

## 2025-04-29 ASSESSMENT — PAIN SCALES - GENERAL
PAINLEVEL_OUTOF10: 0-NO PAIN

## 2025-04-29 NOTE — PROGRESS NOTES
Dane Molina is a 65 y.o. male who presents for Day 15 Cycle 3.    He is on the following chemotherapy regimen:     Treatment Plans       Name Type Plan Dates Plan Provider         Active    Tarlatamab, 28 Day Cycles Oncology Treatment 2/3/2025 - Present Nicolas Ospina MD                    Since his last visit, he has been doing well.  Overall, he states his energy level is stable.  His appetite has been unchanged.  He reports no complaints.  He has no other concerns.    Tolerated infusion well, vitals ok during 3 hour observation, AVS provided and discharged in stable condition. Message sent to Dr. Ospina to place line care orders for future treatments.

## 2025-05-12 NOTE — PROGRESS NOTES
Patient ID: Dane Molina is a 65 y.o. male    Primary Care Provider: Darwin Early MD    DIAGNOSIS AND STAGING  cT3 pN2 pM1b small cell lung cancer (INSM1 and TTF1+) of the right lower lobe, diagnosed on 02/05/2024 through bronchoscopy      SITES OF DISEASE  Right lower lobe   Levels 4R and 7 nodes   Liver, confirmed by biopsy      MOLECULAR GENOMICS  Not performed   RB loss by IHC      PRIOR THERAPIES  03/19/2024-05/20/2024: 4 cycles carboplatin/etoposide with addition of atezolizumab to C2 on 04/08/2024 08/09/2024: Completion 45 cGy/15f to chest by Dr. Conde  06/21/2024: maintenance atezolizumab     CURRENT THERAPY  Tarlatamab since 2/10/2025    CURRENT ONCOLOGICAL PROBLEMS  Grade 3 immune related rash now grade II     HISTORY OF PRESENT ILLNESS  Patient presented to the emergency room on 01/04/2023 with a hypertensive urgency and cough.  He has a history of noncompliance with antihypertensive medications.  He also complained of dyspnea and underwent a CT chest without IV contrast that demonstrated a right lower lobe paramediastinal mass measuring 3.1 cm with adjacent pulmonary nodule measuring 1.8 cm.  Mediastinal lymphadenopathy was demonstrated, including a subcarinal node measuring 1.7 cm.  There was right hilar lymphadenopathy, 2 cm in short axis.  On 01/15/2024 the patient underwent a PET scan that demonstrated avidity of the aforementioned masses/lymph nodes, as well as a lesion in the liver, SUV max 3.3, right hepatic lobe.  On 02/05/2024, patient underwent a bronchoscopy:  A. LYMPH NODE 4 R PULMONARY FINE NEEDLE ASPIRATION , CYTOLOGY AND CELL BLOCK:    Positive for malignant cells   Metastatic small cell carcinoma, see note  B. LYMPH NODE 7 PULMONARY FINE NEEDLE ASPIRATION , CYTOLOGY AND CELL BLOCK:    Positive for malignant cells  Metastatic small cell carcinoma, see note          Note: Immunostains demonstrate the lesional cells to be diffusely positive for CAM 5.2, INSM1, TTF-1.  The  proliferative marker Ki-67 is greater than 90%.  Immunostain for retinoblastoma shows loss of staining.  Immunostain for p40 is negative.  The morphology and immunohistochemical profile are consistent with the above diagnosis.  On 02/29/24 the pt is seen by med onc for the first time. Denies any systemic sx - denies lack of appetite, fatigue or unintentional weight loss. Denies new localized pain, headaches or neuro sx.  Denies new respiratory sx.  He is claustrophobic and MRI brain was scheduled for today but pt could not go through with the test. He also has mild CKD, which limits his ability to receive intravenous contrast for CT.   03/04/24: MRI brain shows no ICMA, but MRI liver shows a 1.9 cm hepatic lesion with T2 hyperintensity and peripheral enhancement   03/19/2024: C1 D1 carboplatin/etoposide  03/22/24: Liver biopsy:  FINAL DIAGNOSIS   A. RIGHT LIVER MASS BIOPSY:      -- LIVER TISSUE INVOLVED BY SMALL CELL CARCINOMA, SEE NOTE     Note: Clinical history of lung small cell carcinoma noted. The findings most likely represent metastasis from the known primary lung tumor.      04/04/24: discussed with patient liver biopsy results and recommendations to add atezolizumab to his regimen  He has met with radiation oncology, and we will consider consolidative TRT pending response to cytotoxic chemotherapy     04/08/2024: C2 D1 carboplatin/etoposide/atezolizumab     05/20/24: C4 D1 carboplatin/etoposide/atezolizumab     06/10/2024: CT chest/abdomen/pelvis demonstrating continue his improvement of metastatic lymphadenopathy within the mediastinum/right hilum, with no progression within the liver     06/13/2024: Referral for radiation oncology/TRT.  Patient to proceed with maintenance atezolizumab     06/21/24: begins maintenance atezolizumab      08/09/24: completes 45 Gy in 15 fractions to chest (To Conde)      08/08/24: prescribed prednisone 1 mg/Kg for grade 3 IO-induced rash with peeling of hands       08/29/24: last steroid taken last week but did not complete full prescription. Rash on chest/abdomen improved but has some peeling of the hands/arms. Will have patient take remaining prednisone and follow up in 3 weeks     02/07/25: PET/CT with increasing hypermetabolic liver met, new liver met concerning for progression     02/10/25: Started C1 tarlatamab       PAST MEDICAL HISTORY  CKD - creatinine 1.68  Neck pain (injury at work)   Hypertension  Iron deficiency anemia (hx of blood transfusion in the 90's)    SURGICAL HISTORY  Ankle fracture and has a latoya in place      SOCIAL HISTORY  Former 35-40 pack-year smoker, quit at age 57  Smokes cigars now   History of alcohol addiction, sober for the past 15 years   Has one chlid ([place])  Single      FAMILY HISTORY  Lung CA (mother)  Mother had CKD and required HD    CURRENT MEDS REVIEWED       ALLERGIES REVIEWED       SUBJECTIVE: Patient doing well today. He is feeling great, denies acute complaints. His weight is stable. He has a rash on his hands - worst on R hand. It is similar to his immune mediated rash on immunotherapy.     A 13 point review of systems was performed, with significant findings documented above in subjective history.    OBJECTIVE:      4/29/2025     8:37 AM 4/29/2025    10:35 AM 4/29/2025    12:03 PM 4/29/2025     1:26 PM 5/13/2025     8:02 AM 5/13/2025     9:40 AM 5/13/2025    11:10 AM   Vitals   Systolic 116 107 104 115 99 107 108   Diastolic 81 68 66 66 72 69 65   BP Location Right arm Right arm Right arm Right arm   Left arm   Heart Rate 91 81 80 78 100  81   Temp 36 °C (96.8 °F) 36.3 °C (97.3 °F) 36.5 °C (97.7 °F) 36.6 °C (97.9 °F) 36.3 °C (97.3 °F)     Resp 19 18 18 18 20     Weight (lb) 232.9    232.81     BMI 33.34 kg/m2    33.33 kg/m2     BSA (m2) 2.29 m2    2.29 m2     Visit Report Report Report Report Report Report Report Report       Wt Readings from Last 5 Encounters:   05/13/25 106 kg (232 lb 12.9 oz)   04/29/25 106 kg (232 lb 14.4  oz)   04/14/25 106 kg (233 lb 12.8 oz)   03/25/25 107 kg (235 lb)   03/21/25 107 kg (235 lb)     Gen: A&O, NAD  Head: Normocephalic, atraumatic  Eyes: no scleral icterus  ENT: mucous membranes moist, no oropharyngeal lesions  Resp: Lungs CTAB  Cardiac: Normal rate, regular rhythm, no murmurs appreciated  Abdomen: Soft, nondistended, nontender, +BS  Neuro: CNII-XII grossly intact  Psych: appropriate mood & affect  Skin: warm, dry, no apparent rashes    Diagnostic Results   Results:  Labs:  Lab Results   Component Value Date    WBC 5.3 05/13/2025    HGB 13.6 05/13/2025    HCT 41.2 05/13/2025    MCV 99 05/13/2025     05/13/2025      Lab Results   Component Value Date    NEUTROABS 3.27 05/13/2025        Lab Results   Component Value Date    GLUCOSE 120 (H) 05/13/2025    CALCIUM 9.1 05/13/2025     05/13/2025    K 3.7 05/13/2025    CO2 25 05/13/2025     05/13/2025    BUN 23 05/13/2025    CREATININE 1.87 (H) 05/13/2025    MG 1.83 02/18/2025     Lab Results   Component Value Date    ALT 12 05/13/2025    AST 12 05/13/2025    ALKPHOS 66 05/13/2025    BILITOT 0.6 05/13/2025    BILIDIR 0.1 12/21/2023      Lab Results   Component Value Date    ACTH 14.7 10/28/2024    CORTISOL 6.4 01/02/2025    TSH 1.16 01/02/2025    FREET4 1.07 08/09/2024       CT CAP 3/21/25  ABDOMEN-PELVIS:  1. Decrease in the size of the dominant hepatic dome metastatic  implants. The previously described FDG avid adjacent smaller nodules  in PET scan dated 02/07/2025 is less conspicuous.  2. Colonic diverticulosis with segmental wall thickening of the  sigmoid colon could be related to chronic diverticulitis  3. No new concerning findings in the abdomen or pelvis.      MRI Brain 3/21/25  IMPRESSION  * There is no evidence of mass, cerebral infarction or hemorrhage.    Assessment/Plan     Small cell carcinoma of lung, Clinical: Stage FREDRICK (cT3, cN2, pM1b)  Mr. Dane Molina is a 63 YO with ES-SCLC seen for follow up. He is s/p 4 cycles  platinum doublet with addition of atezo after c1 as well as consolidative RT. He has had 1 dose atezo which was held for grade III ICI dermatitis which is now grade I only on palms/hands. We resumed atezo and he tolerated well with topical clobetasol.      Unfortunately with progression in liver. We began c1d1 tarlatamab on 2/10 and his most recent scans on 3/21 were personally reviewed with improvement in disease. Specifically his dominant liver lesion has decreased from 5.7 cm to 2.2 cm since beginning treatment (based off PET LDCT).    Will resume 14 day course of clobetasol for rash and obtain scans prior to next cycle.     #T3 pN2 pM1b small cell lung cancer of the RLL - extensive stage.  -S/p 4 cycles carboplatin/etoposide + atezolizumab added to cycle 2  -S/p 1 dose atezo c/b ICI dermatitis  -S/P TRT (45 Gy in 15 fx per Dr. Conde)   -Presenting today for c4d1. Scans prior to c5d1     #Grade I ICI dermatitis - resolved  -Was initially grade III now downgraded after PO steroid taper  -Repeating Clobetasol 0.5% ointment BID x 14d. Now using cocoa butter PRN      #CNS monitoring   -MRI on 03/21/25 shows no ICM   -Repeat every 3 months - next end of June     #Back pain  Likely MSK given paraspinal tenderness  -Lidocaine patch PRN    Nicolas Ospina MD  Thoracic Medical Oncology   [id number] [id number]  Phone: [phone]

## 2025-05-13 ENCOUNTER — INFUSION (OUTPATIENT)
Dept: HEMATOLOGY/ONCOLOGY | Facility: HOSPITAL | Age: 65
End: 2025-05-13
Payer: COMMERCIAL

## 2025-05-13 ENCOUNTER — NUTRITION (OUTPATIENT)
Dept: HEMATOLOGY/ONCOLOGY | Facility: HOSPITAL | Age: 65
End: 2025-05-13

## 2025-05-13 ENCOUNTER — OFFICE VISIT (OUTPATIENT)
Dept: HEMATOLOGY/ONCOLOGY | Facility: HOSPITAL | Age: 65
End: 2025-05-13
Payer: COMMERCIAL

## 2025-05-13 ENCOUNTER — LAB (OUTPATIENT)
Dept: LAB | Facility: HOSPITAL | Age: 65
End: 2025-05-13
Payer: COMMERCIAL

## 2025-05-13 VITALS — SYSTOLIC BLOOD PRESSURE: 108 MMHG | HEART RATE: 81 BPM | DIASTOLIC BLOOD PRESSURE: 65 MMHG | OXYGEN SATURATION: 100 %

## 2025-05-13 VITALS
DIASTOLIC BLOOD PRESSURE: 72 MMHG | BODY MASS INDEX: 33.33 KG/M2 | OXYGEN SATURATION: 96 % | WEIGHT: 232.81 LBS | SYSTOLIC BLOOD PRESSURE: 99 MMHG | RESPIRATION RATE: 20 BRPM | HEART RATE: 100 BPM | TEMPERATURE: 97.3 F

## 2025-05-13 VITALS — HEIGHT: 70 IN | BODY MASS INDEX: 33.33 KG/M2

## 2025-05-13 DIAGNOSIS — C34.00 SMALL CELL CARCINOMA OF HILUM OF LUNG, UNSPECIFIED LATERALITY: ICD-10-CM

## 2025-05-13 DIAGNOSIS — C34.00 SMALL CELL CARCINOMA OF HILUM OF LUNG, UNSPECIFIED LATERALITY: Primary | ICD-10-CM

## 2025-05-13 LAB
ALBUMIN SERPL BCP-MCNC: 4.1 G/DL (ref 3.4–5)
ALP SERPL-CCNC: 66 U/L (ref 33–136)
ALT SERPL W P-5'-P-CCNC: 12 U/L (ref 10–52)
ANION GAP SERPL CALC-SCNC: 14 MMOL/L (ref 10–20)
APTT PPP: 35 SECONDS (ref 26–36)
AST SERPL W P-5'-P-CCNC: 12 U/L (ref 9–39)
BASOPHILS # BLD AUTO: 0.02 X10*3/UL (ref 0–0.1)
BASOPHILS NFR BLD AUTO: 0.4 %
BILIRUB SERPL-MCNC: 0.6 MG/DL (ref 0–1.2)
BUN SERPL-MCNC: 23 MG/DL (ref 6–23)
CALCIUM SERPL-MCNC: 9.1 MG/DL (ref 8.6–10.3)
CHLORIDE SERPL-SCNC: 104 MMOL/L (ref 98–107)
CO2 SERPL-SCNC: 25 MMOL/L (ref 21–32)
CREAT SERPL-MCNC: 1.87 MG/DL (ref 0.5–1.3)
CRP SERPL-MCNC: 1.15 MG/DL
EGFRCR SERPLBLD CKD-EPI 2021: 40 ML/MIN/1.73M*2
EOSINOPHIL # BLD AUTO: 0.13 X10*3/UL (ref 0–0.7)
EOSINOPHIL NFR BLD AUTO: 2.5 %
ERYTHROCYTE [DISTWIDTH] IN BLOOD BY AUTOMATED COUNT: 15.4 % (ref 11.5–14.5)
FERRITIN SERPL-MCNC: 194 NG/ML (ref 20–300)
FIBRINOGEN PPP-MCNC: 464 MG/DL (ref 200–400)
GLUCOSE SERPL-MCNC: 120 MG/DL (ref 74–99)
HCT VFR BLD AUTO: 41.2 % (ref 41–52)
HGB BLD-MCNC: 13.6 G/DL (ref 13.5–17.5)
IMM GRANULOCYTES # BLD AUTO: 0.03 X10*3/UL (ref 0–0.7)
IMM GRANULOCYTES NFR BLD AUTO: 0.6 % (ref 0–0.9)
INR PPP: 1.2 (ref 0.9–1.1)
LDH SERPL L TO P-CCNC: 140 U/L (ref 84–246)
LYMPHOCYTES # BLD AUTO: 0.89 X10*3/UL (ref 1.2–4.8)
LYMPHOCYTES NFR BLD AUTO: 17 %
MCH RBC QN AUTO: 32.5 PG (ref 26–34)
MCHC RBC AUTO-ENTMCNC: 33 G/DL (ref 32–36)
MCV RBC AUTO: 99 FL (ref 80–100)
MONOCYTES # BLD AUTO: 0.91 X10*3/UL (ref 0.1–1)
MONOCYTES NFR BLD AUTO: 17.3 %
NEUTROPHILS # BLD AUTO: 3.27 X10*3/UL (ref 1.2–7.7)
NEUTROPHILS NFR BLD AUTO: 62.2 %
NRBC BLD-RTO: 0 /100 WBCS (ref 0–0)
PLATELET # BLD AUTO: 182 X10*3/UL (ref 150–450)
POTASSIUM SERPL-SCNC: 3.7 MMOL/L (ref 3.5–5.3)
PROT SERPL-MCNC: 7.1 G/DL (ref 6.4–8.2)
PROTHROMBIN TIME: 12.8 SECONDS (ref 9.8–12.4)
RBC # BLD AUTO: 4.18 X10*6/UL (ref 4.5–5.9)
SODIUM SERPL-SCNC: 139 MMOL/L (ref 136–145)
WBC # BLD AUTO: 5.3 X10*3/UL (ref 4.4–11.3)

## 2025-05-13 PROCEDURE — 96413 CHEMO IV INFUSION 1 HR: CPT

## 2025-05-13 PROCEDURE — 85025 COMPLETE CBC W/AUTO DIFF WBC: CPT

## 2025-05-13 PROCEDURE — 85384 FIBRINOGEN ACTIVITY: CPT

## 2025-05-13 PROCEDURE — 86140 C-REACTIVE PROTEIN: CPT

## 2025-05-13 PROCEDURE — 99215 OFFICE O/P EST HI 40 MIN: CPT | Mod: 25 | Performed by: STUDENT IN AN ORGANIZED HEALTH CARE EDUCATION/TRAINING PROGRAM

## 2025-05-13 PROCEDURE — 85730 THROMBOPLASTIN TIME PARTIAL: CPT

## 2025-05-13 PROCEDURE — 80053 COMPREHEN METABOLIC PANEL: CPT

## 2025-05-13 PROCEDURE — 2500000004 HC RX 250 GENERAL PHARMACY W/ HCPCS (ALT 636 FOR OP/ED): Mod: JZ | Performed by: STUDENT IN AN ORGANIZED HEALTH CARE EDUCATION/TRAINING PROGRAM

## 2025-05-13 PROCEDURE — 36415 COLL VENOUS BLD VENIPUNCTURE: CPT

## 2025-05-13 PROCEDURE — 99215 OFFICE O/P EST HI 40 MIN: CPT | Performed by: STUDENT IN AN ORGANIZED HEALTH CARE EDUCATION/TRAINING PROGRAM

## 2025-05-13 PROCEDURE — 3074F SYST BP LT 130 MM HG: CPT | Performed by: STUDENT IN AN ORGANIZED HEALTH CARE EDUCATION/TRAINING PROGRAM

## 2025-05-13 PROCEDURE — 83615 LACTATE (LD) (LDH) ENZYME: CPT

## 2025-05-13 PROCEDURE — 4004F PT TOBACCO SCREEN RCVD TLK: CPT | Performed by: STUDENT IN AN ORGANIZED HEALTH CARE EDUCATION/TRAINING PROGRAM

## 2025-05-13 PROCEDURE — 82728 ASSAY OF FERRITIN: CPT

## 2025-05-13 PROCEDURE — 3078F DIAST BP <80 MM HG: CPT | Performed by: STUDENT IN AN ORGANIZED HEALTH CARE EDUCATION/TRAINING PROGRAM

## 2025-05-13 RX ORDER — FAMOTIDINE 10 MG/ML
20 INJECTION, SOLUTION INTRAVENOUS ONCE AS NEEDED
Status: DISCONTINUED | OUTPATIENT
Start: 2025-05-13 | End: 2025-05-13 | Stop reason: HOSPADM

## 2025-05-13 RX ORDER — CLOBETASOL PROPIONATE 0.5 MG/G
CREAM TOPICAL 2 TIMES DAILY
Qty: 15 G | Refills: 0 | Status: SHIPPED | OUTPATIENT
Start: 2025-05-13 | End: 2025-05-27

## 2025-05-13 RX ORDER — PSYLLIUM HUSK 0.4 G
1 CAPSULE ORAL DAILY
Qty: 30 TABLET | Refills: 3 | Status: SHIPPED | OUTPATIENT
Start: 2025-05-13 | End: 2025-09-10

## 2025-05-13 RX ORDER — DIPHENHYDRAMINE HYDROCHLORIDE 50 MG/ML
50 INJECTION, SOLUTION INTRAMUSCULAR; INTRAVENOUS AS NEEDED
Status: DISCONTINUED | OUTPATIENT
Start: 2025-05-13 | End: 2025-05-13 | Stop reason: HOSPADM

## 2025-05-13 RX ORDER — EPINEPHRINE 0.3 MG/.3ML
0.3 INJECTION SUBCUTANEOUS EVERY 5 MIN PRN
Status: DISCONTINUED | OUTPATIENT
Start: 2025-05-13 | End: 2025-05-13 | Stop reason: HOSPADM

## 2025-05-13 RX ORDER — PROCHLORPERAZINE MALEATE 10 MG
10 TABLET ORAL EVERY 6 HOURS PRN
Status: DISCONTINUED | OUTPATIENT
Start: 2025-05-13 | End: 2025-05-13 | Stop reason: HOSPADM

## 2025-05-13 RX ORDER — ALBUTEROL SULFATE 0.83 MG/ML
3 SOLUTION RESPIRATORY (INHALATION) AS NEEDED
Status: DISCONTINUED | OUTPATIENT
Start: 2025-05-13 | End: 2025-05-13 | Stop reason: HOSPADM

## 2025-05-13 RX ORDER — PROCHLORPERAZINE EDISYLATE 5 MG/ML
10 INJECTION INTRAMUSCULAR; INTRAVENOUS EVERY 6 HOURS PRN
Status: DISCONTINUED | OUTPATIENT
Start: 2025-05-13 | End: 2025-05-13 | Stop reason: HOSPADM

## 2025-05-13 RX ADMIN — Medication 10 MG: at 09:44

## 2025-05-13 ASSESSMENT — PAIN SCALES - GENERAL: PAINLEVEL_OUTOF10: 0-NO PAIN

## 2025-05-13 NOTE — PROGRESS NOTES
"NUTRITION Assessment NOTE    Nutrition Assessment     Reason for Visit:  Dane Molina is a 65 y.o. male who presents for SSCL, Stage FREDRICK  ACTIVE TREATMENT Tarlatamab 28 day cycles    Met with patient during infusion Day 1, Cycle 4    Nutrition consult for weight loss, taste changes.   Patient requesting Ensure Plus BID to meet needs.    Problem List[1]    Nutrition Significant labs:  Lab Results   Component Value Date/Time    GLUCOSE 120 (H) 05/13/2025 0746     05/13/2025 0746    K 3.7 05/13/2025 0746     05/13/2025 0746    CO2 25 05/13/2025 0746    ANIONGAP 14 05/13/2025 0746    BUN 23 05/13/2025 0746    CREATININE 1.87 (H) 05/13/2025 0746    EGFR 40 (L) 05/13/2025 0746    CALCIUM 9.1 05/13/2025 0746    ALBUMIN 4.1 05/13/2025 0746    ALKPHOS 66 05/13/2025 0746    PROT 7.1 05/13/2025 0746    AST 12 05/13/2025 0746    BILITOT 0.6 05/13/2025 0746    ALT 12 05/13/2025 0746    MG 1.83 02/18/2025 0715    PHOS 3.3 08/09/2024 1540     No results found for: \"VITD25\"      Anthropometrics:  Height: 178 cm (5' 10.08\")            IBW/kg (Dietitian Calculated): 83 kg Percent of IBW: 127 %          Weight History:   Daily Weight  05/13/25 : 106 kg (232 lb 12.9 oz)  04/29/25 : 106 kg (232 lb 14.4 oz)  04/14/25 : 106 kg (233 lb 12.8 oz)  03/25/25 : 107 kg (235 lb)  03/21/25 : 107 kg (235 lb)  03/11/25 : 107 kg (235 lb 9.6 oz)  02/25/25 : 105 kg (231 lb 12.8 oz)  02/17/25 : 107 kg (236 lb 15.9 oz)  02/17/25 : 107 kg (235 lb 0.2 oz)  02/11/25 : 108 kg (237 lb 3.4 oz)  02/10/25 : 108 kg (237 lb 14 oz)  01/22/25 : 108 kg (239 lb)  01/02/25 : 110 kg (242 lb 8.1 oz)  12/12/24 : 107 kg (235 lb 7.2 oz)  11/21/24 : 106 kg (234 lb 5.6 oz)  10/30/24 : 108 kg (238 lb 8.6 oz)  10/29/24 : 107 kg (235 lb 10.8 oz)  11/15/24 : 108 kg (237 lb)  09/19/24 : 109 kg (240 lb 11.9 oz)  09/18/24 : 107 kg (236 lb 15.9 oz)    Weight Change %: stable       Nutrition History:  Food and Nutrient History  Food and Nutrient History: Appetite fair. " Cut out sugar in diet ie. candy bars, switched to diet soda, etc. because he heard it make cancer spread faster. Everything has a salty taste.    Food Intake  Meal 1: fruit cup and sardines and Ritz crackes, tea hot sour sop  Meal 2: chicken thigh 2, water  Meal 3: 3 turkey slices  Snacks: corn chips  Fluid Intake: 64 oz water    Total energy: 914 kcal  Protein: 56 g protein  Fiber: 1 gm          Current Medications[2]     Nutrition Focused Physical Exam Findings:    Subcutaneous Fat Loss  Orbital Fat Pads: Well nourished (slightly bulging fat pads)  Buccal Fat Pads: Well nourished (full, rounded cheeks)  Triceps: Well nourished (ample fat tissue)  Ribs: Well nourished (chest is full, ribs do not show, slight to no protrusion of the iliac crest)    Muscle Wasting  Temporalis: Mild-Moderate (slight depression)  Pectoralis (Clavicular Region): Well nourished (clavicle not visible)  Deltoid/Trapezius: Well nourished (rounded appearance at arm, shoulder, neck)  Interosseous: Well nourished (muscle bulges)  Trapezius/Infraspinatus/Supraspinatus (Scapular Region): Well nourished (bones not prominent, muscle taut)  Quadriceps: Well nourished (well developed, well rounded)  Gastrocnemius: Well nourished (well developed bulbous muscle)    Physical Findings  Hair: Negative  Eyes: Negative  Nails: Negative  Skin: Negative  Digestive System Findings: Constipation  Mouth Findings: Dysgeusia    Edema  Edema: none    Estimated Needs:       Estimated Energy Needs  Total Energy Estimated Needs in 24 hours (kCal): 2330 kCal  Energy Estimated Needs per kg Body Weight in 24 hours (kCal/kg): 22 kCal/kg     Estimated Fluid Needs  Total Fluid Estimated Needs in 24 Hours (mL):  ()  Total Fluid Estimated Needs in 24 hours (mL/kg):  (1.0-1.2 g/kg/IBW)             Nutrition Diagnosis   Malnutrition Diagnosis  Patient has Malnutrition Diagnosis: No    Nutrition Diagnosis  Patient has Nutrition Diagnosis: Yes  Diagnosis Status (1):  New  Nutrition Diagnosis 1: Inadequate protein energy intake  Related to (1): SE of cancer/cancer treatment (dysguesia/ decreased appetite)  As Evidenced by (1): diet recall intake <50% of energy needs.       Nutrition Interventions/Recommendations   Nutrition Prescription: Individualized Nutrition Prescription Provided for : Oral nutrition     Recommendations: Individualized Nutrition Prescription Provided for : High protein & High fiber  Recommend Boost Plus 1 carton po BID    Nutrition Interventions:   Food and Nutrient Delivery: Food and Nutrition Delivery  Meals & Snacks: General Healthful Diet, Other, specify:, Fiber-modified diet, Protein-modified diet  Goals: Patient will increase intake at meals to meet >75% of energy, protein and fiber needs.  Other:: Fluids  Goals: Patient will consume onfly sugar free bevereages: water, tea, coffee     Coordination of Care: Coordination of Nutrition Care by a Nutrition Professional  Collaboration and referral of nutrition care: Collaboration and Referral of Nutrition Care  Goals: RD will work with oncology team to ensure the best outcomes possible.     Nutrition Education:   Nutrition Education Content: Nutrition Education Content: Education on nutrition's influence on health  Goals: Pt will understand the role diet plays in health and well being.   Educated patient on nutrition metabolism on cancer  Handouts provided/ reviewed: Fiber for Bowel Management, and Protein content of foods, and Taste and Smell Changes  Provided meal planning and food preparation strategies/suggestions that may enhance intake.  Provided dietitian contact information                 Nutrition Monitoring and Evaluation   Food and Nutrient Intake  Monitoring and Evaluation Plan: Fiber intake, Meal/snack pattern, Energy intake, Protein intake  Energy Intake: Estimated energy intake  Criteria: Diet Recall  Fluid Intake: Estimated fluid intake  Meal/Snack Pattern: Estimated meal and snack  pattern  Criteria: Diet Recall  Estimated protein intake: Estimated protein intake  Criteria: Diet Recall  Estimated fiber intake: Estimated fiber intake  Criteria: Diet recall and Bowel elimination    Anthropometric measurements  Monitoring and Evaluation Plan: Weight, Weight change  Body Weight: Measured body weight  Body Weight Change: Body weight change percentage               Follow Up:     Time Spent  Prep time on day of patient encounter: 5 minutes  Time spent directly with patient, family or caregiver: 45 minutes  Additional Time Spent on Patient Care Activities: 0 minutes  Documentation Time: 15 minutes  Other Time Spent: 0 minutes  Total: 65 minutes             [1]   Patient Active Problem List  Diagnosis    Lung nodule    Mediastinal lymphadenopathy    Small cell carcinoma of lung    Nicotine dependence    Iron deficiency anemia, unspecified    Essential (primary) hypertension    Dyspnea on exertion    Chronic obstructive pulmonary disease (Multi)    Cervicalgia    Arteriosclerosis of coronary artery    Acute on chronic systolic heart failure    Tobacco use    Chemotherapy management, encounter for    Encounter for antineoplastic chemotherapy   [2]   Current Outpatient Medications:     atorvastatin (Lipitor) 40 mg tablet, Take 1 tablet (40 mg) by mouth once daily., Disp: 30 tablet, Rfl: 11    calcium carbonate-vitamin D3 500 mg-5 mcg (200 unit) tablet, Take 1 tablet by mouth once daily., Disp: 30 tablet, Rfl: 3    cholecalciferol, vitamin D3, (VITAMIN D3 ORAL), Take 1 capsule by mouth once daily., Disp: , Rfl:     clobetasol (Temovate) 0.05 % cream, Apply topically 2 times a day for 14 days., Disp: 15 g, Rfl: 0    dexAMETHasone (Decadron) 4 mg tablet, Take 2 tablets (8 mg) by mouth once daily as needed (for fever or shortness of breath as instructed by care team.)., Disp: 10 tablet, Rfl: 0    diphenhydrAMINE (BenadryL) 25 mg capsule, Take 1 capsule (25 mg) by mouth once daily as needed for itching.,  Disp: 30 capsule, Rfl: 0    nicotine (Nicoderm CQ) 14 mg/24 hr patch, Place 1 patch over 24 hours on the skin once every 24 hours., Disp: 30 patch, Rfl: 0    nicotine polacrilex (Nicorette) 4 mg gum, Chew 1 each (4 mg) if needed for smoking cessation., Disp: 100 each, Rfl: 0    prochlorperazine (Compazine) 10 mg tablet, Take 1 tablet (10 mg) by mouth every 6 hours if needed for nausea or vomiting., Disp: 30 tablet, Rfl: 5    sacubitriL-valsartan (Entresto)  mg tablet, Take 1 tablet by mouth 2 times a day., Disp: 180 tablet, Rfl: 3    sennosides-docusate sodium (Michelle-Colace) 8.6-50 mg tablet, Take 2 tablets by mouth once daily. As needed for constipation, Disp: 60 tablet, Rfl: 2    sildenafil (Viagra) 50 mg tablet, Take 1 tablet (50 mg) by mouth once daily as needed for erectile dysfunction., Disp: 10 tablet, Rfl: 0    triamterene-hydrochlorothiazid (Maxzide-25) 37.5-25 mg tablet, Take 1 tablet by mouth once daily in the morning., Disp: 30 tablet, Rfl: 11  No current facility-administered medications for this visit.    Facility-Administered Medications Ordered in Other Visits:     albuterol 2.5 mg /3 mL (0.083 %) nebulizer solution 3 mL, 3 mL, nebulization, PRN, Nicolas Ospina MD    dextrose 5 % in water (D5W) bolus 500 mL, 500 mL, intravenous, PRN, Nicolas Ospina MD    diphenhydrAMINE (BENADryl) injection 50 mg, 50 mg, intravenous, PRN, Nicolas Ospina MD    EPINEPHrine (Epipen) injection syringe 0.3 mg, 0.3 mg, intramuscular, q5 min PRN, Nicolas Ospina MD    famotidine PF (Pepcid) injection 20 mg, 20 mg, intravenous, Once PRN, Nicolas Ospina MD    methylPREDNISolone sod succinate (SOLU-Medrol) 40 mg/mL injection 40 mg, 40 mg, intravenous, PRN, Nicolas Ospina MD    prochlorperazine (Compazine) injection 10 mg, 10 mg, intravenous, q6h PRN, Nicolas Ospina MD    prochlorperazine (Compazine) tablet 10 mg, 10 mg, oral, q6h PRN, Nicolas Ospina MD    sodium chloride 0.9 % bolus 500 mL, 500 mL,  intravenous, PRN, Nicolas Ospina MD

## 2025-05-13 NOTE — PROGRESS NOTES
Patient arrived from doctor's visit for Tarlatamab treatment . Patient denies new or worsening symptoms . Patient received the treatment as ordered and tolerated well , patient stayed for 3 hour observation per protocol . Discharge patient ambulatory in a stable condition and aware of future scheduled appointments.

## 2025-05-13 NOTE — PROGRESS NOTES
Pt is doing really well, he has no complaints. He is requesting that Dr. Ospina put in a script in his pharmacy for vitamin D.

## 2025-05-27 ENCOUNTER — INFUSION (OUTPATIENT)
Dept: HEMATOLOGY/ONCOLOGY | Facility: HOSPITAL | Age: 65
End: 2025-05-27
Payer: COMMERCIAL

## 2025-05-27 ENCOUNTER — OFFICE VISIT (OUTPATIENT)
Dept: HEMATOLOGY/ONCOLOGY | Facility: HOSPITAL | Age: 65
End: 2025-05-27
Payer: COMMERCIAL

## 2025-05-27 ENCOUNTER — LAB (OUTPATIENT)
Dept: LAB | Facility: HOSPITAL | Age: 65
End: 2025-05-27
Payer: COMMERCIAL

## 2025-05-27 VITALS
WEIGHT: 232.81 LBS | TEMPERATURE: 98.4 F | HEART RATE: 90 BPM | BODY MASS INDEX: 33.33 KG/M2 | OXYGEN SATURATION: 95 % | SYSTOLIC BLOOD PRESSURE: 141 MMHG | RESPIRATION RATE: 20 BRPM | DIASTOLIC BLOOD PRESSURE: 89 MMHG

## 2025-05-27 DIAGNOSIS — C34.00 SMALL CELL CARCINOMA OF HILUM OF LUNG, UNSPECIFIED LATERALITY: ICD-10-CM

## 2025-05-27 LAB
ALBUMIN SERPL BCP-MCNC: 4 G/DL (ref 3.4–5)
ALP SERPL-CCNC: 59 U/L (ref 33–136)
ALT SERPL W P-5'-P-CCNC: 12 U/L (ref 10–52)
ANION GAP SERPL CALC-SCNC: 14 MMOL/L (ref 10–20)
APTT PPP: 36 SECONDS (ref 26–36)
AST SERPL W P-5'-P-CCNC: 13 U/L (ref 9–39)
BASOPHILS # BLD AUTO: 0.02 X10*3/UL (ref 0–0.1)
BASOPHILS NFR BLD AUTO: 0.4 %
BILIRUB SERPL-MCNC: 0.6 MG/DL (ref 0–1.2)
BUN SERPL-MCNC: 29 MG/DL (ref 6–23)
CALCIUM SERPL-MCNC: 8.8 MG/DL (ref 8.6–10.3)
CHLORIDE SERPL-SCNC: 108 MMOL/L (ref 98–107)
CO2 SERPL-SCNC: 24 MMOL/L (ref 21–32)
CREAT SERPL-MCNC: 1.62 MG/DL (ref 0.5–1.3)
CRP SERPL-MCNC: 0.81 MG/DL
EGFRCR SERPLBLD CKD-EPI 2021: 47 ML/MIN/1.73M*2
EOSINOPHIL # BLD AUTO: 0.12 X10*3/UL (ref 0–0.7)
EOSINOPHIL NFR BLD AUTO: 2.5 %
ERYTHROCYTE [DISTWIDTH] IN BLOOD BY AUTOMATED COUNT: 15 % (ref 11.5–14.5)
FERRITIN SERPL-MCNC: 169 NG/ML (ref 20–300)
FIBRINOGEN PPP-MCNC: 413 MG/DL (ref 200–400)
GLUCOSE SERPL-MCNC: 100 MG/DL (ref 74–99)
HCT VFR BLD AUTO: 40 % (ref 41–52)
HGB BLD-MCNC: 13 G/DL (ref 13.5–17.5)
IMM GRANULOCYTES # BLD AUTO: 0.02 X10*3/UL (ref 0–0.7)
IMM GRANULOCYTES NFR BLD AUTO: 0.4 % (ref 0–0.9)
INR PPP: 1.2 (ref 0.9–1.1)
LDH SERPL L TO P-CCNC: 162 U/L (ref 84–246)
LYMPHOCYTES # BLD AUTO: 0.7 X10*3/UL (ref 1.2–4.8)
LYMPHOCYTES NFR BLD AUTO: 14.7 %
MCH RBC QN AUTO: 32.3 PG (ref 26–34)
MCHC RBC AUTO-ENTMCNC: 32.5 G/DL (ref 32–36)
MCV RBC AUTO: 100 FL (ref 80–100)
MONOCYTES # BLD AUTO: 0.88 X10*3/UL (ref 0.1–1)
MONOCYTES NFR BLD AUTO: 18.4 %
NEUTROPHILS # BLD AUTO: 3.03 X10*3/UL (ref 1.2–7.7)
NEUTROPHILS NFR BLD AUTO: 63.6 %
NRBC BLD-RTO: 0 /100 WBCS (ref 0–0)
PLATELET # BLD AUTO: 198 X10*3/UL (ref 150–450)
POTASSIUM SERPL-SCNC: 4.1 MMOL/L (ref 3.5–5.3)
PROT SERPL-MCNC: 6.9 G/DL (ref 6.4–8.2)
PROTHROMBIN TIME: 12.9 SECONDS (ref 9.8–12.4)
RBC # BLD AUTO: 4.02 X10*6/UL (ref 4.5–5.9)
SODIUM SERPL-SCNC: 142 MMOL/L (ref 136–145)
WBC # BLD AUTO: 4.8 X10*3/UL (ref 4.4–11.3)

## 2025-05-27 PROCEDURE — 36415 COLL VENOUS BLD VENIPUNCTURE: CPT

## 2025-05-27 PROCEDURE — 82728 ASSAY OF FERRITIN: CPT

## 2025-05-27 PROCEDURE — 83615 LACTATE (LD) (LDH) ENZYME: CPT

## 2025-05-27 PROCEDURE — 2500000004 HC RX 250 GENERAL PHARMACY W/ HCPCS (ALT 636 FOR OP/ED): Mod: JZ | Performed by: STUDENT IN AN ORGANIZED HEALTH CARE EDUCATION/TRAINING PROGRAM

## 2025-05-27 PROCEDURE — 85384 FIBRINOGEN ACTIVITY: CPT

## 2025-05-27 PROCEDURE — 99215 OFFICE O/P EST HI 40 MIN: CPT | Performed by: STUDENT IN AN ORGANIZED HEALTH CARE EDUCATION/TRAINING PROGRAM

## 2025-05-27 PROCEDURE — 80053 COMPREHEN METABOLIC PANEL: CPT

## 2025-05-27 PROCEDURE — 86140 C-REACTIVE PROTEIN: CPT

## 2025-05-27 PROCEDURE — 85610 PROTHROMBIN TIME: CPT

## 2025-05-27 PROCEDURE — 85025 COMPLETE CBC W/AUTO DIFF WBC: CPT

## 2025-05-27 PROCEDURE — 4004F PT TOBACCO SCREEN RCVD TLK: CPT | Performed by: STUDENT IN AN ORGANIZED HEALTH CARE EDUCATION/TRAINING PROGRAM

## 2025-05-27 PROCEDURE — 3077F SYST BP >= 140 MM HG: CPT | Performed by: STUDENT IN AN ORGANIZED HEALTH CARE EDUCATION/TRAINING PROGRAM

## 2025-05-27 PROCEDURE — 96413 CHEMO IV INFUSION 1 HR: CPT

## 2025-05-27 PROCEDURE — 3079F DIAST BP 80-89 MM HG: CPT | Performed by: STUDENT IN AN ORGANIZED HEALTH CARE EDUCATION/TRAINING PROGRAM

## 2025-05-27 RX ORDER — DIPHENHYDRAMINE HYDROCHLORIDE 50 MG/ML
50 INJECTION, SOLUTION INTRAMUSCULAR; INTRAVENOUS AS NEEDED
Status: DISCONTINUED | OUTPATIENT
Start: 2025-05-27 | End: 2025-05-27 | Stop reason: HOSPADM

## 2025-05-27 RX ORDER — PROCHLORPERAZINE MALEATE 10 MG
10 TABLET ORAL EVERY 6 HOURS PRN
Status: DISCONTINUED | OUTPATIENT
Start: 2025-05-27 | End: 2025-05-27 | Stop reason: HOSPADM

## 2025-05-27 RX ORDER — FAMOTIDINE 10 MG/ML
20 INJECTION, SOLUTION INTRAVENOUS ONCE AS NEEDED
Status: DISCONTINUED | OUTPATIENT
Start: 2025-05-27 | End: 2025-05-27 | Stop reason: HOSPADM

## 2025-05-27 RX ORDER — EPINEPHRINE 0.3 MG/.3ML
0.3 INJECTION SUBCUTANEOUS EVERY 5 MIN PRN
Status: DISCONTINUED | OUTPATIENT
Start: 2025-05-27 | End: 2025-05-27 | Stop reason: HOSPADM

## 2025-05-27 RX ORDER — ALBUTEROL SULFATE 0.83 MG/ML
3 SOLUTION RESPIRATORY (INHALATION) AS NEEDED
Status: DISCONTINUED | OUTPATIENT
Start: 2025-05-27 | End: 2025-05-27 | Stop reason: HOSPADM

## 2025-05-27 RX ORDER — PROCHLORPERAZINE EDISYLATE 5 MG/ML
10 INJECTION INTRAMUSCULAR; INTRAVENOUS EVERY 6 HOURS PRN
Status: DISCONTINUED | OUTPATIENT
Start: 2025-05-27 | End: 2025-05-27 | Stop reason: HOSPADM

## 2025-05-27 RX ADMIN — Medication 10 MG: at 13:11

## 2025-05-27 ASSESSMENT — PAIN SCALES - GENERAL: PAINLEVEL_OUTOF10: 0-NO PAIN

## 2025-05-27 NOTE — PROGRESS NOTES
Pt arrived ambulatory to infusion for treatment of tarlatamab.  Denies any new or worsening symptoms. Tolerated infusion without issue. Discharged in stable condition after 3 hour observation.

## 2025-05-27 NOTE — PROGRESS NOTES
Patient ID: Dane Molina is a 65 y.o. male    Primary Care Provider: Darwin Early MD    DIAGNOSIS AND STAGING  cT3 pN2 pM1b small cell lung cancer (INSM1 and TTF1+) of the right lower lobe, diagnosed on 02/05/2024 through bronchoscopy      SITES OF DISEASE  Right lower lobe   Levels 4R and 7 nodes   Liver, confirmed by biopsy      MOLECULAR GENOMICS  Not performed   RB loss by IHC      PRIOR THERAPIES  03/19/2024-05/20/2024: 4 cycles carboplatin/etoposide with addition of atezolizumab to C2 on 04/08/2024 08/09/2024: Completion 45 cGy/15f to chest by Dr. Conde  06/21/2024: maintenance atezolizumab     CURRENT THERAPY  Tarlatamab since 2/10/2025    CURRENT ONCOLOGICAL PROBLEMS  Grade 3 immune related rash now grade II     HISTORY OF PRESENT ILLNESS  Patient presented to the emergency room on 01/04/2023 with a hypertensive urgency and cough.  He has a history of noncompliance with antihypertensive medications.  He also complained of dyspnea and underwent a CT chest without IV contrast that demonstrated a right lower lobe paramediastinal mass measuring 3.1 cm with adjacent pulmonary nodule measuring 1.8 cm.  Mediastinal lymphadenopathy was demonstrated, including a subcarinal node measuring 1.7 cm.  There was right hilar lymphadenopathy, 2 cm in short axis.  On 01/15/2024 the patient underwent a PET scan that demonstrated avidity of the aforementioned masses/lymph nodes, as well as a lesion in the liver, SUV max 3.3, right hepatic lobe.  On 02/05/2024, patient underwent a bronchoscopy:  A. LYMPH NODE 4 R PULMONARY FINE NEEDLE ASPIRATION , CYTOLOGY AND CELL BLOCK:    Positive for malignant cells   Metastatic small cell carcinoma, see note  B. LYMPH NODE 7 PULMONARY FINE NEEDLE ASPIRATION , CYTOLOGY AND CELL BLOCK:    Positive for malignant cells  Metastatic small cell carcinoma, see note          Note: Immunostains demonstrate the lesional cells to be diffusely positive for CAM 5.2, INSM1, TTF-1.  The  proliferative marker Ki-67 is greater than 90%.  Immunostain for retinoblastoma shows loss of staining.  Immunostain for p40 is negative.  The morphology and immunohistochemical profile are consistent with the above diagnosis.  On 02/29/24 the pt is seen by med onc for the first time. Denies any systemic sx - denies lack of appetite, fatigue or unintentional weight loss. Denies new localized pain, headaches or neuro sx.  Denies new respiratory sx.  He is claustrophobic and MRI brain was scheduled for today but pt could not go through with the test. He also has mild CKD, which limits his ability to receive intravenous contrast for CT.   03/04/24: MRI brain shows no ICMA, but MRI liver shows a 1.9 cm hepatic lesion with T2 hyperintensity and peripheral enhancement   03/19/2024: C1 D1 carboplatin/etoposide  03/22/24: Liver biopsy:  FINAL DIAGNOSIS   A. RIGHT LIVER MASS BIOPSY:      -- LIVER TISSUE INVOLVED BY SMALL CELL CARCINOMA, SEE NOTE     Note: Clinical history of lung small cell carcinoma noted. The findings most likely represent metastasis from the known primary lung tumor.      04/04/24: discussed with patient liver biopsy results and recommendations to add atezolizumab to his regimen  He has met with radiation oncology, and we will consider consolidative TRT pending response to cytotoxic chemotherapy     04/08/2024: C2 D1 carboplatin/etoposide/atezolizumab     05/20/24: C4 D1 carboplatin/etoposide/atezolizumab     06/10/2024: CT chest/abdomen/pelvis demonstrating continue his improvement of metastatic lymphadenopathy within the mediastinum/right hilum, with no progression within the liver     06/13/2024: Referral for radiation oncology/TRT.  Patient to proceed with maintenance atezolizumab     06/21/24: begins maintenance atezolizumab      08/09/24: completes 45 Gy in 15 fractions to chest (To Conde)      08/08/24: prescribed prednisone 1 mg/Kg for grade 3 IO-induced rash with peeling of hands     "  08/29/24: last steroid taken last week but did not complete full prescription. Rash on chest/abdomen improved but has some peeling of the hands/arms. Will have patient take remaining prednisone and follow up in 3 weeks     02/07/25: PET/CT with increasing hypermetabolic liver met, new liver met concerning for progression     02/10/25: Started C1 tarlatamab       PAST MEDICAL HISTORY  CKD - creatinine 1.68  Neck pain (injury at work)   Hypertension  Iron deficiency anemia (hx of blood transfusion in the 90's)    SURGICAL HISTORY  Ankle fracture and has a latoya in place      SOCIAL HISTORY  Former 35-40 pack-year smoker, quit at age 57  Smokes cigars now   History of alcohol addiction, sober for the past 15 years   Has one chlid (Roula)  Single      FAMILY HISTORY  Lung CA (mother)  Mother had CKD and required HD    CURRENT MEDS REVIEWED       ALLERGIES REVIEWED       SUBJECTIVE: Patient doing well today. He is feeling great, denies acute complaints. His hand rash has improved with clobetasol. He was BBQing with family for the holiday, stable weight.    A 13 point review of systems was performed, with significant findings documented above in subjective history.    OBJECTIVE:      4/29/2025    12:03 PM 4/29/2025     1:26 PM 5/13/2025     8:02 AM 5/13/2025     9:40 AM 5/13/2025    11:10 AM 5/13/2025     3:00 PM 5/27/2025    10:29 AM   Vitals   Systolic 104 115 99 107 108  141   Diastolic 66 66 72 69 65  89   BP Location Right arm Right arm   Left arm     Heart Rate 80 78 100  81  90   Temp 36.5 °C (97.7 °F) 36.6 °C (97.9 °F) 36.3 °C (97.3 °F)    36.9 °C (98.4 °F)   Resp 18 18 20    20   Height      1.78 m (5' 10.08\")    Weight (lb)   232.81    232.81   BMI   33.33 kg/m2   33.33 kg/m2 33.33 kg/m2   BSA (m2)   2.29 m2   2.29 m2 2.29 m2   Visit Report Report Report Report Report Report Report Report       Wt Readings from Last 5 Encounters:   05/27/25 106 kg (232 lb 12.9 oz)   05/13/25 106 kg (232 lb 12.9 oz)   04/29/25 106 " kg (232 lb 14.4 oz)   04/14/25 106 kg (233 lb 12.8 oz)   03/25/25 107 kg (235 lb)     Gen: A&O, NAD. ECOG 0-1  Head: Normocephalic, atraumatic  Eyes: no scleral icterus  ENT: mucous membranes moist, no oropharyngeal lesions  Resp: Lungs CTAB  Cardiac: Normal rate, regular rhythm, no murmurs appreciated  Abdomen: Soft, nondistended, nontender, +BS  Neuro: CNII-XII grossly intact  Psych: appropriate mood & affect  Skin: warm, dry, no apparent rashes    Diagnostic Results   Results:  Labs:  Lab Results   Component Value Date    WBC 4.8 05/27/2025    HGB 13.0 (L) 05/27/2025    HCT 40.0 (L) 05/27/2025     05/27/2025     05/27/2025      Lab Results   Component Value Date    NEUTROABS 3.03 05/27/2025        Lab Results   Component Value Date    GLUCOSE 120 (H) 05/13/2025    CALCIUM 9.1 05/13/2025     05/13/2025    K 3.7 05/13/2025    CO2 25 05/13/2025     05/13/2025    BUN 23 05/13/2025    CREATININE 1.87 (H) 05/13/2025    MG 1.83 02/18/2025     Lab Results   Component Value Date    ALT 12 05/13/2025    AST 12 05/13/2025    ALKPHOS 66 05/13/2025    BILITOT 0.6 05/13/2025    BILIDIR 0.1 12/21/2023      Lab Results   Component Value Date    ACTH 14.7 10/28/2024    CORTISOL 6.4 01/02/2025    TSH 1.16 01/02/2025    FREET4 1.07 08/09/2024       CT CAP 3/21/25  ABDOMEN-PELVIS:  1. Decrease in the size of the dominant hepatic dome metastatic  implants. The previously described FDG avid adjacent smaller nodules  in PET scan dated 02/07/2025 is less conspicuous.  2. Colonic diverticulosis with segmental wall thickening of the  sigmoid colon could be related to chronic diverticulitis  3. No new concerning findings in the abdomen or pelvis.      MRI Brain 3/21/25  IMPRESSION  * There is no evidence of mass, cerebral infarction or hemorrhage.    Assessment/Plan     Small cell carcinoma of lung, Clinical: Stage FREDRICK (cT3, cN2, pM1b)  Mr. Dane Molina is a 65 YO with ES-SCLC seen for follow up. He is s/p 4  cycles platinum doublet with addition of atezo after c1 as well as consolidative RT. He has had 1 dose atezo which was held for grade III ICI dermatitis which is now grade I only on palms/hands. We resumed atezo and he tolerated well with topical clobetasol.      Unfortunately with progression in liver. We began c1d1 tarlatamab on 2/10 and his most recent scans on 3/21 were personally reviewed with improvement in disease. Specifically his dominant liver lesion has decreased from 5.7 cm to 2.2 cm since beginning treatment (based off PET LDCT).    Rash improved 14 day course of clobetasol for rash and obtain scans prior to next cycle.     #T3 pN2 pM1b small cell lung cancer of the RLL - extensive stage.  -S/p 4 cycles carboplatin/etoposide + atezolizumab added to cycle 2  -S/p 1 dose atezo c/b ICI dermatitis  -S/P TRT (45 Gy in 15 fx per Dr. Conde)   -Presenting today for c4d15. Scans prior to c5d1     #Grade I ICI dermatitis - improving  -Was initially grade III now downgraded after PO steroid taper  -Repeating Clobetasol 0.5% ointment BID x 14d with improvement. Advised using cocoa butter PRN      #CNS monitoring   -MRI on 03/21/25 shows no ICM   -Repeat every 3 months - next end of June     #Back pain  Likely MSK given paraspinal tenderness  -Lidocaine patch PRN    Nicolas Ospina MD  Thoracic Medical Oncology   [address] [facility] [address]  Phone: [phone]

## 2025-06-12 ENCOUNTER — HOSPITAL ENCOUNTER (OUTPATIENT)
Dept: RADIOLOGY | Facility: CLINIC | Age: 65
Discharge: HOME | End: 2025-06-12
Payer: COMMERCIAL

## 2025-06-12 DIAGNOSIS — B05.9 MEASLES: ICD-10-CM

## 2025-06-12 DIAGNOSIS — Z11.59 MEASLES SCREENING: ICD-10-CM

## 2025-06-12 DIAGNOSIS — C34.00 SMALL CELL CARCINOMA OF HILUM OF LUNG, UNSPECIFIED LATERALITY: ICD-10-CM

## 2025-06-12 PROCEDURE — 2550000001 HC RX 255 CONTRASTS: Performed by: STUDENT IN AN ORGANIZED HEALTH CARE EDUCATION/TRAINING PROGRAM

## 2025-06-12 PROCEDURE — 74177 CT ABD & PELVIS W/CONTRAST: CPT

## 2025-06-12 PROCEDURE — 70553 MRI BRAIN STEM W/O & W/DYE: CPT

## 2025-06-12 PROCEDURE — A9575 INJ GADOTERATE MEGLUMI 0.1ML: HCPCS | Performed by: STUDENT IN AN ORGANIZED HEALTH CARE EDUCATION/TRAINING PROGRAM

## 2025-06-12 RX ORDER — GADOTERATE MEGLUMINE 376.9 MG/ML
20 INJECTION INTRAVENOUS
Status: COMPLETED | OUTPATIENT
Start: 2025-06-12 | End: 2025-06-12

## 2025-06-12 RX ADMIN — IOHEXOL 75 ML: 350 INJECTION, SOLUTION INTRAVENOUS at 14:30

## 2025-06-12 RX ADMIN — GADOTERATE MEGLUMINE 20 ML: 376.9 INJECTION INTRAVENOUS at 15:23

## 2025-06-30 ENCOUNTER — TELEPHONE (OUTPATIENT)
Dept: HEMATOLOGY/ONCOLOGY | Facility: HOSPITAL | Age: 65
End: 2025-06-30
Payer: COMMERCIAL

## 2025-06-30 RX ORDER — ALBUTEROL SULFATE 0.83 MG/ML
3 SOLUTION RESPIRATORY (INHALATION) AS NEEDED
OUTPATIENT
Start: 2025-08-12

## 2025-06-30 RX ORDER — PROCHLORPERAZINE MALEATE 10 MG
10 TABLET ORAL EVERY 6 HOURS PRN
OUTPATIENT
Start: 2025-08-12

## 2025-06-30 RX ORDER — ALBUTEROL SULFATE 0.83 MG/ML
3 SOLUTION RESPIRATORY (INHALATION) AS NEEDED
OUTPATIENT
Start: 2025-07-29

## 2025-06-30 RX ORDER — PROCHLORPERAZINE MALEATE 10 MG
10 TABLET ORAL EVERY 6 HOURS PRN
OUTPATIENT
Start: 2025-07-15

## 2025-06-30 RX ORDER — FAMOTIDINE 10 MG/ML
20 INJECTION, SOLUTION INTRAVENOUS ONCE AS NEEDED
Status: CANCELLED | OUTPATIENT
Start: 2025-07-01

## 2025-06-30 RX ORDER — EPINEPHRINE 0.3 MG/.3ML
0.3 INJECTION SUBCUTANEOUS EVERY 5 MIN PRN
Status: CANCELLED | OUTPATIENT
Start: 2025-07-01

## 2025-06-30 RX ORDER — PROCHLORPERAZINE EDISYLATE 5 MG/ML
10 INJECTION INTRAMUSCULAR; INTRAVENOUS EVERY 6 HOURS PRN
Status: CANCELLED | OUTPATIENT
Start: 2025-07-01

## 2025-06-30 RX ORDER — PROCHLORPERAZINE EDISYLATE 5 MG/ML
10 INJECTION INTRAMUSCULAR; INTRAVENOUS EVERY 6 HOURS PRN
OUTPATIENT
Start: 2025-07-29

## 2025-06-30 RX ORDER — DIPHENHYDRAMINE HYDROCHLORIDE 50 MG/ML
50 INJECTION, SOLUTION INTRAMUSCULAR; INTRAVENOUS AS NEEDED
OUTPATIENT
Start: 2025-08-12

## 2025-06-30 RX ORDER — ALBUTEROL SULFATE 0.83 MG/ML
3 SOLUTION RESPIRATORY (INHALATION) AS NEEDED
OUTPATIENT
Start: 2025-07-15

## 2025-06-30 RX ORDER — EPINEPHRINE 0.3 MG/.3ML
0.3 INJECTION SUBCUTANEOUS EVERY 5 MIN PRN
OUTPATIENT
Start: 2025-08-12

## 2025-06-30 RX ORDER — PROCHLORPERAZINE MALEATE 10 MG
10 TABLET ORAL EVERY 6 HOURS PRN
OUTPATIENT
Start: 2025-07-29

## 2025-06-30 RX ORDER — PROCHLORPERAZINE EDISYLATE 5 MG/ML
10 INJECTION INTRAMUSCULAR; INTRAVENOUS EVERY 6 HOURS PRN
OUTPATIENT
Start: 2025-07-15

## 2025-06-30 RX ORDER — DIPHENHYDRAMINE HYDROCHLORIDE 50 MG/ML
50 INJECTION, SOLUTION INTRAMUSCULAR; INTRAVENOUS AS NEEDED
OUTPATIENT
Start: 2025-07-29

## 2025-06-30 RX ORDER — DIPHENHYDRAMINE HYDROCHLORIDE 50 MG/ML
50 INJECTION, SOLUTION INTRAMUSCULAR; INTRAVENOUS AS NEEDED
Status: CANCELLED | OUTPATIENT
Start: 2025-07-01

## 2025-06-30 RX ORDER — DIPHENHYDRAMINE HYDROCHLORIDE 50 MG/ML
50 INJECTION, SOLUTION INTRAMUSCULAR; INTRAVENOUS AS NEEDED
OUTPATIENT
Start: 2025-07-15

## 2025-06-30 RX ORDER — PROCHLORPERAZINE EDISYLATE 5 MG/ML
10 INJECTION INTRAMUSCULAR; INTRAVENOUS EVERY 6 HOURS PRN
OUTPATIENT
Start: 2025-08-12

## 2025-06-30 RX ORDER — ALBUTEROL SULFATE 0.83 MG/ML
3 SOLUTION RESPIRATORY (INHALATION) AS NEEDED
Status: CANCELLED | OUTPATIENT
Start: 2025-07-01

## 2025-06-30 RX ORDER — EPINEPHRINE 0.3 MG/.3ML
0.3 INJECTION SUBCUTANEOUS EVERY 5 MIN PRN
OUTPATIENT
Start: 2025-07-15

## 2025-06-30 RX ORDER — PROCHLORPERAZINE MALEATE 10 MG
10 TABLET ORAL EVERY 6 HOURS PRN
Status: CANCELLED | OUTPATIENT
Start: 2025-07-01

## 2025-06-30 RX ORDER — FAMOTIDINE 10 MG/ML
20 INJECTION, SOLUTION INTRAVENOUS ONCE AS NEEDED
OUTPATIENT
Start: 2025-07-15

## 2025-06-30 RX ORDER — FAMOTIDINE 10 MG/ML
20 INJECTION, SOLUTION INTRAVENOUS ONCE AS NEEDED
OUTPATIENT
Start: 2025-07-29

## 2025-06-30 RX ORDER — FAMOTIDINE 10 MG/ML
20 INJECTION, SOLUTION INTRAVENOUS ONCE AS NEEDED
OUTPATIENT
Start: 2025-08-12

## 2025-06-30 RX ORDER — EPINEPHRINE 0.3 MG/.3ML
0.3 INJECTION SUBCUTANEOUS EVERY 5 MIN PRN
OUTPATIENT
Start: 2025-07-29

## 2025-06-30 NOTE — TELEPHONE ENCOUNTER
Called Patient and call went to  which was full.     Called Patients daughter, chiqui, she said she will try to reach out to her dad to message or call the office. Patient is not scheduled for his infusion tomorrow, trying to see if patient is able to come at 1:30 for infusion appointment tomorrow.

## 2025-06-30 NOTE — PROGRESS NOTES
Patient ID: Dane Molina is a 65 y.o. male    Primary Care Provider: Darwin Early MD    DIAGNOSIS AND STAGING  cT3 pN2 pM1b small cell lung cancer (INSM1 and TTF1+) of the right lower lobe, diagnosed on 02/05/2024 through bronchoscopy      SITES OF DISEASE  Right lower lobe   Levels 4R and 7 nodes   Liver, confirmed by biopsy      MOLECULAR GENOMICS  Not performed   RB loss by IHC      PRIOR THERAPIES  03/19/2024-05/20/2024: 4 cycles carboplatin/etoposide with addition of atezolizumab to C2 on 04/08/2024 08/09/2024: Completion 45 cGy/15f to chest by Dr. Conde  06/21/2024: maintenance atezolizumab     CURRENT THERAPY  Tarlatamab since 2/10/2025    CURRENT ONCOLOGICAL PROBLEMS  Grade 3 immune related rash now resolved     HISTORY OF PRESENT ILLNESS  Patient presented to the emergency room on 01/04/2023 with a hypertensive urgency and cough.  He has a history of noncompliance with antihypertensive medications.  He also complained of dyspnea and underwent a CT chest without IV contrast that demonstrated a right lower lobe paramediastinal mass measuring 3.1 cm with adjacent pulmonary nodule measuring 1.8 cm.  Mediastinal lymphadenopathy was demonstrated, including a subcarinal node measuring 1.7 cm.  There was right hilar lymphadenopathy, 2 cm in short axis.  On 01/15/2024 the patient underwent a PET scan that demonstrated avidity of the aforementioned masses/lymph nodes, as well as a lesion in the liver, SUV max 3.3, right hepatic lobe.  On 02/05/2024, patient underwent a bronchoscopy:  A. LYMPH NODE 4 R PULMONARY FINE NEEDLE ASPIRATION , CYTOLOGY AND CELL BLOCK:    Positive for malignant cells   Metastatic small cell carcinoma, see note  B. LYMPH NODE 7 PULMONARY FINE NEEDLE ASPIRATION , CYTOLOGY AND CELL BLOCK:    Positive for malignant cells  Metastatic small cell carcinoma, see note          Note: Immunostains demonstrate the lesional cells to be diffusely positive for CAM 5.2, INSM1, TTF-1.  The  proliferative marker Ki-67 is greater than 90%.  Immunostain for retinoblastoma shows loss of staining.  Immunostain for p40 is negative.  The morphology and immunohistochemical profile are consistent with the above diagnosis.  On 02/29/24 the pt is seen by med onc for the first time. Denies any systemic sx - denies lack of appetite, fatigue or unintentional weight loss. Denies new localized pain, headaches or neuro sx.  Denies new respiratory sx.  He is claustrophobic and MRI brain was scheduled for today but pt could not go through with the test. He also has mild CKD, which limits his ability to receive intravenous contrast for CT.   03/04/24: MRI brain shows no ICMA, but MRI liver shows a 1.9 cm hepatic lesion with T2 hyperintensity and peripheral enhancement   03/19/2024: C1 D1 carboplatin/etoposide  03/22/24: Liver biopsy:  FINAL DIAGNOSIS   A. RIGHT LIVER MASS BIOPSY:      -- LIVER TISSUE INVOLVED BY SMALL CELL CARCINOMA, SEE NOTE     Note: Clinical history of lung small cell carcinoma noted. The findings most likely represent metastasis from the known primary lung tumor.      04/04/24: discussed with patient liver biopsy results and recommendations to add atezolizumab to his regimen  He has met with radiation oncology, and we will consider consolidative TRT pending response to cytotoxic chemotherapy     04/08/2024: C2 D1 carboplatin/etoposide/atezolizumab     05/20/24: C4 D1 carboplatin/etoposide/atezolizumab     06/10/2024: CT chest/abdomen/pelvis demonstrating continue his improvement of metastatic lymphadenopathy within the mediastinum/right hilum, with no progression within the liver     06/13/2024: Referral for radiation oncology/TRT.  Patient to proceed with maintenance atezolizumab     06/21/24: begins maintenance atezolizumab      08/09/24: completes 45 Gy in 15 fractions to chest (To Conde)      08/08/24: prescribed prednisone 1 mg/Kg for grade 3 IO-induced rash with peeling of hands     "  08/29/24: last steroid taken last week but did not complete full prescription. Rash on chest/abdomen improved but has some peeling of the hands/arms. Will have patient take remaining prednisone and follow up in 3 weeks     02/07/25: PET/CT with increasing hypermetabolic liver met, new liver met concerning for progression     02/10/25: Started C1 tarlatamab       PAST MEDICAL HISTORY  CKD - creatinine 1.68  Neck pain (injury at work)   Hypertension  Iron deficiency anemia (hx of blood transfusion in the 90's)    SURGICAL HISTORY  Ankle fracture and has a latoya in place      SOCIAL HISTORY  Former 35-40 pack-year smoker, quit at age 57  Smokes cigars now   History of alcohol addiction, sober for the past 15 years   Has one chlid (Roula)  Single      FAMILY HISTORY  Lung CA (mother)  Mother had CKD and required HD    CURRENT MEDS REVIEWED       ALLERGIES REVIEWED       SUBJECTIVE: Patient doing well today. He is feeling great. His daughter Roula is considering moving to [place]. He is still working, his hand rash has resolved.     A 13 point review of systems was performed, with significant findings documented above in subjective history.    OBJECTIVE:      5/13/2025     9:40 AM 5/13/2025    11:10 AM 5/13/2025     3:00 PM 5/27/2025    10:29 AM 6/12/2025     2:46 PM 7/1/2025     1:13 PM 7/1/2025     1:16 PM   Vitals   Systolic 107 108  141  170 150   Diastolic 69 65  89  102 89   BP Location  Left arm    Right arm Right arm   Heart Rate  81  90  98    Temp    36.9 °C (98.4 °F)  36.1 °C (97 °F)    Resp    20  18    Height   1.78 m (5' 10.08\")  1.829 m (6')     Weight (lb)    232.81 232 233.5    BMI   33.33 kg/m2 33.33 kg/m2 31.46 kg/m2 31.67 kg/m2    BSA (m2)   2.29 m2 2.29 m2 2.31 m2 2.32 m2    Visit Report Report Report Report Report          Wt Readings from Last 5 Encounters:   07/01/25 106 kg (233 lb 8 oz)   05/27/25 106 kg (232 lb 12.9 oz)   05/13/25 106 kg (232 lb 12.9 oz)   06/12/25 105 kg (232 lb)   04/29/25 106 " kg (232 lb 14.4 oz)     Gen: A&O, NAD. ECOG 0  Head: Normocephalic, atraumatic  Eyes: no scleral icterus  ENT: mucous membranes moist, no oropharyngeal lesions  Resp: Lungs CTAB  Cardiac: Normal rate, regular rhythm, no murmurs appreciated  Abdomen: Soft, nondistended, nontender, +BS  Neuro: CNII-XII grossly intact  Psych: appropriate mood & affect  Skin: warm, dry, no apparent rashes    Diagnostic Results   Results:  Labs:  Lab Results   Component Value Date    WBC 5.3 07/01/2025    HGB 13.4 (L) 07/01/2025    HCT 41.5 07/01/2025     (H) 07/01/2025     07/01/2025      Lab Results   Component Value Date    NEUTROABS 3.17 07/01/2025        Lab Results   Component Value Date    GLUCOSE 92 07/01/2025    CALCIUM 9.1 07/01/2025     07/01/2025    K 3.7 07/01/2025    CO2 26 07/01/2025     (H) 07/01/2025    BUN 20 07/01/2025    CREATININE 1.75 (H) 07/01/2025    MG 1.83 02/18/2025     Lab Results   Component Value Date    ALT 11 07/01/2025    AST 11 07/01/2025    ALKPHOS 65 07/01/2025    BILITOT 0.4 07/01/2025    BILIDIR 0.1 12/21/2023      Lab Results   Component Value Date    ACTH 14.7 10/28/2024    CORTISOL 6.4 01/02/2025    TSH 1.16 01/02/2025    FREET4 1.07 08/09/2024       CT CAP 6/12/25  IMPRESSION:  Hepatic metastases are decreased in size with segment 7 lesion no  longer visible      No new metastatic disease is noted      Posttreatment changes are noted right lung with no evidence of  recurrent mass              MRI Brain 6/12/25  IMPRESSION:  No abnormal enhancement to suggest intracranial metastatic disease.  No acute intracranial process.      Chronic left maxillary sinusitis.       Assessment/Plan     Small cell carcinoma of lung, Clinical: Stage FREDRICK (cT3, cN2, pM1b)  Mr. Dane Molina is a 65 YO with ES-SCLC seen for follow up. He is s/p 4 cycles platinum doublet with addition of atezo after c1 as well as consolidative RT. He has had 1 dose atezo which was held for grade III ICI  dermatitis which is now grade I only on palms/hands. We resumed atezo and he tolerated well with topical clobetasol.      Unfortunately with progression in liver. We began c1d1 tarlatamab on 2/10 and his most recent scans were reviewed with complete resolution of liver metastasis. We will continue with cycle 5 on 7/1.      #T3 pN2 pM1b small cell lung cancer of the RLL - extensive stage.  -S/p 4 cycles carboplatin/etoposide + atezolizumab added to cycle 2  -S/p 1 dose atezo c/b ICI dermatitis  -S/P TRT (45 Gy in 15 fx per Dr. Conde)   -Now on tarlatamab since 02/10/2025  -Presenting today for c5d1     #Grade I ICI dermatitis - improving  -Was initially grade III now downgraded after PO steroid taper  -Repeating Clobetasol 0.5% ointment BID x 14d with improvement. Advised using cocoa butter PRN      #CNS monitoring   -MRI on 06/12/25 shows no ICM   -Repeat every 3 months - next end of Sept     #Back pain  Likely MSK given paraspinal tenderness  -Lidocaine patch PRN    Nicolas Ospina MD  Thoracic Medical Oncology   [address] [id number]  Phone: [phone]

## 2025-07-01 ENCOUNTER — LAB (OUTPATIENT)
Dept: LAB | Facility: HOSPITAL | Age: 65
End: 2025-07-01
Payer: COMMERCIAL

## 2025-07-01 ENCOUNTER — OFFICE VISIT (OUTPATIENT)
Dept: HEMATOLOGY/ONCOLOGY | Facility: HOSPITAL | Age: 65
End: 2025-07-01
Payer: COMMERCIAL

## 2025-07-01 ENCOUNTER — INFUSION (OUTPATIENT)
Dept: HEMATOLOGY/ONCOLOGY | Facility: HOSPITAL | Age: 65
End: 2025-07-01
Payer: COMMERCIAL

## 2025-07-01 VITALS
BODY MASS INDEX: 31.67 KG/M2 | TEMPERATURE: 97 F | RESPIRATION RATE: 18 BRPM | DIASTOLIC BLOOD PRESSURE: 89 MMHG | OXYGEN SATURATION: 99 % | SYSTOLIC BLOOD PRESSURE: 150 MMHG | HEART RATE: 98 BPM | WEIGHT: 233.5 LBS

## 2025-07-01 DIAGNOSIS — C34.00 SMALL CELL CARCINOMA OF HILUM OF LUNG, UNSPECIFIED LATERALITY: Primary | ICD-10-CM

## 2025-07-01 DIAGNOSIS — C34.00 SMALL CELL CARCINOMA OF HILUM OF LUNG, UNSPECIFIED LATERALITY: ICD-10-CM

## 2025-07-01 LAB
ALBUMIN SERPL BCP-MCNC: 4 G/DL (ref 3.4–5)
ALP SERPL-CCNC: 65 U/L (ref 33–136)
ALT SERPL W P-5'-P-CCNC: 11 U/L (ref 10–52)
ANION GAP SERPL CALC-SCNC: 12 MMOL/L (ref 10–20)
AST SERPL W P-5'-P-CCNC: 11 U/L (ref 9–39)
BASOPHILS # BLD AUTO: 0.03 X10*3/UL (ref 0–0.1)
BASOPHILS NFR BLD AUTO: 0.6 %
BILIRUB SERPL-MCNC: 0.4 MG/DL (ref 0–1.2)
BUN SERPL-MCNC: 20 MG/DL (ref 6–23)
CALCIUM SERPL-MCNC: 9.1 MG/DL (ref 8.6–10.3)
CHLORIDE SERPL-SCNC: 110 MMOL/L (ref 98–107)
CO2 SERPL-SCNC: 26 MMOL/L (ref 21–32)
CREAT SERPL-MCNC: 1.75 MG/DL (ref 0.5–1.3)
EGFRCR SERPLBLD CKD-EPI 2021: 43 ML/MIN/1.73M*2
EOSINOPHIL # BLD AUTO: 0.1 X10*3/UL (ref 0–0.7)
EOSINOPHIL NFR BLD AUTO: 1.9 %
ERYTHROCYTE [DISTWIDTH] IN BLOOD BY AUTOMATED COUNT: 14.7 % (ref 11.5–14.5)
GLUCOSE SERPL-MCNC: 92 MG/DL (ref 74–99)
HCT VFR BLD AUTO: 41.5 % (ref 41–52)
HGB BLD-MCNC: 13.4 G/DL (ref 13.5–17.5)
IMM GRANULOCYTES # BLD AUTO: 0.03 X10*3/UL (ref 0–0.7)
IMM GRANULOCYTES NFR BLD AUTO: 0.6 % (ref 0–0.9)
LDH SERPL L TO P-CCNC: 140 U/L (ref 84–246)
LYMPHOCYTES # BLD AUTO: 1.08 X10*3/UL (ref 1.2–4.8)
LYMPHOCYTES NFR BLD AUTO: 20.4 %
MCH RBC QN AUTO: 32.6 PG (ref 26–34)
MCHC RBC AUTO-ENTMCNC: 32.3 G/DL (ref 32–36)
MCV RBC AUTO: 101 FL (ref 80–100)
MONOCYTES # BLD AUTO: 0.89 X10*3/UL (ref 0.1–1)
MONOCYTES NFR BLD AUTO: 16.8 %
NEUTROPHILS # BLD AUTO: 3.17 X10*3/UL (ref 1.2–7.7)
NEUTROPHILS NFR BLD AUTO: 59.7 %
NRBC BLD-RTO: 0 /100 WBCS (ref 0–0)
PLATELET # BLD AUTO: 207 X10*3/UL (ref 150–450)
POTASSIUM SERPL-SCNC: 3.7 MMOL/L (ref 3.5–5.3)
PROT SERPL-MCNC: 6.9 G/DL (ref 6.4–8.2)
RBC # BLD AUTO: 4.11 X10*6/UL (ref 4.5–5.9)
SODIUM SERPL-SCNC: 144 MMOL/L (ref 136–145)
WBC # BLD AUTO: 5.3 X10*3/UL (ref 4.4–11.3)

## 2025-07-01 PROCEDURE — 96413 CHEMO IV INFUSION 1 HR: CPT

## 2025-07-01 PROCEDURE — 2500000004 HC RX 250 GENERAL PHARMACY W/ HCPCS (ALT 636 FOR OP/ED): Performed by: STUDENT IN AN ORGANIZED HEALTH CARE EDUCATION/TRAINING PROGRAM

## 2025-07-01 PROCEDURE — 83615 LACTATE (LD) (LDH) ENZYME: CPT

## 2025-07-01 PROCEDURE — 36415 COLL VENOUS BLD VENIPUNCTURE: CPT

## 2025-07-01 PROCEDURE — 99215 OFFICE O/P EST HI 40 MIN: CPT | Performed by: STUDENT IN AN ORGANIZED HEALTH CARE EDUCATION/TRAINING PROGRAM

## 2025-07-01 PROCEDURE — 80053 COMPREHEN METABOLIC PANEL: CPT

## 2025-07-01 PROCEDURE — 85025 COMPLETE CBC W/AUTO DIFF WBC: CPT

## 2025-07-01 RX ORDER — PROCHLORPERAZINE MALEATE 10 MG
10 TABLET ORAL EVERY 6 HOURS PRN
OUTPATIENT
Start: 2025-09-23

## 2025-07-01 RX ORDER — PROCHLORPERAZINE EDISYLATE 5 MG/ML
10 INJECTION INTRAMUSCULAR; INTRAVENOUS EVERY 6 HOURS PRN
OUTPATIENT
Start: 2025-09-09

## 2025-07-01 RX ORDER — PROCHLORPERAZINE MALEATE 10 MG
10 TABLET ORAL EVERY 6 HOURS PRN
OUTPATIENT
Start: 2025-09-09

## 2025-07-01 RX ORDER — FAMOTIDINE 10 MG/ML
20 INJECTION, SOLUTION INTRAVENOUS ONCE AS NEEDED
OUTPATIENT
Start: 2025-09-23

## 2025-07-01 RX ORDER — PROCHLORPERAZINE EDISYLATE 5 MG/ML
10 INJECTION INTRAMUSCULAR; INTRAVENOUS EVERY 6 HOURS PRN
OUTPATIENT
Start: 2025-08-26

## 2025-07-01 RX ORDER — DIPHENHYDRAMINE HYDROCHLORIDE 50 MG/ML
50 INJECTION, SOLUTION INTRAMUSCULAR; INTRAVENOUS AS NEEDED
Status: DISCONTINUED | OUTPATIENT
Start: 2025-07-01 | End: 2025-07-01 | Stop reason: HOSPADM

## 2025-07-01 RX ORDER — EPINEPHRINE 0.3 MG/.3ML
0.3 INJECTION SUBCUTANEOUS EVERY 5 MIN PRN
Status: DISCONTINUED | OUTPATIENT
Start: 2025-07-01 | End: 2025-07-01 | Stop reason: HOSPADM

## 2025-07-01 RX ORDER — FAMOTIDINE 10 MG/ML
20 INJECTION, SOLUTION INTRAVENOUS ONCE AS NEEDED
OUTPATIENT
Start: 2025-09-09

## 2025-07-01 RX ORDER — DIPHENHYDRAMINE HYDROCHLORIDE 50 MG/ML
50 INJECTION, SOLUTION INTRAMUSCULAR; INTRAVENOUS AS NEEDED
OUTPATIENT
Start: 2025-08-26

## 2025-07-01 RX ORDER — ALBUTEROL SULFATE 0.83 MG/ML
3 SOLUTION RESPIRATORY (INHALATION) AS NEEDED
Status: DISCONTINUED | OUTPATIENT
Start: 2025-07-01 | End: 2025-07-01 | Stop reason: HOSPADM

## 2025-07-01 RX ORDER — FAMOTIDINE 10 MG/ML
20 INJECTION, SOLUTION INTRAVENOUS ONCE AS NEEDED
Status: DISCONTINUED | OUTPATIENT
Start: 2025-07-01 | End: 2025-07-01 | Stop reason: HOSPADM

## 2025-07-01 RX ORDER — DIPHENHYDRAMINE HYDROCHLORIDE 50 MG/ML
50 INJECTION, SOLUTION INTRAMUSCULAR; INTRAVENOUS AS NEEDED
OUTPATIENT
Start: 2025-09-09

## 2025-07-01 RX ORDER — PROCHLORPERAZINE MALEATE 10 MG
10 TABLET ORAL EVERY 6 HOURS PRN
OUTPATIENT
Start: 2025-10-07

## 2025-07-01 RX ORDER — PROCHLORPERAZINE EDISYLATE 5 MG/ML
10 INJECTION INTRAMUSCULAR; INTRAVENOUS EVERY 6 HOURS PRN
OUTPATIENT
Start: 2025-09-23

## 2025-07-01 RX ORDER — PROCHLORPERAZINE EDISYLATE 5 MG/ML
10 INJECTION INTRAMUSCULAR; INTRAVENOUS EVERY 6 HOURS PRN
OUTPATIENT
Start: 2025-10-07

## 2025-07-01 RX ORDER — EPINEPHRINE 0.3 MG/.3ML
0.3 INJECTION SUBCUTANEOUS EVERY 5 MIN PRN
OUTPATIENT
Start: 2025-10-07

## 2025-07-01 RX ORDER — PROCHLORPERAZINE MALEATE 10 MG
10 TABLET ORAL EVERY 6 HOURS PRN
Status: DISCONTINUED | OUTPATIENT
Start: 2025-07-01 | End: 2025-07-01 | Stop reason: HOSPADM

## 2025-07-01 RX ORDER — ALBUTEROL SULFATE 0.83 MG/ML
3 SOLUTION RESPIRATORY (INHALATION) AS NEEDED
OUTPATIENT
Start: 2025-09-09

## 2025-07-01 RX ORDER — DIPHENHYDRAMINE HYDROCHLORIDE 50 MG/ML
50 INJECTION, SOLUTION INTRAMUSCULAR; INTRAVENOUS AS NEEDED
OUTPATIENT
Start: 2025-10-07

## 2025-07-01 RX ORDER — PROCHLORPERAZINE EDISYLATE 5 MG/ML
10 INJECTION INTRAMUSCULAR; INTRAVENOUS EVERY 6 HOURS PRN
Status: DISCONTINUED | OUTPATIENT
Start: 2025-07-01 | End: 2025-07-01 | Stop reason: HOSPADM

## 2025-07-01 RX ORDER — FAMOTIDINE 10 MG/ML
20 INJECTION, SOLUTION INTRAVENOUS ONCE AS NEEDED
OUTPATIENT
Start: 2025-08-26

## 2025-07-01 RX ORDER — ALBUTEROL SULFATE 0.83 MG/ML
3 SOLUTION RESPIRATORY (INHALATION) AS NEEDED
OUTPATIENT
Start: 2025-10-07

## 2025-07-01 RX ORDER — EPINEPHRINE 0.3 MG/.3ML
0.3 INJECTION SUBCUTANEOUS EVERY 5 MIN PRN
OUTPATIENT
Start: 2025-09-09

## 2025-07-01 RX ORDER — FAMOTIDINE 10 MG/ML
20 INJECTION, SOLUTION INTRAVENOUS ONCE AS NEEDED
OUTPATIENT
Start: 2025-10-07

## 2025-07-01 RX ORDER — ALBUTEROL SULFATE 0.83 MG/ML
3 SOLUTION RESPIRATORY (INHALATION) AS NEEDED
OUTPATIENT
Start: 2025-09-23

## 2025-07-01 RX ORDER — DIPHENHYDRAMINE HYDROCHLORIDE 50 MG/ML
50 INJECTION, SOLUTION INTRAMUSCULAR; INTRAVENOUS AS NEEDED
OUTPATIENT
Start: 2025-09-23

## 2025-07-01 RX ORDER — EPINEPHRINE 0.3 MG/.3ML
0.3 INJECTION SUBCUTANEOUS EVERY 5 MIN PRN
OUTPATIENT
Start: 2025-09-23

## 2025-07-01 RX ORDER — PROCHLORPERAZINE MALEATE 10 MG
10 TABLET ORAL EVERY 6 HOURS PRN
OUTPATIENT
Start: 2025-08-26

## 2025-07-01 RX ORDER — EPINEPHRINE 0.3 MG/.3ML
0.3 INJECTION SUBCUTANEOUS EVERY 5 MIN PRN
OUTPATIENT
Start: 2025-08-26

## 2025-07-01 RX ORDER — ALBUTEROL SULFATE 0.83 MG/ML
3 SOLUTION RESPIRATORY (INHALATION) AS NEEDED
OUTPATIENT
Start: 2025-08-26

## 2025-07-01 RX ADMIN — Medication 10 MG: at 14:25

## 2025-07-01 NOTE — PROGRESS NOTES
Pt arrived ambulatory to infusion for treatment of tarlatamab.  Denies any new or worsening symptoms. Tolerated infusion without issue. Discharged in stable condition after one hour observation, okay per Dr Ospina.

## 2025-07-15 ENCOUNTER — INFUSION (OUTPATIENT)
Dept: HEMATOLOGY/ONCOLOGY | Facility: HOSPITAL | Age: 65
End: 2025-07-15
Payer: COMMERCIAL

## 2025-07-15 ENCOUNTER — LAB (OUTPATIENT)
Dept: LAB | Facility: HOSPITAL | Age: 65
End: 2025-07-15
Payer: COMMERCIAL

## 2025-07-15 ENCOUNTER — OFFICE VISIT (OUTPATIENT)
Dept: HEMATOLOGY/ONCOLOGY | Facility: HOSPITAL | Age: 65
End: 2025-07-15
Payer: COMMERCIAL

## 2025-07-15 VITALS
OXYGEN SATURATION: 97 % | HEART RATE: 110 BPM | WEIGHT: 226.19 LBS | SYSTOLIC BLOOD PRESSURE: 150 MMHG | DIASTOLIC BLOOD PRESSURE: 88 MMHG | TEMPERATURE: 97.2 F | BODY MASS INDEX: 30.68 KG/M2 | RESPIRATION RATE: 20 BRPM

## 2025-07-15 DIAGNOSIS — C34.00 SMALL CELL CARCINOMA OF HILUM OF LUNG, UNSPECIFIED LATERALITY: ICD-10-CM

## 2025-07-15 LAB
ALBUMIN SERPL BCP-MCNC: 4.2 G/DL (ref 3.4–5)
ALP SERPL-CCNC: 70 U/L (ref 33–136)
ALT SERPL W P-5'-P-CCNC: 13 U/L (ref 10–52)
ANION GAP SERPL CALC-SCNC: 13 MMOL/L (ref 10–20)
AST SERPL W P-5'-P-CCNC: 13 U/L (ref 9–39)
BASOPHILS # BLD AUTO: 0.03 X10*3/UL (ref 0–0.1)
BASOPHILS NFR BLD AUTO: 0.5 %
BILIRUB SERPL-MCNC: 0.3 MG/DL (ref 0–1.2)
BUN SERPL-MCNC: 31 MG/DL (ref 6–23)
CALCIUM SERPL-MCNC: 9.8 MG/DL (ref 8.6–10.3)
CHLORIDE SERPL-SCNC: 107 MMOL/L (ref 98–107)
CO2 SERPL-SCNC: 26 MMOL/L (ref 21–32)
CREAT SERPL-MCNC: 1.82 MG/DL (ref 0.5–1.3)
EGFRCR SERPLBLD CKD-EPI 2021: 41 ML/MIN/1.73M*2
EOSINOPHIL # BLD AUTO: 0.11 X10*3/UL (ref 0–0.7)
EOSINOPHIL NFR BLD AUTO: 1.8 %
ERYTHROCYTE [DISTWIDTH] IN BLOOD BY AUTOMATED COUNT: 14 % (ref 11.5–14.5)
GLUCOSE SERPL-MCNC: 107 MG/DL (ref 74–99)
HCT VFR BLD AUTO: 41.1 % (ref 41–52)
HGB BLD-MCNC: 13.6 G/DL (ref 13.5–17.5)
IMM GRANULOCYTES # BLD AUTO: 0.02 X10*3/UL (ref 0–0.7)
IMM GRANULOCYTES NFR BLD AUTO: 0.3 % (ref 0–0.9)
LDH SERPL L TO P-CCNC: 139 U/L (ref 84–246)
LYMPHOCYTES # BLD AUTO: 1.22 X10*3/UL (ref 1.2–4.8)
LYMPHOCYTES NFR BLD AUTO: 19.6 %
MCH RBC QN AUTO: 32.7 PG (ref 26–34)
MCHC RBC AUTO-ENTMCNC: 33.1 G/DL (ref 32–36)
MCV RBC AUTO: 99 FL (ref 80–100)
MONOCYTES # BLD AUTO: 1.05 X10*3/UL (ref 0.1–1)
MONOCYTES NFR BLD AUTO: 16.9 %
NEUTROPHILS # BLD AUTO: 3.78 X10*3/UL (ref 1.2–7.7)
NEUTROPHILS NFR BLD AUTO: 60.9 %
NRBC BLD-RTO: 0 /100 WBCS (ref 0–0)
PLATELET # BLD AUTO: 197 X10*3/UL (ref 150–450)
POTASSIUM SERPL-SCNC: 3.4 MMOL/L (ref 3.5–5.3)
PROT SERPL-MCNC: 7.3 G/DL (ref 6.4–8.2)
RBC # BLD AUTO: 4.16 X10*6/UL (ref 4.5–5.9)
SODIUM SERPL-SCNC: 143 MMOL/L (ref 136–145)
WBC # BLD AUTO: 6.2 X10*3/UL (ref 4.4–11.3)

## 2025-07-15 PROCEDURE — 3079F DIAST BP 80-89 MM HG: CPT | Performed by: STUDENT IN AN ORGANIZED HEALTH CARE EDUCATION/TRAINING PROGRAM

## 2025-07-15 PROCEDURE — 2500000004 HC RX 250 GENERAL PHARMACY W/ HCPCS (ALT 636 FOR OP/ED): Performed by: STUDENT IN AN ORGANIZED HEALTH CARE EDUCATION/TRAINING PROGRAM

## 2025-07-15 PROCEDURE — 96413 CHEMO IV INFUSION 1 HR: CPT

## 2025-07-15 PROCEDURE — 80053 COMPREHEN METABOLIC PANEL: CPT

## 2025-07-15 PROCEDURE — 85025 COMPLETE CBC W/AUTO DIFF WBC: CPT

## 2025-07-15 PROCEDURE — 36415 COLL VENOUS BLD VENIPUNCTURE: CPT

## 2025-07-15 PROCEDURE — 99215 OFFICE O/P EST HI 40 MIN: CPT | Performed by: STUDENT IN AN ORGANIZED HEALTH CARE EDUCATION/TRAINING PROGRAM

## 2025-07-15 PROCEDURE — 83615 LACTATE (LD) (LDH) ENZYME: CPT

## 2025-07-15 PROCEDURE — 99215 OFFICE O/P EST HI 40 MIN: CPT | Mod: 25 | Performed by: STUDENT IN AN ORGANIZED HEALTH CARE EDUCATION/TRAINING PROGRAM

## 2025-07-15 PROCEDURE — 4004F PT TOBACCO SCREEN RCVD TLK: CPT | Performed by: STUDENT IN AN ORGANIZED HEALTH CARE EDUCATION/TRAINING PROGRAM

## 2025-07-15 PROCEDURE — 3077F SYST BP >= 140 MM HG: CPT | Performed by: STUDENT IN AN ORGANIZED HEALTH CARE EDUCATION/TRAINING PROGRAM

## 2025-07-15 RX ORDER — EPINEPHRINE 0.3 MG/.3ML
0.3 INJECTION SUBCUTANEOUS EVERY 5 MIN PRN
Status: DISCONTINUED | OUTPATIENT
Start: 2025-07-15 | End: 2025-07-15 | Stop reason: HOSPADM

## 2025-07-15 RX ORDER — PROCHLORPERAZINE MALEATE 10 MG
10 TABLET ORAL EVERY 6 HOURS PRN
Status: DISCONTINUED | OUTPATIENT
Start: 2025-07-15 | End: 2025-07-15 | Stop reason: HOSPADM

## 2025-07-15 RX ORDER — FAMOTIDINE 10 MG/ML
20 INJECTION, SOLUTION INTRAVENOUS ONCE AS NEEDED
Status: DISCONTINUED | OUTPATIENT
Start: 2025-07-15 | End: 2025-07-15 | Stop reason: HOSPADM

## 2025-07-15 RX ORDER — DIPHENHYDRAMINE HYDROCHLORIDE 50 MG/ML
50 INJECTION, SOLUTION INTRAMUSCULAR; INTRAVENOUS AS NEEDED
Status: DISCONTINUED | OUTPATIENT
Start: 2025-07-15 | End: 2025-07-15 | Stop reason: HOSPADM

## 2025-07-15 RX ORDER — PROCHLORPERAZINE EDISYLATE 5 MG/ML
10 INJECTION INTRAMUSCULAR; INTRAVENOUS EVERY 6 HOURS PRN
Status: DISCONTINUED | OUTPATIENT
Start: 2025-07-15 | End: 2025-07-15 | Stop reason: HOSPADM

## 2025-07-15 RX ORDER — ALBUTEROL SULFATE 0.83 MG/ML
3 SOLUTION RESPIRATORY (INHALATION) AS NEEDED
Status: DISCONTINUED | OUTPATIENT
Start: 2025-07-15 | End: 2025-07-15 | Stop reason: HOSPADM

## 2025-07-15 RX ADMIN — Medication 10 MG: at 16:40

## 2025-07-15 ASSESSMENT — PAIN SCALES - GENERAL: PAINLEVEL_OUTOF10: 0-NO PAIN

## 2025-07-15 NOTE — PROGRESS NOTES
[facility] Infusion Nursing Note  07/15/25    Dane Molina is a 65 y.o. year old male patient presenting to outpatient infusion for cycle 15 day 5 of the following regimen:    Treatment Plans       Name Type Plan Dates Plan Provider         Active    Tarlatamab, 28 Day Cycles Oncology Treatment 2/3/2025 - Present Nicolas Ospina MD               Administrations This Visit       tarlatamab-dlle (Imdelltra) 10 mg in sodium chloride 0.9% 267 mL IV infusion       Admin Date  07/15/2025 Action  New Bag Dose  10 mg Route  intravenous Documented By  Geoffrey Michaels RN                  Hypersensitivity reaction noted: No.  Patient tolerated treatment well. VSS stable after 1 hour observation, ok per Dr. Ospina. PIV removed. Pt discharged in stable condition and ambulated off unit independently.    Follow-up Plan: pt will call to schedule    GEOFFREY MICHAELS RN

## 2025-07-15 NOTE — PROGRESS NOTES
Patient ID: Dane Molina is a 65 y.o. male    Primary Care Provider: Darwin Early MD    DIAGNOSIS AND STAGING  cT3 pN2 pM1b small cell lung cancer (INSM1 and TTF1+) of the right lower lobe, diagnosed on 02/05/2024 through bronchoscopy      SITES OF DISEASE  Right lower lobe   Levels 4R and 7 nodes   Liver, confirmed by biopsy      MOLECULAR GENOMICS  Not performed   RB loss by IHC      PRIOR THERAPIES  03/19/2024-05/20/2024: 4 cycles carboplatin/etoposide with addition of atezolizumab to C2 on 04/08/2024 08/09/2024: Completion 45 cGy/15f to chest by Dr. Conde  06/21/2024: maintenance atezolizumab     CURRENT THERAPY  Tarlatamab since 2/10/2025    CURRENT ONCOLOGICAL PROBLEMS  Grade 3 immune related rash now resolved     HISTORY OF PRESENT ILLNESS  Patient presented to the emergency room on 01/04/2023 with a hypertensive urgency and cough.  He has a history of noncompliance with antihypertensive medications.  He also complained of dyspnea and underwent a CT chest without IV contrast that demonstrated a right lower lobe paramediastinal mass measuring 3.1 cm with adjacent pulmonary nodule measuring 1.8 cm.  Mediastinal lymphadenopathy was demonstrated, including a subcarinal node measuring 1.7 cm.  There was right hilar lymphadenopathy, 2 cm in short axis.  On 01/15/2024 the patient underwent a PET scan that demonstrated avidity of the aforementioned masses/lymph nodes, as well as a lesion in the liver, SUV max 3.3, right hepatic lobe.  On 02/05/2024, patient underwent a bronchoscopy:  A. LYMPH NODE 4 R PULMONARY FINE NEEDLE ASPIRATION , CYTOLOGY AND CELL BLOCK:    Positive for malignant cells   Metastatic small cell carcinoma, see note  B. LYMPH NODE 7 PULMONARY FINE NEEDLE ASPIRATION , CYTOLOGY AND CELL BLOCK:    Positive for malignant cells  Metastatic small cell carcinoma, see note          Note: Immunostains demonstrate the lesional cells to be diffusely positive for CAM 5.2, INSM1, TTF-1.  The  proliferative marker Ki-67 is greater than 90%.  Immunostain for retinoblastoma shows loss of staining.  Immunostain for p40 is negative.  The morphology and immunohistochemical profile are consistent with the above diagnosis.  On 02/29/24 the pt is seen by med onc for the first time. Denies any systemic sx - denies lack of appetite, fatigue or unintentional weight loss. Denies new localized pain, headaches or neuro sx.  Denies new respiratory sx.  He is claustrophobic and MRI brain was scheduled for today but pt could not go through with the test. He also has mild CKD, which limits his ability to receive intravenous contrast for CT.   03/04/24: MRI brain shows no ICMA, but MRI liver shows a 1.9 cm hepatic lesion with T2 hyperintensity and peripheral enhancement   03/19/2024: C1 D1 carboplatin/etoposide  03/22/24: Liver biopsy:  FINAL DIAGNOSIS   A. RIGHT LIVER MASS BIOPSY:      -- LIVER TISSUE INVOLVED BY SMALL CELL CARCINOMA, SEE NOTE     Note: Clinical history of lung small cell carcinoma noted. The findings most likely represent metastasis from the known primary lung tumor.      04/04/24: discussed with patient liver biopsy results and recommendations to add atezolizumab to his regimen  He has met with radiation oncology, and we will consider consolidative TRT pending response to cytotoxic chemotherapy     04/08/2024: C2 D1 carboplatin/etoposide/atezolizumab     05/20/24: C4 D1 carboplatin/etoposide/atezolizumab     06/10/2024: CT chest/abdomen/pelvis demonstrating continue his improvement of metastatic lymphadenopathy within the mediastinum/right hilum, with no progression within the liver     06/13/2024: Referral for radiation oncology/TRT.  Patient to proceed with maintenance atezolizumab     06/21/24: begins maintenance atezolizumab      08/09/24: completes 45 Gy in 15 fractions to chest (To Conde)      08/08/24: prescribed prednisone 1 mg/Kg for grade 3 IO-induced rash with peeling of hands     "  08/29/24: last steroid taken last week but did not complete full prescription. Rash on chest/abdomen improved but has some peeling of the hands/arms. Will have patient take remaining prednisone and follow up in 3 weeks     02/07/25: PET/CT with increasing hypermetabolic liver met, new liver met concerning for progression     02/10/25: Started C1 tarlatamab       PAST MEDICAL HISTORY  CKD - creatinine 1.68  Neck pain (injury at work)   Hypertension  Iron deficiency anemia (hx of blood transfusion in the 90's)    SURGICAL HISTORY  Ankle fracture and has a latoya in place      SOCIAL HISTORY  Former 35-40 pack-year smoker, quit at age 57  Smokes cigars now   History of alcohol addiction, sober for the past 15 years   Has one chlid (Roula)  Single      FAMILY HISTORY  Lung CA (mother)  Mother had CKD and required HD    CURRENT MEDS REVIEWED       ALLERGIES REVIEWED       SUBJECTIVE: Patient doing well today. He is feeling great. His daughter Roula is considering moving to [place]. He is still working, his hand rash has resolved. He has some dry skin around his eyes which is ongoing since before tarlatamab. He has lost a few pounds from purposeful dieting and has mild changes to his sense of taste which he says doesn't bother him much. He reports no fever, shortness of breath, chest pain, cough, or new rashes.     A 13 point review of systems was performed, with significant findings documented above in subjective history.    OBJECTIVE:      5/13/2025     9:40 AM 5/13/2025    11:10 AM 5/13/2025     3:00 PM 5/27/2025    10:29 AM 6/12/2025     2:46 PM 7/1/2025     1:13 PM 7/1/2025     1:16 PM   Vitals   Systolic 107 108  141  170 150   Diastolic 69 65  89  102 89   BP Location  Left arm    Right arm Right arm   Heart Rate  81  90  98    Temp    36.9 °C (98.4 °F)  36.1 °C (97 °F)    Resp    20  18    Height   1.78 m (5' 10.08\")  1.829 m (6')     Weight (lb)    232.81 232 233.5    BMI   33.33 kg/m2 33.33 kg/m2 31.46 kg/m2 31.67 " kg/m2    BSA (m2)   2.29 m2 2.29 m2 2.31 m2 2.32 m2    Visit Report Report Report Report Report  Report Report       Wt Readings from Last 5 Encounters:   07/01/25 106 kg (233 lb 8 oz)   05/27/25 106 kg (232 lb 12.9 oz)   05/13/25 106 kg (232 lb 12.9 oz)   06/12/25 105 kg (232 lb)   04/29/25 106 kg (232 lb 14.4 oz)     Gen: A&O, NAD. ECOG 0  Head: Normocephalic, atraumatic  Eyes: no scleral icterus  ENT: mucous membranes moist, no oropharyngeal lesions  Resp: Lungs CTAB  Cardiac: Normal rate, regular rhythm, no murmurs appreciated  Abdomen: Soft, nondistended, nontender, +BS  Neuro: CNII-XII grossly intact  Psych: appropriate mood & affect  Skin: warm, dry, no apparent rashes    Diagnostic Results   Results:  Labs:  Lab Results   Component Value Date    WBC 5.3 07/01/2025    HGB 13.4 (L) 07/01/2025    HCT 41.5 07/01/2025     (H) 07/01/2025     07/01/2025      Lab Results   Component Value Date    NEUTROABS 3.17 07/01/2025        Lab Results   Component Value Date    GLUCOSE 92 07/01/2025    CALCIUM 9.1 07/01/2025     07/01/2025    K 3.7 07/01/2025    CO2 26 07/01/2025     (H) 07/01/2025    BUN 20 07/01/2025    CREATININE 1.75 (H) 07/01/2025    MG 1.83 02/18/2025     Lab Results   Component Value Date    ALT 11 07/01/2025    AST 11 07/01/2025    ALKPHOS 65 07/01/2025    BILITOT 0.4 07/01/2025    BILIDIR 0.1 12/21/2023      Lab Results   Component Value Date    ACTH 14.7 10/28/2024    CORTISOL 6.4 01/02/2025    TSH 1.16 01/02/2025    FREET4 1.07 08/09/2024       CT CAP 6/12/25  IMPRESSION:  Hepatic metastases are decreased in size with segment 7 lesion no  longer visible      No new metastatic disease is noted      Posttreatment changes are noted right lung with no evidence of  recurrent mass              MRI Brain 6/12/25  IMPRESSION:  No abnormal enhancement to suggest intracranial metastatic disease.  No acute intracranial process.      Chronic left maxillary sinusitis.        Assessment/Plan     Small cell carcinoma of lung, Clinical: Stage FREDRICK (cT3, cN2, pM1b)  Mr. Dane Molina is a 65 YO with ES-SCLC seen for follow up. He is s/p 4 cycles platinum doublet with addition of atezo after c1 as well as consolidative RT. He has had 1 dose atezo which was held for grade III ICI dermatitis which is now grade I only on palms/hands. We resumed atezo and he tolerated well with topical clobetasol.      Unfortunately with progression in liver. We began c1d1 tarlatamab on 2/10 and his most recent scans were reviewed with complete resolution of liver metastasis. We will continue with cycle 5 on 7/15.      #T3 pN2 pM1b small cell lung cancer of the RLL - extensive stage.  -S/p 4 cycles carboplatin/etoposide + atezolizumab added to cycle 2  -S/p 1 dose atezo c/b ICI dermatitis  -S/P TRT (45 Gy in 15 fx per Dr. Conde)   -Now on tarlatamab since 02/10/2025  -Presenting today for c5d15     #Grade I ICI dermatitis - improving  -Was initially grade III now downgraded after PO steroid taper  -Repeating Clobetasol 0.5% ointment BID x 14d with improvement. Advised using cocoa butter PRN      #CNS monitoring   -MRI on 06/12/25 shows no ICM   -Repeat every 3 months - next end of Sept     #Back pain  Likely MSK given paraspinal tenderness  -Lidocaine patch PRN    Patient was seen and discussed with Dr. Ospina.      Sulema Angelo MD  Department of Medicine - PGY1  [facility]

## 2025-07-16 ENCOUNTER — APPOINTMENT (OUTPATIENT)
Dept: HEMATOLOGY/ONCOLOGY | Facility: HOSPITAL | Age: 65
End: 2025-07-16
Payer: MEDICARE

## 2025-07-25 NOTE — PROGRESS NOTES
Patient ID: Dane Molina is a 65 y.o. male    Primary Care Provider: Darwin Early MD    DIAGNOSIS AND STAGING  cT3 pN2 pM1b small cell lung cancer (INSM1 and TTF1+) of the right lower lobe, diagnosed on 02/05/2024 through bronchoscopy      SITES OF DISEASE  Right lower lobe   Levels 4R and 7 nodes   Liver, confirmed by biopsy      MOLECULAR GENOMICS  Not performed   RB loss by IHC      PRIOR THERAPIES  03/19/2024-05/20/2024: 4 cycles carboplatin/etoposide with addition of atezolizumab to C2 on 04/08/2024 08/09/2024: Completion 45 cGy/15f to chest by Dr. Conde  06/21/2024: maintenance atezolizumab     CURRENT THERAPY  Tarlatamab since 2/10/2025    CURRENT ONCOLOGICAL PROBLEMS  Grade 3 immune related rash now resolved     HISTORY OF PRESENT ILLNESS  Patient presented to the emergency room on 01/04/2023 with a hypertensive urgency and cough.  He has a history of noncompliance with antihypertensive medications.  He also complained of dyspnea and underwent a CT chest without IV contrast that demonstrated a right lower lobe paramediastinal mass measuring 3.1 cm with adjacent pulmonary nodule measuring 1.8 cm.  Mediastinal lymphadenopathy was demonstrated, including a subcarinal node measuring 1.7 cm.  There was right hilar lymphadenopathy, 2 cm in short axis.  On 01/15/2024 the patient underwent a PET scan that demonstrated avidity of the aforementioned masses/lymph nodes, as well as a lesion in the liver, SUV max 3.3, right hepatic lobe.  On 02/05/2024, patient underwent a bronchoscopy:  A. LYMPH NODE 4 R PULMONARY FINE NEEDLE ASPIRATION , CYTOLOGY AND CELL BLOCK:    Positive for malignant cells   Metastatic small cell carcinoma, see note  B. LYMPH NODE 7 PULMONARY FINE NEEDLE ASPIRATION , CYTOLOGY AND CELL BLOCK:    Positive for malignant cells  Metastatic small cell carcinoma, see note          Note: Immunostains demonstrate the lesional cells to be diffusely positive for CAM 5.2, INSM1, TTF-1.  The  proliferative marker Ki-67 is greater than 90%.  Immunostain for retinoblastoma shows loss of staining.  Immunostain for p40 is negative.  The morphology and immunohistochemical profile are consistent with the above diagnosis.  On 02/29/24 the pt is seen by med onc for the first time. Denies any systemic sx - denies lack of appetite, fatigue or unintentional weight loss. Denies new localized pain, headaches or neuro sx.  Denies new respiratory sx.  He is claustrophobic and MRI brain was scheduled for today but pt could not go through with the test. He also has mild CKD, which limits his ability to receive intravenous contrast for CT.   03/04/24: MRI brain shows no ICMA, but MRI liver shows a 1.9 cm hepatic lesion with T2 hyperintensity and peripheral enhancement   03/19/2024: C1 D1 carboplatin/etoposide  03/22/24: Liver biopsy:  FINAL DIAGNOSIS   A. RIGHT LIVER MASS BIOPSY:      -- LIVER TISSUE INVOLVED BY SMALL CELL CARCINOMA, SEE NOTE     Note: Clinical history of lung small cell carcinoma noted. The findings most likely represent metastasis from the known primary lung tumor.      04/04/24: discussed with patient liver biopsy results and recommendations to add atezolizumab to his regimen  He has met with radiation oncology, and we will consider consolidative TRT pending response to cytotoxic chemotherapy     04/08/2024: C2 D1 carboplatin/etoposide/atezolizumab     05/20/24: C4 D1 carboplatin/etoposide/atezolizumab     06/10/2024: CT chest/abdomen/pelvis demonstrating continue his improvement of metastatic lymphadenopathy within the mediastinum/right hilum, with no progression within the liver     06/13/2024: Referral for radiation oncology/TRT.  Patient to proceed with maintenance atezolizumab     06/21/24: begins maintenance atezolizumab      08/09/24: completes 45 Gy in 15 fractions to chest (To Conde)      08/08/24: prescribed prednisone 1 mg/Kg for grade 3 IO-induced rash with peeling of hands       08/29/24: last steroid taken last week but did not complete full prescription. Rash on chest/abdomen improved but has some peeling of the hands/arms. Will have patient take remaining prednisone and follow up in 3 weeks     02/07/25: PET/CT with increasing hypermetabolic liver met, new liver met concerning for progression     02/10/25: Started C1 tarlatamab    PAST MEDICAL HISTORY  CKD - creatinine 1.68  Neck pain (injury at work)   Hypertension  Iron deficiency anemia (hx of blood transfusion in the 90's)    SURGICAL HISTORY  Ankle fracture and has a latoya in place      SOCIAL HISTORY  Former 35-40 pack-year smoker, quit at age 57  Smokes cigars now   History of alcohol addiction, sober for the past 15 years   Has one chlid (Roula)  Single      FAMILY HISTORY  Lung CA (mother)  Mother had CKD and required HD    CURRENT MEDS REVIEWED  Current Outpatient Medications   Medication Instructions    atorvastatin (LIPITOR) 40 mg, oral, Daily    calcium carbonate-vitamin D3 500 mg-5 mcg (200 unit) tablet 1 tablet, oral, Daily    cholecalciferol, vitamin D3, (VITAMIN D3 ORAL) 1 capsule, Daily    dexAMETHasone (DECADRON) 8 mg, oral, Daily PRN    diphenhydrAMINE (BENADRYL) 25 mg, oral, Daily PRN    nicotine (Nicoderm CQ) 14 mg/24 hr patch 1 patch, transdermal, Every 24 hours    nicotine polacrilex (NICORETTE) 4 mg, Mouth/Throat, As needed    prochlorperazine (COMPAZINE) 10 mg, oral, Every 6 hours PRN    sacubitriL-valsartan (Entresto)  mg tablet 1 tablet, oral, 2 times daily    sildenafil (VIAGRA) 50 mg, oral, Daily PRN    triamterene-hydrochlorothiazid (Maxzide-25) 37.5-25 mg tablet 1 tablet, oral, Every morning      ALLERGIES REVIEWED   RX Allergies[1]    Subjective   Patient presents for C6 Tarlatamab.     Patient doing well today. He is feeling great. His daughter Roula is considering moving to [place]. He is still working, his hand rash has resolved, still mildly pruritic, applying lotion.     Review of Systems  "  Constitutional:  Negative for chills and fever.   HENT:  Negative.     Eyes: Negative.    Respiratory:  Negative for cough and shortness of breath.    Cardiovascular:  Negative for chest pain and leg swelling.   Gastrointestinal:  Negative for abdominal pain, constipation, diarrhea, nausea and vomiting.   Genitourinary: Negative.     Musculoskeletal:  Negative for arthralgias and myalgias.   Skin:  Negative for rash.   Neurological:  Negative for dizziness, headaches, light-headedness and numbness.   Psychiatric/Behavioral: Negative.       Objective       5/13/2025     3:00 PM 5/27/2025    10:29 AM 6/12/2025     2:46 PM 7/1/2025     1:13 PM 7/1/2025     1:16 PM 7/15/2025     3:08 PM 7/29/2025     8:28 AM   Vitals   Systolic  141  170 150 150 144   Diastolic  89  102 89 88 90   BP Location    Right arm Right arm     Heart Rate  90  98  110 79   Temp  36.9 °C (98.4 °F)  36.1 °C (97 °F)  36.2 °C (97.2 °F) 36.3 °C (97.3 °F)   Resp  20  18  20 20   Height 1.78 m (5' 10.08\")  1.829 m (6')    1.79 m (5' 10.47\")   Weight (lb)  232.81 232 233.5  226.19 230.38   BMI 33.33 kg/m2 33.33 kg/m2 31.46 kg/m2 31.67 kg/m2  30.68 kg/m2 32.61 kg/m2   BSA (m2) 2.29 m2 2.29 m2 2.31 m2 2.32 m2  2.29 m2 2.28 m2   Visit Report Report Report  Report Report Report Report     Wt Readings from Last 5 Encounters:   07/29/25 104 kg (230 lb 6.1 oz)   07/15/25 103 kg (226 lb 3.1 oz)   07/01/25 106 kg (233 lb 8 oz)   05/27/25 106 kg (232 lb 12.9 oz)   05/13/25 106 kg (232 lb 12.9 oz)     Gen: A&O, NAD. ECOG 0    Physical Exam  Vitals reviewed.   Constitutional:       Appearance: Normal appearance.   HENT:      Head: Normocephalic and atraumatic.      Nose: Nose normal.      Mouth/Throat:      Mouth: Mucous membranes are moist.     Eyes:      Extraocular Movements: Extraocular movements intact.      Conjunctiva/sclera: Conjunctivae normal.      Pupils: Pupils are equal, round, and reactive to light.       Cardiovascular:      Rate and Rhythm: Normal " rate and regular rhythm.      Pulses: Normal pulses.      Heart sounds: Normal heart sounds.   Pulmonary:      Effort: Pulmonary effort is normal.      Breath sounds: Normal breath sounds.   Abdominal:      General: Abdomen is flat.      Palpations: Abdomen is soft.     Musculoskeletal:         General: Normal range of motion.      Cervical back: Normal range of motion and neck supple.     Skin:     General: Skin is warm.     Neurological:      General: No focal deficit present.      Mental Status: He is alert and oriented to person, place, and time.     Psychiatric:         Mood and Affect: Mood normal.         Behavior: Behavior normal.         Head: Normocephalic, atraumatic  Eyes: no scleral icterus  ENT: mucous membranes moist, no oropharyngeal lesions  Resp: Lungs CTAB  Cardiac: Normal rate, regular rhythm, no murmurs appreciated  Abdomen: Soft, nondistended, nontender, +BS  Neuro: CNII-XII grossly intact  Psych: appropriate mood & affect  Skin: warm, dry, no apparent rashes    Diagnostic Results   Results:  Labs:  Lab Results   Component Value Date    WBC 6.2 07/15/2025    HGB 13.6 07/15/2025    HCT 41.1 07/15/2025    MCV 99 07/15/2025     07/15/2025      Lab Results   Component Value Date    NEUTROABS 3.78 07/15/2025        Lab Results   Component Value Date    GLUCOSE 107 (H) 07/15/2025    CALCIUM 9.8 07/15/2025     07/15/2025    K 3.4 (L) 07/15/2025    CO2 26 07/15/2025     07/15/2025    BUN 31 (H) 07/15/2025    CREATININE 1.82 (H) 07/15/2025    MG 1.83 02/18/2025     Lab Results   Component Value Date    ALT 13 07/15/2025    AST 13 07/15/2025    ALKPHOS 70 07/15/2025    BILITOT 0.3 07/15/2025    BILIDIR 0.1 12/21/2023      Lab Results   Component Value Date    ACTH 14.7 10/28/2024    CORTISOL 6.4 01/02/2025    TSH 1.16 01/02/2025    FREET4 1.07 08/09/2024       CT CAP 6/12/25  IMPRESSION:  Hepatic metastases are decreased in size with segment 7 lesion no  longer visible      No new  metastatic disease is noted      Posttreatment changes are noted right lung with no evidence of  recurrent mass      MRI Brain 6/12/25  IMPRESSION:  No abnormal enhancement to suggest intracranial metastatic disease.  No acute intracranial process.      Chronic left maxillary sinusitis.       Assessment/Plan     Small cell carcinoma of lung, Clinical: Stage FREDRICK (cT3, cN2, pM1b)  Mr. Dane Molina is a 65 y.o. male with ES-SCLC seen for follow up. He is s/p 4 cycles platinum doublet with addition of atezo after c1 as well as consolidative RT. He has had 1 dose atezo which was held for grade III ICI dermatitis which is now grade I only on palms/hands. We resumed atezo and he tolerated well with topical clobetasol.      Unfortunately with progression in liver. We began c1d1 tarlatamab on 2/10/25 and his most recent scans were reviewed with complete resolution of liver metastasis. We will continue with cycle 6 on 7/29/25.       #T3 pN2 pM1b small cell lung cancer of the RLL - extensive stage.  -S/p 4 cycles carboplatin/etoposide + atezolizumab added to cycle 2  -S/p 1 dose atezo c/b ICI dermatitis  -S/P TRT (45 Gy in 15 fx per Dr. Conde)   -Now on tarlatamab since 02/10/2025  -Presenting today for c6d1     #Grade I ICI dermatitis - improving  -Was initially grade III now downgraded after PO steroid taper  -Repeating Clobetasol 0.5% ointment BID x 14d with improvement. Advised using cocoa butter PRN      #CNS monitoring   -MRI on 06/12/25 shows no ICM   -Repeat every 3 months - next end of Sept     #Back pain  Likely MSK given paraspinal tenderness  -Lidocaine patch PRN         [1] No Known Allergies

## 2025-07-29 ENCOUNTER — LAB (OUTPATIENT)
Dept: LAB | Facility: HOSPITAL | Age: 65
End: 2025-07-29
Payer: COMMERCIAL

## 2025-07-29 ENCOUNTER — OFFICE VISIT (OUTPATIENT)
Dept: HEMATOLOGY/ONCOLOGY | Facility: HOSPITAL | Age: 65
End: 2025-07-29
Payer: COMMERCIAL

## 2025-07-29 ENCOUNTER — INFUSION (OUTPATIENT)
Dept: HEMATOLOGY/ONCOLOGY | Facility: HOSPITAL | Age: 65
End: 2025-07-29
Payer: COMMERCIAL

## 2025-07-29 VITALS
SYSTOLIC BLOOD PRESSURE: 151 MMHG | TEMPERATURE: 97.7 F | HEART RATE: 74 BPM | DIASTOLIC BLOOD PRESSURE: 82 MMHG | RESPIRATION RATE: 18 BRPM | OXYGEN SATURATION: 99 %

## 2025-07-29 VITALS
TEMPERATURE: 97.3 F | SYSTOLIC BLOOD PRESSURE: 144 MMHG | HEART RATE: 79 BPM | OXYGEN SATURATION: 97 % | BODY MASS INDEX: 32.98 KG/M2 | RESPIRATION RATE: 20 BRPM | WEIGHT: 230.38 LBS | HEIGHT: 70 IN | DIASTOLIC BLOOD PRESSURE: 90 MMHG

## 2025-07-29 DIAGNOSIS — Z51.12 ENCOUNTER FOR ANTINEOPLASTIC IMMUNOTHERAPY: ICD-10-CM

## 2025-07-29 DIAGNOSIS — R06.09 DYSPNEA ON EXERTION: ICD-10-CM

## 2025-07-29 DIAGNOSIS — C34.00 SMALL CELL CARCINOMA OF HILUM OF LUNG, UNSPECIFIED LATERALITY: Primary | ICD-10-CM

## 2025-07-29 DIAGNOSIS — C34.00 SMALL CELL CARCINOMA OF HILUM OF LUNG, UNSPECIFIED LATERALITY: ICD-10-CM

## 2025-07-29 DIAGNOSIS — R21 RASH AND OTHER NONSPECIFIC SKIN ERUPTION: ICD-10-CM

## 2025-07-29 LAB
ALBUMIN SERPL BCP-MCNC: 4 G/DL (ref 3.4–5)
ALP SERPL-CCNC: 59 U/L (ref 33–136)
ALT SERPL W P-5'-P-CCNC: 12 U/L (ref 10–52)
ANION GAP SERPL CALC-SCNC: 13 MMOL/L (ref 10–20)
AST SERPL W P-5'-P-CCNC: 12 U/L (ref 9–39)
BASOPHILS # BLD AUTO: 0.02 X10*3/UL (ref 0–0.1)
BASOPHILS NFR BLD AUTO: 0.4 %
BILIRUB SERPL-MCNC: 0.5 MG/DL (ref 0–1.2)
BUN SERPL-MCNC: 26 MG/DL (ref 6–23)
CALCIUM SERPL-MCNC: 9.1 MG/DL (ref 8.6–10.3)
CHLORIDE SERPL-SCNC: 107 MMOL/L (ref 98–107)
CO2 SERPL-SCNC: 26 MMOL/L (ref 21–32)
CREAT SERPL-MCNC: 1.8 MG/DL (ref 0.5–1.3)
EGFRCR SERPLBLD CKD-EPI 2021: 42 ML/MIN/1.73M*2
EOSINOPHIL # BLD AUTO: 0.09 X10*3/UL (ref 0–0.7)
EOSINOPHIL NFR BLD AUTO: 1.9 %
ERYTHROCYTE [DISTWIDTH] IN BLOOD BY AUTOMATED COUNT: 14.4 % (ref 11.5–14.5)
GLUCOSE SERPL-MCNC: 98 MG/DL (ref 74–99)
HCT VFR BLD AUTO: 40.7 % (ref 41–52)
HGB BLD-MCNC: 13.3 G/DL (ref 13.5–17.5)
IMM GRANULOCYTES # BLD AUTO: 0.02 X10*3/UL (ref 0–0.7)
IMM GRANULOCYTES NFR BLD AUTO: 0.4 % (ref 0–0.9)
LDH SERPL L TO P-CCNC: 140 U/L (ref 84–246)
LYMPHOCYTES # BLD AUTO: 0.88 X10*3/UL (ref 1.2–4.8)
LYMPHOCYTES NFR BLD AUTO: 18.7 %
MCH RBC QN AUTO: 32.4 PG (ref 26–34)
MCHC RBC AUTO-ENTMCNC: 32.7 G/DL (ref 32–36)
MCV RBC AUTO: 99 FL (ref 80–100)
MONOCYTES # BLD AUTO: 0.91 X10*3/UL (ref 0.1–1)
MONOCYTES NFR BLD AUTO: 19.4 %
NEUTROPHILS # BLD AUTO: 2.78 X10*3/UL (ref 1.2–7.7)
NEUTROPHILS NFR BLD AUTO: 59.2 %
NRBC BLD-RTO: 0 /100 WBCS (ref 0–0)
PLATELET # BLD AUTO: 182 X10*3/UL (ref 150–450)
POTASSIUM SERPL-SCNC: 3.8 MMOL/L (ref 3.5–5.3)
PROT SERPL-MCNC: 7 G/DL (ref 6.4–8.2)
RBC # BLD AUTO: 4.11 X10*6/UL (ref 4.5–5.9)
SODIUM SERPL-SCNC: 142 MMOL/L (ref 136–145)
WBC # BLD AUTO: 4.7 X10*3/UL (ref 4.4–11.3)

## 2025-07-29 PROCEDURE — G2211 COMPLEX E/M VISIT ADD ON: HCPCS | Performed by: STUDENT IN AN ORGANIZED HEALTH CARE EDUCATION/TRAINING PROGRAM

## 2025-07-29 PROCEDURE — 3008F BODY MASS INDEX DOCD: CPT | Performed by: STUDENT IN AN ORGANIZED HEALTH CARE EDUCATION/TRAINING PROGRAM

## 2025-07-29 PROCEDURE — 3080F DIAST BP >= 90 MM HG: CPT | Performed by: STUDENT IN AN ORGANIZED HEALTH CARE EDUCATION/TRAINING PROGRAM

## 2025-07-29 PROCEDURE — 36415 COLL VENOUS BLD VENIPUNCTURE: CPT

## 2025-07-29 PROCEDURE — 85025 COMPLETE CBC W/AUTO DIFF WBC: CPT

## 2025-07-29 PROCEDURE — 2500000004 HC RX 250 GENERAL PHARMACY W/ HCPCS (ALT 636 FOR OP/ED): Mod: JZ,TB | Performed by: STUDENT IN AN ORGANIZED HEALTH CARE EDUCATION/TRAINING PROGRAM

## 2025-07-29 PROCEDURE — 99215 OFFICE O/P EST HI 40 MIN: CPT | Mod: 25 | Performed by: STUDENT IN AN ORGANIZED HEALTH CARE EDUCATION/TRAINING PROGRAM

## 2025-07-29 PROCEDURE — 84075 ASSAY ALKALINE PHOSPHATASE: CPT

## 2025-07-29 PROCEDURE — 99215 OFFICE O/P EST HI 40 MIN: CPT | Performed by: STUDENT IN AN ORGANIZED HEALTH CARE EDUCATION/TRAINING PROGRAM

## 2025-07-29 PROCEDURE — 96413 CHEMO IV INFUSION 1 HR: CPT

## 2025-07-29 PROCEDURE — 3077F SYST BP >= 140 MM HG: CPT | Performed by: STUDENT IN AN ORGANIZED HEALTH CARE EDUCATION/TRAINING PROGRAM

## 2025-07-29 PROCEDURE — 83615 LACTATE (LD) (LDH) ENZYME: CPT

## 2025-07-29 RX ORDER — DIPHENHYDRAMINE HYDROCHLORIDE 50 MG/ML
50 INJECTION, SOLUTION INTRAMUSCULAR; INTRAVENOUS AS NEEDED
Status: DISCONTINUED | OUTPATIENT
Start: 2025-07-29 | End: 2025-07-29 | Stop reason: HOSPADM

## 2025-07-29 RX ORDER — ALBUTEROL SULFATE 0.83 MG/ML
3 SOLUTION RESPIRATORY (INHALATION) AS NEEDED
Status: DISCONTINUED | OUTPATIENT
Start: 2025-07-29 | End: 2025-07-29 | Stop reason: HOSPADM

## 2025-07-29 RX ORDER — PROCHLORPERAZINE EDISYLATE 5 MG/ML
10 INJECTION INTRAMUSCULAR; INTRAVENOUS EVERY 6 HOURS PRN
Status: DISCONTINUED | OUTPATIENT
Start: 2025-07-29 | End: 2025-07-29 | Stop reason: HOSPADM

## 2025-07-29 RX ORDER — FAMOTIDINE 10 MG/ML
20 INJECTION, SOLUTION INTRAVENOUS ONCE AS NEEDED
Status: DISCONTINUED | OUTPATIENT
Start: 2025-07-29 | End: 2025-07-29 | Stop reason: HOSPADM

## 2025-07-29 RX ORDER — EPINEPHRINE 0.3 MG/.3ML
0.3 INJECTION SUBCUTANEOUS EVERY 5 MIN PRN
Status: DISCONTINUED | OUTPATIENT
Start: 2025-07-29 | End: 2025-07-29 | Stop reason: HOSPADM

## 2025-07-29 RX ORDER — PROCHLORPERAZINE MALEATE 10 MG
10 TABLET ORAL EVERY 6 HOURS PRN
Status: DISCONTINUED | OUTPATIENT
Start: 2025-07-29 | End: 2025-07-29 | Stop reason: HOSPADM

## 2025-07-29 RX ADMIN — Medication 10 MG: at 10:08

## 2025-07-29 ASSESSMENT — ENCOUNTER SYMPTOMS
DIZZINESS: 0
FEVER: 0
LEG SWELLING: 0
CONSTIPATION: 0
ARTHRALGIAS: 0
NUMBNESS: 0
PSYCHIATRIC NEGATIVE: 1
DIARRHEA: 0
VOMITING: 0
CHILLS: 0
ABDOMINAL PAIN: 0
NAUSEA: 0
COUGH: 0
SHORTNESS OF BREATH: 0
HEADACHES: 0
MYALGIAS: 0
EYES NEGATIVE: 1
LIGHT-HEADEDNESS: 0

## 2025-07-29 ASSESSMENT — PAIN SCALES - GENERAL: PAINLEVEL_OUTOF10: 0-NO PAIN

## 2025-07-29 NOTE — PROGRESS NOTES
[facility] Infusion Nursing Note  07/29/25    Dane Molina is a 65 y.o. year old male patient presenting to outpatient infusion for cycle 6 day 1 of the following regimen:    Treatment Plans       Name Type Plan Dates Plan Provider         Active    Tarlatamab, 28 Day Cycles Oncology Treatment 2/3/2025 - Present Nicolas Ospina MD               Administrations This Visit       tarlatamab-dlle (Imdelltra) 10 mg in sodium chloride 0.9% 267 mL IV infusion       Admin Date  07/29/2025 Action  New Bag Dose  10 mg Route  intravenous Documented By  Geoffrey Michaels RN                  Hypersensitivity reaction noted: No.    Patient tolerated treatment well. VSS at the end of 1 hour observation, OK per Dr. Ospina. PIV removed. Pt discharged in stable condition and ambulated off unit independently.    Follow-up Plan: 8/12    GEOFFREY MICHAELS RN

## 2025-08-08 ASSESSMENT — ENCOUNTER SYMPTOMS
ARTHRALGIAS: 0
EYES NEGATIVE: 1
PSYCHIATRIC NEGATIVE: 1
LIGHT-HEADEDNESS: 0
MYALGIAS: 0
COUGH: 0
CONSTIPATION: 0
NAUSEA: 0
NUMBNESS: 0
LEG SWELLING: 0
VOMITING: 0
SHORTNESS OF BREATH: 0
DIZZINESS: 0
ABDOMINAL PAIN: 0
DIARRHEA: 0
FEVER: 0
CHILLS: 0
HEADACHES: 0

## 2025-08-11 ENCOUNTER — TELEPHONE (OUTPATIENT)
Dept: HEMATOLOGY/ONCOLOGY | Facility: HOSPITAL | Age: 65
End: 2025-08-11
Payer: COMMERCIAL

## 2025-08-12 ENCOUNTER — APPOINTMENT (OUTPATIENT)
Dept: HEMATOLOGY/ONCOLOGY | Facility: HOSPITAL | Age: 65
End: 2025-08-12
Payer: COMMERCIAL

## 2025-08-12 DIAGNOSIS — C34.00 SMALL CELL CARCINOMA OF HILUM OF LUNG, UNSPECIFIED LATERALITY: Primary | ICD-10-CM

## 2025-08-21 ENCOUNTER — TELEPHONE (OUTPATIENT)
Dept: HEMATOLOGY/ONCOLOGY | Facility: HOSPITAL | Age: 65
End: 2025-08-21
Payer: COMMERCIAL